# Patient Record
Sex: FEMALE | Race: WHITE | NOT HISPANIC OR LATINO | Employment: FULL TIME | URBAN - METROPOLITAN AREA
[De-identification: names, ages, dates, MRNs, and addresses within clinical notes are randomized per-mention and may not be internally consistent; named-entity substitution may affect disease eponyms.]

---

## 2017-01-18 ENCOUNTER — MYC REFILL (OUTPATIENT)
Dept: FAMILY MEDICINE | Facility: CLINIC | Age: 52
End: 2017-01-18

## 2017-01-18 DIAGNOSIS — H69.92 DISORDER OF LEFT EUSTACHIAN TUBE: ICD-10-CM

## 2017-01-18 RX ORDER — FLUTICASONE PROPIONATE 50 MCG
2 SPRAY, SUSPENSION (ML) NASAL DAILY
Qty: 16 G | Refills: 11 | Status: SHIPPED | OUTPATIENT
Start: 2017-01-18 | End: 2021-05-10

## 2017-01-18 NOTE — TELEPHONE ENCOUNTER
Fluticasone (Flonase)50 mcg/act     Last Written Prescription Date: 10/6/16  Last Fill Quantity: 16 g,  # refills: 11   Last Office Visit with [facility], P or [facility] prescribing provider: 12/6/16  Prescription approved per [facility] Refill Protocol.

## 2017-04-17 ENCOUNTER — PRE VISIT (OUTPATIENT)
Dept: CARDIOLOGY | Facility: CLINIC | Age: 52
End: 2017-04-17

## 2017-04-24 ENCOUNTER — OFFICE VISIT (OUTPATIENT)
Dept: CARDIOLOGY | Facility: CLINIC | Age: 52
End: 2017-04-24
Attending: INTERNAL MEDICINE
Payer: COMMERCIAL

## 2017-04-24 VITALS
HEART RATE: 88 BPM | SYSTOLIC BLOOD PRESSURE: 136 MMHG | HEIGHT: 65 IN | WEIGHT: 283.8 LBS | DIASTOLIC BLOOD PRESSURE: 86 MMHG | BODY MASS INDEX: 47.28 KG/M2

## 2017-04-24 DIAGNOSIS — I48.20 CHRONIC ATRIAL FIBRILLATION (H): ICD-10-CM

## 2017-04-24 PROCEDURE — 99214 OFFICE O/P EST MOD 30 MIN: CPT | Mod: 25 | Performed by: NURSE PRACTITIONER

## 2017-04-24 PROCEDURE — 93000 ELECTROCARDIOGRAM COMPLETE: CPT | Performed by: INTERNAL MEDICINE

## 2017-04-24 RX ORDER — MEDROXYPROGESTERONE ACETATE 10 MG
10 TABLET ORAL DAILY
COMMUNITY
End: 2022-07-19

## 2017-04-24 NOTE — LETTER
4/24/2017    Nicole Joy Siegler, PA-C  [facility]   [address]  [facility] [address]    RE: Mary Gorman       Dear Colleague,    I had the pleasure of seeing Mary Gorman in the [facility].    Mary is a delightful 52-year-old female presenting in clinic today for a followup visit regarding persistent atrial fibrillation.  She has been in chronic atrial fibrillation for a number of years.  She has been asymptomatic and rate controlled.  She is on anticoagulation with Pradaxa.  She sees Dr. Greenfield at least annually and last saw him in 2016.      In clinic today, Mary tells me she has had an uneventful past year in regards to her health.  She has concerns that she is feeling her heart more frequently.  This occurs when she is active on an intermittent basis.  She does not endorse any palpitations, chest pain, breathlessness, lightheadedness, dizziness, presyncope or syncope.  She is also noticing that her lower extremities are swollen more of the time.  She has obstructive sleep apnea and is compliant with her CPAP.  Her weight has been fluctuant over the last year and is up a little bit at 283.  Blood pressure has been well controlled on her fosinopril.  In regards to the rate control medication, she has been on high dose diltiazem 360 mg daily for a number of years as well.      In clinic today, blood pressure is 130/80, heart rate 88 and irregularly irregular.  EKG shows atrial fibrillation.      PHYSICAL EXAMINATION:   GENERAL:  Well-appearing female in no acute distress.   HEENT:  Normocephalic, atraumatic.   NECK:  Supple without jugular venous distention.   LUNGS:  Clear.   HEART:  S1, S2, with an irregularly irregular rhythm, no murmurs or gallops.   ABDOMEN:  Obese, soft, nontender, nondistended.   EXTREMITIES:  Lower extremities show 1+ edema on the right and 2+ pitting edema on the left.      Outpatient Encounter Prescriptions as of 4/24/2017    Medication Sig Dispense Refill     medroxyPROGESTERone (PROVERA) 10 MG tablet Take 10 mg by mouth daily       fluticasone (FLONASE) 50 MCG/ACT spray Spray 2 sprays into both nostrils daily 16 g 11     cetirizine (ZYRTEC) 10 MG tablet Take 1 tablet (10 mg) by mouth every evening 90 tablet 3     fosinopril (MONOPRIL) 10 MG tablet Take 1 tablet (10 mg) by mouth daily 90 tablet 3     albuterol (PROAIR HFA, PROVENTIL HFA, VENTOLIN HFA) 108 (90 BASE) MCG/ACT inhaler Inhale 2 puffs into the lungs every 6 hours 1 each 0     [DISCONTINUED] diltiazem (TIAZAC) 360 MG 24 hr ER beaded capsule Take 1 capsule (360 mg) by mouth daily 90 capsule 5     [DISCONTINUED] dabigatran ANTICOAGULANT (PRADAXA ANTICOAGULANT) 150 MG CAPS BLISTER PACK Take 1 capsule (150 mg) by mouth every 12 hours Storage of the product in a pill organizer is not recommended.  Store in original 's bottle or blister pack. Use within 120 days of opening.  Do not remove product from 's blister pack or bottle until time of administration. 180 capsule 5     cholecalciferol 2000 UNITS CAPS Take 2 tablets by mouth daily.       buPROPion (WELLBUTRIN XL) 150 MG 24 hr tablet Take 3 tablets by mouth every morning. Indications: Major Depressive Disorder.        No facility-administered encounter medications on file as of 4/24/2017.      IMPRESSION AND PLAN:  Ms. Gorman is a delightful 52-year-old female with persistent atrial fibrillation, rate control, anticoagulated.  This year she is endorsing more palpitations with exertion on an intermittent basis.  She is also endorsing more lower extremity edema.  At this point in time, I think it would be a good idea to pursue a Holter monitor to ascertain if we have adequate rate control.  If this shows poor rate control, then changes to her medications would be warranted.  I will consider adding a beta blocker as it does not appear she has been before.  In addition, the high-dose diltiazem may be  contributing to her edema.  I will consider decreasing this as well.  Once we have the results of the Holter, I can ascertain if she will also need beta blocker therapy.  I have suggested measures such as low-sodium diet, Jobst stockings and leg elevation.  She does sit at a desk most of the day.  We talked about how her weight, atrial fibrillation and sleep apnea could contribute to edema.  We would consider a low-dose diuretic therapy if she is not responsive to the reduction in calcium channel blocker.  I will note she had an echocardiogram several years ago showing normal LV function.                    It was my pleasure to participate in the care of Ms. Gorman in clinic today.  I will be in contract with the results of the monitor.     Sincerely,    Flor Pérez, ALYSIA, APRN CNP     [facility]

## 2017-04-24 NOTE — MR AVS SNAPSHOT
After Visit Summary   4/24/2017    Mary Gorman    MRN: [id number]           Patient Information     Date Of Birth          1965        Visit Information        Provider Department      4/24/2017 8:50 AM Flor Pérez APRN CNP [facility] [place]        Today's Diagnoses     Chronic atrial fibrillation (H)          Care Instructions    -2 day monitor and I will call with results and suggestions for medication changes  -elevate legs above level of the heart, try compression socks, low salt diet ( less than 2000 mg)  -call with problems or concerns [phone]        Follow-ups after your visit        Future tests that were ordered for you today     Open Future Orders        Priority Expected Expires Ordered    Holter Monitor 48 hour - Adult Routine 4/24/2017 6/8/2017 4/24/2017            Who to contact     If you have questions or need follow up information about today's clinic visit or your schedule please contact [facility] [place] directly at [phone].  Normal or non-critical lab and imaging results will be communicated to you by [employer], letter or phone within 4 business days after the clinic has received the results. If you do not hear from us within 7 days, please contact the clinic through [employer] or phone. If you have a critical or abnormal lab result, we will notify you by phone as soon as possible.  Submit refill requests through [employer] or call your pharmacy and they will forward the refill request to us. Please allow 3 business days for your refill to be completed.          Additional Information About Your Visit        [employer] Information     [employer] gives you secure access to your electronic health record. If you see a primary care provider, you can also send messages to your care team and make appointments. If you have questions, please call your primary care clinic.  If you do not have a  "primary care provider, please call [phone] and they will assist you.        Care EveryWhere ID     This is your Care EveryWhere ID. This could be used by other organizations to access your [facility] medical records  [id number]        Your Vitals Were     Pulse Height BMI (Body Mass Index)             88 1.651 m (5' 5\") 47.23 kg/m2          Blood Pressure from Last 3 Encounters:   04/24/17 136/86   12/06/16 114/76   11/10/16 116/84    Weight from Last 3 Encounters:   04/24/17 128.7 kg (283 lb 12.8 oz)   12/06/16 120.2 kg (265 lb)   11/10/16 127 kg (280 lb)              We Performed the Following     EKG 12-lead complete w/read (Future)- to be scheduled     Follow-Up with Cardiac Advanced Practice Provider        Primary Care Provider Fax #    [facility] [phone]       [address] [place]  [facility] [id number]        Thank you!     Thank you for choosing [facility] [place]  for your care. Our goal is always to provide you with excellent care. Hearing back from our patients is one way we can continue to improve our services. Please take a few minutes to complete the written survey that you may receive in the mail after your visit with us. Thank you!             Your Updated Medication List - Protect others around you: Learn how to safely use, store and throw away your medicines at www.disposemymeds.org.          This list is accurate as of: 4/24/17  9:24 AM.  Always use your most recent med list.                   Brand Name Dispense Instructions for use    albuterol 108 (90 BASE) MCG/ACT Inhaler    PROAIR HFA/PROVENTIL HFA/VENTOLIN HFA    1 each    Inhale 2 puffs into the lungs every 6 hours       buPROPion 150 MG 24 hr tablet    WELLBUTRIN XL     Take 3 tablets by mouth every morning. Indications: Major Depressive Disorder.       cetirizine 10 MG tablet    zyrTEC    90 tablet    Take 1 tablet (10 mg) by mouth every evening       cholecalciferol 2000 " UNITS Caps      Take 2 tablets by mouth daily.       dabigatran ANTICOAGULANT 150 MG Caps capsule    PRADAXA ANTICOAGULANT    180 capsule    Take 1 capsule (150 mg) by mouth every 12 hours Storage of the product in a pill organizer is not recommended.  Store in original 's bottle or blister pack. Use within 120 days of opening.  Do not remove product from 's blister pack or bottle until time of administration.       diltiazem 360 MG 24 hr capsule    TIAZAC    90 capsule    Take 1 capsule (360 mg) by mouth daily       fluticasone 50 MCG/ACT spray    FLONASE    16 g    Spray 2 sprays into both nostrils daily       fosinopril 10 MG tablet    MONOPRIL    90 tablet    Take 1 tablet (10 mg) by mouth daily       medroxyPROGESTERone 10 MG tablet    PROVERA     Take 10 mg by mouth daily

## 2017-04-24 NOTE — PROGRESS NOTES
HPI and Plan:   See dictation    Orders Placed This Encounter   Procedures     Holter Monitor 48 hour - Adult       Orders Placed This Encounter   Medications     medroxyPROGESTERone (PROVERA) 10 MG tablet     Sig: Take 10 mg by mouth daily       There are no discontinued medications.      Encounter Diagnosis   Name Primary?     Chronic atrial fibrillation (H)        CURRENT MEDICATIONS:  Current Outpatient Prescriptions   Medication Sig Dispense Refill     medroxyPROGESTERone (PROVERA) 10 MG tablet Take 10 mg by mouth daily       fluticasone (FLONASE) 50 MCG/ACT spray Spray 2 sprays into both nostrils daily 16 g 11     cetirizine (ZYRTEC) 10 MG tablet Take 1 tablet (10 mg) by mouth every evening 90 tablet 3     fosinopril (MONOPRIL) 10 MG tablet Take 1 tablet (10 mg) by mouth daily 90 tablet 3     albuterol (PROAIR HFA, PROVENTIL HFA, VENTOLIN HFA) 108 (90 BASE) MCG/ACT inhaler Inhale 2 puffs into the lungs every 6 hours 1 each 0     diltiazem (TIAZAC) 360 MG 24 hr ER beaded capsule Take 1 capsule (360 mg) by mouth daily 90 capsule 5     dabigatran ANTICOAGULANT (PRADAXA ANTICOAGULANT) 150 MG CAPS BLISTER PACK Take 1 capsule (150 mg) by mouth every 12 hours Storage of the product in a pill organizer is not recommended.  Store in original 's bottle or blister pack. Use within 120 days of opening.  Do not remove product from 's blister pack or bottle until time of administration. 180 capsule 5     cholecalciferol 2000 UNITS CAPS Take 2 tablets by mouth daily.       buPROPion (WELLBUTRIN XL) 150 MG 24 hr tablet Take 3 tablets by mouth every morning. Indications: Major Depressive Disorder.          ALLERGIES     Allergies   Allergen Reactions     Dyazide [Hydrochlorothiazide W/Triamterene] Unknown     Nickel Rash     Robaxin [Methocarbamol] Rash     Sulfa Drugs Rash       PAST MEDICAL HISTORY:  Past Medical History:   Diagnosis Date     Depression      GERD (gastroesophageal reflux disease)       HTN (hypertension)      Hyperlipidemia      BALTA (obstructive sleep apnea)      Permanent atrial fibrillation (H) 6/5/11       PAST SURGICAL HISTORY:  Past Surgical History:   Procedure Laterality Date     CARDIOVERSION  6/7/11     CHOLECYSTECTOMY       DILATION AND CURETTAGE  4/26/2012    Procedure:DILATION AND CURETTAGE; DILATION AND CURETTAGE; Surgeon:JEAN-PAUL DEL CID; Location:[facility]     DILATION AND CURETTAGE, HYSTEROSCOPY DIAGNOSTIC, COMBINED  12/8/2011    Procedure:COMBINED DILATION AND CURETTAGE, HYSTEROSCOPY DIAGNOSTIC; DILATION AND CURETTAGE, DIAGNOSTIC HYSTEROSCOPY ; Surgeon:JEAN-PAUL DEL CID; Location:[facility]     KNEE SURGERY         FAMILY HISTORY:  Family History   Problem Relation Age of Onset     Hypertension Mother      HEART DISEASE Mother      A-fib     Arthritis Mother      HEART DISEASE Father      triple bypass     CANCER Father 50     bladder     Hypertension Father      Respiratory Father      smoker     CANCER Paternal Grandmother 90     rectal     Unknown/Adopted Brother        SOCIAL HISTORY:  Social History     Social History     Marital status: Single     Spouse name: N/A     Number of children: N/A     Years of education: N/A     Social History Main Topics     Smoking status: Never Smoker     Smokeless tobacco: Never Used     Alcohol use Yes      Comment: rarely     Drug use: No     Sexual activity: No     Other Topics Concern     Parent/Sibling W/ Cabg, Mi Or Angioplasty Before 65f 55m? No     Special Diet No     Exercise Yes     walks 2 miles minimum 5 x weekly      Social History Narrative       Review of Systems:  Skin:  Negative       Eyes:  Negative      ENT:  Negative      Respiratory:  Positive for dyspnea on exertion increasing    Cardiovascular:    Positive for;palpitations;edema;exercise intolerance;fatigue palpitations increasing   Gastroenterology: Negative      Genitourinary:  Negative      Musculoskeletal:  Positive for arthritis    Neurologic:  Negative      Psychiatric:  Negative     "  Heme/Lymph/Imm:  Negative      Endocrine:  Negative        Physical Exam:  Vitals: /86  Pulse 88  Ht 1.651 m (5' 5\")  Wt 128.7 kg (283 lb 12.8 oz)  BMI 47.23 kg/m2    Constitutional:           Skin:           Head:           Eyes:           ENT:           Neck:           Chest:             Cardiac:                    Abdomen:           Vascular:                                          Extremities and Back:                 Neurological:                 CC  Jeffrey Greenfield MD   [facility]  [address]  [address]  [place] [address]              "

## 2017-04-24 NOTE — PATIENT INSTRUCTIONS
-2 day monitor and I will call with results and suggestions for medication changes  -elevate legs above level of the heart, try compression socks, low salt diet ( less than 2000 mg)  -call with problems or concerns [phone]

## 2017-04-24 NOTE — PROGRESS NOTES
HISTORY OF PRESENT ILLNESS:  Mary is a delightful 52-year-old female presenting in clinic today for a followup visit regarding persistent atrial fibrillation.  She has been in chronic atrial fibrillation for a number of years.  She has been asymptomatic and rate controlled.  She is on anticoagulation with Pradaxa.  She sees Dr. Greenfield at least annually and last saw him in 2016.      In clinic today, Mary tells me she has had an uneventful past year in regards to her health.  She has concerns that she is feeling her heart more frequently.  This occurs when she is active on an intermittent basis.  She does not endorse any palpitations, chest pain, breathlessness, lightheadedness, dizziness, presyncope or syncope.  She is also noticing that her lower extremities are swollen more of the time.  She has obstructive sleep apnea and is compliant with her CPAP.  Her weight has been fluctuant over the last year and is up a little bit at 283.  Blood pressure has been well controlled on her fosinopril.  In regards to the rate control medication, she has been on high dose diltiazem 360 mg daily for a number of years as well.      In clinic today, blood pressure is 130/80, heart rate 88 and irregularly irregular.  EKG shows atrial fibrillation.      PHYSICAL EXAMINATION:   GENERAL:  Well-appearing female in no acute distress.   HEENT:  Normocephalic, atraumatic.   NECK:  Supple without jugular venous distention.   LUNGS:  Clear.   HEART:  S1, S2, with an irregularly irregular rhythm, no murmurs or gallops.   ABDOMEN:  Obese, soft, nontender, nondistended.   EXTREMITIES:  Lower extremities show 1+ edema on the right and 2+ pitting edema on the left.      IMPRESSION AND PLAN:  Ms. Gorman is a delightful 52-year-old female with persistent atrial fibrillation, rate control, anticoagulated.  This year she is endorsing more palpitations with exertion on an intermittent basis.  She is also endorsing more lower extremity edema.  At this  point in time, I think it would be a good idea to pursue a Holter monitor to ascertain if we have adequate rate control.  If this shows poor rate control, then changes to her medications would be warranted.  I will consider adding a beta blocker as it does not appear she has been before.  In addition, the high-dose diltiazem may be contributing to her edema.  I will consider decreasing this as well.  Once we have the results of the Holter, I can ascertain if she will also need beta blocker therapy.  I have suggested measures such as low-sodium diet, Jobst stockings and leg elevation.  She does sit at a desk most of the day.  We talked about how her weight, atrial fibrillation and sleep apnea could contribute to edema.  We would consider a low-dose diuretic therapy if she is not responsive to the reduction in calcium channel blocker.  I will note she had an echocardiogram several years ago showing normal LV function.      It was my pleasure to participate in the care of Ms. Estes in clinic today.  I will be in contract with the results of the monitor.         YAMIL GAMEZ, CNP             D: 2017 09:36   T: 2017 15:12   MT: [email or web address]      Name:     ALBARO ESTES   MRN:      [id number]        Account:      [id number]   :      1965           Service Date: 2017      Document: [id number]

## 2017-05-01 ENCOUNTER — HOSPITAL ENCOUNTER (OUTPATIENT)
Dept: CARDIOLOGY | Facility: CLINIC | Age: 52
Discharge: HOME | End: 2017-05-01
Attending: NURSE PRACTITIONER | Admitting: NURSE PRACTITIONER
Payer: COMMERCIAL

## 2017-05-01 DIAGNOSIS — I48.20 CHRONIC ATRIAL FIBRILLATION (H): ICD-10-CM

## 2017-05-01 PROCEDURE — 93226 XTRNL ECG REC<48 HR SCAN A/R: CPT

## 2017-05-01 PROCEDURE — 93227 XTRNL ECG REC<48 HR R&I: CPT | Performed by: INTERNAL MEDICINE

## 2017-05-09 ENCOUNTER — TELEPHONE (OUTPATIENT)
Dept: CARDIOLOGY | Facility: CLINIC | Age: 52
End: 2017-05-09

## 2017-05-09 DIAGNOSIS — I10 ESSENTIAL HYPERTENSION, BENIGN: ICD-10-CM

## 2017-05-09 RX ORDER — METOPROLOL SUCCINATE 50 MG/1
50 TABLET, EXTENDED RELEASE ORAL DAILY
Qty: 30 TABLET | Refills: 11 | Status: SHIPPED | OUTPATIENT
Start: 2017-05-09 | End: 2017-05-11

## 2017-05-09 RX ORDER — DILTIAZEM HYDROCHLORIDE 240 MG/1
240 CAPSULE, EXTENDED RELEASE ORAL DAILY
Qty: 30 CAPSULE | Refills: 11 | Status: SHIPPED | OUTPATIENT
Start: 2017-05-09 | End: 2017-05-11

## 2017-05-09 NOTE — TELEPHONE ENCOUNTER
Please call patient.  I reviewed her Holter.  Rates are a little on the high side.  She was also having edema at our OV.  I suggest decreasing diltiazem to 240 and adding metoprolol 50 mg.  Can you send scripts to the pharmacy of her choice as well?  None listed in her chart.  I would like to see her with an EKG in 2 weeks.  Thanks!

## 2017-05-11 RX ORDER — METOPROLOL SUCCINATE 50 MG/1
50 TABLET, EXTENDED RELEASE ORAL DAILY
Qty: 30 TABLET | Refills: 11 | Status: SHIPPED | OUTPATIENT
Start: 2017-05-11 | End: 2018-05-03

## 2017-05-11 RX ORDER — DILTIAZEM HYDROCHLORIDE 240 MG/1
240 CAPSULE, EXTENDED RELEASE ORAL DAILY
Qty: 30 CAPSULE | Refills: 11 | Status: SHIPPED | OUTPATIENT
Start: 2017-05-11 | End: 2018-05-03

## 2017-05-11 NOTE — TELEPHONE ENCOUNTER
I spoke to Mary regarding the results of her Holter and the medication changes. Patient verbalized understanding of these changes and requested the prescriptions be sent to the [facility] mail order pharmacy, which I did. I also told her that Flor would like to follow up with her in two weeks with an EKG - Mary said she needed to look at a few things first and would call our schedulers back to make the appointment. Orders for both the follow up and EKG were placed. No further questions/concerns. ELBA Hall

## 2017-05-15 DIAGNOSIS — I48.20 CHRONIC ATRIAL FIBRILLATION (H): ICD-10-CM

## 2017-05-15 RX ORDER — DABIGATRAN ETEXILATE 150 MG/1
150 CAPSULE ORAL EVERY 12 HOURS
Qty: 180 CAPSULE | Refills: 3 | Status: SHIPPED | OUTPATIENT
Start: 2017-05-15 | End: 2018-05-03

## 2017-05-31 ENCOUNTER — OFFICE VISIT (OUTPATIENT)
Dept: FAMILY MEDICINE | Facility: CLINIC | Age: 52
End: 2017-05-31
Payer: COMMERCIAL

## 2017-05-31 VITALS
RESPIRATION RATE: 16 BRPM | BODY MASS INDEX: 47.59 KG/M2 | TEMPERATURE: 97.8 F | WEIGHT: 286 LBS | DIASTOLIC BLOOD PRESSURE: 80 MMHG | HEART RATE: 85 BPM | OXYGEN SATURATION: 97 % | SYSTOLIC BLOOD PRESSURE: 134 MMHG

## 2017-05-31 DIAGNOSIS — I10 HYPERTENSION GOAL BP (BLOOD PRESSURE) < 140/90: ICD-10-CM

## 2017-05-31 DIAGNOSIS — S86.812A STRAIN OF CALF MUSCLE, LEFT, INITIAL ENCOUNTER: ICD-10-CM

## 2017-05-31 DIAGNOSIS — G57.01 PIRIFORMIS SYNDROME OF RIGHT SIDE: Primary | ICD-10-CM

## 2017-05-31 DIAGNOSIS — R73.03 PREDIABETES: ICD-10-CM

## 2017-05-31 LAB
CREAT SERPL-MCNC: 0.98 MG/DL (ref 0.52–1.04)
GFR SERPL CREATININE-BSD FRML MDRD: 59 ML/MIN/1.7M2
HBA1C MFR BLD: 6.2 % (ref 4.3–6)

## 2017-05-31 PROCEDURE — 99214 OFFICE O/P EST MOD 30 MIN: CPT | Performed by: PHYSICIAN ASSISTANT

## 2017-05-31 PROCEDURE — 36415 COLL VENOUS BLD VENIPUNCTURE: CPT | Performed by: PHYSICIAN ASSISTANT

## 2017-05-31 PROCEDURE — 83036 HEMOGLOBIN GLYCOSYLATED A1C: CPT | Performed by: PHYSICIAN ASSISTANT

## 2017-05-31 PROCEDURE — 82565 ASSAY OF CREATININE: CPT | Performed by: PHYSICIAN ASSISTANT

## 2017-05-31 RX ORDER — METHYLPREDNISOLONE 4 MG
TABLET, DOSE PACK ORAL
Qty: 21 TABLET | Refills: 0 | Status: SHIPPED | OUTPATIENT
Start: 2017-05-31 | End: 2017-06-12

## 2017-05-31 RX ORDER — LIDOCAINE 50 MG/G
OINTMENT TOPICAL PRN
Qty: 50 G | Refills: 3 | Status: SHIPPED | OUTPATIENT
Start: 2017-05-31 | End: 2017-10-01

## 2017-05-31 NOTE — MR AVS SNAPSHOT
After Visit Summary   5/31/2017    Mary Gorman    MRN: [id number]           Patient Information     Date Of Birth          1965        Visit Information        Provider Department      5/31/2017 3:10 PM Siegler, Nicole Joy, PA-C [facility]        Today's Diagnoses     Piriformis syndrome of right side    -  1    Strain of calf muscle, left, initial encounter        Hypertension goal BP (blood pressure) < 140/90        Prediabetes           Follow-ups after your visit        Additional Services     [facility] PT, HAND, AND CHIROPRACTIC REFERRAL       **This order will print in the [facility] Scheduling Office**    Physical Therapy, Hand Therapy and Chiropractic Care are available through:    *[facility]  *[facility]  *[facility]    Call one number to schedule at any of the above locations: [phone].    Your provider has referred you to: Physical Therapy at [facility] or [facility]    Indication/Reason for Referral: right side piriformis syndrome, left calf strain  Onset of Illness: a few weeks  Therapy Orders: Evaluate and Treat  Special Programs: None  Special Request: None    Renata Yanez      Additional Comments for the Therapist or Chiropractor: none    Please be aware that coverage of these services is subject to the terms and limitations of your health insurance plan.  Call member services at your health plan with any benefit or coverage questions.      Please bring the following to your appointment:    *Your personal calendar for scheduling future appointments  *Comfortable clothing                  Your next 10 appointments already scheduled     Jun 12, 2017  8:50 AM CDT   [facility] EP RETURN with YAMIL Boswell CNP   [facility] [place] ([facility])    [address] [place] [id number] [phone]              Who to contact     If you have  questions or need follow up information about today's clinic visit or your schedule please contact [facility] directly at [phone].  Normal or non-critical lab and imaging results will be communicated to you by MyChart, letter or phone within 4 business days after the clinic has received the results. If you do not hear from us within 7 days, please contact the clinic through [employer] or phone. If you have a critical or abnormal lab result, we will notify you by phone as soon as possible.  Submit refill requests through [employer] or call your pharmacy and they will forward the refill request to us. Please allow 3 business days for your refill to be completed.          Additional Information About Your Visit        [employer] Information     [employer] gives you secure access to your electronic health record. If you see a primary care provider, you can also send messages to your care team and make appointments. If you have questions, please call your primary care clinic.  If you do not have a primary care provider, please call [phone] and they will assist you.        Care EveryWhere ID     This is your Care EveryWhere ID. This could be used by other organizations to access your [employer] medical records  [id number]        Your Vitals Were     Pulse Temperature Respirations Pulse Oximetry BMI (Body Mass Index)       85 97.8  F (36.6  C) (Tympanic) 16 97% 47.59 kg/m2        Blood Pressure from Last 3 Encounters:   05/31/17 134/80   04/24/17 136/86   12/06/16 114/76    Weight from Last 3 Encounters:   05/31/17 286 lb (129.7 kg)   04/24/17 283 lb 12.8 oz (128.7 kg)   12/06/16 265 lb (120.2 kg)              We Performed the Following     Creatinine     Hemoglobin A1c     [facility] PT, HAND, AND CHIROPRACTIC REFERRAL          Today's Medication Changes          These changes are accurate as of: 5/31/17  3:42 PM.  If you have any questions, ask your nurse or doctor.               Start taking these  medicines.        Dose/Directions    lidocaine 5 % ointment   Commonly known as:  XYLOCAINE   Used for:  Piriformis syndrome of right side   Started by:  Siegler, Nicole Joy, PA-C        Apply topically as needed for moderate pain   Quantity:  50 g   Refills:  3       methylPREDNISolone 4 MG tablet   Commonly known as:  MEDROL DOSEPAK   Used for:  Piriformis syndrome of right side   Started by:  Siegler, Nicole Joy, PA-C        Follow package instructions   Quantity:  21 tablet   Refills:  0            Where to get your medicines      These [facility] [address] [facility], [place] - [address] AT [facility] [address]  [address], [facility] [address]     Phone:  [phone]     lidocaine 5 % ointment         Some of these will need a paper prescription and others can be bought over the counter.  Ask your nurse if you have questions.     Bring a paper prescription for each of these medications     methylPREDNISolone 4 MG tablet                Primary Care Provider Fax #    [facility] [phone]       [address]  [facility] [address]        Thank you!     Thank you for choosing [facility]  for your care. Our goal is always to provide you with excellent care. Hearing back from our patients is one way we can continue to improve our services. Please take a few minutes to complete the written survey that you may receive in the mail after your visit with us. Thank you!             Your Updated Medication List - Protect others around you: Learn how to safely use, store and throw away your medicines at www.disposemymeds.org.          This list is accurate as of: 5/31/17  3:42 PM.  Always use your most recent med list.                   Brand Name Dispense Instructions for use    albuterol 108 (90 BASE) MCG/ACT Inhaler    PROAIR HFA/PROVENTIL HFA/VENTOLIN HFA    1 each    Inhale 2 puffs into the  lungs every 6 hours       buPROPion 150 MG 24 hr tablet    WELLBUTRIN XL     Take 3 tablets by mouth every morning. Indications: Major Depressive Disorder.       cetirizine 10 MG tablet    zyrTEC    90 tablet    Take 1 tablet (10 mg) by mouth every evening       cholecalciferol 2000 UNITS Caps      Take 2 tablets by mouth daily.       dabigatran ANTICOAGULANT 150 MG Caps capsule    PRADAXA ANTICOAGULANT    180 capsule    Take 1 capsule (150 mg) by mouth every 12 hours Store in original bottle/blister pack. Use within 120 days of opening.       diltiazem 240 MG 24 hr ER beaded capsule    TIAZAC    30 capsule    Take 1 capsule (240 mg) by mouth daily       fluticasone 50 MCG/ACT spray    FLONASE    16 g    Spray 2 sprays into both nostrils daily       fosinopril 10 MG tablet    MONOPRIL    90 tablet    Take 1 tablet (10 mg) by mouth daily       lidocaine 5 % ointment    XYLOCAINE    50 g    Apply topically as needed for moderate pain       medroxyPROGESTERone 10 MG tablet    PROVERA     Take 10 mg by mouth daily       methylPREDNISolone 4 MG tablet    MEDROL DOSEPAK    21 tablet    Follow package instructions       metoprolol 50 MG 24 hr tablet    TOPROL-XL    30 tablet    Take 1 tablet (50 mg) by mouth daily

## 2017-05-31 NOTE — PROGRESS NOTES
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    Alleviating factors:  Improved by: nothing       Therapies Tried and outcome: ice, heat, advil    No radiation of pain beyond the buttock/hip   No numbness or tingling of the right lower extremity  No loss of bladder or bowel control  No saddle anesthesia  No snapping/popping or audible motion of the hip  No perceived instability of the hip      Musculoskeletal problem/pain      Duration: 3 week    Description  Location: patient states that ever since she increased her walking she has tightness behind her left knee    Intensity:  mild, moderate    Accompanying signs and symptoms: always has swelling in left knee due to knee surgery    History  Previous similar problem: no   Previous evaluation:  none    Precipitating or alleviating factors:  Trauma or overuse: no   Aggravating factors include: none    Therapies tried and outcome: nothing     No clicking/popping of the knee or calf  No pain with movement of the ankle  No perceived weakness or instability of the knee or ankle    Problem list and histories reviewed & adjusted, as indicated.  Additional history: as documented    Patient Active Problem List   Diagnosis     BALTA (obstructive sleep apnea)     Low back pain     Lumbar radiculitis     Hyperlipidemia LDL goal <100     Atrial fibrillation (H)     Mild recurrent major depression (H)     Hypertension  goal BP (blood pressure) < 140/90     ACP (advance care planning)     Permanent atrial fibrillation (H)     Lipoma of skin and subcutaneous tissue     Morbid obesity due to excess calories (H)     Prediabetes     Past Surgical History:   Procedure Laterality Date     CARDIOVERSION  6/7/11     CHOLECYSTECTOMY       DILATION AND CURETTAGE  4/26/2012    Procedure:DILATION AND CURETTAGE; DILATION AND CURETTAGE; Surgeon:JEAN-PAUL DEL CID; Location:[facility]     DILATION AND CURETTAGE, HYSTEROSCOPY DIAGNOSTIC, COMBINED  12/8/2011    Procedure:COMBINED DILATION AND CURETTAGE, HYSTEROSCOPY DIAGNOSTIC; DILATION AND CURETTAGE, DIAGNOSTIC HYSTEROSCOPY ; Surgeon:JEAN-PAUL DEL CID; Location:[facility]     KNEE SURGERY         Social History   Substance Use Topics     Smoking status: Never Smoker     Smokeless tobacco: Never Used     Alcohol use Yes      Comment: rarely     Family History   Problem Relation Age of Onset     Hypertension Mother      HEART DISEASE Mother      A-fib     Arthritis Mother      HEART DISEASE Father      triple bypass     CANCER Father 50     bladder     Hypertension Father      Respiratory Father      smoker     CANCER Paternal Grandmother 90     rectal     Unknown/Adopted Brother          Current Outpatient Prescriptions   Medication Sig Dispense Refill     methylPREDNISolone (MEDROL DOSEPAK) 4 MG tablet Follow package instructions 21 tablet 0     lidocaine (XYLOCAINE) 5 % ointment Apply topically as needed for moderate pain 50 g 3     dabigatran ANTICOAGULANT (PRADAXA ANTICOAGULANT) 150 MG CAPS capsule Take 1 capsule (150 mg) by mouth every 12 hours Store in original bottle/blister pack. Use within 120 days of opening. 180 capsule 3     diltiazem (TIAZAC) 240 MG 24 hr ER beaded capsule Take 1 capsule (240 mg) by mouth daily 30 capsule 11     metoprolol (TOPROL-XL) 50 MG 24 hr tablet Take 1 tablet (50 mg) by mouth daily 30 tablet 11     medroxyPROGESTERone (PROVERA) 10 MG tablet Take 10 mg by mouth daily       fluticasone  (FLONASE) 50 MCG/ACT spray Spray 2 sprays into both nostrils daily 16 g 11     cetirizine (ZYRTEC) 10 MG tablet Take 1 tablet (10 mg) by mouth every evening 90 tablet 3     fosinopril (MONOPRIL) 10 MG tablet Take 1 tablet (10 mg) by mouth daily 90 tablet 3     albuterol (PROAIR HFA, PROVENTIL HFA, VENTOLIN HFA) 108 (90 BASE) MCG/ACT inhaler Inhale 2 puffs into the lungs every 6 hours 1 each 0     cholecalciferol 2000 UNITS CAPS Take 2 tablets by mouth daily.       buPROPion (WELLBUTRIN XL) 150 MG 24 hr tablet Take 3 tablets by mouth every morning. Indications: Major Depressive Disorder.          Reviewed and updated as needed this visit by clinical staff  Tobacco  Allergies  Meds  Med Hx  Surg Hx  Fam Hx  Soc Hx      Reviewed and updated as needed this visit by Provider         ROS:  Constitutional, HEENT, cardiovascular, pulmonary, gi and gu systems are negative, except as otherwise noted.    OBJECTIVE:                                                    /80 (BP Location: Left arm, Patient Position: Chair, Cuff Size: Adult Large)  Pulse 85  Temp 97.8  F (36.6  C) (Tympanic)  Resp 16  Wt 286 lb (129.7 kg)  SpO2 97%  BMI 47.59 kg/m2  Body mass index is 47.59 kg/(m^2).  GENERAL: healthy, alert and no distress  RESP: non labored breathing  MS:   Back/buttock: no midline tenderness in the c/t/l spines. No paraspinal tenderness. ttp over the right proximal buttock. Full ROM of the right hip. Pain in the posterior buttock with fwd flexion of the lumbar spine and flexion of the hip only.   Left lower extremity mild tenderness at the medial head of the gastrocnemius. Generalized soft tissue swelling around the left knee. Full ROM of the ankle and knee with normal strength. No swelling of the calf, edema or discoloration.    SKIN: no rash, ecchymosis, redness, induration of the back, buttock or LLE  NEURO: bilateral lower extremities with grossly normal DTRs and sensation to light touch intact.   PSYCH:  mentation appears normal, affect normal/bright    Diagnostic Test Results:  none      ASSESSMENT/PLAN:                                                        ICD-10-CM    1. Piriformis syndrome of right side G57.01 methylPREDNISolone (MEDROL DOSEPAK) 4 MG tablet     [facility] PT, HAND, AND CHIROPRACTIC REFERRAL     lidocaine (XYLOCAINE) 5 % ointment   2. Strain of calf muscle, left, initial encounter S86.812A [facility] PT, HAND, AND CHIROPRACTIC REFERRAL   3. Hypertension goal BP (blood pressure) < 140/90 I10 Creatinine   4. Prediabetes R73.03 Hemoglobin A1c     Provided handout about piriformis syndrome and provided PT referral for that and calf strain.   No concern for blood clot of the left calf d/t no change in size compared to contralateral, no redness/ecchymosis or rash, no tenderness of the calf or popliteal to palpation. Wells score is 0.     She will start with physical means, however, if doing so makes her buttock pain worse she has been provided a medrol dose pack. She has not used prednisone or medrol for 3+ months. We discussed the potential negative side effects of use and of use too frequently. She agrees to start only if symptoms were not improving in the next few days with physical means.     Nicole Joy Siegler, PA-C  [facility]

## 2017-05-31 NOTE — NURSING NOTE
"Chief Complaint   Patient presents with     Musculoskeletal Problem       Initial /80 (BP Location: Left arm, Patient Position: Chair, Cuff Size: Adult Large)  Pulse 85  Temp 97.8  F (36.6  C) (Tympanic)  Resp 16  Wt 286 lb (129.7 kg)  SpO2 97%  BMI 47.59 kg/m2 Estimated body mass index is 47.59 kg/(m^2) as calculated from the following:    Height as of 4/24/17: 5' 5\" (1.651 m).    Weight as of this encounter: 286 lb (129.7 kg).  Medication Reconciliation: complete    "

## 2017-06-01 ASSESSMENT — PATIENT HEALTH QUESTIONNAIRE - PHQ9: SUM OF ALL RESPONSES TO PHQ QUESTIONS 1-9: 5

## 2017-06-05 ENCOUNTER — MYC MEDICAL ADVICE (OUTPATIENT)
Dept: FAMILY MEDICINE | Facility: CLINIC | Age: 52
End: 2017-06-05

## 2017-06-06 ENCOUNTER — MYC MEDICAL ADVICE (OUTPATIENT)
Dept: FAMILY MEDICINE | Facility: CLINIC | Age: 52
End: 2017-06-06

## 2017-06-06 DIAGNOSIS — M54.40 ACUTE LOW BACK PAIN WITH SCIATICA, SCIATICA LATERALITY UNSPECIFIED, UNSPECIFIED BACK PAIN LATERALITY: Primary | ICD-10-CM

## 2017-06-06 RX ORDER — GABAPENTIN 100 MG/1
100 CAPSULE ORAL 3 TIMES DAILY
Qty: 90 CAPSULE | Refills: 1 | Status: SHIPPED | OUTPATIENT
Start: 2017-06-06 | End: 2020-05-28

## 2017-06-07 ENCOUNTER — THERAPY VISIT (OUTPATIENT)
Dept: PHYSICAL THERAPY | Facility: CLINIC | Age: 52
End: 2017-06-07
Payer: COMMERCIAL

## 2017-06-07 DIAGNOSIS — G89.29 CHRONIC PAIN OF LEFT KNEE: ICD-10-CM

## 2017-06-07 DIAGNOSIS — M25.562 CHRONIC PAIN OF LEFT KNEE: ICD-10-CM

## 2017-06-07 DIAGNOSIS — M54.50 LOW BACK PAIN: ICD-10-CM

## 2017-06-07 DIAGNOSIS — M25.551 HIP PAIN, RIGHT: ICD-10-CM

## 2017-06-07 PROCEDURE — 97161 PT EVAL LOW COMPLEX 20 MIN: CPT | Mod: GP | Performed by: PHYSICAL THERAPIST

## 2017-06-07 PROCEDURE — 97110 THERAPEUTIC EXERCISES: CPT | Mod: GP | Performed by: PHYSICAL THERAPIST

## 2017-06-07 ASSESSMENT — ACTIVITIES OF DAILY LIVING (ADL)
STEPPING_UP_AND_DOWN_CURBS: SLIGHT DIFFICULTY
PUTTING_ON_SOCKS_AND_SHOES: SLIGHT DIFFICULTY
HOW_WOULD_YOU_RATE_YOUR_CURRENT_LEVEL_OF_FUNCTION_DURING_YOUR_USUAL_ACTIVITIES_OF_DAILY_LIVING_FROM_0_TO_100_WITH_100_BEING_YOUR_LEVEL_OF_FUNCTION_PRIOR_TO_YOUR_HIP_PROBLEM_AND_0_BEING_THE_INABILITY_TO_PERFORM_ANY_OF_YOUR_USUAL_DAILY_ACTIVITIES?: 50
TWISTING/PIVOTING_ON_INVOLVED_LEG: SLIGHT DIFFICULTY
GETTING_INTO_AND_OUT_OF_A_BATHTUB: UNABLE TO DO
WALKING_INITIALLY: NO DIFFICULTY AT ALL
HOS_ADL_COUNT: 17
WALKING_15_MINUTES_OR_GREATER: MODERATE DIFFICULTY
HEAVY_WORK: MODERATE DIFFICULTY
WALKING_DOWN_STEEP_HILLS: EXTREME DIFFICULTY
WALKING_APPROXIMATELY_10_MINUTES: SLIGHT DIFFICULTY
DEEP_SQUATTING: UNABLE TO DO
GOING_DOWN_1_FLIGHT_OF_STAIRS: MODERATE DIFFICULTY
SITTING_FOR_15_MINUTES: SLIGHT DIFFICULTY
STANDING_FOR_15_MINUTES: MODERATE DIFFICULTY
ROLLING_OVER_IN_BED: NO DIFFICULTY AT ALL
LIGHT_TO_MODERATE_WORK: SLIGHT DIFFICULTY
WALKING_UP_STEEP_HILLS: EXTREME DIFFICULTY
RECREATIONAL_ACTIVITIES: MODERATE DIFFICULTY
HOS_ADL_HIGHEST_POTENTIAL_SCORE: 68
HOS_ADL_ITEM_SCORE_TOTAL: 37
GETTING_INTO_AND_OUT_OF_AN_AVERAGE_CAR: SLIGHT DIFFICULTY
HOS_ADL_SCORE(%): 54.41
GOING_UP_1_FLIGHT_OF_STAIRS: MODERATE DIFFICULTY

## 2017-06-07 NOTE — MR AVS SNAPSHOT
After Visit Summary   6/7/2017    Mary Gorman    MRN: [id number]           Patient Information     Date Of Birth          1965        Visit Information        Provider Department      6/7/2017 3:10 PM Amairlis Law, PT [facility]        Today's Diagnoses     Low back pain        Hip pain, right        Chronic pain of left knee           Follow-ups after your visit        Your next 10 appointments already scheduled     Jun 12, 2017  8:50 AM CDT   [facility] EP RETURN with YAMIL Boswell CNP   [facility] [place] ([facility])    [address]  [facility] [id number] [phone]            Mika 15, 2017 10:00 AM CDT   [facility] Extremity with Amarilis Law PT   [facility] ([facility]  )    [address]  [facility] [id number] [phone]              Who to contact     If you have questions or need follow up information about today's clinic visit or your schedule please contact [facility] directly at [phone].  Normal or non-critical lab and imaging results will be communicated to you by [employer], letter or phone within 4 business days after the clinic has received the results. If you do not hear from us within 7 days, please contact the clinic through [employer] or phone. If you have a critical or abnormal lab result, we will notify you by phone as soon as possible.  Submit refill requests through [employer] or call your pharmacy and they will forward the refill request to us. Please allow 3 business days for your refill to be completed.          Additional Information About Your Visit        [employer] Information     [employer] gives you secure access to your electronic health record. If you see a primary care provider, you can also send messages to your care team and make  appointments. If you have questions, please call your primary care clinic.  If you do not have a primary care provider, please call [phone] and they will assist you.        Care EveryWhere ID     This is your Care EveryWhere ID. This could be used by other organizations to access your [facility] medical records  [id number]         Blood Pressure from Last 3 Encounters:   05/31/17 134/80   04/24/17 136/86   12/06/16 114/76    Weight from Last 3 Encounters:   05/31/17 129.7 kg (286 lb)   04/24/17 128.7 kg (283 lb 12.8 oz)   12/06/16 120.2 kg (265 lb)              We Performed the Following     HC PT EVAL, LOW COMPLEXITY     [facility] INITIAL EVAL REPORT     THERAPEUTIC EXERCISES        Primary Care Provider Fax #    [place] [facility]hell [phone] [address]  [facility] [address]        Thank you!     Thank you for choosing [facility]  for your care. Our goal is always to provide you with excellent care. Hearing back from our patients is one way we can continue to improve our services. Please take a few minutes to complete the written survey that you may receive in the mail after your visit with us. Thank you!             Your Updated Medication List - Protect others around you: Learn how to safely use, store and throw away your medicines at www.disposemymeds.org.          This list is accurate as of: 6/7/17 11:59 PM.  Always use your most recent med list.                   Brand Name Dispense Instructions for use    albuterol 108 (90 BASE) MCG/ACT Inhaler    PROAIR HFA/PROVENTIL HFA/VENTOLIN HFA    1 each    Inhale 2 puffs into the lungs every 6 hours       buPROPion 150 MG 24 hr tablet    WELLBUTRIN XL     Take 3 tablets by mouth every morning. Indications: Major Depressive Disorder.       cetirizine 10 MG tablet    zyrTEC    90 tablet    Take 1 tablet (10 mg) by mouth every evening       cholecalciferol 2000 UNITS Caps      Take 2 tablets  by mouth daily.       dabigatran ANTICOAGULANT 150 MG capsule    PRADAXA ANTICOAGULANT    180 capsule    Take 1 capsule (150 mg) by mouth every 12 hours Store in original bottle/blister pack. Use within 120 days of opening.       diclofenac 1 % Gel topical gel    VOLTAREN    100 g    Place 2 g onto the skin 4 times daily       diltiazem 240 MG 24 hr ER beaded capsule    TIAZAC    30 capsule    Take 1 capsule (240 mg) by mouth daily       fluticasone 50 MCG/ACT spray    FLONASE    16 g    Spray 2 sprays into both nostrils daily       fosinopril 10 MG tablet    MONOPRIL    90 tablet    Take 1 tablet (10 mg) by mouth daily       gabapentin 100 MG capsule    NEURONTIN    90 capsule    Take 1 capsule (100 mg) by mouth 3 times daily       lidocaine 5 % ointment    XYLOCAINE    50 g    Apply topically as needed for moderate pain       medroxyPROGESTERone 10 MG tablet    PROVERA     Take 10 mg by mouth daily       methylPREDNISolone 4 MG tablet    MEDROL DOSEPAK    21 tablet    Follow package instructions       metoprolol 50 MG 24 hr tablet    TOPROL-XL    30 tablet    Take 1 tablet (50 mg) by mouth daily

## 2017-06-08 ENCOUNTER — THERAPY VISIT (OUTPATIENT)
Dept: PHYSICAL THERAPY | Facility: CLINIC | Age: 52
End: 2017-06-08
Payer: COMMERCIAL

## 2017-06-08 ENCOUNTER — PRE VISIT (OUTPATIENT)
Dept: CARDIOLOGY | Facility: CLINIC | Age: 52
End: 2017-06-08

## 2017-06-08 DIAGNOSIS — M54.16 LUMBAR RADICULITIS: ICD-10-CM

## 2017-06-08 DIAGNOSIS — M54.40 ACUTE LOW BACK PAIN WITH SCIATICA, SCIATICA LATERALITY UNSPECIFIED, UNSPECIFIED BACK PAIN LATERALITY: ICD-10-CM

## 2017-06-08 PROCEDURE — 97110 THERAPEUTIC EXERCISES: CPT | Mod: GP | Performed by: PHYSICAL THERAPIST

## 2017-06-08 PROCEDURE — 97140 MANUAL THERAPY 1/> REGIONS: CPT | Mod: GP | Performed by: PHYSICAL THERAPIST

## 2017-06-08 NOTE — PROGRESS NOTES
Subjective:    Patient is a 52 year old female presenting with rehab back hpi.           Pt reports insidious onset of L posterior knee tightness and aching pain with increase in walking May 2017.   She was then on a bus trip around [place]. She had been doing a lot of standing and sitting. She had sharp shooting burning R hip and LE pain following that trip..    Patient reports pain:  Lower lumbar spine, central lumbar spine, SI joint right and lumbar spine right (L knee).  Radiates to:  Gluteals right.  Pain is described as aching, sharp and shooting and is constant and reported as 6/10.  Associated symptoms:  Loss of motion/stiffness. Pain is worse during the day.  Symptoms are exacerbated by bending, standing, twisting, lifting, carrying, sitting and walking and relieved by rest and analgesics.  Since onset symptoms are unchanged.    Previous treatment includes physical therapy (several years ago for LB).                          Red flags:  None as reported by the patient.                        Objective:    Standing Alignment:          Pelvic:  Iliac crest high L        General deviations alignment: inc WTB R   Gait:  Guarded,     Deviations:  General Deviations:  Anabel decr, stance time decr and stride length decr    Flexibility/Screens:   Positive screens:  Lumbar                   Lumbar/SI Evaluation  ROM:    AROM Lumbar:   Flexion:          Max loss with inc R hip area pain   Ext:                    Max loss with inc R hip area pain    Side Bend:        Left:  Mod loss    Right:  Max loss with inc R hip pain   Rotation:           Left:     Right:   Side Glide:        Left:     Right:           Lumbar Myotomes:  normal            Lumbar DTR's:  not assessed      Cord Signs:  not assessed    Lumbar Dermtomes:  not assessed                  Lumbar Palpation:  Palpation (lumbar): sig guarding and spasm throughout LS area, dec dwayne to eval and palpation due to pain   Tenderness present at Left:     Quadratus Lumborum; Erector Spinae; Piriformis and PSIS  Tenderness present at Right: Quadratus Lumborum; Erector Spinae; Piriformis and PSIS                                                     General     ROS    Assessment/Plan:      Patient is a 52 year old female with lumbar complaints.    Patient has the following significant findings with corresponding treatment plan.                Diagnosis 1:  LBP, R hip pain, L knee pain   Pain -  hot/cold therapy, manual therapy, self management and directional preference exercise  Decreased ROM/flexibility - manual therapy and therapeutic exercise  Decreased joint mobility - manual therapy and therapeutic exercise  Impaired gait - gait training  Impaired muscle performance - neuro re-education  Decreased function - therapeutic activities  Impaired posture - neuro re-education    Therapy Evaluation Codes:   1) History comprised of:   Personal factors that impact the plan of care:      None.    Comorbidity factors that impact the plan of care are:      None.     Medications impacting care: None.  2) Examination of Body Systems comprised of:   Body structures and functions that impact the plan of care:      Hip, Knee and Lumbar spine.   Activity limitations that impact the plan of care are:      Bending, Dressing, Sitting, Standing and Walking.  3) Clinical presentation characteristics are:   Stable/Uncomplicated.  4) Decision-Making    Low complexity using standardized patient assessment instrument and/or measureable assessment of functional outcome.  Cumulative Therapy Evaluation is: Low complexity.    Previous and current functional limitations:  (See Goal Flow Sheet for this information)    Short term and Long term goals: (See Goal Flow Sheet for this information)     Communication ability:  Patient appears to be able to clearly communicate and understand verbal and written communication and follow directions correctly.  Treatment Explanation - The following has been  discussed with the patient:   RX ordered/plan of care  Anticipated outcomes  Possible risks and side effects  This patient would benefit from PT intervention to resume normal activities.   Rehab potential is excellent.    Frequency:  2 X week, once daily  Duration:  for 4 weeks  Discharge Plan:  Achieve all LTG.  Independent in home treatment program.  Reach maximal therapeutic benefit.    Please refer to the daily flowsheet for treatment today, total treatment time and time spent performing 1:1 timed codes.

## 2017-06-09 PROBLEM — G89.29 CHRONIC PAIN OF LEFT KNEE: Status: ACTIVE | Noted: 2017-06-09

## 2017-06-09 PROBLEM — M25.551 HIP PAIN, RIGHT: Status: ACTIVE | Noted: 2017-06-09

## 2017-06-09 PROBLEM — M25.562 CHRONIC PAIN OF LEFT KNEE: Status: ACTIVE | Noted: 2017-06-09

## 2017-06-09 NOTE — PROGRESS NOTES
Subjective:    Patient is a 52 year old female presenting with rehab left ankle/foot hpi.                                      Pertinent medical history includes:  Overweight, high blood pressure, depression and other (afib).  Medical allergies: yes (sulfa).  Other surgeries include:  Orthopedic surgery and other (L knee, [place]).  Current medications:  Cardiac medication, hormone replacement therapy, pain medication, anti-depressants and high blood pressure medication.  Current occupation is RN.    Primary job tasks include:  Other (computer work).                                Objective:    System    Physical Exam    General     ROS    Assessment/Plan:

## 2017-06-12 ENCOUNTER — OFFICE VISIT (OUTPATIENT)
Dept: CARDIOLOGY | Facility: CLINIC | Age: 52
End: 2017-06-12
Attending: NURSE PRACTITIONER
Payer: COMMERCIAL

## 2017-06-12 VITALS
BODY MASS INDEX: 47.17 KG/M2 | SYSTOLIC BLOOD PRESSURE: 130 MMHG | HEIGHT: 65 IN | WEIGHT: 283.1 LBS | DIASTOLIC BLOOD PRESSURE: 80 MMHG

## 2017-06-12 DIAGNOSIS — I10 ESSENTIAL HYPERTENSION, BENIGN: ICD-10-CM

## 2017-06-12 PROCEDURE — 93000 ELECTROCARDIOGRAM COMPLETE: CPT | Performed by: NURSE PRACTITIONER

## 2017-06-12 PROCEDURE — 99214 OFFICE O/P EST MOD 30 MIN: CPT | Mod: 25 | Performed by: NURSE PRACTITIONER

## 2017-06-12 NOTE — MR AVS SNAPSHOT
After Visit Summary   6/12/2017    Mary Gorman    MRN: [id number]           Patient Information     Date Of Birth          1965        Visit Information        Provider Department      6/12/2017 8:50 AM Flor Pérez APRN CNP [facility] [place]        Today's Diagnoses     Essential hypertension, benign           Follow-ups after your visit        Your next 10 appointments already scheduled     Mika 15, 2017 10:00 AM CDT   SEJ[facility] Extremity with Amarilis Law, PT   [facility] [place] [facility] ([facility] [place]  )    [address] [place] [address] [address]  [place] [address] [phone]              Who to contact     If you have questions or need follow up information about today's clinic visit or your schedule please contact [facility] directly at [phone].  Normal or non-critical lab and imaging results will be communicated to you by MyChart, letter or phone within 4 business days after the clinic has received the results. If you do not hear from us within 7 days, please contact the clinic through [employer] or phone. If you have a critical or abnormal lab result, we will notify you by phone as soon as possible.  Submit refill requests through [employer] or call your pharmacy and they will forward the refill request to us. Please allow 3 business days for your refill to be completed.          Additional Information About Your Visit        MyChart Information     [employer] gives you secure access to your electronic health record. If you see a primary care provider, you can also send messages to your care team and make appointments. If you have questions, please call your primary care clinic.  If you do not have a primary care provider, please call [phone] and they will assist you.        Care EveryWhere ID     This is your Care EveryWhere ID. This could be used by other  "organizations to access your [place] medical records  [id number]        Your Vitals Were     Height BMI (Body Mass Index)                1.651 m (5' 5\") 47.11 kg/m2           Blood Pressure from Last 3 Encounters:   06/12/17 130/80   05/31/17 134/80   04/24/17 136/86    Weight from Last 3 Encounters:   06/12/17 128.4 kg (283 lb 1.6 oz)   05/31/17 129.7 kg (286 lb)   04/24/17 128.7 kg (283 lb 12.8 oz)              We Performed the Following     EKG 12-lead complete w/read - Clinics (future- to be scheduled)     EKG 12-lead complete w/read - Clinics (performed today)     Follow-Up with Cardiac Advanced Practice Provider        Primary Care Provider Office Phone # Fax #    Nicole Joy Siegler, PA-C [phone]       [facility] [address]  [facility] [address]        Thank you!     Thank you for choosing [facility] [place]  for your care. Our goal is always to provide you with excellent care. Hearing back from our patients is one way we can continue to improve our services. Please take a few minutes to complete the written survey that you may receive in the mail after your visit with us. Thank you!             Your Updated Medication List - Protect others around you: Learn how to safely use, store and throw away your medicines at www.disposemymeds.org.          This list is accurate as of: 6/12/17  9:29 AM.  Always use your most recent med list.                   Brand Name Dispense Instructions for use    albuterol 108 (90 BASE) MCG/ACT Inhaler    PROAIR HFA/PROVENTIL HFA/VENTOLIN HFA    1 each    Inhale 2 puffs into the lungs every 6 hours       buPROPion 150 MG 24 hr tablet    WELLBUTRIN XL     Take 3 tablets by mouth every morning. Indications: Major Depressive Disorder.       cetirizine 10 MG tablet    zyrTEC    90 tablet    Take 1 tablet (10 mg) by mouth every evening       cholecalciferol 2000 UNITS Caps      Take 2 tablets by mouth daily.    "    dabigatran ANTICOAGULANT 150 MG capsule    PRADAXA ANTICOAGULANT    180 capsule    Take 1 capsule (150 mg) by mouth every 12 hours Store in original bottle/blister pack. Use within 120 days of opening.       diclofenac 1 % Gel topical gel    VOLTAREN    100 g    Place 2 g onto the skin 4 times daily       diltiazem 240 MG 24 hr ER beaded capsule    TIAZAC    30 capsule    Take 1 capsule (240 mg) by mouth daily       fluticasone 50 MCG/ACT spray    FLONASE    16 g    Spray 2 sprays into both nostrils daily       fosinopril 10 MG tablet    MONOPRIL    90 tablet    Take 1 tablet (10 mg) by mouth daily       gabapentin 100 MG capsule    NEURONTIN    90 capsule    Take 1 capsule (100 mg) by mouth 3 times daily       lidocaine 5 % ointment    XYLOCAINE    50 g    Apply topically as needed for moderate pain       medroxyPROGESTERone 10 MG tablet    PROVERA     Take 10 mg by mouth daily       metoprolol 50 MG 24 hr tablet    TOPROL-XL    30 tablet    Take 1 tablet (50 mg) by mouth daily

## 2017-06-12 NOTE — PROGRESS NOTES
HISTORY OF PRESENT ILLNESS:  Mary is a delightful 52-year-old patient presenting in clinic today for reevaluation regarding persistent atrial fibrillation.  She has been treated with rhythm control and anticoagulation for a number of years.  In regards to her rate control, she has been on high dose diltiazem 360 mg.  For anticoagulation, she takes Pradaxa.  At our last visit in April of this year she was complaining of more lower extremity edema, palpitations.  We performed a Holter monitor at that time and recommended she decrease her diltiazem to 240 mg and initiate metoprolol extended release 50 mg daily.      In clinic today, Mary tells me that she has not been aware of her symptoms of swelling and palpitations due to the new onset of back pain.  For this she has been placed on gabapentin and is undergoing physical therapy.  She has had minimal relief with these interventions.  She denies feeling palpitations or a racing heart.  She has no breathlessness with chest pain, lightheadedness, dizziness, presyncope or syncope.      In clinic today, blood pressure is 130/80.  An EKG shows a heart rate of 79 beats per minute with a rhythm of atrial fibrillation.      PHYSICAL EXAMINATION:   GENERAL:  Reveals a well-appearing, overweight female in no acute distress.   HEENT:  Normocephalic, atraumatic.   LUNGS:  Clear.   HEART:  Reveals S1, S2 with irregularly irregular rhythm.   ABDOMEN:  Soft and obese.   EXTREMITIES:  Lower extremities show trace edema bilaterally.      IMPRESSION AND PLAN:  Ms. Gorman is a delightful 52-year-old female with a history of persistent atrial fibrillation.  She has been treated for a number of years with rate control on high-dose diltiazem.  At our annual visit last month, she was complaining of more palpitations and edema.  We decreased her diltiazem and placed her on Toprol extended release.  Today, her biggest complaint is the back pain she has been experiencing.  She is in therapy  and on gabapentin, which has provided some minimal relief.  Physical exam shows that her edema is just trace bilaterally which is an improvement from our last visit.  Additionally, EKG shows a well-controlled heart rate of 79 beats per minute.  As she is tolerating the beta blocker, we will continue with this regimen.  She is to contact our office should she have any worsening edema or more sensation of palpitations, and we can certainly readjust her medication regimen.  She will continue on Pradaxa for anticoagulation.  We will see her back in 1 year or sooner at her request.         YAMIL GAMEZ, CNP             D: 2017 09:29   T: 2017 11:54   MT: al      Name:     ALBARO ESTES   MRN:      [id number]        Account:      [id number]   :      1965           Service Date: 2017      Document: [id number]

## 2017-06-12 NOTE — PROGRESS NOTES
HPI and Plan:   See dictation    Orders Placed This Encounter   Procedures     EKG 12-lead complete w/read - Clinics (performed today)       No orders of the defined types were placed in this encounter.      Medications Discontinued During This Encounter   Medication Reason     methylPREDNISolone (MEDROL DOSEPAK) 4 MG tablet Therapy completed         Encounter Diagnosis   Name Primary?     Essential hypertension, benign        CURRENT MEDICATIONS:  Current Outpatient Prescriptions   Medication Sig Dispense Refill     gabapentin (NEURONTIN) 100 MG capsule Take 1 capsule (100 mg) by mouth 3 times daily 90 capsule 1     diclofenac (VOLTAREN) 1 % GEL topical gel Place 2 g onto the skin 4 times daily 100 g 1     lidocaine (XYLOCAINE) 5 % ointment Apply topically as needed for moderate pain 50 g 3     dabigatran ANTICOAGULANT (PRADAXA ANTICOAGULANT) 150 MG CAPS capsule Take 1 capsule (150 mg) by mouth every 12 hours Store in original bottle/blister pack. Use within 120 days of opening. 180 capsule 3     diltiazem (TIAZAC) 240 MG 24 hr ER beaded capsule Take 1 capsule (240 mg) by mouth daily 30 capsule 11     metoprolol (TOPROL-XL) 50 MG 24 hr tablet Take 1 tablet (50 mg) by mouth daily 30 tablet 11     medroxyPROGESTERone (PROVERA) 10 MG tablet Take 10 mg by mouth daily       fluticasone (FLONASE) 50 MCG/ACT spray Spray 2 sprays into both nostrils daily 16 g 11     cetirizine (ZYRTEC) 10 MG tablet Take 1 tablet (10 mg) by mouth every evening 90 tablet 3     fosinopril (MONOPRIL) 10 MG tablet Take 1 tablet (10 mg) by mouth daily 90 tablet 3     albuterol (PROAIR HFA, PROVENTIL HFA, VENTOLIN HFA) 108 (90 BASE) MCG/ACT inhaler Inhale 2 puffs into the lungs every 6 hours 1 each 0     cholecalciferol 2000 UNITS CAPS Take 2 tablets by mouth daily.       buPROPion (WELLBUTRIN XL) 150 MG 24 hr tablet Take 3 tablets by mouth every morning. Indications: Major Depressive Disorder.          ALLERGIES     Allergies   Allergen  Reactions     Dyazide [Hydrochlorothiazide W/Triamterene] Unknown     Nickel Rash     Robaxin [Methocarbamol] Rash     Sulfa Drugs Rash       PAST MEDICAL HISTORY:  Past Medical History:   Diagnosis Date     Atrial fibrillation (H)      Depression      GERD (gastroesophageal reflux disease)      HTN (hypertension)      Hyperlipidemia      BALTA (obstructive sleep apnea)      Permanent atrial fibrillation (H) 6/5/11       PAST SURGICAL HISTORY:  Past Surgical History:   Procedure Laterality Date     CARDIOVERSION  6/7/11     CHOLECYSTECTOMY       DILATION AND CURETTAGE  4/26/2012    Procedure:DILATION AND CURETTAGE; DILATION AND CURETTAGE; Surgeon:JEAN-PAUL DEL CID; Location:[facility]     DILATION AND CURETTAGE, HYSTEROSCOPY DIAGNOSTIC, COMBINED  12/8/2011    Procedure:COMBINED DILATION AND CURETTAGE, HYSTEROSCOPY DIAGNOSTIC; DILATION AND CURETTAGE, DIAGNOSTIC HYSTEROSCOPY ; Surgeon:JEAN-PAUL DEL CID; Location:[facility]     KNEE SURGERY         FAMILY HISTORY:  Family History   Problem Relation Age of Onset     Hypertension Mother      HEART DISEASE Mother      A-fib     Arthritis Mother      HEART DISEASE Father      triple bypass     CANCER Father 50     bladder     Hypertension Father      Respiratory Father      smoker     CANCER Paternal Grandmother 90     rectal     Unknown/Adopted Brother        SOCIAL HISTORY:  Social History     Social History     Marital status: Single     Spouse name: N/A     Number of children: N/A     Years of education: N/A     Social History Main Topics     Smoking status: Never Smoker     Smokeless tobacco: Never Used     Alcohol use Yes      Comment: rarely     Drug use: No     Sexual activity: No     Other Topics Concern     Parent/Sibling W/ Cabg, Mi Or Angioplasty Before 65f 55m? No     Special Diet No     Exercise Yes     walks 2 miles minimum 5 x weekly      Social History Narrative       Review of Systems:  Skin:  Negative       Eyes:  Negative      ENT:  Negative      Respiratory:  Positive for  "dyspnea on exertion increasing    Cardiovascular:    Positive for;palpitations;edema;exercise intolerance;fatigue palpitations increasing   Gastroenterology: Negative      Genitourinary:  Negative      Musculoskeletal:  Positive for arthritis    Neurologic:  Negative      Psychiatric:  Negative      Heme/Lymph/Imm:  Negative      Endocrine:  Negative        Physical Exam:  Vitals: Ht 1.651 m (5' 5\")  Wt 128.4 kg (283 lb 1.6 oz)  BMI 47.11 kg/m2    Constitutional:           Skin:           Head:           Eyes:           ENT:           Neck:           Chest:             Cardiac:                    Abdomen:           Vascular:                                          Extremities and Back:                 Neurological:                 CC  Flor Pérez, APRN CNP   [facility]  [address]  [place] [address]              "

## 2017-06-12 NOTE — LETTER
6/12/2017    Nicole Joy Siegler, PA-C  [facility]   [address]  [facility] [address]    RE: Mary Noonan Sherif       Dear Colleague,    I had the pleasure of seeing Mary Gorman in the [facility].    Mary is a delightful 52-year-old patient presenting in clinic today for reevaluation regarding persistent atrial fibrillation.  She has been treated with rhythm control and anticoagulation for a number of years.  In regards to her rate control, she has been on high dose diltiazem 360 mg.  For anticoagulation, she takes Pradaxa.  At our last visit in April of this year she was complaining of more lower extremity edema, palpitations.  We performed a Holter monitor at that time and recommended she decrease her diltiazem to 240 mg and initiate metoprolol extended release 50 mg daily.      In clinic today, Mary tells me that she has not been aware of her symptoms of swelling and palpitations due to the new onset of back pain.  For this she has been placed on gabapentin and is undergoing physical therapy.  She has had minimal relief with these interventions.  She denies feeling palpitations or a racing heart.  She has no breathlessness with chest pain, lightheadedness, dizziness, presyncope or syncope.      In clinic today, blood pressure is 130/80.  An EKG shows a heart rate of 79 beats per minute with a rhythm of atrial fibrillation.      PHYSICAL EXAMINATION:   GENERAL:  Reveals a well-appearing, overweight female in no acute distress.   HEENT:  Normocephalic, atraumatic.   LUNGS:  Clear.   HEART:  Reveals S1, S2 with irregularly irregular rhythm.   ABDOMEN:  Soft and obese.   EXTREMITIES:  Lower extremities show trace edema bilaterally.     Outpatient Encounter Prescriptions as of 6/12/2017   Medication Sig Dispense Refill     gabapentin (NEURONTIN) 100 MG capsule Take 1 capsule (100 mg) by mouth 3 times daily 90 capsule 1     [DISCONTINUED] diclofenac  (VOLTAREN) 1 % GEL topical gel Place 2 g onto the skin 4 times daily 100 g 1     lidocaine (XYLOCAINE) 5 % ointment Apply topically as needed for moderate pain 50 g 3     dabigatran ANTICOAGULANT (PRADAXA ANTICOAGULANT) 150 MG CAPS capsule Take 1 capsule (150 mg) by mouth every 12 hours Store in original bottle/blister pack. Use within 120 days of opening. 180 capsule 3     diltiazem (TIAZAC) 240 MG 24 hr ER beaded capsule Take 1 capsule (240 mg) by mouth daily 30 capsule 11     metoprolol (TOPROL-XL) 50 MG 24 hr tablet Take 1 tablet (50 mg) by mouth daily 30 tablet 11     medroxyPROGESTERone (PROVERA) 10 MG tablet Take 10 mg by mouth daily       fluticasone (FLONASE) 50 MCG/ACT spray Spray 2 sprays into both nostrils daily 16 g 11     cetirizine (ZYRTEC) 10 MG tablet Take 1 tablet (10 mg) by mouth every evening 90 tablet 3     fosinopril (MONOPRIL) 10 MG tablet Take 1 tablet (10 mg) by mouth daily 90 tablet 3     albuterol (PROAIR HFA, PROVENTIL HFA, VENTOLIN HFA) 108 (90 BASE) MCG/ACT inhaler Inhale 2 puffs into the lungs every 6 hours 1 each 0     cholecalciferol 2000 UNITS CAPS Take 2 tablets by mouth daily.       buPROPion (WELLBUTRIN XL) 150 MG 24 hr tablet Take 3 tablets by mouth every morning. Indications: Major Depressive Disorder.        [DISCONTINUED] methylPREDNISolone (MEDROL DOSEPAK) 4 MG tablet Follow package instructions 21 tablet 0     No facility-administered encounter medications on file as of 6/12/2017.       IMPRESSION AND PLAN:  Ms. Gorman is a delightful 52-year-old female with a history of persistent atrial fibrillation.  She has been treated for a number of years with rate control on high-dose diltiazem.  At our annual visit last month, she was complaining of more palpitations and edema.  We decreased her diltiazem and placed her on Toprol extended release.  Today, her biggest complaint is the back pain she has been experiencing.  She is in therapy and on gabapentin, which has provided some  minimal relief.  Physical exam shows that her edema is just trace bilaterally which is an improvement from our last visit.  Additionally, EKG shows a well-controlled heart rate of 79 beats per minute.  As she is tolerating the beta blocker, we will continue with this regimen.  She is to contact our office should she have any worsening edema or more sensation of palpitations, and we can certainly readjust her medication regimen.  She will continue on Pradaxa for anticoagulation.  We will see her back in 1 year or sooner at her request.     Again, thank you for allowing me to participate in the care of your patient.      Sincerely,    Flor Pérez, ALYSIA, APRN CNP     [facility]

## 2017-06-15 ENCOUNTER — THERAPY VISIT (OUTPATIENT)
Dept: PHYSICAL THERAPY | Facility: CLINIC | Age: 52
End: 2017-06-15
Payer: COMMERCIAL

## 2017-06-15 DIAGNOSIS — G89.29 CHRONIC PAIN OF LEFT KNEE: ICD-10-CM

## 2017-06-15 DIAGNOSIS — M25.562 CHRONIC PAIN OF LEFT KNEE: ICD-10-CM

## 2017-06-15 DIAGNOSIS — M54.40 ACUTE LOW BACK PAIN WITH SCIATICA, SCIATICA LATERALITY UNSPECIFIED, UNSPECIFIED BACK PAIN LATERALITY: ICD-10-CM

## 2017-06-15 DIAGNOSIS — M25.551 HIP PAIN, RIGHT: ICD-10-CM

## 2017-06-15 PROCEDURE — 97140 MANUAL THERAPY 1/> REGIONS: CPT | Mod: GP | Performed by: PHYSICAL THERAPIST

## 2017-06-15 PROCEDURE — 97110 THERAPEUTIC EXERCISES: CPT | Mod: GP | Performed by: PHYSICAL THERAPIST

## 2017-06-20 ENCOUNTER — THERAPY VISIT (OUTPATIENT)
Dept: PHYSICAL THERAPY | Facility: CLINIC | Age: 52
End: 2017-06-20
Payer: COMMERCIAL

## 2017-06-20 DIAGNOSIS — M25.551 HIP PAIN, RIGHT: ICD-10-CM

## 2017-06-20 DIAGNOSIS — G89.29 CHRONIC PAIN OF LEFT KNEE: ICD-10-CM

## 2017-06-20 DIAGNOSIS — M25.562 CHRONIC PAIN OF LEFT KNEE: ICD-10-CM

## 2017-06-20 DIAGNOSIS — M54.40 ACUTE LOW BACK PAIN WITH SCIATICA, SCIATICA LATERALITY UNSPECIFIED, UNSPECIFIED BACK PAIN LATERALITY: ICD-10-CM

## 2017-06-20 PROCEDURE — 97140 MANUAL THERAPY 1/> REGIONS: CPT | Mod: GP | Performed by: PHYSICAL THERAPIST

## 2017-06-20 PROCEDURE — 97110 THERAPEUTIC EXERCISES: CPT | Mod: GP | Performed by: PHYSICAL THERAPIST

## 2017-06-20 PROCEDURE — 97035 APP MDLTY 1+ULTRASOUND EA 15: CPT | Mod: GP | Performed by: PHYSICAL THERAPIST

## 2017-06-23 ENCOUNTER — THERAPY VISIT (OUTPATIENT)
Dept: PHYSICAL THERAPY | Facility: CLINIC | Age: 52
End: 2017-06-23
Payer: COMMERCIAL

## 2017-06-23 DIAGNOSIS — G89.29 CHRONIC PAIN OF LEFT KNEE: ICD-10-CM

## 2017-06-23 DIAGNOSIS — M25.551 HIP PAIN, RIGHT: ICD-10-CM

## 2017-06-23 DIAGNOSIS — M54.40 ACUTE LOW BACK PAIN WITH SCIATICA, SCIATICA LATERALITY UNSPECIFIED, UNSPECIFIED BACK PAIN LATERALITY: ICD-10-CM

## 2017-06-23 DIAGNOSIS — M25.562 CHRONIC PAIN OF LEFT KNEE: ICD-10-CM

## 2017-06-23 PROCEDURE — 97110 THERAPEUTIC EXERCISES: CPT | Mod: GP | Performed by: PHYSICAL THERAPIST

## 2017-06-23 PROCEDURE — 97035 APP MDLTY 1+ULTRASOUND EA 15: CPT | Mod: GP | Performed by: PHYSICAL THERAPIST

## 2017-06-23 PROCEDURE — 97140 MANUAL THERAPY 1/> REGIONS: CPT | Mod: GP | Performed by: PHYSICAL THERAPIST

## 2017-06-23 NOTE — MR AVS SNAPSHOT
After Visit Summary   6/23/2017    Mary Gorman    MRN: [id number]           Patient Information     Date Of Birth          1965        Visit Information        Provider Department      6/23/2017 11:20 AM Amarilis Law, PT [facility]        Today's Diagnoses     Hip pain, right        Chronic pain of left knee        Acute low back pain with sciatica, sciatica laterality unspecified, unspecified back pain laterality           Follow-ups after your visit        Your next 10 appointments already scheduled     Jun 27, 2017  3:10 PM CDT   Nenita Gibbons with Nicole Joy Siegler, [facility]-C   [facility]    [address]  [facility] [id number] [phone]            Jun 29, 2017  9:20 AM CDT   SEJAL Extremity with Amarilis Law PT   [facility] ([facility] [place]  )    [address]  [facility] [address] [phone]              Who to contact     If you have questions or need follow up information about today's clinic visit or your schedule please contact [facility] directly at [phone].  Normal or non-critical lab and imaging results will be communicated to you by Phraxishart, letter or phone within 4 business days after the clinic has received the results. If you do not hear from us within 7 days, please contact the clinic through Phraxishart or phone. If you have a critical or abnormal lab result, we will notify you by phone as soon as possible.  Submit refill requests through [employer] or call your pharmacy and they will forward the refill request to us. Please allow 3 business days for your refill to be completed.          Additional Information About Your Visit        PhraxisharHomejoy Information     [employer] gives you secure access to your electronic health  record. If you see a primary care provider, you can also send messages to your care team and make appointments. If you have questions, please call your primary care clinic.  If you do not have a primary care provider, please call [phone] and they will assist you.        Care EveryWhere ID     This is your Care EveryWhere ID. This could be used by other organizations to access your [place] medical records  [id number]         Blood Pressure from Last 3 Encounters:   06/12/17 130/80   05/31/17 134/80   04/24/17 136/86    Weight from Last 3 Encounters:   06/12/17 128.4 kg (283 lb 1.6 oz)   05/31/17 129.7 kg (286 lb)   04/24/17 128.7 kg (283 lb 12.8 oz)              We Performed the Following     SEJAL PROGRESS NOTES REPORT     MANUAL THER TECH,1+REGIONS,EA 15 MIN     THERAPEUTIC EXERCISES     ULTRASOUND THERAPY        Primary Care Provider Office Phone # Fax #    Nicole Joy Siegler, PA-C [phone]       [facility] [address]  [facility] [address]        Equal Access to Services     LORENZO CONTRERAS : Hadii aad ku hadasho Soomaali, waaxda luqadaha, qaybta kaalmada adeegyada, eve sanchez . So [facility] [phone].    ATENCIÓN: Si habla español, tiene a kiser disposición servicios gratuitos de asistencia lingüística. [facility] [phone].    We comply with applicable federal civil rights laws and [place] laws. We do not discriminate on the basis of race, color, national origin, age, disability sex, sexual orientation or gender identity.            Thank you!     Thank you for choosing [facility]  for your care. Our goal is always to provide you with excellent care. Hearing back from our patients is one way we can continue to improve our services. Please take a few minutes to complete the written survey that you may receive in the mail after your visit with us. Thank you!             Your Updated Medication List -  Protect others around you: Learn how to safely use, store and throw away your medicines at www.disposemymeds.org.          This list is accurate as of: 6/23/17 11:59 PM.  Always use your most recent med list.                   Brand Name Dispense Instructions for use Diagnosis    albuterol 108 (90 BASE) MCG/ACT Inhaler    PROAIR HFA/PROVENTIL HFA/VENTOLIN HFA    1 each    Inhale 2 puffs into the lungs every 6 hours    Wheezing       buPROPion 150 MG 24 hr tablet    WELLBUTRIN XL     Take 3 tablets by mouth every morning. Indications: Major Depressive Disorder.        cetirizine 10 MG tablet    zyrTEC    90 tablet    Take 1 tablet (10 mg) by mouth every evening    Disorder of left eustachian tube       cholecalciferol 2000 UNITS Caps      Take 2 tablets by mouth daily.        dabigatran ANTICOAGULANT 150 MG capsule    PRADAXA ANTICOAGULANT    180 capsule    Take 1 capsule (150 mg) by mouth every 12 hours Store in original bottle/blister pack. Use within 120 days of opening.    Chronic atrial fibrillation (H)       diclofenac 1 % Gel topical gel    VOLTAREN    100 g    Place 2 g onto the skin 4 times daily    Acute low back pain with sciatica, sciatica laterality unspecified, unspecified back pain laterality       diltiazem 240 MG 24 hr ER beaded capsule    TIAZAC    30 capsule    Take 1 capsule (240 mg) by mouth daily    Essential hypertension, benign       fluticasone 50 MCG/ACT spray    FLONASE    16 g    Spray 2 sprays into both nostrils daily    Disorder of left eustachian tube       fosinopril 10 MG tablet    MONOPRIL    90 tablet    Take 1 tablet (10 mg) by mouth daily    Secondary hypertension with goal blood pressure less than 140/90       gabapentin 100 MG capsule    NEURONTIN    90 capsule    Take 1 capsule (100 mg) by mouth 3 times daily    Acute low back pain with sciatica, sciatica laterality unspecified, unspecified back pain laterality       lidocaine 5 % ointment    XYLOCAINE    50 g    Apply  topically as needed for moderate pain    Piriformis syndrome of right side       medroxyPROGESTERone 10 MG tablet    PROVERA     Take 10 mg by mouth daily        metoprolol 50 MG 24 hr tablet    TOPROL-XL    30 tablet    Take 1 tablet (50 mg) by mouth daily    Essential hypertension, benign

## 2017-06-26 ENCOUNTER — THERAPY VISIT (OUTPATIENT)
Dept: PHYSICAL THERAPY | Facility: CLINIC | Age: 52
End: 2017-06-26
Payer: COMMERCIAL

## 2017-06-26 DIAGNOSIS — M25.562 CHRONIC PAIN OF LEFT KNEE: ICD-10-CM

## 2017-06-26 DIAGNOSIS — G89.29 CHRONIC PAIN OF LEFT KNEE: ICD-10-CM

## 2017-06-26 DIAGNOSIS — M54.40 ACUTE LOW BACK PAIN WITH SCIATICA, SCIATICA LATERALITY UNSPECIFIED, UNSPECIFIED BACK PAIN LATERALITY: ICD-10-CM

## 2017-06-26 DIAGNOSIS — M25.551 HIP PAIN, RIGHT: ICD-10-CM

## 2017-06-26 PROCEDURE — 97140 MANUAL THERAPY 1/> REGIONS: CPT | Mod: GP | Performed by: PHYSICAL THERAPIST

## 2017-06-26 PROCEDURE — 97035 APP MDLTY 1+ULTRASOUND EA 15: CPT | Mod: GP | Performed by: PHYSICAL THERAPIST

## 2017-06-26 PROCEDURE — 97110 THERAPEUTIC EXERCISES: CPT | Mod: GP | Performed by: PHYSICAL THERAPIST

## 2017-06-26 NOTE — PROGRESS NOTES
Subjective:    HPI                    Objective:    System    Physical Exam    General     ROS    Assessment/Plan:      PROGRESS  REPORT    Progress reporting period is  to 6-.       SUBJECTIVE  Subjective changes noted by patient:  She has had pain into R LE with sharp LBP ongoing with walking and WTB. SL and prone can eliminate LE pain. She also has had L knee pain dating back to hx of ORIF and loss of motion related . Motion in knee improving. R LE and LBP very significantly primary functional limitation.     .   Changes in function:  Yes (See Goal flowsheet attached for changes in current functional level)  Adverse reaction to treatment or activity: activity - walking/standing    OBJECTIVE  Changes noted in objective findings:  Yes,   Objective: Dwayne press up today (prior only dwayne sidelying L, and prone, and prone for RX, Lumbar ext mod loss with inc L LBP, Standing and walking inc R LE symtpoms within a few minutes. Guarded motion and transition motions, sig inc mm tone LS.  ASSESSMENT/PLAN  Updated problem list and treatment plan: Diagnosis 1:  LBP, L knee pain   Pain -  hot/cold therapy, US, manual therapy, self management and directional preference exercise  Decreased ROM/flexibility - manual therapy and therapeutic exercise  Decreased joint mobility - manual therapy and therapeutic exercise  Impaired gait - gait training  Impaired muscle performance - neuro re-education  Decreased function - therapeutic activities  Impaired posture - neuro re-education  STG/LTGs have been met or progress has been made towards goals:  Yes (See Goal flow sheet completed today.)  Assessment of Progress: The patient's condition has potential to improve.  Self Management Plans:  Patient has been instructed in a home treatment program.  I have re-evaluated this patient and find that the nature, scope, duration and intensity of the therapy is appropriate for the medical condition of the patient.  Mary continues to require  the following intervention to meet STG and LTG's:  PT    Recommendations:  This patient would benefit from continued therapy.     Frequency:  2 X week, once daily  Duration:  for 3 weeks        Please refer to the daily flowsheet for treatment today, total treatment time and time spent performing 1:1 timed codes.

## 2017-06-27 ENCOUNTER — OFFICE VISIT (OUTPATIENT)
Dept: FAMILY MEDICINE | Facility: CLINIC | Age: 52
End: 2017-06-27
Payer: COMMERCIAL

## 2017-06-27 VITALS
OXYGEN SATURATION: 97 % | WEIGHT: 283 LBS | HEIGHT: 65 IN | TEMPERATURE: 99.1 F | DIASTOLIC BLOOD PRESSURE: 64 MMHG | SYSTOLIC BLOOD PRESSURE: 108 MMHG | HEART RATE: 85 BPM | BODY MASS INDEX: 47.15 KG/M2

## 2017-06-27 DIAGNOSIS — M54.40 ACUTE LOW BACK PAIN WITH SCIATICA, SCIATICA LATERALITY UNSPECIFIED, UNSPECIFIED BACK PAIN LATERALITY: Primary | ICD-10-CM

## 2017-06-27 DIAGNOSIS — M54.16 LUMBAR RADICULITIS: ICD-10-CM

## 2017-06-27 PROCEDURE — 99214 OFFICE O/P EST MOD 30 MIN: CPT | Performed by: PHYSICIAN ASSISTANT

## 2017-06-27 RX ORDER — TRAMADOL HYDROCHLORIDE 50 MG/1
50-100 TABLET ORAL EVERY 6 HOURS PRN
Qty: 20 TABLET | Refills: 0 | Status: SHIPPED | OUTPATIENT
Start: 2017-06-27 | End: 2018-10-25

## 2017-06-27 ASSESSMENT — PAIN SCALES - GENERAL: PAINLEVEL: SEVERE PAIN (6)

## 2017-06-27 NOTE — MR AVS SNAPSHOT
After Visit Summary   6/27/2017    Mary Gorman    MRN: [id number]           Patient Information     Date Of Birth          1965        Visit Information        Provider Department      6/27/2017 3:10 PM Siegler, Nicole Joy, PA-C [facility]        Today's Diagnoses     Acute low back pain with sciatica, sciatica laterality unspecified, unspecified back pain laterality    -  1    Lumbar radiculitis           Follow-ups after your visit        Your next 10 appointments already scheduled     Jun 29, 2017  9:20 AM CDT   SEJAL Extremity with Amarilis Law, PT   [facility] ([facility] [place]  )    [address]  [place] [address] [phone]              Who to contact     If you have questions or need follow up information about today's clinic visit or your schedule please contact [facility] directly at [phone].  Normal or non-critical lab and imaging results will be communicated to you by [employer], letter or phone within 4 business days after the clinic has received the results. If you do not hear from us within 7 days, please contact the clinic through [employer] or phone. If you have a critical or abnormal lab result, we will notify you by phone as soon as possible.  Submit refill requests through [employer] or call your pharmacy and they will forward the refill request to us. Please allow 3 business days for your refill to be completed.          Additional Information About Your Visit        MyChart Information     [employer] gives you secure access to your electronic health record. If you see a primary care provider, you can also send messages to your care team and make appointments. If you have questions, please call your primary care clinic.  If you do not have a primary care provider, please call [phone] and they will assist you.        Care EveryWhere ID     This is  "your Care EveryWhere ID. This could be used by other organizations to access your [place] medical records  [id number]        Your Vitals Were     Pulse Temperature Height Pulse Oximetry Breastfeeding? BMI (Body Mass Index)    85 99.1  F (37.3  C) (Tympanic) 5' 5\" (1.651 m) 97% No 47.09 kg/m2       Blood Pressure from Last 3 Encounters:   06/27/17 108/64   06/12/17 130/80   05/31/17 134/80    Weight from Last 3 Encounters:   06/27/17 283 lb (128.4 kg)   06/12/17 283 lb 1.6 oz (128.4 kg)   05/31/17 286 lb (129.7 kg)              Today, you had the following     No orders found for display         Today's Medication Changes          These changes are accurate as of: 6/27/17  3:41 PM.  If you have any questions, ask your nurse or doctor.               Start taking these medicines.        Dose/Directions    tiZANidine 4 MG tablet   Commonly known as:  ZANAFLEX   Used for:  Acute low back pain with sciatica, sciatica laterality unspecified, unspecified back pain laterality, Lumbar radiculitis   Started by:  Siegler, Nicole Joy, PA-C        Dose:  2-4 mg   Take 0.5-1 tablets (2-4 mg) by mouth 3 times daily as needed for muscle spasms   Quantity:  30 tablet   Refills:  1       traMADol 50 MG tablet   Commonly known as:  ULTRAM   Used for:  Lumbar radiculitis   Started by:  Siegler, Nicole Joy, PA-C        Dose:   mg   Take 1-2 tablets ( mg) by mouth every 6 hours as needed for pain   Quantity:  20 tablet   Refills:  0            Where to get your medicines      These medications were sent to [facility] [id number] [place] [address], [place] - [address] AT [address]  [address], [facility] [address]     Phone:  [phone]     diclofenac 1 % Gel topical gel    tiZANidine 4 MG tablet         Some of these will need a paper prescription and others can be bought over the counter.  Ask your nurse if you have questions.     Bring a paper prescription for each of these " medications     traMADol 50 MG tablet                Primary Care Provider Office Phone # Fax #    Nicole Joy Siegler, PA-C [phone]       [facility] [address]  [facility] [address]        Equal Access to Services     LORENZO CONTRERAS AH: Hadii aad ku hadasho Soomaali, waaxda luqadaha, qaybta kaalmada adeegyada, waxay idiin hayaan adeeg khfannie lajuno virgen. So [facility] [phone].    ATENCIÓN: Si habla español, tiene a kiser disposición servicios gratuitos de asistencia lingüística. Llame al [phone].    We comply with applicable federal civil rights laws and [place] laws. We do not discriminate on the basis of race, color, national origin, age, disability sex, sexual orientation or gender identity.            Thank you!     Thank you for choosing [facility]  for your care. Our goal is always to provide you with excellent care. Hearing back from our patients is one way we can continue to improve our services. Please take a few minutes to complete the written survey that you may receive in the mail after your visit with us. Thank you!             Your Updated Medication List - Protect others around you: Learn how to safely use, store and throw away your medicines at www.disposemymeds.org.          This list is accurate as of: 6/27/17  3:41 PM.  Always use your most recent med list.                   Brand Name Dispense Instructions for use Diagnosis    albuterol 108 (90 BASE) MCG/ACT Inhaler    PROAIR HFA/PROVENTIL HFA/VENTOLIN HFA    1 each    Inhale 2 puffs into the lungs every 6 hours    Wheezing       buPROPion 150 MG 24 hr tablet    WELLBUTRIN XL     Take 3 tablets by mouth every morning. Indications: Major Depressive Disorder.        cetirizine 10 MG tablet    zyrTEC    90 tablet    Take 1 tablet (10 mg) by mouth every evening    Disorder of left eustachian tube       cholecalciferol 2000 UNITS Caps      Take 2 tablets by mouth daily.         dabigatran ANTICOAGULANT 150 MG capsule    PRADAXA ANTICOAGULANT    180 capsule    Take 1 capsule (150 mg) by mouth every 12 hours Store in original bottle/blister pack. Use within 120 days of opening.    Chronic atrial fibrillation (H)       diclofenac 1 % Gel topical gel    VOLTAREN    100 g    Place 2 g onto the skin 4 times daily    Acute low back pain with sciatica, sciatica laterality unspecified, unspecified back pain laterality       diltiazem 240 MG 24 hr ER beaded capsule    TIAZAC    30 capsule    Take 1 capsule (240 mg) by mouth daily    Essential hypertension, benign       fluticasone 50 MCG/ACT spray    FLONASE    16 g    Spray 2 sprays into both nostrils daily    Disorder of left eustachian tube       fosinopril 10 MG tablet    MONOPRIL    90 tablet    Take 1 tablet (10 mg) by mouth daily    Secondary hypertension with goal blood pressure less than 140/90       gabapentin 100 MG capsule    NEURONTIN    90 capsule    Take 1 capsule (100 mg) by mouth 3 times daily    Acute low back pain with sciatica, sciatica laterality unspecified, unspecified back pain laterality       lidocaine 5 % ointment    XYLOCAINE    50 g    Apply topically as needed for moderate pain    Piriformis syndrome of right side       medroxyPROGESTERone 10 MG tablet    PROVERA     Take 10 mg by mouth daily        metoprolol 50 MG 24 hr tablet    TOPROL-XL    30 tablet    Take 1 tablet (50 mg) by mouth daily    Essential hypertension, benign       tiZANidine 4 MG tablet    ZANAFLEX    30 tablet    Take 0.5-1 tablets (2-4 mg) by mouth 3 times daily as needed for muscle spasms    Acute low back pain with sciatica, sciatica laterality unspecified, unspecified back pain laterality, Lumbar radiculitis       traMADol 50 MG tablet    ULTRAM    20 tablet    Take 1-2 tablets ( mg) by mouth every 6 hours as needed for pain    Lumbar radiculitis

## 2017-06-27 NOTE — NURSING NOTE
"Chief Complaint   Patient presents with     Musculoskeletal Problem     right lower back     Musculoskeletal Problem     Pain behind left knee continues       Initial /64 (BP Location: Left arm, Patient Position: Sitting, Cuff Size: Adult Large)  Pulse 85  Temp 99.1  F (37.3  C) (Tympanic)  Ht 5' 5\" (1.651 m)  Wt 283 lb (128.4 kg)  SpO2 97%  Breastfeeding? No  BMI 47.09 kg/m2 Estimated body mass index is 47.09 kg/(m^2) as calculated from the following:    Height as of this encounter: 5' 5\" (1.651 m).    Weight as of this encounter: 283 lb (128.4 kg).  Medication Reconciliation: complete     Fadia Salas LPN  "

## 2017-06-27 NOTE — PROGRESS NOTES
SUBJECTIVE:                                                    Mary Gorman is a 52 year old female who presents to clinic today for the following health issues:    Back Pain       Duration: 1 Month or more        Specific cause: none    Description:   Location of pain: low back right  Character of pain: sharp and dull ache  Pain radiation:radiates into the right buttocks  New numbness or weakness in legs, not attributed to pain:  no     Intensity: Currently 0/10, At its worst 7/10    History:   Pain interferes with job: YES,   History of back problems: recurrent self limited episodes of low back pain in the past  Any previous MRI or X-rays: None  Sees a specialist for back pain:  No  Therapies tried without relief: Physical Therapy    Alleviating factors:   Improved by: Gabapentin, NSAIDs, Physical Therapy and stretch      Precipitating factors:  Worsened by: Bending, Standing, Sitting and Walking    Functional and Psychosocial Screen (Renata STarT Back):      Most recent score:    RENATA START BACK TOTAL SCORE 3/26/2013   Total Score (all 9) 5         Accompanying Signs & Symptoms:  Risk of Fracture:  None  Risk of Cauda Equina:  None  Risk of Infection:  None  Risk of Cancer:  None  Risk of Ankylosing Spondylitis:  Onset at age <35, male, AND morning back stiffness. no       Pt is currently being seen at PT and feels it has been helpful. There has been muscle manipulation and fascial manipulation along with some stretching at PT and she notes the pain is moving down into her hip rather than at her sciatic/ upper buttock/ low back area. She notes the pain is causing her to not be able to walk more than a block without pain which has been debilitating. She has been taking advil against the advice of her cardiologist and other providers in her care due to no improvement with tylenol alone. She plans to continue with PT and is not requesting a change to that plan but is seeking other options for those days  which are very bad due to pain. She does feel like She denies new numbness/tingling, changes to bladder/bowel control or saddle anesthesia.      Problem list and histories reviewed & adjusted, as indicated.  Additional history: as documented    Patient Active Problem List   Diagnosis     BALTA (obstructive sleep apnea)     Low back pain     Lumbar radiculitis     Hyperlipidemia LDL goal <100     Atrial fibrillation (H)     Mild recurrent major depression (H)     Hypertension goal BP (blood pressure) < 140/90     ACP (advance care planning)     Permanent atrial fibrillation (H)     Lipoma of skin and subcutaneous tissue     Morbid obesity due to excess calories (H)     Prediabetes     Hip pain, right     Chronic pain of left knee     Past Surgical History:   Procedure Laterality Date     CARDIOVERSION  6/7/11     CHOLECYSTECTOMY       DILATION AND CURETTAGE  4/26/2012    Procedure:DILATION AND CURETTAGE; DILATION AND CURETTAGE; Surgeon:JEAN-PAUL DEL CID; Location:[facility]     DILATION AND CURETTAGE, HYSTEROSCOPY DIAGNOSTIC, COMBINED  12/8/2011    Procedure:COMBINED DILATION AND CURETTAGE, HYSTEROSCOPY DIAGNOSTIC; DILATION AND CURETTAGE, DIAGNOSTIC HYSTEROSCOPY ; Surgeon:JEAN-PAUL DEL CID; Location:[facility]     KNEE SURGERY         Social History   Substance Use Topics     Smoking status: Never Smoker     Smokeless tobacco: Never Used     Alcohol use Yes      Comment: rarely     Family History   Problem Relation Age of Onset     Hypertension Mother      HEART DISEASE Mother      A-fib     Arthritis Mother      HEART DISEASE Father      triple bypass     CANCER Father 50     bladder     Hypertension Father      Respiratory Father      smoker     CANCER Paternal Grandmother 90     rectal     Unknown/Adopted Brother          Current Outpatient Prescriptions   Medication Sig Dispense Refill     tiZANidine (ZANAFLEX) 4 MG tablet Take 0.5-1 tablets (2-4 mg) by mouth 3 times daily as needed for muscle spasms 30 tablet 1     diclofenac (VOLTAREN)  "1 % GEL topical gel Place 2 g onto the skin 4 times daily 100 g 1     traMADol (ULTRAM) 50 MG tablet Take 1-2 tablets ( mg) by mouth every 6 hours as needed for pain 20 tablet 0     gabapentin (NEURONTIN) 100 MG capsule Take 1 capsule (100 mg) by mouth 3 times daily 90 capsule 1     lidocaine (XYLOCAINE) 5 % ointment Apply topically as needed for moderate pain 50 g 3     dabigatran ANTICOAGULANT (PRADAXA ANTICOAGULANT) 150 MG CAPS capsule Take 1 capsule (150 mg) by mouth every 12 hours Store in original bottle/blister pack. Use within 120 days of opening. 180 capsule 3     diltiazem (TIAZAC) 240 MG 24 hr ER beaded capsule Take 1 capsule (240 mg) by mouth daily 30 capsule 11     metoprolol (TOPROL-XL) 50 MG 24 hr tablet Take 1 tablet (50 mg) by mouth daily 30 tablet 11     medroxyPROGESTERone (PROVERA) 10 MG tablet Take 10 mg by mouth daily       fluticasone (FLONASE) 50 MCG/ACT spray Spray 2 sprays into both nostrils daily 16 g 11     cetirizine (ZYRTEC) 10 MG tablet Take 1 tablet (10 mg) by mouth every evening 90 tablet 3     fosinopril (MONOPRIL) 10 MG tablet Take 1 tablet (10 mg) by mouth daily 90 tablet 3     albuterol (PROAIR HFA, PROVENTIL HFA, VENTOLIN HFA) 108 (90 BASE) MCG/ACT inhaler Inhale 2 puffs into the lungs every 6 hours 1 each 0     cholecalciferol 2000 UNITS CAPS Take 2 tablets by mouth daily.       buPROPion (WELLBUTRIN XL) 150 MG 24 hr tablet Take 3 tablets by mouth every morning. Indications: Major Depressive Disorder.          Reviewed and updated as needed this visit by clinical staff  Tobacco  Allergies  Meds  Med Hx  Surg Hx  Fam Hx  Soc Hx      Reviewed and updated as needed this visit by Provider         ROS:  Constitutional, HEENT, cardiovascular, pulmonary, gi and gu systems are negative, except as otherwise noted.    OBJECTIVE:     /64 (BP Location: Left arm, Patient Position: Sitting, Cuff Size: Adult Large)  Pulse 85  Temp 99.1  F (37.3  C) (Tympanic)  Ht 5' 5\" " (1.651 m)  Wt 283 lb (128.4 kg)  SpO2 97%  Breastfeeding? No  BMI 47.09 kg/m2  Body mass index is 47.09 kg/(m^2).  GENERAL: healthy, alert and no distress  RESP: no labored breathing  MS: no gross musculoskeletal defects noted, no edema  SKIN: no suspicious lesions or rashes  NEURO: Normal strength and tone, mentation intact and speech normal  BACK: no CVA tenderness, no paralumbar tenderness  PSYCH: mentation appears normal, affect normal/bright    Diagnostic Test Results:  none     ASSESSMENT/PLAN:         ICD-10-CM    1. Acute low back pain with sciatica, sciatica laterality unspecified, unspecified back pain laterality M54.40 tiZANidine (ZANAFLEX) 4 MG tablet     diclofenac (VOLTAREN) 1 % GEL topical gel   2. Lumbar radiculitis M54.16 tiZANidine (ZANAFLEX) 4 MG tablet     traMADol (ULTRAM) 50 MG tablet     We discussed use of zanaflex and potential for somnolence but if she is able to find an appropriate dose between 2-8mg that does improve her symptoms without making her sleepy and is helpful for her to continue to move throughout the day that is appropriate. I also agreed to provide her tramadol for her moderate to severe pain and discussed the intent of the medication was for as needed less than daily use and that if she felt she required it more frequently than that would need to return for further evaluation/ discussion. It is appropriate for use intermittently during her active therapy and we discussed potential negative side effects of overdose and potential addictive properties. Both new medications were evaluated for appropriateness with her current cardiovascular conditions and medications. She agrees with the plan and will also continue with therapy/ her current plan.     25 total minutes spent with the patient, > 50% face to face discussing the patients concerns and addressing questions in the above assessment and plan.         Nicole Joy Siegler, PA-C  [facility]

## 2017-06-29 ENCOUNTER — THERAPY VISIT (OUTPATIENT)
Dept: PHYSICAL THERAPY | Facility: CLINIC | Age: 52
End: 2017-06-29
Payer: COMMERCIAL

## 2017-06-29 DIAGNOSIS — M25.562 CHRONIC PAIN OF LEFT KNEE: ICD-10-CM

## 2017-06-29 DIAGNOSIS — M54.40 ACUTE LOW BACK PAIN WITH SCIATICA, SCIATICA LATERALITY UNSPECIFIED, UNSPECIFIED BACK PAIN LATERALITY: ICD-10-CM

## 2017-06-29 DIAGNOSIS — G89.29 CHRONIC PAIN OF LEFT KNEE: ICD-10-CM

## 2017-06-29 DIAGNOSIS — M25.551 HIP PAIN, RIGHT: ICD-10-CM

## 2017-06-29 PROCEDURE — 97140 MANUAL THERAPY 1/> REGIONS: CPT | Mod: GP | Performed by: PHYSICAL THERAPIST

## 2017-06-29 PROCEDURE — 97110 THERAPEUTIC EXERCISES: CPT | Mod: GP | Performed by: PHYSICAL THERAPIST

## 2017-06-29 PROCEDURE — 97035 APP MDLTY 1+ULTRASOUND EA 15: CPT | Mod: GP | Performed by: PHYSICAL THERAPIST

## 2017-07-06 ENCOUNTER — THERAPY VISIT (OUTPATIENT)
Dept: PHYSICAL THERAPY | Facility: CLINIC | Age: 52
End: 2017-07-06
Payer: COMMERCIAL

## 2017-07-06 DIAGNOSIS — M25.562 CHRONIC PAIN OF LEFT KNEE: ICD-10-CM

## 2017-07-06 DIAGNOSIS — G89.29 CHRONIC PAIN OF LEFT KNEE: ICD-10-CM

## 2017-07-06 DIAGNOSIS — M54.40 ACUTE LOW BACK PAIN WITH SCIATICA, SCIATICA LATERALITY UNSPECIFIED, UNSPECIFIED BACK PAIN LATERALITY: ICD-10-CM

## 2017-07-06 DIAGNOSIS — M25.551 HIP PAIN, RIGHT: ICD-10-CM

## 2017-07-06 PROCEDURE — 97110 THERAPEUTIC EXERCISES: CPT | Mod: GP | Performed by: PHYSICAL THERAPIST

## 2017-07-06 PROCEDURE — 97140 MANUAL THERAPY 1/> REGIONS: CPT | Mod: GP | Performed by: PHYSICAL THERAPIST

## 2017-07-06 PROCEDURE — 97035 APP MDLTY 1+ULTRASOUND EA 15: CPT | Mod: GP | Performed by: PHYSICAL THERAPIST

## 2017-07-10 ENCOUNTER — THERAPY VISIT (OUTPATIENT)
Dept: PHYSICAL THERAPY | Facility: CLINIC | Age: 52
End: 2017-07-10
Payer: COMMERCIAL

## 2017-07-10 DIAGNOSIS — M25.551 HIP PAIN, RIGHT: ICD-10-CM

## 2017-07-10 DIAGNOSIS — M25.562 CHRONIC PAIN OF LEFT KNEE: ICD-10-CM

## 2017-07-10 DIAGNOSIS — G89.29 CHRONIC PAIN OF LEFT KNEE: ICD-10-CM

## 2017-07-10 DIAGNOSIS — M54.40 ACUTE LOW BACK PAIN WITH SCIATICA, SCIATICA LATERALITY UNSPECIFIED, UNSPECIFIED BACK PAIN LATERALITY: ICD-10-CM

## 2017-07-10 PROCEDURE — 97112 NEUROMUSCULAR REEDUCATION: CPT | Mod: GP | Performed by: PHYSICAL THERAPIST

## 2017-07-10 PROCEDURE — 97140 MANUAL THERAPY 1/> REGIONS: CPT | Mod: GP | Performed by: PHYSICAL THERAPIST

## 2017-07-10 PROCEDURE — 97035 APP MDLTY 1+ULTRASOUND EA 15: CPT | Mod: GP | Performed by: PHYSICAL THERAPIST

## 2017-07-13 ENCOUNTER — THERAPY VISIT (OUTPATIENT)
Dept: PHYSICAL THERAPY | Facility: CLINIC | Age: 52
End: 2017-07-13
Payer: COMMERCIAL

## 2017-07-13 DIAGNOSIS — M25.551 HIP PAIN, RIGHT: ICD-10-CM

## 2017-07-13 DIAGNOSIS — G89.29 CHRONIC PAIN OF LEFT KNEE: ICD-10-CM

## 2017-07-13 DIAGNOSIS — M25.562 CHRONIC PAIN OF LEFT KNEE: ICD-10-CM

## 2017-07-13 DIAGNOSIS — M54.40 ACUTE LOW BACK PAIN WITH SCIATICA, SCIATICA LATERALITY UNSPECIFIED, UNSPECIFIED BACK PAIN LATERALITY: ICD-10-CM

## 2017-07-13 PROCEDURE — 97110 THERAPEUTIC EXERCISES: CPT | Mod: GP | Performed by: PHYSICAL THERAPIST

## 2017-07-13 PROCEDURE — 97140 MANUAL THERAPY 1/> REGIONS: CPT | Mod: GP | Performed by: PHYSICAL THERAPIST

## 2017-07-20 ENCOUNTER — THERAPY VISIT (OUTPATIENT)
Dept: PHYSICAL THERAPY | Facility: CLINIC | Age: 52
End: 2017-07-20
Payer: COMMERCIAL

## 2017-07-20 DIAGNOSIS — M25.551 HIP PAIN, RIGHT: ICD-10-CM

## 2017-07-20 DIAGNOSIS — M25.562 CHRONIC PAIN OF LEFT KNEE: ICD-10-CM

## 2017-07-20 DIAGNOSIS — G89.29 CHRONIC PAIN OF LEFT KNEE: ICD-10-CM

## 2017-07-20 DIAGNOSIS — M54.40 ACUTE LOW BACK PAIN WITH SCIATICA, SCIATICA LATERALITY UNSPECIFIED, UNSPECIFIED BACK PAIN LATERALITY: ICD-10-CM

## 2017-07-20 PROCEDURE — 97140 MANUAL THERAPY 1/> REGIONS: CPT | Mod: GP | Performed by: PHYSICAL THERAPIST

## 2017-07-20 PROCEDURE — 97110 THERAPEUTIC EXERCISES: CPT | Mod: GP | Performed by: PHYSICAL THERAPIST

## 2017-07-24 ENCOUNTER — THERAPY VISIT (OUTPATIENT)
Dept: PHYSICAL THERAPY | Facility: CLINIC | Age: 52
End: 2017-07-24
Payer: COMMERCIAL

## 2017-07-24 DIAGNOSIS — M25.562 CHRONIC PAIN OF LEFT KNEE: ICD-10-CM

## 2017-07-24 DIAGNOSIS — M25.551 HIP PAIN, RIGHT: ICD-10-CM

## 2017-07-24 DIAGNOSIS — G89.29 CHRONIC PAIN OF LEFT KNEE: ICD-10-CM

## 2017-07-24 DIAGNOSIS — M54.40 ACUTE LOW BACK PAIN WITH SCIATICA, SCIATICA LATERALITY UNSPECIFIED, UNSPECIFIED BACK PAIN LATERALITY: ICD-10-CM

## 2017-07-24 PROCEDURE — 97112 NEUROMUSCULAR REEDUCATION: CPT | Mod: GP | Performed by: PHYSICAL THERAPIST

## 2017-07-24 PROCEDURE — 97110 THERAPEUTIC EXERCISES: CPT | Mod: GP | Performed by: PHYSICAL THERAPIST

## 2017-07-31 ENCOUNTER — THERAPY VISIT (OUTPATIENT)
Dept: PHYSICAL THERAPY | Facility: CLINIC | Age: 52
End: 2017-07-31
Payer: COMMERCIAL

## 2017-07-31 DIAGNOSIS — G89.29 CHRONIC PAIN OF LEFT KNEE: ICD-10-CM

## 2017-07-31 DIAGNOSIS — M25.562 CHRONIC PAIN OF LEFT KNEE: ICD-10-CM

## 2017-07-31 DIAGNOSIS — M25.551 HIP PAIN, RIGHT: ICD-10-CM

## 2017-07-31 DIAGNOSIS — M54.40 ACUTE LOW BACK PAIN WITH SCIATICA, SCIATICA LATERALITY UNSPECIFIED, UNSPECIFIED BACK PAIN LATERALITY: ICD-10-CM

## 2017-07-31 PROCEDURE — 97112 NEUROMUSCULAR REEDUCATION: CPT | Mod: GP | Performed by: PHYSICAL THERAPIST

## 2017-07-31 PROCEDURE — 97110 THERAPEUTIC EXERCISES: CPT | Mod: GP | Performed by: PHYSICAL THERAPIST

## 2017-08-14 ENCOUNTER — THERAPY VISIT (OUTPATIENT)
Dept: PHYSICAL THERAPY | Facility: CLINIC | Age: 52
End: 2017-08-14
Payer: COMMERCIAL

## 2017-08-14 DIAGNOSIS — M25.562 CHRONIC PAIN OF LEFT KNEE: ICD-10-CM

## 2017-08-14 DIAGNOSIS — M25.551 HIP PAIN, RIGHT: ICD-10-CM

## 2017-08-14 DIAGNOSIS — M54.40 ACUTE LOW BACK PAIN WITH SCIATICA, SCIATICA LATERALITY UNSPECIFIED, UNSPECIFIED BACK PAIN LATERALITY: ICD-10-CM

## 2017-08-14 DIAGNOSIS — G89.29 CHRONIC PAIN OF LEFT KNEE: ICD-10-CM

## 2017-08-14 PROCEDURE — 97112 NEUROMUSCULAR REEDUCATION: CPT | Mod: GP | Performed by: PHYSICAL THERAPIST

## 2017-08-14 PROCEDURE — 97110 THERAPEUTIC EXERCISES: CPT | Mod: GP | Performed by: PHYSICAL THERAPIST

## 2017-09-18 ENCOUNTER — THERAPY VISIT (OUTPATIENT)
Dept: PHYSICAL THERAPY | Facility: CLINIC | Age: 52
End: 2017-09-18
Payer: COMMERCIAL

## 2017-09-18 DIAGNOSIS — G89.29 CHRONIC PAIN OF LEFT KNEE: ICD-10-CM

## 2017-09-18 DIAGNOSIS — M54.40 ACUTE LOW BACK PAIN WITH SCIATICA, SCIATICA LATERALITY UNSPECIFIED, UNSPECIFIED BACK PAIN LATERALITY: ICD-10-CM

## 2017-09-18 DIAGNOSIS — M25.551 HIP PAIN, RIGHT: ICD-10-CM

## 2017-09-18 DIAGNOSIS — M25.562 CHRONIC PAIN OF LEFT KNEE: ICD-10-CM

## 2017-09-18 PROCEDURE — 97110 THERAPEUTIC EXERCISES: CPT | Mod: GP | Performed by: PHYSICAL THERAPIST

## 2017-09-18 PROCEDURE — 97140 MANUAL THERAPY 1/> REGIONS: CPT | Mod: GP | Performed by: PHYSICAL THERAPIST

## 2017-10-01 ENCOUNTER — OFFICE VISIT (OUTPATIENT)
Dept: URGENT CARE | Facility: URGENT CARE | Age: 52
End: 2017-10-01
Payer: COMMERCIAL

## 2017-10-01 VITALS
HEART RATE: 90 BPM | DIASTOLIC BLOOD PRESSURE: 80 MMHG | WEIGHT: 293 LBS | SYSTOLIC BLOOD PRESSURE: 110 MMHG | TEMPERATURE: 99.1 F | BODY MASS INDEX: 49.09 KG/M2 | OXYGEN SATURATION: 97 %

## 2017-10-01 DIAGNOSIS — J06.9 VIRAL URI: ICD-10-CM

## 2017-10-01 DIAGNOSIS — K11.20 SIALOADENITIS OF SUBMANDIBULAR GLAND: Primary | ICD-10-CM

## 2017-10-01 PROCEDURE — 99213 OFFICE O/P EST LOW 20 MIN: CPT | Performed by: FAMILY MEDICINE

## 2017-10-01 RX ORDER — AMOXICILLIN 875 MG
875 TABLET ORAL 2 TIMES DAILY
Qty: 20 TABLET | Refills: 0 | Status: SHIPPED | OUTPATIENT
Start: 2017-10-01 | End: 2017-10-17

## 2017-10-01 NOTE — PROGRESS NOTES
SUBJECTIVE:  Chief Complaint   Patient presents with     URI     Pt c/o URI sxs with swollen lymph glands X 1 week.     Urgent Care     Mary Gorman is a 52 year old female  with a chief complaint of swelling and tenderness in the right  mouth/ cheek .  There has been  no associated redness,  warmth and purulence  in the swollen area.         Onset of symptoms was 1 day(s) ago.  Though she has had mild URI symptoms for 1 week    no swelling/ pain localized to a tooth or the gingiva    Course of illness: sudden onset, still present and constant.    Severity:  moderate.  Current and Associated symptoms:  congestion, cough and malaise  Treatment measures tried include None tried  Past Medical History:   Diagnosis Date     Atrial fibrillation (H)      Depression      GERD (gastroesophageal reflux disease)      HTN (hypertension)      Hyperlipidemia      BALTA (obstructive sleep apnea)      Permanent atrial fibrillation (H) 6/5/11       ALLERGIES:  Dyazide [hydrochlorothiazide w/triamterene]; Nickel; Robaxin [methocarbamol]; and Sulfa drugs      Current Outpatient Prescriptions on File Prior to Visit:  tiZANidine (ZANAFLEX) 4 MG tablet Take 0.5-1 tablets (2-4 mg) by mouth 3 times daily as needed for muscle spasms   traMADol (ULTRAM) 50 MG tablet Take 1-2 tablets ( mg) by mouth every 6 hours as needed for pain   gabapentin (NEURONTIN) 100 MG capsule Take 1 capsule (100 mg) by mouth 3 times daily (Patient taking differently: Take 100 mg by mouth as needed )   dabigatran ANTICOAGULANT (PRADAXA ANTICOAGULANT) 150 MG CAPS capsule Take 1 capsule (150 mg) by mouth every 12 hours Store in original bottle/blister pack. Use within 120 days of opening.   diltiazem (TIAZAC) 240 MG 24 hr ER beaded capsule Take 1 capsule (240 mg) by mouth daily   metoprolol (TOPROL-XL) 50 MG 24 hr tablet Take 1 tablet (50 mg) by mouth daily   medroxyPROGESTERone (PROVERA) 10 MG tablet Take 10 mg by mouth daily   fluticasone (FLONASE) 50  MCG/ACT spray Spray 2 sprays into both nostrils daily   cetirizine (ZYRTEC) 10 MG tablet Take 1 tablet (10 mg) by mouth every evening   fosinopril (MONOPRIL) 10 MG tablet Take 1 tablet (10 mg) by mouth daily   albuterol (PROAIR HFA, PROVENTIL HFA, VENTOLIN HFA) 108 (90 BASE) MCG/ACT inhaler Inhale 2 puffs into the lungs every 6 hours   cholecalciferol 2000 UNITS CAPS Take 2 tablets by mouth daily.   buPROPion (WELLBUTRIN XL) 150 MG 24 hr tablet Take 3 tablets by mouth every morning. Indications: Major Depressive Disorder.    diclofenac (VOLTAREN) 1 % GEL topical gel Place 2 g onto the skin 4 times daily (Patient taking differently: Place 2 g onto the skin as needed )     No current facility-administered medications on file prior to visit.     Social History   Substance Use Topics     Smoking status: Never Smoker     Smokeless tobacco: Never Used     Alcohol use Yes      Comment: rarely       Family History   Problem Relation Age of Onset     Hypertension Mother      HEART DISEASE Mother      A-fib     Arthritis Mother      HEART DISEASE Father      triple bypass     CANCER Father 50     bladder     Hypertension Father      Respiratory Father      smoker     CANCER Paternal Grandmother 90     rectal     Unknown/Adopted Brother        ROS:  CONSTITUTIONAL:NEGATIVE for fever, chills, change in weight  INTEGUMENTARY/SKIN: NEGATIVE for worrisome rashes, moles or lesions  EYES: NEGATIVE for vision changes or irritation  GI: NEGATIVE for nausea, abdominal pain, heartburn, or change in bowel habits       OBJECTIVE:   /80  Pulse 90  Temp 99.1  F (37.3  C)  Wt 295 lb (133.8 kg)  SpO2 97%  BMI 49.09 kg/m2   Swelling in the right   submandibular  region without associated redness, warmth, purulence,  With tenderness to palpation  GENERAL APPEARANCE:  , alert and mild distress,  EYES: EOMI,  PERRL, conjunctiva clear  ,HENT: ear canals and TM's normal.  Nose normal.  Pharynx  no erythema , no exudate noted.  NECK:  supple, non-tender to palpation, no adenopathy noted,  RESP: lungs clear to auscultation - no rales, rhonchi or wheezes  ,CV: regular rates and rhythm, normal S1 S2, no murmur noted,   SKIN: no suspicious lesions or rashes     ASSESSMENT:  Sialoadenitis of submandibular gland     - amoxicillin (AMOXIL) 875 MG tablet; Take 1 tablet (875 mg) by mouth 2 times daily      Recommend that patient stimulate saliva production by sucking on lemon drops or other hard candy to try to dislodge the salivary obstruction  Amoxicillin 875 mg. po bid x 10 days  Acetaminophen / ibuprofen for pain.   Follow-up with  ENT if swelling does not resolve with current treatment  .  Given education materials on obstruction of the salivary gland  May apply a warm pack to the region to improve blood flow to the salivary gland    Viral URI   symptomatic treatment with acetaminophen/ ibuprofen

## 2017-10-01 NOTE — PATIENT INSTRUCTIONS
"  Salivary Gland Infection  Salivary glands make saliva. Saliva is mostly water. It also has minerals and proteins that help break down food and keep the mouth and teeth healthy. There are three pairs of salivary glands:    Parotid glands (in front of the ear)    Submandibular glands (below the jaw)    Sublingual glands (below the tongue)  A salivary gland can become infected by bacteria (germs). Things that make this more likely include dehydration and taking medicines that affect saliva flow. Infection is also more likely when the duct (channel) that carries saliva from the gland to the mouth is blocked. It may be blocked by a salivary gland \"stone.\" This is a collection of minerals that forms in the salivary gland.  Signs of infection include fever, severe pain in the gland, and redness and swelling over the gland. It may hurt to open the mouth. Symptoms may be worse when the flow of saliva is stimulated, such as by the smell of food.   Antibiotics are used to treat the infection. Drainage of the infection with a simple surgical procedure is sometimes needed. It a salivary gland stone is present, a procedure may be done to remove it.  Home Care:    Take antibiotics as directed until they are finished. Do this even if you start to feel better after only a few days.    Unless another medicine was prescribed, take over-the-counter medicines, such as acetaminophen or ibuprofen, to help relieve pain.    Moist heat can also help relieve swelling and pain. Wet a cloth with warm water and put it over the affected gland for 10-15 minutes several times a day.    Gently massage the gland a few times a day.     Suck on lemon or other tart hard candies to encourage flow of saliva.  To help prevent blockages and infections:    Drink 6-8 glasses of fluid per day (such as water, tea, and clear soup) to keep well hydrated.    If you smoke, ask your healthcare provider for help to quit. Smoking makes salivary gland stones more " likely.    Maintain good dental hygiene. Brush and floss your teeth daily. See your dentist for regular cleanings.  Follow-up care  Follow up with your healthcare provider or as advised.   When to seek medical advice  Call your healthcare provider if any of the following occur:    Fever over 100.4 F (38 C) after two days of taking antibiotics    Symptoms get worse or don't improve within a few days    Trouble breathing or swallowing  Date Last Reviewed: 5/4/2015 2000-2017 [employer]. [address] [place] [address], [place] [address]. All rights reserved. This information is not intended as a substitute for professional medical care. Always follow your healthcare professional's instructions.

## 2017-10-01 NOTE — NURSING NOTE
"Chief Complaint   Patient presents with     URI     Pt c/o URI sxs with swollen lymph glands X 1 week.     Urgent Care       Initial /80  Pulse 90  Temp 99.1  F (37.3  C)  Wt 295 lb (133.8 kg)  SpO2 97%  BMI 49.09 kg/m2 Estimated body mass index is 49.09 kg/(m^2) as calculated from the following:    Height as of 6/27/17: 5' 5\" (1.651 m).    Weight as of this encounter: 295 lb (133.8 kg).  Medication Reconciliation: complete    "

## 2017-10-01 NOTE — MR AVS SNAPSHOT
"              After Visit Summary   10/1/2017    Mary Gorman    MRN: [id number]           Patient Information     Date Of Birth          1965        Visit Information        Provider Department      10/1/2017 10:40 AM Apolonia Crockett MD [facility]        Today's Diagnoses     Sialoadenitis of submandibular gland    -  1    Viral URI          Care Instructions      Salivary Gland Infection  Salivary glands make saliva. Saliva is mostly water. It also has minerals and proteins that help break down food and keep the mouth and teeth healthy. There are three pairs of salivary glands:    Parotid glands (in front of the ear)    Submandibular glands (below the jaw)    Sublingual glands (below the tongue)  A salivary gland can become infected by bacteria (germs). Things that make this more likely include dehydration and taking medicines that affect saliva flow. Infection is also more likely when the duct (channel) that carries saliva from the gland to the mouth is blocked. It may be blocked by a salivary gland \"stone.\" This is a collection of minerals that forms in the salivary gland.  Signs of infection include fever, severe pain in the gland, and redness and swelling over the gland. It may hurt to open the mouth. Symptoms may be worse when the flow of saliva is stimulated, such as by the smell of food.   Antibiotics are used to treat the infection. Drainage of the infection with a simple surgical procedure is sometimes needed. It a salivary gland stone is present, a procedure may be done to remove it.  Home Care:    Take antibiotics as directed until they are finished. Do this even if you start to feel better after only a few days.    Unless another medicine was prescribed, take over-the-counter medicines, such as acetaminophen or ibuprofen, to help relieve pain.    Moist heat can also help relieve swelling and pain. Wet a cloth with warm water and put it over the affected " gland for 10-15 minutes several times a day.    Gently massage the gland a few times a day.     Suck on lemon or other tart hard candies to encourage flow of saliva.  To help prevent blockages and infections:    Drink 6-8 glasses of fluid per day (such as water, tea, and clear soup) to keep well hydrated.    If you smoke, ask your healthcare provider for help to quit. Smoking makes salivary gland stones more likely.    Maintain good dental hygiene. Brush and floss your teeth daily. See your dentist for regular cleanings.  Follow-up care  Follow up with your healthcare provider or as advised.   When to seek medical advice  Call your healthcare provider if any of the following occur:    Fever over 100.4 F (38 C) after two days of taking antibiotics    Symptoms get worse or don't improve within a few days    Trouble breathing or swallowing  Date Last Reviewed: 5/4/2015 2000-2017 [employer]. [address] [place] [address]. All rights reserved. This information is not intended as a substitute for professional medical care. Always follow your healthcare professional's instructions.                Follow-ups after your visit        Your next 10 appointments already scheduled     Oct 03, 2017  3:10 PM CDT   SEJAL Extremity with Amarilis Law PT   [facility] ([facility]  )    [address] [place] [address] [id number]  [facility] [id number] [phone]            Oct 17, 2017  8:50 AM CDT   MyChart Physical Adult with Nicole Joy Siegler, PA-C   [facility]    [address]  [address]  [facility] [address]   [phone]            Oct 17, 2017 10:45 AM CDT   MA SCREENING DIGITAL BILATERAL with SHBCMA6   [facility]    [address] [place] [address], [address]  [facility] [id number] [phone]           Do not use any powder,  "lotion or deodorant under your arms or on your breast. If you do, we will ask you to remove it before your exam.  Wear comfortable, two-piece clothing.  If you have any allergies, tell your care team.  Bring any previous mammograms from other facilities or have them mailed to the breast center. Three-dimensional (3D) mammograms are available at [facility] locations in [facility], [place], [place], [facility], [place], [place], and [place]. Health locations include [place] and [facility] in [place]. Benefits of 3D mammograms include: - Improved rate of cancer detection - Decreases your chance of having to go back for more tests, which means fewer: - \"False-positive\" results (This means that there is an abnormal area but it isn't cancer.) - Invasive testing procedures, such as a biopsy or surgery - Can provide clearer images of the breast if you have dense breast tissue. 3D mammography is an optional exam that anyone can have with a 2D mammogram. It doesn't replace or take the place of a 2D mammogram. 2D mammograms remain an effective screening test for all women.  Not all insurance companies cover the cost of a 3D mammogram. Check with your insurance.            Dec 20, 2017  3:00 PM CST   [facility] with Bennett Ezra Goltz, PA-C   [facility] ([facility] [place])    [address] [place] [id number] [phone]              Who to contact     If you have questions or need follow up information about today's clinic visit or your schedule please contact [facility] directly at [phone].  Normal or non-critical lab and imaging results will be communicated to you by MyChart, letter or phone within 4 business days after the clinic has received the results. If you do not hear from us within 7 days, please contact the clinic through [employer] or phone. If you have a critical or " abnormal lab result, we will notify you by phone as soon as possible.  Submit refill requests through [employer] or call your pharmacy and they will forward the refill request to us. Please allow 3 business days for your refill to be completed.          Additional Information About Your Visit        Aware Labshart Information     [employer] gives you secure access to your electronic health record. If you see a primary care provider, you can also send messages to your care team and make appointments. If you have questions, please call your primary care clinic.  If you do not have a primary care provider, please call [phone] and they will assist you.        Care EveryWhere ID     This is your Care EveryWhere ID. This could be used by other organizations to access your [facility] medical records  [id number]        Your Vitals Were     Pulse Temperature Pulse Oximetry BMI (Body Mass Index)          90 99.1  F (37.3  C) 97% 49.09 kg/m2         Blood Pressure from Last 3 Encounters:   10/01/17 110/80   06/27/17 108/64   06/12/17 130/80    Weight from Last 3 Encounters:   10/01/17 295 lb (133.8 kg)   06/27/17 283 lb (128.4 kg)   06/12/17 283 lb 1.6 oz (128.4 kg)              Today, you had the following     No orders found for display         Today's Medication Changes          These changes are accurate as of: 10/1/17 12:17 PM.  If you have any questions, ask your nurse or doctor.               Start taking these medicines.        Dose/Directions    amoxicillin 875 MG tablet   Commonly known as:  AMOXIL   Used for:  Sialoadenitis of submandibular gland   Started by:  Apolonia Crockett MD        Dose:  875 mg   Take 1 tablet (875 mg) by mouth 2 times daily   Quantity:  20 tablet   Refills:  0         These medicines have changed or have updated prescriptions.        Dose/Directions    diclofenac 1 % Gel topical gel   Commonly known as:  VOLTAREN   This may have changed:    - when to take this  - reasons to take this   Used for:   Acute low back pain with sciatica, sciatica laterality unspecified, unspecified back pain laterality        Dose:  2 g   Place 2 g onto the skin 4 times daily   Quantity:  100 g   Refills:  1       gabapentin 100 MG capsule   Commonly known as:  NEURONTIN   This may have changed:    - when to take this  - reasons to take this   Used for:  Acute low back pain with sciatica, sciatica laterality unspecified, unspecified back pain laterality        Dose:  100 mg   Take 1 capsule (100 mg) by mouth 3 times daily   Quantity:  90 capsule   Refills:  1            Where to get your medicines      These medications were sent to [id number] - [place], [place] - [address] AT [facility] [address] [place]  [address], [facility] [address]     Phone:  [phone]     amoxicillin 875 MG tablet                Primary Care Provider Office Phone # Fax #    Nicole Joy Siegler, PA-C [phone]       [facility] [address]  [facility] [address]        Equal Access to Services     [facility]: Hadii aad ku hadasho Soomaali, waaxda luqadaha, qaybta kaalmada adeegyada, waxay idiin hayaan ademichele sanchez . So [facility] [phone].    ATENCIÓN: Si habla español, tiene a kiser disposición servicios gratuitos de asistencia lingüística. Llame al [phone].    We comply with applicable federal civil rights laws and [place] laws. We do not discriminate on the basis of race, color, national origin, age, disability, sex, sexual orientation, or gender identity.            Thank you!     Thank you for choosing [facility]  for your care. Our goal is always to provide you with excellent care. Hearing back from our patients is one way we can continue to improve our services. Please take a few minutes to complete the written survey that you may receive in the mail after your visit with us. Thank you!             Your Updated Medication List -  Protect others around you: Learn how to safely use, store and throw away your medicines at www.disposemymeds.org.          This list is accurate as of: 10/1/17 12:17 PM.  Always use your most recent med list.                   Brand Name Dispense Instructions for use Diagnosis    albuterol 108 (90 BASE) MCG/ACT Inhaler    PROAIR HFA/PROVENTIL HFA/VENTOLIN HFA    1 each    Inhale 2 puffs into the lungs every 6 hours    Wheezing       amoxicillin 875 MG tablet    AMOXIL    20 tablet    Take 1 tablet (875 mg) by mouth 2 times daily    Sialoadenitis of submandibular gland       buPROPion 150 MG 24 hr tablet    WELLBUTRIN XL     Take 3 tablets by mouth every morning. Indications: Major Depressive Disorder.        cetirizine 10 MG tablet    zyrTEC    90 tablet    Take 1 tablet (10 mg) by mouth every evening    Disorder of left eustachian tube       cholecalciferol 2000 UNITS Caps      Take 2 tablets by mouth daily.        dabigatran ANTICOAGULANT 150 MG capsule    PRADAXA ANTICOAGULANT    180 capsule    Take 1 capsule (150 mg) by mouth every 12 hours Store in original bottle/blister pack. Use within 120 days of opening.    Chronic atrial fibrillation (H)       diclofenac 1 % Gel topical gel    VOLTAREN    100 g    Place 2 g onto the skin 4 times daily    Acute low back pain with sciatica, sciatica laterality unspecified, unspecified back pain laterality       diltiazem 240 MG 24 hr ER beaded capsule    TIAZAC    30 capsule    Take 1 capsule (240 mg) by mouth daily    Essential hypertension, benign       fluticasone 50 MCG/ACT spray    FLONASE    16 g    Spray 2 sprays into both nostrils daily    Disorder of left eustachian tube       fosinopril 10 MG tablet    MONOPRIL    90 tablet    Take 1 tablet (10 mg) by mouth daily    Secondary hypertension with goal blood pressure less than 140/90       gabapentin 100 MG capsule    NEURONTIN    90 capsule    Take 1 capsule (100 mg) by mouth 3 times daily    Acute low back pain with  sciatica, sciatica laterality unspecified, unspecified back pain laterality       medroxyPROGESTERone 10 MG tablet    PROVERA     Take 10 mg by mouth daily        metoprolol 50 MG 24 hr tablet    TOPROL-XL    30 tablet    Take 1 tablet (50 mg) by mouth daily    Essential hypertension, benign       tiZANidine 4 MG tablet    ZANAFLEX    30 tablet    Take 0.5-1 tablets (2-4 mg) by mouth 3 times daily as needed for muscle spasms    Acute low back pain with sciatica, sciatica laterality unspecified, unspecified back pain laterality, Lumbar radiculitis       traMADol 50 MG tablet    ULTRAM    20 tablet    Take 1-2 tablets ( mg) by mouth every 6 hours as needed for pain    Lumbar radiculitis

## 2017-10-03 ENCOUNTER — THERAPY VISIT (OUTPATIENT)
Dept: PHYSICAL THERAPY | Facility: CLINIC | Age: 52
End: 2017-10-03
Payer: COMMERCIAL

## 2017-10-03 DIAGNOSIS — M25.562 CHRONIC PAIN OF LEFT KNEE: ICD-10-CM

## 2017-10-03 DIAGNOSIS — G89.29 CHRONIC PAIN OF LEFT KNEE: ICD-10-CM

## 2017-10-03 DIAGNOSIS — M54.40 ACUTE LOW BACK PAIN WITH SCIATICA, SCIATICA LATERALITY UNSPECIFIED, UNSPECIFIED BACK PAIN LATERALITY: ICD-10-CM

## 2017-10-03 DIAGNOSIS — M25.551 HIP PAIN, RIGHT: ICD-10-CM

## 2017-10-03 PROCEDURE — 97140 MANUAL THERAPY 1/> REGIONS: CPT | Mod: GP | Performed by: PHYSICAL THERAPIST

## 2017-10-03 PROCEDURE — 97110 THERAPEUTIC EXERCISES: CPT | Mod: GP | Performed by: PHYSICAL THERAPIST

## 2017-10-16 ASSESSMENT — ANXIETY QUESTIONNAIRES
2. NOT BEING ABLE TO STOP OR CONTROL WORRYING: NOT AT ALL
GAD7 TOTAL SCORE: 0
7. FEELING AFRAID AS IF SOMETHING AWFUL MIGHT HAPPEN: NOT AT ALL
5. BEING SO RESTLESS THAT IT IS HARD TO SIT STILL: NOT AT ALL
3. WORRYING TOO MUCH ABOUT DIFFERENT THINGS: NOT AT ALL
4. TROUBLE RELAXING: NOT AT ALL
GAD7 TOTAL SCORE: 0
GAD7 TOTAL SCORE: 0
1. FEELING NERVOUS, ANXIOUS, OR ON EDGE: NOT AT ALL
7. FEELING AFRAID AS IF SOMETHING AWFUL MIGHT HAPPEN: NOT AT ALL
6. BECOMING EASILY ANNOYED OR IRRITABLE: NOT AT ALL

## 2017-10-16 ASSESSMENT — PATIENT HEALTH QUESTIONNAIRE - PHQ9
SUM OF ALL RESPONSES TO PHQ QUESTIONS 1-9: 2
10. IF YOU CHECKED OFF ANY PROBLEMS, HOW DIFFICULT HAVE THESE PROBLEMS MADE IT FOR YOU TO DO YOUR WORK, TAKE CARE OF THINGS AT HOME, OR GET ALONG WITH OTHER PEOPLE: NOT DIFFICULT AT ALL
SUM OF ALL RESPONSES TO PHQ QUESTIONS 1-9: 2

## 2017-10-17 ENCOUNTER — HOSPITAL ENCOUNTER (OUTPATIENT)
Dept: MAMMOGRAPHY | Facility: CLINIC | Age: 52
Discharge: HOME | End: 2017-10-17
Attending: PHYSICIAN ASSISTANT | Admitting: PHYSICIAN ASSISTANT
Payer: COMMERCIAL

## 2017-10-17 ENCOUNTER — OFFICE VISIT (OUTPATIENT)
Dept: FAMILY MEDICINE | Facility: CLINIC | Age: 52
End: 2017-10-17
Payer: COMMERCIAL

## 2017-10-17 VITALS
BODY MASS INDEX: 48.65 KG/M2 | SYSTOLIC BLOOD PRESSURE: 112 MMHG | OXYGEN SATURATION: 98 % | HEART RATE: 85 BPM | HEIGHT: 65 IN | RESPIRATION RATE: 18 BRPM | TEMPERATURE: 97.8 F | WEIGHT: 292 LBS | DIASTOLIC BLOOD PRESSURE: 80 MMHG

## 2017-10-17 DIAGNOSIS — Z00.00 ROUTINE GENERAL MEDICAL EXAMINATION AT A HEALTH CARE FACILITY: Primary | ICD-10-CM

## 2017-10-17 DIAGNOSIS — Z12.39 SCREENING FOR MALIGNANT NEOPLASM OF BREAST: ICD-10-CM

## 2017-10-17 DIAGNOSIS — F33.0 MILD RECURRENT MAJOR DEPRESSION (H): ICD-10-CM

## 2017-10-17 DIAGNOSIS — E66.01 MORBID OBESITY DUE TO EXCESS CALORIES (H): ICD-10-CM

## 2017-10-17 DIAGNOSIS — Z23 NEED FOR PROPHYLACTIC VACCINATION AND INOCULATION AGAINST INFLUENZA: ICD-10-CM

## 2017-10-17 DIAGNOSIS — I15.9 SECONDARY HYPERTENSION WITH GOAL BLOOD PRESSURE LESS THAN 140/90: ICD-10-CM

## 2017-10-17 DIAGNOSIS — Z12.4 SCREENING FOR CERVICAL CANCER: ICD-10-CM

## 2017-10-17 DIAGNOSIS — R73.03 PREDIABETES: ICD-10-CM

## 2017-10-17 DIAGNOSIS — Z23 NEED FOR PROPHYLACTIC VACCINATION AGAINST STREPTOCOCCUS PNEUMONIAE (PNEUMOCOCCUS): ICD-10-CM

## 2017-10-17 LAB
CHOLEST SERPL-MCNC: 163 MG/DL
CREAT UR-MCNC: 114 MG/DL
HBA1C MFR BLD: 6.2 % (ref 4.3–6)
HDLC SERPL-MCNC: 35 MG/DL
LDLC SERPL CALC-MCNC: 86 MG/DL
MICROALBUMIN UR-MCNC: <5 MG/L
MICROALBUMIN/CREAT UR: NORMAL MG/G CR (ref 0–25)
NONHDLC SERPL-MCNC: 128 MG/DL
TRIGL SERPL-MCNC: 211 MG/DL

## 2017-10-17 PROCEDURE — 80061 LIPID PANEL: CPT | Performed by: PHYSICIAN ASSISTANT

## 2017-10-17 PROCEDURE — 36415 COLL VENOUS BLD VENIPUNCTURE: CPT | Performed by: PHYSICIAN ASSISTANT

## 2017-10-17 PROCEDURE — G0202 SCR MAMMO BI INCL CAD: HCPCS

## 2017-10-17 PROCEDURE — G0145 SCR C/V CYTO,THINLAYER,RESCR: HCPCS | Performed by: PHYSICIAN ASSISTANT

## 2017-10-17 PROCEDURE — 82043 UR ALBUMIN QUANTITATIVE: CPT | Performed by: PHYSICIAN ASSISTANT

## 2017-10-17 PROCEDURE — 99207 C FOOT EXAM  NO CHARGE: CPT | Mod: 25 | Performed by: PHYSICIAN ASSISTANT

## 2017-10-17 PROCEDURE — 77063 BREAST TOMOSYNTHESIS BI: CPT

## 2017-10-17 PROCEDURE — 90670 PCV13 VACCINE IM: CPT | Performed by: PHYSICIAN ASSISTANT

## 2017-10-17 PROCEDURE — 90471 IMMUNIZATION ADMIN: CPT | Performed by: PHYSICIAN ASSISTANT

## 2017-10-17 PROCEDURE — 90472 IMMUNIZATION ADMIN EACH ADD: CPT | Performed by: PHYSICIAN ASSISTANT

## 2017-10-17 PROCEDURE — 90686 IIV4 VACC NO PRSV 0.5 ML IM: CPT | Performed by: PHYSICIAN ASSISTANT

## 2017-10-17 PROCEDURE — 87624 HPV HI-RISK TYP POOLED RSLT: CPT | Performed by: PHYSICIAN ASSISTANT

## 2017-10-17 PROCEDURE — 99396 PREV VISIT EST AGE 40-64: CPT | Mod: 25 | Performed by: PHYSICIAN ASSISTANT

## 2017-10-17 PROCEDURE — 83036 HEMOGLOBIN GLYCOSYLATED A1C: CPT | Performed by: PHYSICIAN ASSISTANT

## 2017-10-17 RX ORDER — FOSINOPRIL SODIUM 10 MG/1
10 TABLET ORAL DAILY
Qty: 90 TABLET | Refills: 3 | Status: SHIPPED | OUTPATIENT
Start: 2017-10-17 | End: 2018-09-29

## 2017-10-17 ASSESSMENT — PATIENT HEALTH QUESTIONNAIRE - PHQ9: SUM OF ALL RESPONSES TO PHQ QUESTIONS 1-9: 2

## 2017-10-17 ASSESSMENT — ANXIETY QUESTIONNAIRES: GAD7 TOTAL SCORE: 0

## 2017-10-17 NOTE — NURSING NOTE
"Chief Complaint   Patient presents with     Physical       Initial /80  Pulse 85  Temp 97.8  F (36.6  C)  Resp 18  Ht 5' 5\" (1.651 m)  Wt 292 lb (132.5 kg)  SpO2 98%  BMI 48.59 kg/m2 Estimated body mass index is 48.59 kg/(m^2) as calculated from the following:    Height as of this encounter: 5' 5\" (1.651 m).    Weight as of this encounter: 292 lb (132.5 kg).  Medication Reconciliation: lisa Mccann CMA      "

## 2017-10-17 NOTE — LETTER
My Depression Action Plan  Name: Mary Gorman   Date of Birth 1965  Date: 10/17/2017    My doctor: Siegler, Nicole Joy   My clinic: [address] [address]  [phone]          GREEN    ZONE   Good Control    What it looks like:     Things are going generally well. You have normal up s and down s. You may even feel depressed from time to time, but bad moods usually last less than a day.   What you need to do:  1. Continue to care for yourself (see self care plan)  2. Check your depression survival kit and update it as needed  3. Follow your physician s recommendations including any medication.  4. Do not stop taking medication unless you consult with your physician first.           YELLOW         ZONE Getting Worse    What it looks like:     Depression is starting to interfere with your life.     It may be hard to get out of bed; you may be starting to isolate yourself from others.    Symptoms of depression are starting to last most all day and this has happened for several days.     You may have suicidal thoughts but they are not constant.   What you need to do:     1. Call your care team, your response to treatment will improve if you keep your care team informed of your progress. Yellow periods are signs an adjustment may need to be made.     2. Continue your self-care, even if you have to fake it!    3. Talk to someone in your support network    4. Open up your depression survival kit           RED    ZONE Medical Alert - Get Help    What it looks like:     Depression is seriously interfering with your life.     You may experience these or other symptoms: You can t get out of bed most days, can t work or engage in other necessary activities, you have trouble taking care of basic hygiene, or basic responsibilities, thoughts of suicide or death that will not go away, self-injurious behavior.     What you  need to do:  1. Call your care team and request a same-day appointment. If they are not available (weekends or after hours) call your local crisis line, emergency room or 911.      Electronically signed by: Zo Mccann, October 17, 2017    Depression Self Care Plan / Survival Kit    Self-Care for Depression  Here s the deal. Your body and mind are really not as separate as most people think.  What you do and think affects how you feel and how you feel influences what you do and think. This means if you do things that people who feel good do, it will help you feel better.  Sometimes this is all it takes.  There is also a place for medication and therapy depending on how severe your depression is, so be sure to consult with your medical provider and/ or Behavioral Health Consultant if your symptoms are worsening or not improving.     In order to better manage my stress, I will:    Exercise  Get some form of exercise, every day. This will help reduce pain and release endorphins, the  feel good  chemicals in your brain. This is almost as good as taking antidepressants!  This is not the same as joining a gym and then never going! (they count on that by the way ) It can be as simple as just going for a walk or doing some gardening, anything that will get you moving.      Hygiene   Maintain good hygiene (Get out of bed in the morning, Make your bed, Brush your teeth, Take a shower, and Get dressed like you were going to work, even if you are unemployed).  If your clothes don't fit try to get ones that do.    Diet  I will strive to eat foods that are good for me, drink plenty of water, and avoid excessive sugar, caffeine, alcohol, and other mood-altering substances.  Some foods that are helpful in depression are: complex carbohydrates, B vitamins, flaxseed, fish or fish oil, fresh fruits and vegetables.    Psychotherapy  I agree to participate in Individual Therapy (if recommended).    Medication  If prescribed  medications, I agree to take them.  Missing doses can result in serious side effects.  I understand that drinking alcohol, or other illicit drug use, may cause potential side effects.  I will not stop my medication abruptly without first discussing it with my provider.    Staying Connected With Others  I will stay in touch with my friends, family members, and my primary care provider/team.    Use your imagination  Be creative.  We all have a creative side; it doesn t matter if it s oil painting, sand castles, or mud pies! This will also kick up the endorphins.    Witness Beauty  (AKA stop and smell the roses) Take a look outside, even in mid-winter. Notice colors, textures. Watch the squirrels and birds.     Service to others  Be of service to others.  There is always someone else in need.  By helping others we can  get out of ourselves  and remember the really important things.  This also provides opportunities for practicing all the other parts of the program.    Humor  Laugh and be silly!  Adjust your TV habits for less news and crime-drama and more comedy.    Control your stress  Try breathing deep, massage therapy, biofeedback, and meditation. Find time to relax each day.     My support system    Clinic Contact:  Phone number:    Contact 1:  Phone number:    Contact 2:  Phone number:    Confucianism/:  Phone number:    Therapist:  Phone number:    [facility] crisis center:    Phone number:    Other community support:  Phone number:

## 2017-10-17 NOTE — MR AVS SNAPSHOT
After Visit Summary   10/17/2017    Mary Gorman    MRN: [id number]           Patient Information     Date Of Birth          1965        Visit Information        Provider Department      10/17/2017 8:50 AM Siegler, Nicole Joy, PA-C [facility]        Today's Diagnoses     Routine general medical examination at a health care facility    -  1    Secondary hypertension with goal blood pressure less than 140/90        Prediabetes        Screening for cervical cancer          Care Instructions      Preventive Health Recommendations  Female Ages 50 - 64    Yearly exam: See your health care provider every year in order to  o Review health changes.   o Discuss preventive care.    o Review your medicines if your doctor has prescribed any.      Get a Pap test every three years (unless you have an abnormal result and your provider advises testing more often).    If you get Pap tests with HPV test, you only need to test every 5 years, unless you have an abnormal result.     You do not need a Pap test if your uterus was removed (hysterectomy) and you have not had cancer.    You should be tested each year for STDs (sexually transmitted diseases) if you're at risk.     Have a mammogram every 1 to 2 years.    Have a colonoscopy at age 50, or have a yearly FIT test (stool test). These exams screen for colon cancer.      Have a cholesterol test every 5 years, or more often if advised.    Have a diabetes test (fasting glucose) every three years. If you are at risk for diabetes, you should have this test more often.     If you are at risk for osteoporosis (brittle bone disease), think about having a bone density scan (DEXA).    Shots: Get a flu shot each year. Get a tetanus shot every 10 years.    Nutrition:     Eat at least 5 servings of fruits and vegetables each day.    Eat whole-grain bread, whole-wheat pasta and brown rice instead of white grains and rice.    Talk to your  "provider about Calcium and Vitamin D.     Lifestyle    Exercise at least 150 minutes a week (30 minutes a day, 5 days a week). This will help you control your weight and prevent disease.    Limit alcohol to one drink per day.    No smoking.     Wear sunscreen to prevent skin cancer.     See your dentist every six months for an exam and cleaning.    See your eye doctor every 1 to 2 years.            Follow-ups after your visit        Your next 10 appointments already scheduled     Oct 17, 2017 10:45 AM CDT   MA SCREENING DIGITAL BILATERAL with [id number]   [facility] ([facility])    [address] [place] [address], [address]  [facility] [address] [phone]           Do not use any powder, lotion or deodorant under your arms or on your breast. If you do, we will ask you to remove it before your exam.  Wear comfortable, two-piece clothing.  If you have any allergies, tell your care team.  Bring any previous mammograms from other facilities or have them mailed to the breast center. Three-dimensional (3D) mammograms are available at [facility] locations in [facility], [facility], [place], [place], and [place]. [facility] locations include [place] [facility] in [place]. Benefits of 3D mammograms include: - Improved rate of cancer detection - Decreases your chance of having to go back for more tests, which means fewer: - \"False-positive\" results (This means that there is an abnormal area but it isn't cancer.) - Invasive testing procedures, such as a biopsy or surgery - Can provide clearer images of the breast if you have dense breast tissue. 3D mammography is an optional exam that anyone can have with a 2D mammogram. It doesn't replace or take the place of a 2D mammogram. 2D mammograms remain an effective screening test for all women.  Not all insurance companies cover the cost of a 3D mammogram. Check with your insurance.            Dec " "20, 2017  3:00 PM [facility] with Bennett Ezra Goltz, PA-C   [facility] ([facility])    [address] [address] [phone]              Who to contact     If you have questions or need follow up information about today's clinic visit or your schedule please contact [facility] directly at [phone].  Normal or non-critical lab and imaging results will be communicated to you by [employer] [place], letter or phone within 4 business days after the clinic has received the results. If you do not hear from us within 7 days, please contact the clinic through [employer] or phone. If you have a critical or abnormal lab result, we will notify you by phone as soon as possible.  Submit refill requests through [employer] or call your pharmacy and they will forward the refill request to us. Please allow 3 business days for your refill to be completed.          Additional Information About Your Visit        [employer] Information     [employer] gives you secure access to your electronic health record. If you see a primary care provider, you can also send messages to your care team and make appointments. If you have questions, please call your primary care clinic.  If you do not have a primary care provider, please call [phone] and they will assist you.        Care EveryWhere ID     This is your Care EveryWhere ID. This could be used by other organizations to access your [facility] medical records  [id number]        Your Vitals Were     Pulse Temperature Respirations Height Pulse Oximetry BMI (Body Mass Index)    85 97.8  F (36.6  C) 18 5' 5\" (1.651 m) 98% 48.59 kg/m2       Blood Pressure from Last 3 Encounters:   10/17/17 112/80   10/01/17 110/80   06/27/17 108/64    Weight from Last 3 Encounters:   10/17/17 292 lb (132.5 kg)   10/01/17 295 lb (133.8 kg)   06/27/17 283 lb (128.4 kg)              We Performed the " Following     Albumin Random Urine Quantitative with Creat Ratio     DEPRESSION ACTION PLAN (DAP)     Hemoglobin A1c     HPV High Risk Types DNA Cervical     Lipid panel reflex to direct LDL     Pap imaged thin layer screen with HPV - recommended age 30 - 65 years (select HPV order below)          Today's Medication Changes          These changes are accurate as of: 10/17/17  9:28 AM.  If you have any questions, ask your nurse or doctor.               These medicines have changed or have updated prescriptions.        Dose/Directions    diclofenac 1 % Gel topical gel   Commonly known as:  VOLTAREN   This may have changed:    - when to take this  - reasons to take this   Used for:  Acute low back pain with sciatica, sciatica laterality unspecified, unspecified back pain laterality        Dose:  2 g   Place 2 g onto the skin 4 times daily   Quantity:  100 g   Refills:  1       gabapentin 100 MG capsule   Commonly known as:  NEURONTIN   This may have changed:    - when to take this  - reasons to take this   Used for:  Acute low back pain with sciatica, sciatica laterality unspecified, unspecified back pain laterality        Dose:  100 mg   Take 1 capsule (100 mg) by mouth 3 times daily   Quantity:  90 capsule   Refills:  1            Where to get your medicines      These medications were sent to [facility] - [place], [place] - [address]  [address] [facility] [address]    Hours:  Mon-Fri 8:30am-5:00pm Toll Free [phone] Phone:  [phone]     fosinopril 10 MG tablet                Primary Care Provider Office Phone # Fax #    Nicole Joy Siegler, PA-C [phone]       [facility] [address]  [facility] [address]        Equal Access to Services     [facility] : Hadii aad ku hadasho Soomaali, waaxda luqadaha, qaybta kaalmichaela del real, eve virgen. So [facility] [phone].    ATENCIÓN: Kenny daily,  tiene a kiser disposición servicios gratuitos de asistencia lingüística. Carolina haskins [phone].    We comply with applicable federal civil rights laws and [place] laws. We do not discriminate on the basis of race, color, national origin, age, disability, sex, sexual orientation, or gender identity.            Thank you!     Thank you for choosing [facility]  for your care. Our goal is always to provide you with excellent care. Hearing back from our patients is one way we can continue to improve our services. Please take a few minutes to complete the written survey that you may receive in the mail after your visit with us. Thank you!             Your Updated Medication List - Protect others around you: Learn how to safely use, store and throw away your medicines at www.disposemymeds.org.          This list is accurate as of: 10/17/17  9:28 AM.  Always use your most recent med list.                   Brand Name Dispense Instructions for use Diagnosis    albuterol 108 (90 BASE) MCG/ACT Inhaler    PROAIR HFA/PROVENTIL HFA/VENTOLIN HFA    1 each    Inhale 2 puffs into the lungs every 6 hours    Wheezing       buPROPion 150 MG 24 hr tablet    WELLBUTRIN XL     Take 3 tablets by mouth every morning. Indications: Major Depressive Disorder.        cetirizine 10 MG tablet    zyrTEC    90 tablet    Take 1 tablet (10 mg) by mouth every evening    Disorder of left eustachian tube       cholecalciferol 2000 UNITS Caps      Take 2 tablets by mouth daily.        dabigatran ANTICOAGULANT 150 MG capsule    PRADAXA ANTICOAGULANT    180 capsule    Take 1 capsule (150 mg) by mouth every 12 hours Store in original bottle/blister pack. Use within 120 days of opening.    Chronic atrial fibrillation (H)       diclofenac 1 % Gel topical gel    VOLTAREN    100 g    Place 2 g onto the skin 4 times daily    Acute low back pain with sciatica, sciatica laterality unspecified, unspecified back pain laterality        diltiazem 240 MG 24 hr ER beaded capsule    TIAZAC    30 capsule    Take 1 capsule (240 mg) by mouth daily    Essential hypertension, benign       fluticasone 50 MCG/ACT spray    FLONASE    16 g    Spray 2 sprays into both nostrils daily    Disorder of left eustachian tube       fosinopril 10 MG tablet    MONOPRIL    90 tablet    Take 1 tablet (10 mg) by mouth daily    Secondary hypertension with goal blood pressure less than 140/90       gabapentin 100 MG capsule    NEURONTIN    90 capsule    Take 1 capsule (100 mg) by mouth 3 times daily    Acute low back pain with sciatica, sciatica laterality unspecified, unspecified back pain laterality       medroxyPROGESTERone 10 MG tablet    PROVERA     Take 10 mg by mouth daily        metoprolol 50 MG 24 hr tablet    TOPROL-XL    30 tablet    Take 1 tablet (50 mg) by mouth daily    Essential hypertension, benign       tiZANidine 4 MG tablet    ZANAFLEX    30 tablet    Take 0.5-1 tablets (2-4 mg) by mouth 3 times daily as needed for muscle spasms    Acute low back pain with sciatica, sciatica laterality unspecified, unspecified back pain laterality, Lumbar radiculitis       traMADol 50 MG tablet    ULTRAM    20 tablet    Take 1-2 tablets ( mg) by mouth every 6 hours as needed for pain    Lumbar radiculitis

## 2017-10-17 NOTE — PROGRESS NOTES
Injectable Influenza Immunization Documentation    1.  Is the person to be vaccinated sick today?   No    2. Does the person to be vaccinated have an allergy to a component   of the vaccine?   No    3. Has the person to be vaccinated ever had a serious reaction   to influenza vaccine in the past?   No    4. Has the person to be vaccinated ever had Guillain-Barré syndrome?   No    Form completed by LINDA Mccann CMA

## 2017-10-17 NOTE — PROGRESS NOTES
SUBJECTIVE:   CC: Mary Gorman is an 52 year old woman who presents for preventive health visit.     Physical   Annual:     Getting at least 3 servings of Calcium per day::  Yes    Bi-annual eye exam::  Yes    Dental care twice a year::  Yes    Sleep apnea or symptoms of sleep apnea::  Sleep apnea    Diet::  Regular (no restrictions)    Frequency of exercise::  2-3 days/week    Duration of exercise::  15-30 minutes    Taking medications regularly::  Yes    Medication side effects::  Not applicable    Additional concerns today::  YES      Today's PHQ-2 Score:   PHQ-2 ( 1999 Pfizer) 10/16/2017   Q1: Little interest or pleasure in doing things 0   Q2: Feeling down, depressed or hopeless 1   PHQ-2 Score 1   Q1: Little interest or pleasure in doing things Not at all   Q2: Feeling down, depressed or hopeless Several days   PHQ-2 Score 1       Abuse: Current or Past(Physical, Sexual or Emotional)- No  Do you feel safe in your environment - Yes    Social History   Substance Use Topics     Smoking status: Never Smoker     Smokeless tobacco: Never Used     Alcohol use Yes      Comment: rarely     The patient does not drink >3 drinks per day nor >7 drinks per week.    Reviewed orders with patient.  Reviewed health maintenance and updated orders accordingly - Yes  BP Readings from Last 3 Encounters:   10/17/17 112/80   10/01/17 110/80   06/27/17 108/64    Wt Readings from Last 3 Encounters:   10/17/17 292 lb (132.5 kg)   10/01/17 295 lb (133.8 kg)   06/27/17 283 lb (128.4 kg)                  Patient Active Problem List   Diagnosis     BALTA (obstructive sleep apnea)     Low back pain     Lumbar radiculitis     Hyperlipidemia LDL goal <100     Atrial fibrillation (H)     Mild recurrent major depression (H)     Hypertension goal BP (blood pressure) < 140/90     ACP (advance care planning)     Permanent atrial fibrillation (H)     Lipoma of skin and subcutaneous tissue     Morbid obesity due to excess calories (H)      Prediabetes     Hip pain, right     Chronic pain of left knee     Past Surgical History:   Procedure Laterality Date     CARDIOVERSION  6/7/11     CHOLECYSTECTOMY       DILATION AND CURETTAGE  4/26/2012    Procedure:DILATION AND CURETTAGE; DILATION AND CURETTAGE; Surgeon:JEAN-PAUL DEL CID; Location:[facility]     DILATION AND CURETTAGE, HYSTEROSCOPY DIAGNOSTIC, COMBINED  12/8/2011    Procedure:COMBINED DILATION AND CURETTAGE, HYSTEROSCOPY DIAGNOSTIC; DILATION AND CURETTAGE, DIAGNOSTIC HYSTEROSCOPY ; Surgeon:JEAN-PAUL DEL CID; Location:[facility]     KNEE SURGERY         Social History   Substance Use Topics     Smoking status: Never Smoker     Smokeless tobacco: Never Used     Alcohol use Yes      Comment: rarely     Family History   Problem Relation Age of Onset     Hypertension Mother      HEART DISEASE Mother      A-fib     Arthritis Mother      HEART DISEASE Father      triple bypass     CANCER Father 50     bladder     Hypertension Father      Respiratory Father      smoker     CANCER Paternal Grandmother 90     rectal     Unknown/Adopted Brother          Current Outpatient Prescriptions   Medication Sig Dispense Refill     fosinopril (MONOPRIL) 10 MG tablet Take 1 tablet (10 mg) by mouth daily 90 tablet 3     tiZANidine (ZANAFLEX) 4 MG tablet Take 0.5-1 tablets (2-4 mg) by mouth 3 times daily as needed for muscle spasms 30 tablet 1     diclofenac (VOLTAREN) 1 % GEL topical gel Place 2 g onto the skin 4 times daily (Patient taking differently: Place 2 g onto the skin as needed ) 100 g 1     traMADol (ULTRAM) 50 MG tablet Take 1-2 tablets ( mg) by mouth every 6 hours as needed for pain 20 tablet 0     gabapentin (NEURONTIN) 100 MG capsule Take 1 capsule (100 mg) by mouth 3 times daily (Patient taking differently: Take 100 mg by mouth as needed ) 90 capsule 1     dabigatran ANTICOAGULANT (PRADAXA ANTICOAGULANT) 150 MG CAPS capsule Take 1 capsule (150 mg) by mouth every 12 hours Store in original bottle/blister pack. Use within 120  days of opening. 180 capsule 3     diltiazem (TIAZAC) 240 MG 24 hr ER beaded capsule Take 1 capsule (240 mg) by mouth daily 30 capsule 11     metoprolol (TOPROL-XL) 50 MG 24 hr tablet Take 1 tablet (50 mg) by mouth daily 30 tablet 11     medroxyPROGESTERone (PROVERA) 10 MG tablet Take 10 mg by mouth daily       fluticasone (FLONASE) 50 MCG/ACT spray Spray 2 sprays into both nostrils daily 16 g 11     cetirizine (ZYRTEC) 10 MG tablet Take 1 tablet (10 mg) by mouth every evening 90 tablet 3     albuterol (PROAIR HFA, PROVENTIL HFA, VENTOLIN HFA) 108 (90 BASE) MCG/ACT inhaler Inhale 2 puffs into the lungs every 6 hours 1 each 0     cholecalciferol 2000 UNITS CAPS Take 2 tablets by mouth daily.       buPROPion (WELLBUTRIN XL) 150 MG 24 hr tablet Take 3 tablets by mouth every morning. Indications: Major Depressive Disorder.        [DISCONTINUED] fosinopril (MONOPRIL) 10 MG tablet Take 1 tablet (10 mg) by mouth daily 90 tablet 3           Patient over age 50, mutual decision to screen reflected in health maintenance.      Pertinent mammograms are reviewed under the imaging tab.  History of abnormal Pap smear: NO - age 30- 65 PAP every 3 years recommended    Reviewed and updated as needed this visit by clinical staff  Tobacco  Allergies  Meds  Problems  Med Hx  Surg Hx  Fam Hx  Soc Hx          Reviewed and updated as needed this visit by Provider  Allergies  Meds  Problems              ROS:  C: NEGATIVE for fever, chills, change in weight  I: NEGATIVE for worrisome rashes, moles or lesions  E: NEGATIVE for vision changes or irritation  ENT: NEGATIVE for ear, mouth and throat problems  R: NEGATIVE for significant cough or SOB  B: NEGATIVE for masses, tenderness or discharge  CV: NEGATIVE for chest pain, palpitations or peripheral edema  GI: NEGATIVE for nausea, abdominal pain, heartburn, or change in bowel habits  : NEGATIVE for unusual urinary or vaginal symptoms. No vaginal bleeding.  M: NEGATIVE for  "significant arthralgias or myalgia  N: NEGATIVE for weakness, dizziness or paresthesias  P: NEGATIVE for changes in mood or affect      OBJECTIVE:   /80  Pulse 85  Temp 97.8  F (36.6  C)  Resp 18  Ht 5' 5\" (1.651 m)  Wt 292 lb (132.5 kg)  SpO2 98%  BMI 48.59 kg/m2  EXAM:  GENERAL: healthy, alert and no distress  EYES: Eyes grossly normal to inspection, PERRL and conjunctivae and sclerae normal  HENT: ear canals and TM's normal, nose and mouth without ulcers or lesions  NECK: no adenopathy, no asymmetry, masses, or scars and thyroid normal to palpation  RESP: lungs clear to auscultation - no rales, rhonchi or wheezes  BREAST: normal without masses, tenderness or nipple discharge and no palpable axillary masses or adenopathy  CV: regular rate and rhythm, normal S1 S2, no S3 or S4, no murmur, click or rub, no peripheral edema and peripheral pulses strong  ABDOMEN: soft, nontender, no hepatosplenomegaly, no masses and bowel sounds normal   (female): normal female external genitalia, normal urethral meatus, vaginal mucosa pink, moist, well rugated, and normal cervix/adnexa/uterus without masses or discharge  MS: no gross musculoskeletal defects noted, no edema  SKIN: no suspicious lesions or rashes  NEURO: Normal strength and tone, mentation intact and speech normal  PSYCH: mentation appears normal, affect normal/bright  Foot Exam: normal DP and PT pulses, no trophic changes or ulcerative lesions, normal sensory exam and normal monofilament exam        ASSESSMENT/PLAN:       ICD-10-CM    1. Routine general medical examination at a health care facility Z00.00    2. Secondary hypertension with goal blood pressure less than 140/90 I15.9 fosinopril (MONOPRIL) 10 MG tablet   3. Prediabetes R73.03 Hemoglobin A1c     Albumin Random Urine Quantitative with Creat Ratio     Lipid panel reflex to direct LDL     FOOT EXAM   4. Screening for cervical cancer Z12.4 Pap imaged thin layer screen with HPV - recommended age " "30 - 65 years (select HPV order below)     HPV High Risk Types DNA Cervical   5. Need for prophylactic vaccination against Streptococcus pneumoniae (pneumococcus) Z23 Pneumococcal vaccine 13 valent PCV13 IM (Prevnar) [79865]     ADMIN: Vaccine, Initial (40953)   6. Need for prophylactic vaccination and inoculation against influenza Z23 FLU VAC, SPLIT VIRUS IM > 3 YO (QUADRIVALENT) [89133]     Vaccine Administration, Each Additional [17265]       COUNSELING:  Reviewed preventive health counseling, as reflected in patient instructions     reports that she has never smoked. She has never used smokeless tobacco.    Estimated body mass index is 48.59 kg/(m^2) as calculated from the following:    Height as of this encounter: 5' 5\" (1.651 m).    Weight as of this encounter: 292 lb (132.5 kg).   Weight management plan: Discussed healthy diet and exercise guidelines and patient will follow up in 12 months in clinic to re-evaluate.    Nicole Joy Siegler, PA-C  [facility]  "

## 2017-10-20 LAB
COPATH REPORT: NORMAL
PAP: NORMAL

## 2017-10-24 LAB
FINAL DIAGNOSIS: NORMAL
HPV HR 12 DNA CVX QL NAA+PROBE: NEGATIVE
HPV16 DNA SPEC QL NAA+PROBE: NEGATIVE
HPV18 DNA SPEC QL NAA+PROBE: NEGATIVE
SPECIMEN DESCRIPTION: NORMAL

## 2017-10-31 ENCOUNTER — OFFICE VISIT (OUTPATIENT)
Dept: FAMILY MEDICINE | Facility: CLINIC | Age: 52
End: 2017-10-31
Payer: COMMERCIAL

## 2017-10-31 VITALS
TEMPERATURE: 97.9 F | RESPIRATION RATE: 20 BRPM | HEART RATE: 90 BPM | DIASTOLIC BLOOD PRESSURE: 80 MMHG | SYSTOLIC BLOOD PRESSURE: 128 MMHG | WEIGHT: 292 LBS | BODY MASS INDEX: 48.59 KG/M2 | OXYGEN SATURATION: 97 %

## 2017-10-31 DIAGNOSIS — R07.0 THROAT PAIN: Primary | ICD-10-CM

## 2017-10-31 PROCEDURE — 99213 OFFICE O/P EST LOW 20 MIN: CPT | Performed by: PHYSICIAN ASSISTANT

## 2017-10-31 NOTE — NURSING NOTE
"Chief Complaint   Patient presents with     Mouth Problem       Initial /80  Pulse 90  Temp 97.9  F (36.6  C) (Tympanic)  Resp 20  Wt 292 lb (132.5 kg)  SpO2 97%  BMI 48.59 kg/m2 Estimated body mass index is 48.59 kg/(m^2) as calculated from the following:    Height as of 10/17/17: 5' 5\" (1.651 m).    Weight as of this encounter: 292 lb (132.5 kg).  Medication Reconciliation: complete      "

## 2017-10-31 NOTE — PROGRESS NOTES
SUBJECTIVE:   Mary Gorman is a 52 year old female who presents to clinic today for the following health issues:    Mouth issue      Duration: 3-4 weeks    Description (location/character/radiation): patient states that she has a sore in her mouth on the left side that she can feel, states she feels like something is stuck, but knows that there is not    Intensity:  mild    Accompanying signs and symptoms: see above    History (similar episodes/previous evaluation): None    Precipitating or alleviating factors: None    Therapies tried and outcome: None     Painful with yawning, irritation in the back of the throat on the left side and feels swollen to her. No notable discharge or bleeding, no swollen glands that she can tell. She had salivary gland infection about a month ago on the right and then left sides. She feels her pain has improved today and she feels it is getting better but wants to be sure it looks ok.     Problem list and histories reviewed & adjusted, as indicated.  Additional history: as documented    Patient Active Problem List   Diagnosis     BALTA (obstructive sleep apnea)     Low back pain     Lumbar radiculitis     Hyperlipidemia LDL goal <100     Atrial fibrillation (H)     Mild recurrent major depression (H)     Hypertension goal BP (blood pressure) < 140/90     ACP (advance care planning)     Permanent atrial fibrillation (H)     Lipoma of skin and subcutaneous tissue     Morbid obesity due to excess calories (H)     Prediabetes     Hip pain, right     Chronic pain of left knee     Past Surgical History:   Procedure Laterality Date     CARDIOVERSION  6/7/11     CHOLECYSTECTOMY       DILATION AND CURETTAGE  4/26/2012    Procedure:DILATION AND CURETTAGE; DILATION AND CURETTAGE; Surgeon:JEAN-PAUL DEL CID; Location:[facility]     DILATION AND CURETTAGE, HYSTEROSCOPY DIAGNOSTIC, COMBINED  12/8/2011    Procedure:COMBINED DILATION AND CURETTAGE, HYSTEROSCOPY DIAGNOSTIC; DILATION AND CURETTAGE, DIAGNOSTIC  HYSTEROSCOPY ; Surgeon:JEAN-PAUL DEL CID; Location: [place]     KNEE SURGERY         Social History   Substance Use Topics     Smoking status: Never Smoker     Smokeless tobacco: Never Used     Alcohol use Yes      Comment: rarely     Family History   Problem Relation Age of Onset     Hypertension Mother      HEART DISEASE Mother      A-fib     Arthritis Mother      HEART DISEASE Father      triple bypass     CANCER Father 50     bladder     Hypertension Father      Respiratory Father      smoker     CANCER Paternal Grandmother 90     rectal     Unknown/Adopted Brother          Current Outpatient Prescriptions   Medication Sig Dispense Refill     fosinopril (MONOPRIL) 10 MG tablet Take 1 tablet (10 mg) by mouth daily 90 tablet 3     tiZANidine (ZANAFLEX) 4 MG tablet Take 0.5-1 tablets (2-4 mg) by mouth 3 times daily as needed for muscle spasms 30 tablet 1     diclofenac (VOLTAREN) 1 % GEL topical gel Place 2 g onto the skin 4 times daily (Patient taking differently: Place 2 g onto the skin as needed ) 100 g 1     traMADol (ULTRAM) 50 MG tablet Take 1-2 tablets ( mg) by mouth every 6 hours as needed for pain 20 tablet 0     gabapentin (NEURONTIN) 100 MG capsule Take 1 capsule (100 mg) by mouth 3 times daily (Patient taking differently: Take 100 mg by mouth as needed ) 90 capsule 1     dabigatran ANTICOAGULANT (PRADAXA ANTICOAGULANT) 150 MG CAPS capsule Take 1 capsule (150 mg) by mouth every 12 hours Store in original bottle/blister pack. Use within 120 days of opening. 180 capsule 3     diltiazem (TIAZAC) 240 MG 24 hr ER beaded capsule Take 1 capsule (240 mg) by mouth daily 30 capsule 11     metoprolol (TOPROL-XL) 50 MG 24 hr tablet Take 1 tablet (50 mg) by mouth daily 30 tablet 11     medroxyPROGESTERone (PROVERA) 10 MG tablet Take 10 mg by mouth daily       fluticasone (FLONASE) 50 MCG/ACT spray Spray 2 sprays into both nostrils daily 16 g 11     cetirizine (ZYRTEC) 10 MG tablet Take 1 tablet (10 mg) by mouth every  evening 90 tablet 3     albuterol (PROAIR HFA, PROVENTIL HFA, VENTOLIN HFA) 108 (90 BASE) MCG/ACT inhaler Inhale 2 puffs into the lungs every 6 hours 1 each 0     cholecalciferol 2000 UNITS CAPS Take 2 tablets by mouth daily.       buPROPion (WELLBUTRIN XL) 150 MG 24 hr tablet Take 3 tablets by mouth every morning. Indications: Major Depressive Disorder.            Reviewed and updated as needed this visit by clinical staffTobacco  Allergies  Meds  Med Hx  Surg Hx  Fam Hx  Soc Hx      Reviewed and updated as needed this visit by Provider         ROS:  Constitutional, HEENT, cardiovascular, pulmonary, gi and gu systems are negative, except as otherwise noted.      OBJECTIVE:   /80  Pulse 90  Temp 97.9  F (36.6  C) (Tympanic)  Resp 20  Wt 292 lb (132.5 kg)  SpO2 97%  BMI 48.59 kg/m2  Body mass index is 48.59 kg/(m^2).  GENERAL: healthy, alert and no distress  EYES: Eyes grossly normal to inspection, PERRL and conjunctivae and sclerae normal  HENT: ear canals and TM's normal, nose and mouth without ulcers or lesions. Posterior pharynx is pink, left side tonsillar hypertrophy 2+ without erythema. There are a few areas of mucus on the tonsil without the appearance of purulence, one small tonsillar stone on the left.   NECK: no adenopathy, no asymmetry, masses, or scars and thyroid normal to palpation  RESP: non labored breathing    Diagnostic Test Results:  none     ASSESSMENT/PLAN:       ICD-10-CM    1. Throat pain R07.0      We discussed her mild tonsillar enlargement, what appears to be drainage and a stone in the area. I recommended gargle, continue to increase fluids and monitor. If her symptoms are not improving or worsening return to clinic, I anticipate this should continue to improve as it has been. She agrees.     Nicole Joy Siegler, PA-C  [facility]

## 2017-10-31 NOTE — MR AVS SNAPSHOT
After Visit Summary   10/31/2017    Mary Gorman    MRN: [id number]           Patient Information     Date Of Birth          1965        Visit Information        Provider Department      10/31/2017 4:30 PM Siegler, Nicole Joy, PA-C [facility]        Today's Diagnoses     Throat pain    -  1       Follow-ups after your visit        Your next 10 appointments already scheduled     Dec 20, 2017  3:00 PM [facility] Return with Bennett Ezra Goltz, PA-C   [facility] ([facility])    [address] [address] [phone]              Who to contact     If you have questions or need follow up information about today's clinic visit or your schedule please contact [facility] directly at [phone].  Normal or non-critical lab and imaging results will be communicated to you by Decision Scienceshart, letter or phone within 4 business days after the clinic has received the results. If you do not hear from us within 7 days, please contact the clinic through [employer] Scienceshart or phone. If you have a critical or abnormal lab result, we will notify you by phone as soon as possible.  Submit refill requests through WellApps or call your pharmacy and they will forward the refill request to us. Please allow 3 business days for your refill to be completed.          Additional Information About Your Visit        MyChart Information     WellApps gives you secure access to your electronic health record. If you see a primary care provider, you can also send messages to your care team and make appointments. If you have questions, please call your primary care clinic.  If you do not have a primary care provider, please call [phone] and they will assist you.        Care EveryWhere ID     This is your Care EveryWhere ID. This could be used by other organizations to access your [facility]  records  [id number]        Your Vitals Were     Pulse Temperature Respirations Pulse Oximetry BMI (Body Mass Index)       90 97.9  F (36.6  C) (Tympanic) 20 97% 48.59 kg/m2        Blood Pressure from Last 3 Encounters:   10/31/17 128/80   10/17/17 112/80   10/01/17 110/80    Weight from Last 3 Encounters:   10/31/17 292 lb (132.5 kg)   10/17/17 292 lb (132.5 kg)   10/01/17 295 lb (133.8 kg)              Today, you had the following     No orders found for display         Today's Medication Changes          These changes are accurate as of: 10/31/17 11:59 PM.  If you have any questions, ask your nurse or doctor.               These medicines have changed or have updated prescriptions.        Dose/Directions    diclofenac 1 % Gel topical gel   Commonly known as:  VOLTAREN   This may have changed:    - when to take this  - reasons to take this   Used for:  Acute low back pain with sciatica, sciatica laterality unspecified, unspecified back pain laterality        Dose:  2 g   Place 2 g onto the skin 4 times daily   Quantity:  100 g   Refills:  1       gabapentin 100 MG capsule   Commonly known as:  NEURONTIN   This may have changed:    - when to take this  - reasons to take this   Used for:  Acute low back pain with sciatica, sciatica laterality unspecified, unspecified back pain laterality        Dose:  100 mg   Take 1 capsule (100 mg) by mouth 3 times daily   Quantity:  90 capsule   Refills:  1                Primary Care Provider Office Phone # Fax #    Nicole Joy Siegler, PA-C [phone]       [facility] [address] [place] [address]  [facility] [address]        Equal Access to Services     [facility] - Harbor HospitalDANILO AH: Hadii aad ku hadasho Soomaali, waaxda luqadaha, qaybta kaalmada adeegyada, eve virgen. So [facility] [phone].    ATENCIÓN: Si habla español, tiene a kiser disposición servicios gratuitos de asistencia lingüística. Llame al [phone].    We comply with applicable  federal civil rights laws and [place] laws. We do not discriminate on the basis of race, color, national origin, age, disability, sex, sexual orientation, or gender identity.            Thank you!     Thank you for choosing [facility]  for your care. Our goal is always to provide you with excellent care. Hearing back from our patients is one way we can continue to improve our services. Please take a few minutes to complete the written survey that you may receive in the mail after your visit with us. Thank you!             Your Updated Medication List - Protect others around you: Learn how to safely use, store and throw away your medicines at www.disposemymeds.org.          This list is accurate as of: 10/31/17 11:59 PM.  Always use your most recent med list.                   Brand Name Dispense Instructions for use Diagnosis    albuterol 108 (90 BASE) MCG/ACT Inhaler    PROAIR HFA/PROVENTIL HFA/VENTOLIN HFA    1 each    Inhale 2 puffs into the lungs every 6 hours    Wheezing       buPROPion 150 MG 24 hr tablet    WELLBUTRIN XL     Take 3 tablets by mouth every morning. Indications: Major Depressive Disorder.        cetirizine 10 MG tablet    zyrTEC    90 tablet    Take 1 tablet (10 mg) by mouth every evening    Disorder of left eustachian tube       cholecalciferol 2000 UNITS Caps      Take 2 tablets by mouth daily.        dabigatran ANTICOAGULANT 150 MG capsule    PRADAXA ANTICOAGULANT    180 capsule    Take 1 capsule (150 mg) by mouth every 12 hours Store in original bottle/blister pack. Use within 120 days of opening.    Chronic atrial fibrillation (H)       diclofenac 1 % Gel topical gel    VOLTAREN    100 g    Place 2 g onto the skin 4 times daily    Acute low back pain with sciatica, sciatica laterality unspecified, unspecified back pain laterality       diltiazem 240 MG 24 hr ER beaded capsule    TIAZAC    30 capsule    Take 1 capsule (240 mg) by mouth daily    Essential  hypertension, benign       fluticasone 50 MCG/ACT spray    FLONASE    16 g    Spray 2 sprays into both nostrils daily    Disorder of left eustachian tube       fosinopril 10 MG tablet    MONOPRIL    90 tablet    Take 1 tablet (10 mg) by mouth daily    Secondary hypertension with goal blood pressure less than 140/90       gabapentin 100 MG capsule    NEURONTIN    90 capsule    Take 1 capsule (100 mg) by mouth 3 times daily    Acute low back pain with sciatica, sciatica laterality unspecified, unspecified back pain laterality       medroxyPROGESTERone 10 MG tablet    PROVERA     Take 10 mg by mouth daily        metoprolol 50 MG 24 hr tablet    TOPROL-XL    30 tablet    Take 1 tablet (50 mg) by mouth daily    Essential hypertension, benign       tiZANidine 4 MG tablet    ZANAFLEX    30 tablet    Take 0.5-1 tablets (2-4 mg) by mouth 3 times daily as needed for muscle spasms    Acute low back pain with sciatica, sciatica laterality unspecified, unspecified back pain laterality, Lumbar radiculitis       traMADol 50 MG tablet    ULTRAM    20 tablet    Take 1-2 tablets ( mg) by mouth every 6 hours as needed for pain    Lumbar radiculitis

## 2017-12-20 ENCOUNTER — OFFICE VISIT (OUTPATIENT)
Dept: SLEEP MEDICINE | Facility: CLINIC | Age: 52
End: 2017-12-20
Payer: COMMERCIAL

## 2017-12-20 VITALS
HEART RATE: 88 BPM | OXYGEN SATURATION: 98 % | BODY MASS INDEX: 48.82 KG/M2 | RESPIRATION RATE: 16 BRPM | SYSTOLIC BLOOD PRESSURE: 128 MMHG | WEIGHT: 293 LBS | HEIGHT: 65 IN | DIASTOLIC BLOOD PRESSURE: 83 MMHG

## 2017-12-20 DIAGNOSIS — G47.33 OSA (OBSTRUCTIVE SLEEP APNEA): Primary | ICD-10-CM

## 2017-12-20 PROCEDURE — 99214 OFFICE O/P EST MOD 30 MIN: CPT | Performed by: PHYSICIAN ASSISTANT

## 2017-12-20 NOTE — MR AVS SNAPSHOT
After Visit Summary   12/20/2017    Mary Gorman    MRN: [id number]           Patient Information     Date Of Birth          1965        Visit Information        Provider Department      12/20/2017 3:00 PM Goltz, Bennett Ezra, PA-C [facility]        Today's Diagnoses     BALTA (obstructive sleep apnea)    -  1      Care Instructions      Your BMI is Body mass index is 48.76 kg/(m^2).  Weight management is a personal decision.  If you are interested in exploring weight loss strategies, the following discussion covers the approaches that may be successful. Body mass index (BMI) is one way to tell whether you are at a healthy weight, overweight, or obese. It measures your weight in relation to your height.  A BMI of 18.5 to 24.9 is in the healthy range. A person with a BMI of 25 to 29.9 is considered overweight, and someone with a BMI of 30 or greater is considered obese. More than two-thirds of American adults are considered overweight or obese.  Being overweight or obese increases the risk for further weight gain. Excess weight may lead to heart disease and diabetes.  Creating and following plans for healthy eating and physical activity may help you improve your health.  Weight control is part of healthy lifestyle and includes exercise, emotional health, and healthy eating habits. Careful eating habits lifelong are the mainstay of weight control. Though there are significant health benefits from weight loss, long-term weight loss with diet alone may be very difficult to achieve- studies show long-term success with dietary management in less than 10% of people. Attaining a healthy weight may be especially difficult to achieve in those with severe obesity. In some cases, medications, devices and surgical management might be considered.  What can you do?  If you are overweight or obese and are interested in methods for weight loss, you should discuss this with your provider.      Consider reducing daily calorie intake by 500 calories.     Keep a food journal.     Avoiding skipping meals, consider cutting portions instead.    Diet combined with exercise helps maintain muscle while optimizing fat loss. Strength training is particularly important for building and maintaining muscle mass. Exercise helps reduce stress, increase energy, and improves fitness. Increasing exercise without diet control, however, may not burn enough calories to loose weight.       Start walking three days a week 10-20 minutes at a time    Work towards walking thirty minutes five days a week     Eventually, increase the speed of your walking for 1-2 minutes at time    In addition, we recommend that you review healthy lifestyles and methods for weight loss available through the National Institutes of Health patient information sites:  http://win.niddk.nih.gov/publications/index.htm    And look into health and wellness programs that may be available through your health insurance provider, employer, local community center, or nilesh club.    Weight management plan: Patient was referred to their PCP to discuss a diet and exercise plan.              Follow-ups after your visit        Follow-up notes from your care team     Return in about 2 years (around 12/20/2019) for CPAP compliance recheck.      Who to contact     If you have questions or need follow up information about today's clinic visit or your schedule please contact [facility] directly at [phone].  Normal or non-critical lab and imaging results will be communicated to you by MyChart, letter or phone within 4 business days after the clinic has received the results. If you do not hear from us within 7 days, please contact the clinic through [email or web address] or phone. If you have a critical or abnormal lab result, we will notify you by phone as soon as possible.  Submit refill requests through [employer] or call your pharmacy and they will forward the  "refill request to us. Please allow 3 business days for your refill to be completed.          Additional Information About Your Visit        PointBurstharConferize Information     ascentify gives you secure access to your electronic health record. If you see a primary care provider, you can also send messages to your care team and make appointments. If you have questions, please call your primary care clinic.  If you do not have a primary care provider, please call [phone] and they will assist you.        Care EveryWhere ID     This is your Care EveryWhere ID. This could be used by other organizations to access your [facility] medical records  [id number]        Your Vitals Were     Pulse Respirations Height Pulse Oximetry BMI (Body Mass Index)       88 16 1.651 m (5' 5\") 98% 48.76 kg/m2        Blood Pressure from Last 3 Encounters:   12/20/17 128/83   10/31/17 128/80   10/17/17 112/80    Weight from Last 3 Encounters:   12/20/17 132.9 kg (293 lb)   10/31/17 132.5 kg (292 lb)   10/17/17 132.5 kg (292 lb)              We Performed the Following     Comprehensive DME          Today's Medication Changes          These changes are accurate as of: 12/20/17  3:30 PM.  If you have any questions, ask your nurse or doctor.               These medicines have changed or have updated prescriptions.        Dose/Directions    diclofenac 1 % Gel topical gel   Commonly known as:  VOLTAREN   This may have changed:    - when to take this  - reasons to take this   Used for:  Acute low back pain with sciatica, sciatica laterality unspecified, unspecified back pain laterality        Dose:  2 g   Place 2 g onto the skin 4 times daily   Quantity:  100 g   Refills:  1       gabapentin 100 MG capsule   Commonly known as:  NEURONTIN   This may have changed:    - when to take this  - reasons to take this   Used for:  Acute low back pain with sciatica, sciatica laterality unspecified, unspecified back pain laterality        Dose:  100 mg   Take 1 capsule " (100 mg) by mouth 3 times daily   Quantity:  90 capsule   Refills:  1                Primary Care Provider Office Phone # Fax #    Nicole Joy Siegler, PA-C [phone]       [facility] [address]  [facility] [address]        Equal Access to Services     LORENZO CONTRERAS : Hadii aad ku hadasho Soomaali, waaxda luqadaha, qaybta kaalmada adeegyada, waxay idiin hayaan adeeg khantoniosh la'rossn . So [facility] [phone].    ATENCIÓN: Si habla español, tiene a kiser disposición servicios gratuitos de asistencia lingüística. [place] al [phone].    We comply with applicable federal civil rights laws and [place] laws. We do not discriminate on the basis of race, color, national origin, age, disability, sex, sexual orientation, or gender identity.            Thank you!     Thank you for choosing [facility]  for your care. Our goal is always to provide you with excellent care. Hearing back from our patients is one way we can continue to improve our services. Please take a few minutes to complete the written survey that you may receive in the mail after your visit with us. Thank you!             Your Updated Medication List - Protect others around you: Learn how to safely use, store and throw away your medicines at www.disposemymeds.org.          This list is accurate as of: 12/20/17  3:30 PM.  Always use your most recent med list.                   Brand Name Dispense Instructions for use Diagnosis    albuterol 108 (90 BASE) MCG/ACT Inhaler    PROAIR HFA/PROVENTIL HFA/VENTOLIN HFA    1 each    Inhale 2 puffs into the lungs every 6 hours    Wheezing       buPROPion 150 MG 24 hr tablet    WELLBUTRIN XL     Take 3 tablets by mouth every morning. Indications: Major Depressive Disorder.        cetirizine 10 MG tablet    zyrTEC    90 tablet    Take 1 tablet (10 mg) by mouth every evening    Disorder of left eustachian tube       cholecalciferol 2000 UNITS Caps      Take 2 tablets by mouth  daily.        dabigatran ANTICOAGULANT 150 MG capsule    PRADAXA ANTICOAGULANT    180 capsule    Take 1 capsule (150 mg) by mouth every 12 hours Store in original bottle/blister pack. Use within 120 days of opening.    Chronic atrial fibrillation (H)       diclofenac 1 % Gel topical gel    VOLTAREN    100 g    Place 2 g onto the skin 4 times daily    Acute low back pain with sciatica, sciatica laterality unspecified, unspecified back pain laterality       diltiazem 240 MG 24 hr ER beaded capsule    TIAZAC    30 capsule    Take 1 capsule (240 mg) by mouth daily    Essential hypertension, benign       fluticasone 50 MCG/ACT spray    FLONASE    16 g    Spray 2 sprays into both nostrils daily    Disorder of left eustachian tube       fosinopril 10 MG tablet    MONOPRIL    90 tablet    Take 1 tablet (10 mg) by mouth daily    Secondary hypertension with goal blood pressure less than 140/90       gabapentin 100 MG capsule    NEURONTIN    90 capsule    Take 1 capsule (100 mg) by mouth 3 times daily    Acute low back pain with sciatica, sciatica laterality unspecified, unspecified back pain laterality       medroxyPROGESTERone 10 MG tablet    PROVERA     Take 10 mg by mouth daily        metoprolol 50 MG 24 hr tablet    TOPROL-XL    30 tablet    Take 1 tablet (50 mg) by mouth daily    Essential hypertension, benign       tiZANidine 4 MG tablet    ZANAFLEX    30 tablet    Take 0.5-1 tablets (2-4 mg) by mouth 3 times daily as needed for muscle spasms    Acute low back pain with sciatica, sciatica laterality unspecified, unspecified back pain laterality, Lumbar radiculitis       traMADol 50 MG tablet    ULTRAM    20 tablet    Take 1-2 tablets ( mg) by mouth every 6 hours as needed for pain    Lumbar radiculitis

## 2017-12-20 NOTE — PATIENT INSTRUCTIONS

## 2017-12-20 NOTE — NURSING NOTE
"Chief Complaint   Patient presents with     CPAP Follow Up     Follow up melissa       Initial /83  Pulse 88  Resp 16  Ht 1.651 m (5' 5\")  Wt 132.9 kg (293 lb)  SpO2 98%  BMI 48.76 kg/m2 Estimated body mass index is 48.76 kg/(m^2) as calculated from the following:    Height as of this encounter: 1.651 m (5' 5\").    Weight as of this encounter: 132.9 kg (293 lb).  Medication Reconciliation: complete   ESS 2  Dinorah Márquez MA      "

## 2017-12-20 NOTE — PROGRESS NOTES
Sleep Study Follow-Up Visit:    Date on this visit: 12/20/2017    Mary Gorman is a pleasant 48-year-old female who [facility] in followup of CPAP use for severe obstructive sleep apnea. Her AHI was 39, RDI was 60, desaturations to 89%. She is on an auto titrating CPAP with pressure range 7-12 cm of water.  She feels things are going fine. She could use new supplies. It has been a year since replacing them. She is not snoring with CPAP. She uses a nasal mask. She does not get leak. No dry nose or mouth as long as she uses the humidifier. She is comfortable with the pressures.   The compliance data shows that she has used the CPAP for 90/90 nights, 100% of nights for >4 hours.  The 95th% pressure is 11.6 cm.  The 95th% leak is 0.2 lpm.  The average nightly usage is 8:15.  The average AHI is 0.2/hr.  Her weight is 293, up from 265 in 2015.  Bedtime 11 PM to midnight. Up 7-7:30 AM. She falls asleep within 30 minutes. Hot flashes sometimes interfere with sleep. She may wake 1-2 times, often because her hands fall asleep. Her energy is ok, except she hurt her back in May. She is just starting to get back to her prior level of activity. No naps.    Past medical/surgical history, family history, social history, medications and allergies were reviewed.      Problem List:  Patient Active Problem List    Diagnosis Date Noted     Hip pain, right 06/09/2017     Priority: Medium     Chronic pain of left knee 06/09/2017     Priority: Medium     Prediabetes 12/08/2016     Priority: Medium     Diabetic diagnosis occurred during use of antipsychotic medications with improvement in A1C since d/c antipsychotics x 2 years.       Morbid obesity due to excess calories (H) 12/06/2016     Priority: Medium     Lipoma of skin and subcutaneous tissue 11/10/2016     Priority: Medium     11/10/2016: Present for about a year, located on LUQ of abdomen, about 19 cm in width, 12 cm in length.       ACP (advance  care planning) 11/18/2014     Priority: Medium     Advance Care Planning: Receipt of ACP document:  Received: Health Care Directive which was witnessed or notarized on 7-15-11.  Document previously scanned on 7-18-11 in [facility]-moved to epic 12-29-14.  Validation form completed and sent to be scanned.  Code Status needs to be updated to reflect choices in most recent ACP document.  Confirmed/documented designated decision maker(s). See permanent comments section of demographics in clinical tab. View document(s) and details by clicking on code status. Added by Tricia Hubbard RN, System ACP Coordinator [facility] on 12/29/2014.             Hypertension goal BP (blood pressure) < 140/90 05/15/2014     Priority: Medium     Mild recurrent major depression (H) 06/23/2013     Priority: Medium     Dr Varela's office asking results be faxed to their office- see contact info       Hyperlipidemia LDL goal <100 05/03/2013     Priority: Medium     Atrial fibrillation (H) 05/03/2013     Priority: Medium     Low back pain 03/26/2013     Priority: Medium     Lumbar radiculitis 03/26/2013     Priority: Medium     BALTA (obstructive sleep apnea) 12/02/2011     Priority: Medium     AHI 39 and RDI of 60, desaturations to as 89%       Permanent atrial fibrillation (H) 06/05/2011     Priority: Medium        Impression/Plan:    (G47.33) BALTA (obstructive sleep apnea)  (primary encounter diagnosis)  Comment: She is doing very well with CPAP  Plan: Comprehensive DME        Continue auto CPAP 7-12 cm. She uses [facility]. We reviewed the schedule for cleaning and replacing supplies. We reviewed symptoms to look for as an indication that her apnea may be getting worse. We discussed the relationship between weight and apnea. She is increasing her activity level now that her back is feeling better.      She will follow up with me in about 2 year(s).     Fifteen minutes spent with patient, all of which were spent face-to-face counseling,  consulting, coordinating plan of care.      Bennett Goltz, PA-C    CC: No ref. provider found

## 2018-04-20 ENCOUNTER — OFFICE VISIT (OUTPATIENT)
Dept: CARDIOLOGY | Facility: CLINIC | Age: 53
End: 2018-04-20
Attending: INTERNAL MEDICINE
Payer: COMMERCIAL

## 2018-04-20 VITALS
SYSTOLIC BLOOD PRESSURE: 125 MMHG | DIASTOLIC BLOOD PRESSURE: 84 MMHG | BODY MASS INDEX: 48.82 KG/M2 | HEIGHT: 65 IN | HEART RATE: 64 BPM | WEIGHT: 293 LBS

## 2018-04-20 DIAGNOSIS — I10 ESSENTIAL HYPERTENSION: Primary | ICD-10-CM

## 2018-04-20 DIAGNOSIS — I48.20 CHRONIC ATRIAL FIBRILLATION (H): ICD-10-CM

## 2018-04-20 PROCEDURE — 99214 OFFICE O/P EST MOD 30 MIN: CPT | Performed by: INTERNAL MEDICINE

## 2018-04-20 PROCEDURE — 93000 ELECTROCARDIOGRAM COMPLETE: CPT | Performed by: INTERNAL MEDICINE

## 2018-04-20 RX ORDER — SPIRONOLACTONE 25 MG/1
25 TABLET ORAL DAILY
Qty: 30 TABLET | Refills: 3 | Status: SHIPPED | OUTPATIENT
Start: 2018-04-20 | End: 2018-08-06

## 2018-04-20 NOTE — MR AVS SNAPSHOT
After Visit Summary   4/20/2018    Mary Gorman    MRN: [id number]           Patient Information     Date Of Birth          1965        Visit Information        Provider Department      4/20/2018 3:45 PM Jeffrey Greenfield MD [facility]        Today's Diagnoses     Essential hypertension    -  1    Chronic atrial fibrillation (H)           Follow-ups after your visit        Additional Services     Follow-Up with Cardiac Advanced Practice Provider           Follow-Up with Electrophysiologist                 Future tests that were ordered for you today     Open Future Orders        Priority Expected Expires Ordered    Follow-Up with Cardiac Advanced Practice Provider Routine 4/20/2019 9/2/2019 4/20/2018    Follow-Up with Electrophysiologist Routine 4/20/2020 9/2/2020 4/20/2018            Who to contact     If you have questions or need follow up information about today's clinic visit or your schedule please contact [facility] directly at [phone].  Normal or non-critical lab and imaging results will be communicated to you by ZeaChemhart, letter or phone within 4 business days after the clinic has received the results. If you do not hear from us within 7 days, please contact the clinic through Ondoret or phone. If you have a critical or abnormal lab result, we will notify you by phone as soon as possible.  Submit refill requests through YouLike or call your pharmacy and they will forward the refill request to us. Please allow 3 business days for your refill to be completed.          Additional Information About Your Visit        MyChart Information     YouLike gives you secure access to your electronic health record. If you see a primary care provider, you can also send messages to your care team and make appointments. If you have questions, please call your primary care clinic.  If you do not have a primary care provider,  "please call [phone] and they will assist you.        Care EveryWhere ID     This is your Care EveryWhere ID. This could be used by other organizations to access your [facility] medical records  [id number]        Your Vitals Were     Pulse Height BMI (Body Mass Index)             64 1.651 m (5' 5\") 50.04 kg/m2          Blood Pressure from Last 3 Encounters:   04/20/18 125/84   12/20/17 128/83   10/31/17 128/80    Weight from Last 3 Encounters:   04/20/18 136.4 kg (300 lb 11.2 oz)   12/20/17 132.9 kg (293 lb)   10/31/17 132.5 kg (292 lb)              We Performed the Following     EKG 12-lead complete w/read - Clinics     Follow-Up with Electrophysiologist          Today's Medication Changes          These changes are accurate as of 4/20/18  4:03 PM.  If you have any questions, ask your nurse or doctor.               Start taking these medicines.        Dose/Directions    spironolactone 25 MG tablet   Commonly known as:  ALDACTONE   Used for:  Essential hypertension   Started by:  Jeffrey Greenfield MD        Dose:  25 mg   Take 1 tablet (25 mg) by mouth daily   Quantity:  30 tablet   Refills:  3         These medicines have changed or have updated prescriptions.        Dose/Directions    cetirizine 10 MG tablet   Commonly known as:  zyrTEC   This may have changed:    - when to take this  - reasons to take this   Used for:  Disorder of left eustachian tube        Dose:  10 mg   Take 1 tablet (10 mg) by mouth every evening   Quantity:  90 tablet   Refills:  3       diclofenac 1 % Gel topical gel   Commonly known as:  VOLTAREN   This may have changed:    - when to take this  - reasons to take this   Used for:  Acute low back pain with sciatica, sciatica laterality unspecified, unspecified back pain laterality        Dose:  2 g   Place 2 g onto the skin 4 times daily   Quantity:  100 g   Refills:  1       gabapentin 100 MG capsule   Commonly known as:  NEURONTIN   This may have changed:    - when to take this  - reasons to " take this   Used for:  Acute low back pain with sciatica, sciatica laterality unspecified, unspecified back pain laterality        Dose:  100 mg   Take 1 capsule (100 mg) by mouth 3 times daily   Quantity:  90 capsule   Refills:  1            Where to get your medicines      These medications were sent to [id number] - [place], [place] - [address] AT [facility] [address]  [address], [facility] [address]     Phone:  [phone]     spironolactone 25 MG tablet                Primary Care Provider Office Phone # Fax #    Nicole Joy Siegler, PA-C [phone]       [facility] [address]  [facility] [address]        Equal Access to Services     LORENZO CONTRERAS : Hadii juarez hernandez hadasho Soomaali, waaxda luqadaha, qaybta kaalmada adeegyada, eve sanchez . So [facility] [phone].    ATENCIÓN: Si habla español, tiene a kiser disposición servicios gratuitos de asistencia lingüística. Llame al [phone].    We comply with applicable federal civil rights laws and [place] laws. We do not discriminate on the basis of race, color, national origin, age, disability, sex, sexual orientation, or gender identity.            Thank you!     Thank you for choosing [facility]  for your care. Our goal is always to provide you with excellent care. Hearing back from our patients is one way we can continue to improve our services. Please take a few minutes to complete the written survey that you may receive in the mail after your visit with us. Thank you!             Your Updated Medication List - Protect others around you: Learn how to safely use, store and throw away your medicines at www.disposemymeds.org.          This list is accurate as of 4/20/18  4:03 PM.  Always use your most recent med list.                   Brand Name Dispense Instructions for use Diagnosis    albuterol 108  (90 Base) MCG/ACT Inhaler    PROAIR HFA/PROVENTIL HFA/VENTOLIN HFA    1 each    Inhale 2 puffs into the lungs every 6 hours    Wheezing       buPROPion 150 MG 24 hr tablet    WELLBUTRIN XL     Take 3 tablets by mouth every morning. Indications: Major Depressive Disorder.        cetirizine 10 MG tablet    zyrTEC    90 tablet    Take 1 tablet (10 mg) by mouth every evening    Disorder of left eustachian tube       cholecalciferol 2000 units Caps      Take 2 tablets by mouth daily.        dabigatran ANTICOAGULANT 150 MG capsule    PRADAXA ANTICOAGULANT    180 capsule    Take 1 capsule (150 mg) by mouth every 12 hours Store in original bottle/blister pack. Use within 120 days of opening.    Chronic atrial fibrillation (H)       diclofenac 1 % Gel topical gel    VOLTAREN    100 g    Place 2 g onto the skin 4 times daily    Acute low back pain with sciatica, sciatica laterality unspecified, unspecified back pain laterality       diltiazem 240 MG 24 hr ER beaded capsule    TIAZAC    30 capsule    Take 1 capsule (240 mg) by mouth daily    Essential hypertension, benign       fluticasone 50 MCG/ACT spray    FLONASE    16 g    Spray 2 sprays into both nostrils daily    Disorder of left eustachian tube       fosinopril 10 MG tablet    MONOPRIL    90 tablet    Take 1 tablet (10 mg) by mouth daily    Secondary hypertension with goal blood pressure less than 140/90       gabapentin 100 MG capsule    NEURONTIN    90 capsule    Take 1 capsule (100 mg) by mouth 3 times daily    Acute low back pain with sciatica, sciatica laterality unspecified, unspecified back pain laterality       medroxyPROGESTERone 10 MG tablet    PROVERA     Take 10 mg by mouth daily        metoprolol succinate 50 MG 24 hr tablet    TOPROL-XL    30 tablet    Take 1 tablet (50 mg) by mouth daily    Essential hypertension, benign       spironolactone 25 MG tablet    ALDACTONE    30 tablet    Take 1 tablet (25 mg) by mouth daily    Essential hypertension        tiZANidine 4 MG tablet    ZANAFLEX    30 tablet    Take 0.5-1 tablets (2-4 mg) by mouth 3 times daily as needed for muscle spasms    Acute low back pain with sciatica, sciatica laterality unspecified, unspecified back pain laterality, Lumbar radiculitis       traMADol 50 MG tablet    ULTRAM    20 tablet    Take 1-2 tablets ( mg) by mouth every 6 hours as needed for pain    Lumbar radiculitis

## 2018-04-20 NOTE — LETTER
4/20/2018      Nicole Joy Siegler, PA-C  [facility] [address]  [facility] [address]      RE: Mary Gorman       Dear Colleague,    I had the pleasure of seeing Mary Gorman in the [facility].    Service Date: 04/20/2018      HISTORY OF PRESENT ILLNESS:  I saw Ms. Gorman for followup of atrial fibrillation.  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  She has been on rate control and anticoagulation because she did not have apparent symptoms from atrial fibrillation and the rate could be controlled medically.  Over the last year she has not had any palpitation or chest pain.  She now has some back pain issue.  She has had difficulty to lose weight.  In addition, she has noticed some ankle swelling.      PHYSICAL EXAMINATION:   VITAL SIGNS:  Blood pressure was 125/84, heart rate 64 beats per minute, body weight 300 pounds.   CARDIAC:  Rhythm was irregularly irregular, and the heart sounds were normal.   EXTREMITIES:  She does have tracing bilateral leg edema.      EKG showed atrial fibrillation with narrow QRS complex and controlled ventricular rate.      Her previous creatinine level back in 2015 was mildly elevated.      ASSESSMENT AND RECOMMENDATIONS:  Ms. Gorman remains in chronic atrial fibrillation with controlled ventricular rate.  Her leg edema could be due to chronic renal insufficiency and side effects of diltiazem.  I originally considered addition of hydrochlorothiazide, but that is on her allergy list.  I therefore prescribed spironolactone for her leg edema.  She is asked to check her creatinine with you when she has her next visit in your office.  Otherwise, she will continue the cardiac medications and return for followup visit in a year.      cc:   Nicole Siegler, PA-C    [facility]    [address] [place] [address], [address]    [place]  [address]         MORA HUBBARD MD              D: 2018   T: 2018   MT: CLEMENTINE      Name:     ALBARO ESTES   MRN:      [id number]        Account:      [id number]   :      1965           Service Date: 2018      Document: [id number]           Outpatient Encounter Prescriptions as of 2018   Medication Sig Dispense Refill     albuterol (PROAIR HFA, PROVENTIL HFA, VENTOLIN HFA) 108 (90 BASE) MCG/ACT inhaler Inhale 2 puffs into the lungs every 6 hours 1 each 0     buPROPion (WELLBUTRIN XL) 150 MG 24 hr tablet Take 3 tablets by mouth every morning. Indications: Major Depressive Disorder.        cetirizine (ZYRTEC) 10 MG tablet Take 1 tablet (10 mg) by mouth every evening (Patient taking differently: Take 10 mg by mouth as needed ) 90 tablet 3     cholecalciferol 2000 UNITS CAPS Take 2 tablets by mouth daily.       dabigatran ANTICOAGULANT (PRADAXA ANTICOAGULANT) 150 MG CAPS capsule Take 1 capsule (150 mg) by mouth every 12 hours Store in original bottle/blister pack. Use within 120 days of opening. 180 capsule 3     diclofenac (VOLTAREN) 1 % GEL topical gel Place 2 g onto the skin 4 times daily (Patient taking differently: Place 2 g onto the skin as needed ) 100 g 1     diltiazem (TIAZAC) 240 MG 24 hr ER beaded capsule Take 1 capsule (240 mg) by mouth daily 30 capsule 11     fluticasone (FLONASE) 50 MCG/ACT spray Spray 2 sprays into both nostrils daily 16 g 11     fosinopril (MONOPRIL) 10 MG tablet Take 1 tablet (10 mg) by mouth daily 90 tablet 3     gabapentin (NEURONTIN) 100 MG capsule Take 1 capsule (100 mg) by mouth 3 times daily (Patient taking differently: Take 100 mg by mouth as needed ) 90 capsule 1     medroxyPROGESTERone (PROVERA) 10 MG tablet Take 10 mg by mouth daily       metoprolol (TOPROL-XL) 50 MG 24 hr tablet Take 1 tablet (50 mg) by mouth daily 30 tablet 11     spironolactone (ALDACTONE) 25 MG tablet Take 1 tablet (25 mg) by mouth daily 30 tablet 3     tiZANidine (ZANAFLEX) 4 MG tablet Take 0.5-1 tablets (2-4 mg) by  mouth 3 times daily as needed for muscle spasms 30 tablet 1     traMADol (ULTRAM) 50 MG tablet Take 1-2 tablets ( mg) by mouth every 6 hours as needed for pain 20 tablet 0     No facility-administered encounter medications on file as of 4/20/2018.        Again, thank you for allowing me to participate in the care of your patient.      Sincerely,    Jeffrey Greenfield MD     [facility]

## 2018-04-20 NOTE — PROGRESS NOTES
Service Date: 2018      HISTORY OF PRESENT ILLNESS:  I saw Ms. Estes for followup of atrial fibrillation.  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  She has been on rate control and anticoagulation because she did not have apparent symptoms from atrial fibrillation and the rate could be controlled medically.  Over the last year she has not had any palpitation or chest pain.  She now has some back pain issue.  She has had difficulty to lose weight.  In addition, she has noticed some ankle swelling.      PHYSICAL EXAMINATION:   VITAL SIGNS:  Blood pressure was 125/84, heart rate 64 beats per minute, body weight 300 pounds.   CARDIAC:  Rhythm was irregularly irregular, and the heart sounds were normal.   EXTREMITIES:  She does have tracing bilateral leg edema.      EKG showed atrial fibrillation with narrow QRS complex and controlled ventricular rate.      Her previous creatinine level back in  was mildly elevated.      ASSESSMENT AND RECOMMENDATIONS:  Ms. Estes remains in chronic atrial fibrillation with controlled ventricular rate.  Her leg edema could be due to chronic renal insufficiency and side effects of diltiazem.  I originally considered addition of hydrochlorothiazide, but that is on her allergy list.  I therefore prescribed spironolactone for her leg edema.  She is asked to check her creatinine with you when she has her next visit in your office.  Otherwise, she will continue the cardiac medications and return for followup visit in a year.      cc:   Nicole Siegler, PA-C    [address], [address]    [place] [address]         MORA HUBBARD MD             D: 2018   T: 2018   MT: CLEMENTINE      Name:     ALBARO ESTES   MRN:      [id number]        Account:      [id number]   :      1965           Service Date: 2018      Document: [id number]

## 2018-04-20 NOTE — LETTER
4/20/2018    Nicole Joy Siegler, PA-C  [facility] [address]  [facility] [address]    RE: Mary Gorman       Dear Colleague,    I had the pleasure of seeing Mary Gorman in the [facility].    HPI and Plan:   See dictation    Orders Placed This Encounter   Procedures     Follow-Up with Cardiac Advanced Practice Provider     Follow-Up with Electrophysiologist     EKG 12-lead complete w/read - Clinics       No orders of the defined types were placed in this encounter.      There are no discontinued medications.      Encounter Diagnosis   Name Primary?     Chronic atrial fibrillation (H)        CURRENT MEDICATIONS:  Current Outpatient Prescriptions   Medication Sig Dispense Refill     albuterol (PROAIR HFA, PROVENTIL HFA, VENTOLIN HFA) 108 (90 BASE) MCG/ACT inhaler Inhale 2 puffs into the lungs every 6 hours 1 each 0     buPROPion (WELLBUTRIN XL) 150 MG 24 hr tablet Take 3 tablets by mouth every morning. Indications: Major Depressive Disorder.        cetirizine (ZYRTEC) 10 MG tablet Take 1 tablet (10 mg) by mouth every evening (Patient taking differently: Take 10 mg by mouth as needed ) 90 tablet 3     cholecalciferol 2000 UNITS CAPS Take 2 tablets by mouth daily.       dabigatran ANTICOAGULANT (PRADAXA ANTICOAGULANT) 150 MG CAPS capsule Take 1 capsule (150 mg) by mouth every 12 hours Store in original bottle/blister pack. Use within 120 days of opening. 180 capsule 3     diclofenac (VOLTAREN) 1 % GEL topical gel Place 2 g onto the skin 4 times daily (Patient taking differently: Place 2 g onto the skin as needed ) 100 g 1     diltiazem (TIAZAC) 240 MG 24 hr ER beaded capsule Take 1 capsule (240 mg) by mouth daily 30 capsule 11     fluticasone (FLONASE) 50 MCG/ACT spray Spray 2 sprays into both nostrils daily 16 g 11     fosinopril (MONOPRIL) 10 MG tablet Take 1 tablet (10 mg) by mouth daily 90 tablet 3     gabapentin (NEURONTIN) 100 MG capsule  Take 1 capsule (100 mg) by mouth 3 times daily (Patient taking differently: Take 100 mg by mouth as needed ) 90 capsule 1     medroxyPROGESTERone (PROVERA) 10 MG tablet Take 10 mg by mouth daily       metoprolol (TOPROL-XL) 50 MG 24 hr tablet Take 1 tablet (50 mg) by mouth daily 30 tablet 11     tiZANidine (ZANAFLEX) 4 MG tablet Take 0.5-1 tablets (2-4 mg) by mouth 3 times daily as needed for muscle spasms 30 tablet 1     traMADol (ULTRAM) 50 MG tablet Take 1-2 tablets ( mg) by mouth every 6 hours as needed for pain 20 tablet 0       ALLERGIES     Allergies   Allergen Reactions     Dyazide [Hydrochlorothiazide W/Triamterene] Unknown     Nickel Rash     Robaxin [Methocarbamol] Rash     Sulfa Drugs Rash       PAST MEDICAL HISTORY:  Past Medical History:   Diagnosis Date     Atrial fibrillation (H)      Depression      GERD (gastroesophageal reflux disease)      HTN (hypertension)      Hyperlipidemia      BALTA (obstructive sleep apnea)      Permanent atrial fibrillation (H) 6/5/11       PAST SURGICAL HISTORY:  Past Surgical History:   Procedure Laterality Date     CARDIOVERSION  6/7/11     CHOLECYSTECTOMY       DILATION AND CURETTAGE  4/26/2012    Procedure:DILATION AND CURETTAGE; DILATION AND CURETTAGE; Surgeon:JEAN-PAUL DEL CID; Location:[facility]     DILATION AND CURETTAGE, HYSTEROSCOPY DIAGNOSTIC, COMBINED  12/8/2011    Procedure:COMBINED DILATION AND CURETTAGE, HYSTEROSCOPY DIAGNOSTIC; DILATION AND CURETTAGE, DIAGNOSTIC HYSTEROSCOPY ; Surgeon:JEAN-PAUL DEL CID; Location:[facility]     KNEE SURGERY         FAMILY HISTORY:  Family History   Problem Relation Age of Onset     Hypertension Mother      HEART DISEASE Mother      A-fib     Arthritis Mother      HEART DISEASE Father      triple bypass     CANCER Father 50     bladder     Hypertension Father      Respiratory Father      smoker     CANCER Paternal Grandmother 90     rectal     Unknown/Adopted Brother        SOCIAL HISTORY:  Social History     Social History     Marital  "status: Single     Spouse name: N/A     Number of children: N/A     Years of education: N/A     Social History Main Topics     Smoking status: Never Smoker     Smokeless tobacco: Never Used     Alcohol use Yes      Comment: rarely     Drug use: No     Sexual activity: No     Other Topics Concern     Parent/Sibling W/ Cabg, Mi Or Angioplasty Before 65f 55m? No     Special Diet No     Exercise Yes     walks 2 miles minimum 5 x weekly      Social History Narrative       Review of Systems:  Skin:  Negative       Eyes:  Negative      ENT:  Negative      Respiratory:  Positive for dyspnea on exertion increasing    Cardiovascular:    Positive for palpitations increasing   Gastroenterology: Negative      Genitourinary:  Negative      Musculoskeletal:  Positive for arthritis    Neurologic:  Negative      Psychiatric:  Negative      Heme/Lymph/Imm:  Negative      Endocrine:  Negative        Physical Exam:  Vitals: /84  Pulse 64  Ht 1.651 m (5' 5\")  Wt 136.4 kg (300 lb 11.2 oz)  BMI 50.04 kg/m2    Constitutional:  cooperative, alert and oriented, well developed, well nourished, in no acute distress        Skin:  warm and dry to the touch, no apparent skin lesions or masses noted          Head:  normocephalic, no masses or lesions        Eyes:           Lymph:      ENT:  no pallor or cyanosis, dentition good        Neck:  JVP normal;no carotid bruit        Respiratory:  normal breath sounds, clear to auscultation, normal A-P diameter, normal symmetry, normal respiratory excursion, no use of accessory muscles         Cardiac:   irregularly irregular rhythm           HR 70s  pulses full and equal                                        GI:  abdomen soft obese      Extremities and Muscular Skeletal:  no deformities, clubbing, cyanosis, erythema observed   bilateral LE edema;trace          Neurological:           Psych:           CC  Jeffrey Greenfield MD  [address]  [address]  [place] [id number]                Thank you for " allowing me to participate in the care of your patient.      Sincerely,     Jeffrey Greenfield MD     [facility]    cc:   Jeffrey Greenfield MD  [address]  [address]  [place] [address]

## 2018-04-20 NOTE — PROGRESS NOTES
HPI and Plan:   See dictation    Orders Placed This Encounter   Procedures     Follow-Up with Cardiac Advanced Practice Provider     Follow-Up with Electrophysiologist     EKG 12-lead complete w/read - Clinics       No orders of the defined types were placed in this encounter.      There are no discontinued medications.      Encounter Diagnosis   Name Primary?     Chronic atrial fibrillation (H)        CURRENT MEDICATIONS:  Current Outpatient Prescriptions   Medication Sig Dispense Refill     albuterol (PROAIR HFA, PROVENTIL HFA, VENTOLIN HFA) 108 (90 BASE) MCG/ACT inhaler Inhale 2 puffs into the lungs every 6 hours 1 each 0     buPROPion (WELLBUTRIN XL) 150 MG 24 hr tablet Take 3 tablets by mouth every morning. Indications: Major Depressive Disorder.        cetirizine (ZYRTEC) 10 MG tablet Take 1 tablet (10 mg) by mouth every evening (Patient taking differently: Take 10 mg by mouth as needed ) 90 tablet 3     cholecalciferol 2000 UNITS CAPS Take 2 tablets by mouth daily.       dabigatran ANTICOAGULANT (PRADAXA ANTICOAGULANT) 150 MG CAPS capsule Take 1 capsule (150 mg) by mouth every 12 hours Store in original bottle/blister pack. Use within 120 days of opening. 180 capsule 3     diclofenac (VOLTAREN) 1 % GEL topical gel Place 2 g onto the skin 4 times daily (Patient taking differently: Place 2 g onto the skin as needed ) 100 g 1     diltiazem (TIAZAC) 240 MG 24 hr ER beaded capsule Take 1 capsule (240 mg) by mouth daily 30 capsule 11     fluticasone (FLONASE) 50 MCG/ACT spray Spray 2 sprays into both nostrils daily 16 g 11     fosinopril (MONOPRIL) 10 MG tablet Take 1 tablet (10 mg) by mouth daily 90 tablet 3     gabapentin (NEURONTIN) 100 MG capsule Take 1 capsule (100 mg) by mouth 3 times daily (Patient taking differently: Take 100 mg by mouth as needed ) 90 capsule 1     medroxyPROGESTERone (PROVERA) 10 MG tablet Take 10 mg by mouth daily       metoprolol (TOPROL-XL) 50 MG 24 hr tablet Take 1 tablet (50 mg) by  mouth daily 30 tablet 11     tiZANidine (ZANAFLEX) 4 MG tablet Take 0.5-1 tablets (2-4 mg) by mouth 3 times daily as needed for muscle spasms 30 tablet 1     traMADol (ULTRAM) 50 MG tablet Take 1-2 tablets ( mg) by mouth every 6 hours as needed for pain 20 tablet 0       ALLERGIES     Allergies   Allergen Reactions     Dyazide [Hydrochlorothiazide W/Triamterene] Unknown     Nickel Rash     Robaxin [Methocarbamol] Rash     Sulfa Drugs Rash       PAST MEDICAL HISTORY:  Past Medical History:   Diagnosis Date     Atrial fibrillation (H)      Depression      GERD (gastroesophageal reflux disease)      HTN (hypertension)      Hyperlipidemia      BALTA (obstructive sleep apnea)      Permanent atrial fibrillation (H) 6/5/11       PAST SURGICAL HISTORY:  Past Surgical History:   Procedure Laterality Date     CARDIOVERSION  6/7/11     CHOLECYSTECTOMY       DILATION AND CURETTAGE  4/26/2012    Procedure:DILATION AND CURETTAGE; DILATION AND CURETTAGE; Surgeon:JEAN-PAUL DEL CID; Location:[facility]     DILATION AND CURETTAGE, HYSTEROSCOPY DIAGNOSTIC, COMBINED  12/8/2011    Procedure:COMBINED DILATION AND CURETTAGE, HYSTEROSCOPY DIAGNOSTIC; DILATION AND CURETTAGE, DIAGNOSTIC HYSTEROSCOPY ; Surgeon:JEAN-PAUL DEL CID; Location:[facility]     KNEE SURGERY         FAMILY HISTORY:  Family History   Problem Relation Age of Onset     Hypertension Mother      HEART DISEASE Mother      A-fib     Arthritis Mother      HEART DISEASE Father      triple bypass     CANCER Father 50     bladder     Hypertension Father      Respiratory Father      smoker     CANCER Paternal Grandmother 90     rectal     Unknown/Adopted Brother        SOCIAL HISTORY:  Social History     Social History     Marital status: Single     Spouse name: N/A     Number of children: N/A     Years of education: N/A     Social History Main Topics     Smoking status: Never Smoker     Smokeless tobacco: Never Used     Alcohol use Yes      Comment: rarely     Drug use: No     Sexual activity: No  "    Other Topics Concern     Parent/Sibling W/ Cabg, Mi Or Angioplasty Before 65f 55m? No     Special Diet No     Exercise Yes     walks 2 miles minimum 5 x weekly      Social History Narrative       Review of Systems:  Skin:  Negative       Eyes:  Negative      ENT:  Negative      Respiratory:  Positive for dyspnea on exertion increasing    Cardiovascular:    Positive for palpitations increasing   Gastroenterology: Negative      Genitourinary:  Negative      Musculoskeletal:  Positive for arthritis    Neurologic:  Negative      Psychiatric:  Negative      Heme/Lymph/Imm:  Negative      Endocrine:  Negative        Physical Exam:  Vitals: /84  Pulse 64  Ht 1.651 m (5' 5\")  Wt 136.4 kg (300 lb 11.2 oz)  BMI 50.04 kg/m2    Constitutional:  cooperative, alert and oriented, well developed, well nourished, in no acute distress        Skin:  warm and dry to the touch, no apparent skin lesions or masses noted          Head:  normocephalic, no masses or lesions        Eyes:           Lymph:      ENT:  no pallor or cyanosis, dentition good        Neck:  JVP normal;no carotid bruit        Respiratory:  normal breath sounds, clear to auscultation, normal A-P diameter, normal symmetry, normal respiratory excursion, no use of accessory muscles         Cardiac:   irregularly irregular rhythm           HR 70s  pulses full and equal                                        GI:  abdomen soft obese      Extremities and Muscular Skeletal:  no deformities, clubbing, cyanosis, erythema observed   bilateral LE edema;trace          Neurological:           Psych:           CC  Jeffrey Greenfield MD  [address]  [address]  [place] [id number]              "

## 2018-05-03 DIAGNOSIS — I10 ESSENTIAL HYPERTENSION, BENIGN: ICD-10-CM

## 2018-05-03 DIAGNOSIS — I48.20 CHRONIC ATRIAL FIBRILLATION (H): ICD-10-CM

## 2018-05-03 RX ORDER — DABIGATRAN ETEXILATE 150 MG/1
150 CAPSULE ORAL EVERY 12 HOURS
Qty: 180 CAPSULE | Refills: 3 | Status: SHIPPED | OUTPATIENT
Start: 2018-05-03 | End: 2019-04-17

## 2018-05-03 RX ORDER — DILTIAZEM HYDROCHLORIDE 240 MG/1
240 CAPSULE, EXTENDED RELEASE ORAL DAILY
Qty: 90 CAPSULE | Refills: 3 | Status: SHIPPED | OUTPATIENT
Start: 2018-05-03 | End: 2019-04-17

## 2018-05-03 RX ORDER — METOPROLOL SUCCINATE 50 MG/1
50 TABLET, EXTENDED RELEASE ORAL DAILY
Qty: 90 TABLET | Refills: 3 | Status: SHIPPED | OUTPATIENT
Start: 2018-05-03 | End: 2019-04-15

## 2018-05-25 ENCOUNTER — TELEPHONE (OUTPATIENT)
Dept: CARDIOLOGY | Facility: CLINIC | Age: 53
End: 2018-05-25

## 2018-05-25 NOTE — TELEPHONE ENCOUNTER
Pt calling and asking if it is OK to take serteraline with her cardiac meds. Reviewed micromedex and there are no interactions with serteraline and diltiazem and metoprolol. The serteraline is ordered by another MD.

## 2018-08-06 DIAGNOSIS — I10 ESSENTIAL HYPERTENSION: ICD-10-CM

## 2018-08-06 RX ORDER — SPIRONOLACTONE 25 MG/1
25 TABLET ORAL DAILY
Qty: 90 TABLET | Refills: 2 | Status: SHIPPED | OUTPATIENT
Start: 2018-08-06 | End: 2019-04-15

## 2018-09-29 DIAGNOSIS — I15.9 SECONDARY HYPERTENSION WITH GOAL BLOOD PRESSURE LESS THAN 140/90: ICD-10-CM

## 2018-09-29 NOTE — TELEPHONE ENCOUNTER
"Requested Prescriptions   Pending Prescriptions Disp Refills     fosinopril (MONOPRIL) 10 MG tablet [Pharmacy Med Name: FOSINOPRIL SODIUM 10MG TABS]  Last Written Prescription Date:  10/17/2017  Last Fill Quantity: 90,  # refills: 3   Last office visit: 10/31/2017 with prescribing provider:  Molly   Future Office Visit:     90 tablet 3     Sig: TAKE ONE TABLET BY MOUTH EVERY DAY    ACE Inhibitors (Including Combos) Protocol Failed    9/29/2018 10:50 AM       Failed - Normal serum creatinine on file in past 12 months    Recent Labs   Lab Test  05/31/17   1546   CR  0.98            Failed - Normal serum potassium on file in past 12 months    Recent Labs   Lab Test  11/02/15   0901   POTASSIUM  4.0            Passed - Blood pressure under 140/90 in past 12 months    BP Readings from Last 3 Encounters:   04/20/18 125/84   12/20/17 128/83   10/31/17 128/80                Passed - Recent (12 mo) or future (30 days) visit within the authorizing provider's specialty    Patient had office visit in the last 12 months or has a visit in the next 30 days with authorizing provider or within the authorizing provider's specialty.  See \"Patient Info\" tab in inbasket, or \"Choose Columns\" in Meds & Orders section of the refill encounter.           Passed - Patient is age 18 or older       Passed - No active pregnancy on record       Passed - No positive pregnancy test in past 12 months          "

## 2018-10-02 RX ORDER — FOSINOPRIL SODIUM 10 MG/1
TABLET ORAL
Qty: 30 TABLET | Refills: 0 | Status: SHIPPED | OUTPATIENT
Start: 2018-10-02 | End: 2018-10-25

## 2018-10-02 NOTE — TELEPHONE ENCOUNTER
Overdue for office visit, only 1 month  supply submitted.   No additional refills until seen in clinic.     Please call and inform pt to schedule an annual exam

## 2018-10-25 ENCOUNTER — OFFICE VISIT (OUTPATIENT)
Dept: FAMILY MEDICINE | Facility: CLINIC | Age: 53
End: 2018-10-25
Payer: COMMERCIAL

## 2018-10-25 VITALS
OXYGEN SATURATION: 98 % | RESPIRATION RATE: 20 BRPM | WEIGHT: 293 LBS | HEART RATE: 85 BPM | DIASTOLIC BLOOD PRESSURE: 82 MMHG | BODY MASS INDEX: 48.82 KG/M2 | TEMPERATURE: 97 F | HEIGHT: 65 IN | SYSTOLIC BLOOD PRESSURE: 128 MMHG

## 2018-10-25 DIAGNOSIS — E78.5 HYPERLIPIDEMIA LDL GOAL <100: ICD-10-CM

## 2018-10-25 DIAGNOSIS — R73.03 PREDIABETES: ICD-10-CM

## 2018-10-25 DIAGNOSIS — F33.0 MILD RECURRENT MAJOR DEPRESSION (H): ICD-10-CM

## 2018-10-25 DIAGNOSIS — E66.01 MORBID OBESITY DUE TO EXCESS CALORIES (H): ICD-10-CM

## 2018-10-25 DIAGNOSIS — Z00.00 ROUTINE HISTORY AND PHYSICAL EXAMINATION OF ADULT: Primary | ICD-10-CM

## 2018-10-25 DIAGNOSIS — I48.20 CHRONIC ATRIAL FIBRILLATION (H): ICD-10-CM

## 2018-10-25 DIAGNOSIS — I10 HYPERTENSION GOAL BP (BLOOD PRESSURE) < 140/90: ICD-10-CM

## 2018-10-25 DIAGNOSIS — H61.21 EXCESSIVE CERUMEN IN RIGHT EAR CANAL: ICD-10-CM

## 2018-10-25 LAB
ALBUMIN SERPL-MCNC: 3.5 G/DL (ref 3.4–5)
ALP SERPL-CCNC: 121 U/L (ref 40–150)
ALT SERPL W P-5'-P-CCNC: 32 U/L (ref 0–50)
ANION GAP SERPL CALCULATED.3IONS-SCNC: 10 MMOL/L (ref 3–14)
AST SERPL W P-5'-P-CCNC: 22 U/L (ref 0–45)
BILIRUB SERPL-MCNC: 0.6 MG/DL (ref 0.2–1.3)
BUN SERPL-MCNC: 18 MG/DL (ref 7–30)
CALCIUM SERPL-MCNC: 9.7 MG/DL (ref 8.5–10.1)
CHLORIDE SERPL-SCNC: 108 MMOL/L (ref 94–109)
CHOLEST SERPL-MCNC: 172 MG/DL
CO2 SERPL-SCNC: 21 MMOL/L (ref 20–32)
CREAT SERPL-MCNC: 1.04 MG/DL (ref 0.52–1.04)
GFR SERPL CREATININE-BSD FRML MDRD: 55 ML/MIN/1.7M2
GLUCOSE SERPL-MCNC: 123 MG/DL (ref 70–99)
HBA1C MFR BLD: 6.4 % (ref 0–5.6)
HDLC SERPL-MCNC: 35 MG/DL
LDLC SERPL CALC-MCNC: 97 MG/DL
NONHDLC SERPL-MCNC: 137 MG/DL
POTASSIUM SERPL-SCNC: 4.3 MMOL/L (ref 3.4–5.3)
PROT SERPL-MCNC: 7.6 G/DL (ref 6.8–8.8)
SODIUM SERPL-SCNC: 139 MMOL/L (ref 133–144)
TRIGL SERPL-MCNC: 200 MG/DL

## 2018-10-25 PROCEDURE — 36415 COLL VENOUS BLD VENIPUNCTURE: CPT | Performed by: PHYSICIAN ASSISTANT

## 2018-10-25 PROCEDURE — 80061 LIPID PANEL: CPT | Performed by: PHYSICIAN ASSISTANT

## 2018-10-25 PROCEDURE — 69210 REMOVE IMPACTED EAR WAX UNI: CPT | Performed by: PHYSICIAN ASSISTANT

## 2018-10-25 PROCEDURE — 83036 HEMOGLOBIN GLYCOSYLATED A1C: CPT | Performed by: PHYSICIAN ASSISTANT

## 2018-10-25 PROCEDURE — 99396 PREV VISIT EST AGE 40-64: CPT | Mod: 25 | Performed by: PHYSICIAN ASSISTANT

## 2018-10-25 PROCEDURE — 80053 COMPREHEN METABOLIC PANEL: CPT | Performed by: PHYSICIAN ASSISTANT

## 2018-10-25 RX ORDER — FOSINOPRIL SODIUM 10 MG/1
10 TABLET ORAL DAILY
Qty: 90 TABLET | Refills: 3 | Status: SHIPPED | OUTPATIENT
Start: 2018-10-25 | End: 2019-11-25

## 2018-10-25 RX ORDER — GABAPENTIN 100 MG/1
100 CAPSULE ORAL 3 TIMES DAILY PRN
Qty: 90 CAPSULE | Refills: 3 | Status: CANCELLED | OUTPATIENT
Start: 2018-10-25

## 2018-10-25 ASSESSMENT — PATIENT HEALTH QUESTIONNAIRE - PHQ9
SUM OF ALL RESPONSES TO PHQ QUESTIONS 1-9: 1
10. IF YOU CHECKED OFF ANY PROBLEMS, HOW DIFFICULT HAVE THESE PROBLEMS MADE IT FOR YOU TO DO YOUR WORK, TAKE CARE OF THINGS AT HOME, OR GET ALONG WITH OTHER PEOPLE: NOT DIFFICULT AT ALL
SUM OF ALL RESPONSES TO PHQ QUESTIONS 1-9: 1

## 2018-10-25 ASSESSMENT — ANXIETY QUESTIONNAIRES
GAD7 TOTAL SCORE: 0
GAD7 TOTAL SCORE: 0
3. WORRYING TOO MUCH ABOUT DIFFERENT THINGS: NOT AT ALL
7. FEELING AFRAID AS IF SOMETHING AWFUL MIGHT HAPPEN: NOT AT ALL
5. BEING SO RESTLESS THAT IT IS HARD TO SIT STILL: NOT AT ALL
4. TROUBLE RELAXING: NOT AT ALL
7. FEELING AFRAID AS IF SOMETHING AWFUL MIGHT HAPPEN: NOT AT ALL
6. BECOMING EASILY ANNOYED OR IRRITABLE: NOT AT ALL
GAD7 TOTAL SCORE: 0
2. NOT BEING ABLE TO STOP OR CONTROL WORRYING: NOT AT ALL
1. FEELING NERVOUS, ANXIOUS, OR ON EDGE: NOT AT ALL

## 2018-10-25 NOTE — MR AVS SNAPSHOT
After Visit Summary   10/25/2018    Mary Gorman    MRN: [id number]           Patient Information     Date Of Birth          1965        Visit Information        Provider Department      10/25/2018 9:10 AM Nicole Castellanos PA-C [facility]        Today's Diagnoses     Routine history and physical examination of adult    -  1    Morbid obesity due to excess calories (H)        Chronic atrial fibrillation (H)        Mild recurrent major depression (H)        Hypertension goal BP (blood pressure) < 140/90        Prediabetes        Hyperlipidemia LDL goal <100        Excessive cerumen in right ear canal          Care Instructions      Preventive Health Recommendations  Female Ages 50 - 64    Yearly exam: See your health care provider every year in order to  o Review health changes.   o Discuss preventive care.    o Review your medicines if your doctor has prescribed any.      Get a Pap test every three years (unless you have an abnormal result and your provider advises testing more often).    If you get Pap tests with HPV test, you only need to test every 5 years, unless you have an abnormal result.     You do not need a Pap test if your uterus was removed (hysterectomy) and you have not had cancer.    You should be tested each year for STDs (sexually transmitted diseases) if you're at risk.     Have a mammogram every 1 to 2 years.    Have a colonoscopy at age 50, or have a yearly FIT test (stool test). These exams screen for colon cancer.      Have a cholesterol test every 5 years, or more often if advised.    Have a diabetes test (fasting glucose) every three years. If you are at risk for diabetes, you should have this test more often.     If you are at risk for osteoporosis (brittle bone disease), think about having a bone density scan (DEXA).    Shots: Get a flu shot each year. Get a tetanus shot every 10 years.    Nutrition:     Eat at least 5  servings of fruits and vegetables each day.    Eat whole-grain bread, whole-wheat pasta and brown rice instead of white grains and rice.    Get adequate Calcium and Vitamin D.     Lifestyle    Exercise at least 150 minutes a week (30 minutes a day, 5 days a week). This will help you control your weight and prevent disease.    Limit alcohol to one drink per day.    No smoking.     Wear sunscreen to prevent skin cancer.     See your dentist every six months for an exam and cleaning.    See your eye doctor every 1 to 2 years.            Follow-ups after your visit        Follow-up notes from your care team     Return in about 1 year (around 10/25/2019) for Annual Exam.      Who to contact     If you have questions or need follow up information about today's clinic visit or your schedule please contact [facility] directly at [phone].  Normal or non-critical lab and imaging results will be communicated to you by [email or web address], letter or phone within 4 business days after the clinic has received the results. If you do not hear from us within 7 days, please contact the clinic through [employer] or phone. If you have a critical or abnormal lab result, we will notify you by phone as soon as possible.  Submit refill requests through Collections or call your pharmacy and they will forward the refill request to us. Please allow 3 business days for your refill to be completed.          Additional Information About Your Visit        MyChart Information     Collections gives you secure access to your electronic health record. If you see a primary care provider, you can also send messages to your care team and make appointments. If you have questions, please call your primary care clinic.  If you do not have a primary care provider, please call [phone] and they will assist you.        Care EveryWhere ID     This is your Care EveryWhere ID. This could be used by other organizations to access your [place]  "medical records  [id number]        Your Vitals Were     Pulse Temperature Respirations Height Pulse Oximetry BMI (Body Mass Index)    85 97  F (36.1  C) (Tympanic) 20 5' 4.5\" (1.638 m) 98% 50.62 kg/m2       Blood Pressure from Last 3 Encounters:   10/25/18 128/82   04/20/18 125/84   12/20/17 128/83    Weight from Last 3 Encounters:   10/25/18 299 lb 8 oz (135.9 kg)   04/20/18 300 lb 11.2 oz (136.4 kg)   12/20/17 293 lb (132.9 kg)              We Performed the Following     Comprehensive metabolic panel     DEPRESSION ACTION PLAN (DAP)     Hemoglobin A1c     Lipid panel reflex to direct LDL Fasting     REMOVE IMPACTED CERUMEN          Today's Medication Changes          These changes are accurate as of 10/25/18  9:44 AM.  If you have any questions, ask your nurse or doctor.               These medicines have changed or have updated prescriptions.        Dose/Directions    cetirizine 10 MG tablet   Commonly known as:  zyrTEC   This may have changed:    - when to take this  - reasons to take this   Used for:  Disorder of left eustachian tube        Dose:  10 mg   Take 1 tablet (10 mg) by mouth every evening   Quantity:  90 tablet   Refills:  3       diclofenac 1 % Gel topical gel   Commonly known as:  VOLTAREN   This may have changed:    - when to take this  - reasons to take this   Used for:  Acute low back pain with sciatica, sciatica laterality unspecified, unspecified back pain laterality        Dose:  2 g   Place 2 g onto the skin 4 times daily   Quantity:  100 g   Refills:  1       fosinopril 10 MG tablet   Commonly known as:  MONOPRIL   This may have changed:  See the new instructions.   Used for:  Hypertension goal BP (blood pressure) < 140/90   Changed by:  Nicole Castellanos PA-C        Dose:  10 mg   Take 1 tablet (10 mg) by mouth daily   Quantity:  90 tablet   Refills:  3       gabapentin 100 MG capsule   Commonly known as:  NEURONTIN   This may have changed:    - when to take this  - reasons " to take this   Used for:  Acute low back pain with sciatica, sciatica laterality unspecified, unspecified back pain laterality        Dose:  100 mg   Take 1 capsule (100 mg) by mouth 3 times daily   Quantity:  90 capsule   Refills:  1         Stop taking these medicines if you haven't already. Please contact your care team if you have questions.     tiZANidine 4 MG tablet   Commonly known as:  ZANAFLEX   Stopped by:  Nicole Castellanos PA-C           traMADol 50 MG tablet   Commonly known as:  ULTRAM   Stopped by:  Nicole Castellanos PA-C                Where to get your medicines      These medications were sent to [facility] [place] [address] [facility]  [address], [facility] [address]    Hours:  Mon-Fri 8:30am-5:00pm Toll Free [phone] Phone:  [phone]     fosinopril 10 MG tablet                Primary Care Provider Office Phone # Fax #    [facility] [phone] [address] [address]        Equal Access to Services     LORENZO CONTRERAS : Hadii aad ku hadasho Soomaali, waaxda luqadaha, qaybta kaalmada adeegyada, waxay idiin hayaan ademichele kharanaveed sanchez . So [facility] [phone].    ATENCIÓN: Si habla español, tiene a kiser disposición servicios gratuitos de asistencia lingüística. Llame al [phone].    We comply with applicable federal civil rights laws and [place] laws. We do not discriminate on the basis of race, color, national origin, age, disability, sex, sexual orientation, or gender identity.            Thank you!     Thank you for choosing [facility]  for your care. Our goal is always to provide you with excellent care. Hearing back from our patients is one way we can continue to improve our services. Please take a few minutes to complete the written survey that you may receive in the mail after your visit with us. Thank you!              Your Updated Medication List - Protect others around you: Learn how to safely use, store and throw away your medicines at www.disposemymeds.org.          This list is accurate as of 10/25/18  9:44 AM.  Always use your most recent med list.                   Brand Name Dispense Instructions for use Diagnosis    albuterol 108 (90 Base) MCG/ACT inhaler    PROAIR HFA/PROVENTIL HFA/VENTOLIN HFA    1 each    Inhale 2 puffs into the lungs every 6 hours    Wheezing       buPROPion 150 MG 24 hr tablet    WELLBUTRIN XL     Take 3 tablets by mouth every morning. Indications: Major Depressive Disorder.        cetirizine 10 MG tablet    zyrTEC    90 tablet    Take 1 tablet (10 mg) by mouth every evening    Disorder of left eustachian tube       cholecalciferol 2000 units Caps      Take 2 tablets by mouth daily.        dabigatran ANTICOAGULANT 150 MG capsule    PRADAXA ANTICOAGULANT    180 capsule    Take 1 capsule (150 mg) by mouth every 12 hours Store in original bottle/blister pack. Use within 120 days of opening.    Chronic atrial fibrillation (H)       diclofenac 1 % Gel topical gel    VOLTAREN    100 g    Place 2 g onto the skin 4 times daily    Acute low back pain with sciatica, sciatica laterality unspecified, unspecified back pain laterality       diltiazem 240 MG 24 hr ER beaded capsule    TIAZAC    90 capsule    Take 1 capsule (240 mg) by mouth daily    Essential hypertension, benign       fluticasone 50 MCG/ACT spray    FLONASE    16 g    Spray 2 sprays into both nostrils daily    Disorder of left eustachian tube       fosinopril 10 MG tablet    MONOPRIL    90 tablet    Take 1 tablet (10 mg) by mouth daily    Hypertension goal BP (blood pressure) < 140/90       gabapentin 100 MG capsule    NEURONTIN    90 capsule    Take 1 capsule (100 mg) by mouth 3 times daily    Acute low back pain with sciatica, sciatica laterality unspecified, unspecified back pain laterality       medroxyPROGESTERone 10 MG tablet     PROVERA     Take 10 mg by mouth daily        metoprolol succinate 50 MG 24 hr tablet    TOPROL-XL    90 tablet    Take 1 tablet (50 mg) by mouth daily    Essential hypertension, benign       sertraline 50 MG tablet    ZOLOFT          spironolactone 25 MG tablet    ALDACTONE    90 tablet    Take 1 tablet (25 mg) by mouth daily    Essential hypertension

## 2018-10-25 NOTE — PROGRESS NOTES
SUBJECTIVE:   CC: Mary Gorman is an 53 year old woman who presents for preventive health visit.     Physical   Annual:     Getting at least 3 servings of Calcium per day:  Yes    Bi-annual eye exam:  Yes    Dental care twice a year:  Yes    Sleep apnea or symptoms of sleep apnea:  Sleep apnea    Diet:  Regular (no restrictions)    Frequency of exercise:  4-5 days/week    Duration of exercise:  15-30 minutes    Taking medications regularly:  Yes    Medication side effects:  None    Additional concerns today:  No     Hyperlipidemia Follow-Up      Rate your low fat/cholesterol diet?: fair    Taking statin?  No    Other lipid medications/supplements?:  none    Hypertension Follow-up      Outpatient blood pressures are not being checked.    Low Salt Diet: not monitoring salt    Depression Followup    Status since last visit: Stable     See PHQ-9 for current symptoms.  Other associated symptoms: None    Complicating factors:   Significant life event:  No   Current substance abuse:  None  Anxiety or Panic symptoms:  Yes-  mild    PHQ 5/31/2017 10/16/2017 10/25/2018   PHQ-9 Total Score 5 2 1   Q9: Suicide Ideation Not at all Not at all Not at all     PHQ-9  English  PHQ-9   Any Language  Suicide Assessment Five-step Evaluation and Treatment (SAFE-T)    Today's PHQ-2 Score:   PHQ-2 ( 1999 [employer]) 10/25/2018   Q1: Little interest or pleasure in doing things 0   Q2: Feeling down, depressed or hopeless 1   PHQ-2 Score 1   Q1: Little interest or pleasure in doing things Not at all   Q2: Feeling down, depressed or hopeless Several days   PHQ-2 Score 1       Abuse: Current or Past(Physical, Sexual or Emotional)- No  Do you feel safe in your environment - Yes    Social History   Substance Use Topics     Smoking status: Never Smoker     Smokeless tobacco: Never Used     Alcohol use Yes      Comment: rarely     Alcohol Use 10/25/2018   If you drink alcohol do you typically have greater than 3 drinks per day OR greater than  7 drinks per week? No   No flowsheet data found.    Reviewed orders with patient.  Reviewed health maintenance and updated orders accordingly - Yes  BP Readings from Last 3 Encounters:   10/25/18 128/82   04/20/18 125/84   12/20/17 128/83    Wt Readings from Last 3 Encounters:   10/25/18 299 lb 8 oz (135.9 kg)   04/20/18 300 lb 11.2 oz (136.4 kg)   12/20/17 293 lb (132.9 kg)                  Recent Labs   Lab Test  10/17/17   0933  05/31/17   1546  12/06/16   0900   11/02/15   0901  05/28/14   1036  03/21/14   0841   02/27/12   1511   06/15/11   0515   06/07/11   1339   A1C  6.2*  6.2*  6.0   < >   --   6.5*   --    < >  5.9   < >  7.0*   --   6.6*   LDL  86   --   94   --   93   --   44   --   65.4   --   48   < >  57.8   HDL  35*   --   39*   --   48*   --   38*   --   45   --   38*   --   37   TRIG  211*   --   148   --   119   --   138   --   178*   < >  298*   --   321*   ALT   --    --    --    --    --    --   30   --   41.0   --    --    --   57.0   CR   --   0.98   --    --   1.10*  1.30*   --    < >  0.9   --    --    < >   --    GFRESTIMATED   --   59*   --    --   52*  43*   --    --    --    --    --    < >   --    GFRESTBLACK   --   72   --    --   63  52*   --    --    --    --    --    < >   --    POTASSIUM   --    --    --    --   4.0  4.4   --    < >  4.6   < >   --    < >   --    TSH   --    --   1.75   --    --    --    --    --    --    --   2.64   --    --     < > = values in this interval not displayed.        Patient over age 50, mutual decision to screen reflected in health maintenance.    Pertinent mammograms are reviewed under the imaging tab.  History of abnormal Pap smear: NO - age 30-65 PAP every 5 years with negative HPV co-testing recommended  PAP / HPV Latest Ref Rng & Units 10/17/2017   PAP - NIL   HPV 16 DNA NEG:Negative Negative   HPV 18 DNA NEG:Negative Negative   OTHER HR HPV NEG:Negative Negative     Reviewed and updated as needed this visit by clinical staff  Tobacco   "Allergies  Meds  Problems  Med Hx  Surg Hx  Fam Hx  Soc Hx          Reviewed and updated as needed this visit by Provider  Tobacco  Allergies  Meds  Problems  Med Hx  Surg Hx  Fam Hx  Soc Hx           Review of Systems  CONSTITUTIONAL: NEGATIVE for fever, chills, change in weight  INTEGUMENTARY/SKIN: NEGATIVE for worrisome rashes, moles or lesions  EYES: NEGATIVE for vision changes or irritation  ENT: NEGATIVE for ear, mouth and throat problems  RESP: NEGATIVE for significant cough or SOB  BREAST: NEGATIVE for masses, tenderness or discharge  CV: NEGATIVE for chest pain, palpitations or peripheral edema  GI: NEGATIVE for nausea, abdominal pain, heartburn, or change in bowel habits  : NEGATIVE for unusual urinary or vaginal symptoms. No vaginal bleeding.  MUSCULOSKELETAL: NEGATIVE for significant arthralgias or myalgia  NEURO: NEGATIVE for weakness, dizziness or paresthesias  PSYCHIATRIC: NEGATIVE for changes in mood or affect      OBJECTIVE:   /82 (BP Location: Right arm, Patient Position: Sitting, Cuff Size: Adult Large)  Pulse 85  Temp 97  F (36.1  C) (Tympanic)  Resp 20  Ht 5' 4.5\" (1.638 m)  Wt 299 lb 8 oz (135.9 kg)  SpO2 98%  BMI 50.62 kg/m2  Physical Exam  GENERAL APPEARANCE: healthy, alert and no distress  EYES: Eyes grossly normal to inspection, PERRL and conjunctivae and sclerae normal  HENT: ear canals and TM's normal, nose and mouth without ulcers or lesions, oropharynx clear and oral mucous membranes moist  NECK: no adenopathy, no asymmetry, masses, or scars and thyroid normal to palpation  RESP: lungs clear to auscultation - no rales, rhonchi or wheezes  BREAST: normal without masses, tenderness or nipple discharge and no palpable axillary masses or adenopathy  CV: regular rate and rhythm, normal S1 S2, no S3 or S4, no murmur, click or rub, no peripheral edema and peripheral pulses strong  ABDOMEN: soft, nontender, no hepatosplenomegaly, no masses and bowel sounds " "normal  MS: no musculoskeletal defects are noted and gait is age appropriate without ataxia  SKIN: no suspicious lesions or rashes  NEURO: Normal strength and tone, sensory exam grossly normal, mentation intact and speech normal  PSYCH: mentation appears normal and affect normal/bright      ASSESSMENT/PLAN:       ICD-10-CM    1. Routine history and physical examination of adult Z00.00 Hemoglobin A1c     Lipid panel reflex to direct LDL Fasting     Comprehensive metabolic panel   2. Morbid obesity due to excess calories (H) E66.01    3. Chronic atrial fibrillation (H) I48.2    4. Mild recurrent major depression (H) F33.0    5. Hypertension goal BP (blood pressure) < 140/90 I10 fosinopril (MONOPRIL) 10 MG tablet   6. Prediabetes R73.03 Hemoglobin A1c   7. Acute low back pain with sciatica, sciatica laterality unspecified, unspecified back pain laterality M54.40 gabapentin (NEURONTIN) 100 MG capsule   8. Hyperlipidemia LDL goal <100 E78.5    Cerumen is noted via otoscopic examination in the right external ear causing severe obstruction with impaction.  Removal deemed medically necessary due loss of hearing secondary to obstruction.  Cerumen was removed by provider with syringing and manual debridement with wax curette. Instructions for home care to prevent wax buildup are given. Pt tolerated procedure well without complication.       COUNSELING:  Reviewed preventive health counseling, as reflected in patient instructions    BP Readings from Last 1 Encounters:   10/25/18 128/82     Estimated body mass index is 50.62 kg/(m^2) as calculated from the following:    Height as of this encounter: 5' 4.5\" (1.638 m).    Weight as of this encounter: 299 lb 8 oz (135.9 kg).      Weight management plan: : -30 minutes of exercise 5 days a week (walking, jogging, housework, biking).  - Limit portion sizes and avoid eating out.  -Eat at least 5 servings of fruits and vegetables daily.   -Eat whole-grain bread, whole-wheat pasta and " brown rice instead of white grains and rice.       reports that she has never smoked. She has never used smokeless tobacco.      Counseling Resources:  ATP IV Guidelines  Pooled Cohorts Equation Calculator  Breast Cancer Risk Calculator  FRAX Risk Assessment  ICSI Preventive Guidelines  Dietary Guidelines for Americans, 2010  USDA's MyPlate  ASA Prophylaxis  Lung CA Screening    Nicole Castellanos PA-C  [facility]

## 2018-10-26 ASSESSMENT — PATIENT HEALTH QUESTIONNAIRE - PHQ9: SUM OF ALL RESPONSES TO PHQ QUESTIONS 1-9: 1

## 2018-10-26 ASSESSMENT — ANXIETY QUESTIONNAIRES: GAD7 TOTAL SCORE: 0

## 2018-10-29 NOTE — PROGRESS NOTES
Octavio Hernandez,    I just wanted to let you know that your lab results have been reviewed and are attached.    - Your A1c is still in the pre-diabetic range and we want to make sure it doesn't reach 6.5%.  Try to decrease sugars and carbohydrates from your diet and increase exercise to keep those numbers down.  We'll recheck next year.    Please let me know if you have any questions and have a great week!    Sincerely,    Paulina Castellanos PA-C    [facility]  [address]  [place] [address]  [phone] (p)

## 2019-04-15 DIAGNOSIS — I10 ESSENTIAL HYPERTENSION, BENIGN: ICD-10-CM

## 2019-04-15 DIAGNOSIS — I10 ESSENTIAL HYPERTENSION: ICD-10-CM

## 2019-04-15 RX ORDER — SPIRONOLACTONE 25 MG/1
25 TABLET ORAL DAILY
Qty: 90 TABLET | Refills: 0 | Status: SHIPPED | OUTPATIENT
Start: 2019-04-15 | End: 2019-07-17

## 2019-04-15 RX ORDER — METOPROLOL SUCCINATE 50 MG/1
50 TABLET, EXTENDED RELEASE ORAL DAILY
Qty: 60 TABLET | Refills: 3 | Status: SHIPPED | OUTPATIENT
Start: 2019-04-15 | End: 2019-06-14

## 2019-04-17 DIAGNOSIS — I10 ESSENTIAL HYPERTENSION, BENIGN: ICD-10-CM

## 2019-04-17 DIAGNOSIS — I48.20 CHRONIC ATRIAL FIBRILLATION (H): ICD-10-CM

## 2019-04-17 RX ORDER — DILTIAZEM HYDROCHLORIDE 240 MG/1
240 CAPSULE, EXTENDED RELEASE ORAL DAILY
Qty: 90 CAPSULE | Refills: 0 | Status: SHIPPED | OUTPATIENT
Start: 2019-04-17 | End: 2019-06-14

## 2019-04-17 RX ORDER — DABIGATRAN ETEXILATE 150 MG/1
150 CAPSULE ORAL EVERY 12 HOURS
Qty: 180 CAPSULE | Refills: 0 | Status: SHIPPED | OUTPATIENT
Start: 2019-04-17 | End: 2019-06-14

## 2019-05-02 ENCOUNTER — OFFICE VISIT (OUTPATIENT)
Dept: CARDIOLOGY | Facility: CLINIC | Age: 54
End: 2019-05-02
Attending: INTERNAL MEDICINE
Payer: COMMERCIAL

## 2019-05-02 VITALS
HEART RATE: 90 BPM | BODY MASS INDEX: 52.05 KG/M2 | WEIGHT: 293 LBS | DIASTOLIC BLOOD PRESSURE: 74 MMHG | SYSTOLIC BLOOD PRESSURE: 128 MMHG

## 2019-05-02 DIAGNOSIS — I48.20 CHRONIC ATRIAL FIBRILLATION (H): ICD-10-CM

## 2019-05-02 DIAGNOSIS — R06.09 DYSPNEA ON EXERTION: Primary | ICD-10-CM

## 2019-05-02 PROCEDURE — 93000 ELECTROCARDIOGRAM COMPLETE: CPT | Performed by: PHYSICIAN ASSISTANT

## 2019-05-02 PROCEDURE — 99214 OFFICE O/P EST MOD 30 MIN: CPT | Performed by: PHYSICIAN ASSISTANT

## 2019-05-02 NOTE — LETTER
5/2/2019    [facility]  [address]  [facility] [address]    RE: Mary Noonan Sherif       Dear Colleague,    I had the pleasure of seeing Mary Gorman in the [facility].    HPI:   I had the pleasure of seeing Mary when she came for follow up of atrial fibrillation.  She is a 54 year old who sees Dr. Greenfield for her history of:    1. Permanent atrial fibrillation on a rate control/ AC strategy given her asymptomatic status  2. Hypertension under good control  3. Obesity   4. Lower extremity edema with adjustments in Diltiazem dose and addition of spironlactone    Mary saw Dr. Greenfield 1 year ago at which time she complained of edema thought due to use of CCB for her AFib.  He recommended spironolactone 25 mg daily as she had an allergy to hydrochlorothiazide listed.  Most recent BMP 10/2018 showed normal electrolytes and renal function.    Unfortunately, the spironolactone did not seem to improve her swelling dramatically, but did help somewhat. She's not had orthopnea or PND but does feel more dyspneic.  This has been a gradual increase.    She remains compliant with CPAP therapy. No chest pain, dizziness or lightheadedness.    EKG today, which I overread, showed atrial fibrillation at 82 bpm with low voltage.  48 Hour Holter 5/2017 showed atrial fibrillation with average HR 95 bpm, for which Diltiazem was decreased to 240 mg daily (for swelling) and Metoprolol was initiated.   Echocardiogram 4/2014 showed EF 55-60% with no RWMA. RV function and size was normal.  1+ MR noted. LA was mildly dilated at 4.3 cm.    Component      Latest Ref Rng & Units 10/25/2018   Sodium      133 - 144 mmol/L 139   Potassium      3.4 - 5.3 mmol/L 4.3   Chloride      94 - 109 mmol/L 108   Carbon Dioxide      20 - 32 mmol/L 21   Anion Gap      3 - 14 mmol/L 10   Glucose      70 - 99 mg/dL 123 (H)   Urea Nitrogen      7 - 30 mg/dL 18   Creatinine      0.52 - 1.04 mg/dL  1.04   GFR Estimate      >60 mL/min/1.7m2 55 (L)   GFR Estimate If Black      >60 mL/min/1.7m2 67       Assessment & Plan:    1. Permanent AFib    Remains on rate control/AC strategy    Last Holter and echo as above    Remains on AC given CHADSVASc 2 (HTN, sex)    PLAN:    Continue Pradaxa 150 mg BID    Will assess HR control given occasional palpitations    See me back to review Holter and echo      2. Dyspnea    Slightly worse, but has been a gradual increase    No evidence of significant fluid overload. No blood loss on Pradaxa    PLAN:    Echo and Holter    See me back to review. Could consider stress test and/or blood work or furosemide therapy    3. Hypertension    Well controlled on Diltiazem, fosinopril and metoprolol XL. No significant reduction after starting spironolactone.    PLAN:    Continue current meds      Joceline Gomez PA-C, MSPAS      Orders Placed This Encounter   Procedures     Follow-Up with Cardiac Advanced Practice Provider     EKG 12-lead complete w/read - Clinics (performed today)     Holter Monitor 24 hour Adult Pediatric     Echocardiogram Complete     No orders of the defined types were placed in this encounter.    There are no discontinued medications.      Encounter Diagnoses   Name Primary?     Chronic atrial fibrillation (H)      Dyspnea on exertion Yes       CURRENT MEDICATIONS:  Current Outpatient Medications   Medication Sig Dispense Refill     albuterol (PROAIR HFA, PROVENTIL HFA, VENTOLIN HFA) 108 (90 BASE) MCG/ACT inhaler Inhale 2 puffs into the lungs every 6 hours 1 each 0     buPROPion (WELLBUTRIN XL) 150 MG 24 hr tablet Take 3 tablets by mouth every morning. Indications: Major Depressive Disorder.        cetirizine (ZYRTEC) 10 MG tablet Take 1 tablet (10 mg) by mouth every evening (Patient taking differently: Take 10 mg by mouth as needed ) 90 tablet 3     cholecalciferol 2000 UNITS CAPS Take 2 tablets by mouth daily.       dabigatran ANTICOAGULANT (PRADAXA ANTICOAGULANT) 150  MG capsule Take 1 capsule (150 mg) by mouth every 12 hours Store in original bottle/blister pack. Use within 120 days of opening. 180 capsule 0     diclofenac (VOLTAREN) 1 % GEL topical gel Place 2 g onto the skin 4 times daily (Patient taking differently: Place 2 g onto the skin as needed ) 100 g 1     diltiazem ER (TIAZAC) 240 MG 24 hr ER beaded capsule Take 1 capsule (240 mg) by mouth daily 90 capsule 0     fluticasone (FLONASE) 50 MCG/ACT spray Spray 2 sprays into both nostrils daily 16 g 11     fosinopril (MONOPRIL) 10 MG tablet Take 1 tablet (10 mg) by mouth daily 90 tablet 3     gabapentin (NEURONTIN) 100 MG capsule Take 1 capsule (100 mg) by mouth 3 times daily (Patient taking differently: Take 100 mg by mouth as needed ) 90 capsule 1     medroxyPROGESTERone (PROVERA) 10 MG tablet Take 10 mg by mouth daily       metoprolol succinate ER (TOPROL-XL) 50 MG 24 hr tablet Take 1 tablet (50 mg) by mouth daily 60 tablet 3     sertraline (ZOLOFT) 50 MG tablet        spironolactone (ALDACTONE) 25 MG tablet Take 1 tablet (25 mg) by mouth daily 90 tablet 0       ALLERGIES     Allergies   Allergen Reactions     Dyazide [Hydrochlorothiazide W/Triamterene] Unknown     Nickel Rash     Robaxin [Methocarbamol] Rash     Sulfa Drugs Rash       PAST MEDICAL HISTORY:  Past Medical History:   Diagnosis Date     Atrial fibrillation (H)      Depression      GERD (gastroesophageal reflux disease)      HTN (hypertension)      Hyperlipidemia      BALTA (obstructive sleep apnea)      Permanent atrial fibrillation (H) 6/5/11       PAST SURGICAL HISTORY:  Past Surgical History:   Procedure Laterality Date     CARDIOVERSION  6/7/11     CHOLECYSTECTOMY       DILATION AND CURETTAGE  4/26/2012    Procedure:DILATION AND CURETTAGE; DILATION AND CURETTAGE; Surgeon:JEAN-PAUL DEL CID; Location:[facility]     DILATION AND CURETTAGE, HYSTEROSCOPY DIAGNOSTIC, COMBINED  12/8/2011    Procedure:COMBINED DILATION AND CURETTAGE, HYSTEROSCOPY DIAGNOSTIC; DILATION AND  CURETTAGE, DIAGNOSTIC HYSTEROSCOPY ; Surgeon:JEAN-PAUL DEL CID; Location:[facility]     KNEE SURGERY         FAMILY HISTORY:  Family History   Problem Relation Age of Onset     Hypertension Mother      Heart Disease Mother         A-fib     Arthritis Mother      Heart Disease Father         triple bypass     Cancer Father 50        bladder     Hypertension Father      Respiratory Father         smoker     Cancer Paternal Grandmother 90        rectal     Unknown/Adopted Brother        SOCIAL HISTORY:  Social History     Socioeconomic History     Marital status: Single     Spouse name: None     Number of children: None     Years of education: None     Highest education level: None   Occupational History     None   Social Needs     Financial resource strain: None     Food insecurity:     Worry: None     Inability: None     Transportation needs:     Medical: None     Non-medical: None   Tobacco Use     Smoking status: Never Smoker     Smokeless tobacco: Never Used   Substance and Sexual Activity     Alcohol use: Yes     Comment: rarely     Drug use: No     Sexual activity: Never   Lifestyle     Physical activity:     Days per week: None     Minutes per session: None     Stress: None   Relationships     Social connections:     Talks on phone: None     Gets together: None     Attends Shinto service: None     Active member of club or organization: None     Attends meetings of clubs or organizations: None     Relationship status: None     Intimate partner violence:     Fear of current or ex partner: None     Emotionally abused: None     Physically abused: None     Forced sexual activity: None   Other Topics Concern     Parent/sibling w/ CABG, MI or angioplasty before 65F 55M? No      Service Not Asked     Blood Transfusions Not Asked     Caffeine Concern Not Asked     Occupational Exposure Not Asked     Hobby Hazards Not Asked     Sleep Concern Not Asked     Stress Concern Not Asked     Weight Concern Not Asked     Special  Diet No     Back Care Not Asked     Exercise Yes     Comment: walks 2 miles minimum 5 x weekly      Bike Helmet Not Asked     Seat Belt Not Asked     Self-Exams Not Asked   Social History Narrative     None       Review of Systems:  Skin:  Negative     Eyes:  Negative    ENT:  Negative    Respiratory:  Positive for dyspnea on exertion  Cardiovascular:  Negative for;palpitations;chest pain Positive for;edema;fatigue;exercise intolerance  Gastroenterology: Negative for melena;hematochezia  Genitourinary:  Negative    Musculoskeletal:  Positive for arthritis  Neurologic:  Negative    Psychiatric:  Negative    Heme/Lymph/Imm:  Negative    Endocrine:  Negative      Physical Exam:  Vitals: /74   Pulse 90   Wt 139.7 kg (308 lb)   BMI 52.05 kg/m       Constitutional:  cooperative, alert and oriented, well developed, well nourished, in no acute distress        Skin:  warm and dry to the touch, no apparent skin lesions or masses noted        Head:  normocephalic, no masses or lesions        Eyes:  pupils equal and round;conjunctivae and lids unremarkable;sclera white        ENT:  no pallor or cyanosis, dentition good        Neck:  JVP normal;no carotid bruit        Chest:  normal breath sounds, clear to auscultation, normal A-P diameter, normal symmetry, normal respiratory excursion, no use of accessory muscles        Cardiac:   irregularly irregular rhythm           HR 70s    Abdomen:  abdomen soft obese      Vascular: pulses full and equal                                      Extremities and Back:  no deformities, clubbing, cyanosis, erythema observed        Neurological:  no gross motor deficits          Recent Lab Results:  LIPID RESULTS:  Lab Results   Component Value Date    CHOL 172 10/25/2018    HDL 35 (L) 10/25/2018    LDL 97 10/25/2018    TRIG 200 (H) 10/25/2018    CHOLHDLRATIO 3.4 11/02/2015       LIVER ENZYME RESULTS:  Lab Results   Component Value Date    AST 22 10/25/2018    ALT 32 10/25/2018       CBC  RESULTS:  Lab Results   Component Value Date    WBC 9.3 06/14/2011    RBC 4.63 06/14/2011    HGB 13.5 04/26/2012    HCT 43.9 04/06/2012    MCV 89.7 04/06/2012    MCH 28.2 04/06/2012    MCHC 31.4 04/06/2012    RDW 14.5 04/06/2012     06/14/2011       BMP RESULTS:  Lab Results   Component Value Date     10/25/2018    POTASSIUM 4.3 10/25/2018    CHLORIDE 108 10/25/2018    CO2 21 10/25/2018    ANIONGAP 10 10/25/2018     (H) 10/25/2018    BUN 18 10/25/2018    CR 1.04 10/25/2018    GFRESTIMATED 55 (L) 10/25/2018    GFRESTBLACK 67 10/25/2018    CHERYL 9.7 10/25/2018        A1C RESULTS:  Lab Results   Component Value Date    A1C 6.4 (H) 10/25/2018       INR RESULTS:  Lab Results   Component Value Date    INR 1.19 (H) 08/03/2011    INR 1.01 06/15/2011                         Thank you for allowing me to participate in the care of your patient.      Sincerely,     Kayleen Gomez PA-C     [facility]

## 2019-05-02 NOTE — PATIENT INSTRUCTIONS
1. EKG today looked OK.  AFib @ 82 bpm.  This is fine.    2. Discussed slight increase (gradual) of shortness of breath.  Will plan to repeat 24 hour Holter and echo. See me back to review    3. Call if any issues! [phone]

## 2019-05-02 NOTE — PROGRESS NOTES
HPI:   I had the pleasure of seeing Mary when she came for follow up of atrial fibrillation.  She is a 54 year old who sees Dr. Greenfield for her history of:    1. Permanent atrial fibrillation on a rate control/ AC strategy given her asymptomatic status  2. Hypertension under good control  3. Obesity   4. Lower extremity edema with adjustments in Diltiazem dose and addition of spironlactone    Mary saw Dr. Greenfield 1 year ago at which time she complained of edema thought due to use of CCB for her AFib.  He recommended spironolactone 25 mg daily as she had an allergy to hydrochlorothiazide listed.  Most recent BMP 10/2018 showed normal electrolytes and renal function.    Unfortunately, the spironolactone did not seem to improve her swelling dramatically, but did help somewhat. She's not had orthopnea or PND but does feel more dyspneic.  This has been a gradual increase.    She remains compliant with CPAP therapy. No chest pain, dizziness or lightheadedness.    EKG today, which I overread, showed atrial fibrillation at 82 bpm with low voltage.  48 Hour Holter 5/2017 showed atrial fibrillation with average HR 95 bpm, for which Diltiazem was decreased to 240 mg daily (for swelling) and Metoprolol was initiated.   Echocardiogram 4/2014 showed EF 55-60% with no RWMA. RV function and size was normal.  1+ MR noted. LA was mildly dilated at 4.3 cm.    Component      Latest Ref Rng & Units 10/25/2018   Sodium      133 - 144 mmol/L 139   Potassium      3.4 - 5.3 mmol/L 4.3   Chloride      94 - 109 mmol/L 108   Carbon Dioxide      20 - 32 mmol/L 21   Anion Gap      3 - 14 mmol/L 10   Glucose      70 - 99 mg/dL 123 (H)   Urea Nitrogen      7 - 30 mg/dL 18   Creatinine      0.52 - 1.04 mg/dL 1.04   GFR Estimate      >60 mL/min/1.7m2 55 (L)   GFR Estimate If Black      >60 mL/min/1.7m2 67       Assessment & Plan:    1. Permanent AFib    Remains on rate control/AC strategy    Last Holter and echo as above    Remains on AC given CHADSVASc 2  (HTN, sex)    PLAN:    Continue Pradaxa 150 mg BID    Will assess HR control given occasional palpitations    See me back to review Holter and echo      2. Dyspnea    Slightly worse, but has been a gradual increase    No evidence of significant fluid overload. No blood loss on Pradaxa    PLAN:    Echo and Holter    See me back to review. Could consider stress test and/or blood work or furosemide therapy    3. Hypertension    Well controlled on Diltiazem, fosinopril and metoprolol XL. No significant reduction after starting spironolactone.    PLAN:    Continue current meds      Joceline Gomez PA-C, MSPAS      Orders Placed This Encounter   Procedures     Follow-Up with Cardiac Advanced Practice Provider     EKG 12-lead complete w/read - Clinics (performed today)     Holter Monitor 24 hour Adult Pediatric     Echocardiogram Complete     No orders of the defined types were placed in this encounter.    There are no discontinued medications.      Encounter Diagnoses   Name Primary?     Chronic atrial fibrillation (H)      Dyspnea on exertion Yes       CURRENT MEDICATIONS:  Current Outpatient Medications   Medication Sig Dispense Refill     albuterol (PROAIR HFA, PROVENTIL HFA, VENTOLIN HFA) 108 (90 BASE) MCG/ACT inhaler Inhale 2 puffs into the lungs every 6 hours 1 each 0     buPROPion (WELLBUTRIN XL) 150 MG 24 hr tablet Take 3 tablets by mouth every morning. Indications: Major Depressive Disorder.        cetirizine (ZYRTEC) 10 MG tablet Take 1 tablet (10 mg) by mouth every evening (Patient taking differently: Take 10 mg by mouth as needed ) 90 tablet 3     cholecalciferol 2000 UNITS CAPS Take 2 tablets by mouth daily.       dabigatran ANTICOAGULANT (PRADAXA ANTICOAGULANT) 150 MG capsule Take 1 capsule (150 mg) by mouth every 12 hours Store in original bottle/blister pack. Use within 120 days of opening. 180 capsule 0     diclofenac (VOLTAREN) 1 % GEL topical gel Place 2 g onto the skin 4 times daily (Patient taking  differently: Place 2 g onto the skin as needed ) 100 g 1     diltiazem ER (TIAZAC) 240 MG 24 hr ER beaded capsule Take 1 capsule (240 mg) by mouth daily 90 capsule 0     fluticasone (FLONASE) 50 MCG/ACT spray Spray 2 sprays into both nostrils daily 16 g 11     fosinopril (MONOPRIL) 10 MG tablet Take 1 tablet (10 mg) by mouth daily 90 tablet 3     gabapentin (NEURONTIN) 100 MG capsule Take 1 capsule (100 mg) by mouth 3 times daily (Patient taking differently: Take 100 mg by mouth as needed ) 90 capsule 1     medroxyPROGESTERone (PROVERA) 10 MG tablet Take 10 mg by mouth daily       metoprolol succinate ER (TOPROL-XL) 50 MG 24 hr tablet Take 1 tablet (50 mg) by mouth daily 60 tablet 3     sertraline (ZOLOFT) 50 MG tablet        spironolactone (ALDACTONE) 25 MG tablet Take 1 tablet (25 mg) by mouth daily 90 tablet 0       ALLERGIES     Allergies   Allergen Reactions     Dyazide [Hydrochlorothiazide W/Triamterene] Unknown     Nickel Rash     Robaxin [Methocarbamol] Rash     Sulfa Drugs Rash       PAST MEDICAL HISTORY:  Past Medical History:   Diagnosis Date     Atrial fibrillation (H)      Depression      GERD (gastroesophageal reflux disease)      HTN (hypertension)      Hyperlipidemia      BALTA (obstructive sleep apnea)      Permanent atrial fibrillation (H) 6/5/11       PAST SURGICAL HISTORY:  Past Surgical History:   Procedure Laterality Date     CARDIOVERSION  6/7/11     CHOLECYSTECTOMY       DILATION AND CURETTAGE  4/26/2012    Procedure:DILATION AND CURETTAGE; DILATION AND CURETTAGE; Surgeon:JEAN-PAUL DEL CID; Location:[facility]     DILATION AND CURETTAGE, HYSTEROSCOPY DIAGNOSTIC, COMBINED  12/8/2011    Procedure:COMBINED DILATION AND CURETTAGE, HYSTEROSCOPY DIAGNOSTIC; DILATION AND CURETTAGE, DIAGNOSTIC HYSTEROSCOPY ; Surgeon:JEAN-PAUL DEL CID; Location:[facility]     KNEE SURGERY         FAMILY HISTORY:  Family History   Problem Relation Age of Onset     Hypertension Mother      Heart Disease Mother         A-fib     Arthritis Mother       Heart Disease Father         triple bypass     Cancer Father 50        bladder     Hypertension Father      Respiratory Father         smoker     Cancer Paternal Grandmother 90        rectal     Unknown/Adopted Brother        SOCIAL HISTORY:  Social History     Socioeconomic History     Marital status: Single     Spouse name: None     Number of children: None     Years of education: None     Highest education level: None   Occupational History     None   Social Needs     Financial resource strain: None     Food insecurity:     Worry: None     Inability: None     Transportation needs:     Medical: None     Non-medical: None   Tobacco Use     Smoking status: Never Smoker     Smokeless tobacco: Never Used   Substance and Sexual Activity     Alcohol use: Yes     Comment: rarely     Drug use: No     Sexual activity: Never   Lifestyle     Physical activity:     Days per week: None     Minutes per session: None     Stress: None   Relationships     Social connections:     Talks on phone: None     Gets together: None     Attends Hindu service: None     Active member of club or organization: None     Attends meetings of clubs or organizations: None     Relationship status: None     Intimate partner violence:     Fear of current or ex partner: None     Emotionally abused: None     Physically abused: None     Forced sexual activity: None   Other Topics Concern     Parent/sibling w/ CABG, MI or angioplasty before 65F 55M? No      Service Not Asked     Blood Transfusions Not Asked     Caffeine Concern Not Asked     Occupational Exposure Not Asked     Hobby Hazards Not Asked     Sleep Concern Not Asked     Stress Concern Not Asked     Weight Concern Not Asked     Special Diet No     Back Care Not Asked     Exercise Yes     Comment: walks 2 miles minimum 5 x weekly      Bike Helmet Not Asked     Seat Belt Not Asked     Self-Exams Not Asked   Social History Narrative     None       Review of Systems:  Skin:  Negative      Eyes:  Negative    ENT:  Negative    Respiratory:  Positive for dyspnea on exertion  Cardiovascular:  Negative for;palpitations;chest pain Positive for;edema;fatigue;exercise intolerance  Gastroenterology: Negative for melena;hematochezia  Genitourinary:  Negative    Musculoskeletal:  Positive for arthritis  Neurologic:  Negative    Psychiatric:  Negative    Heme/Lymph/Imm:  Negative    Endocrine:  Negative      Physical Exam:  Vitals: /74   Pulse 90   Wt 139.7 kg (308 lb)   BMI 52.05 kg/m      Constitutional:  cooperative, alert and oriented, well developed, well nourished, in no acute distress        Skin:  warm and dry to the touch, no apparent skin lesions or masses noted        Head:  normocephalic, no masses or lesions        Eyes:  pupils equal and round;conjunctivae and lids unremarkable;sclera white        ENT:  no pallor or cyanosis, dentition good        Neck:  JVP normal;no carotid bruit        Chest:  normal breath sounds, clear to auscultation, normal A-P diameter, normal symmetry, normal respiratory excursion, no use of accessory muscles        Cardiac:   irregularly irregular rhythm           HR 70s    Abdomen:  abdomen soft obese      Vascular: pulses full and equal                                      Extremities and Back:  no deformities, clubbing, cyanosis, erythema observed        Neurological:  no gross motor deficits          Recent Lab Results:  LIPID RESULTS:  Lab Results   Component Value Date    CHOL 172 10/25/2018    HDL 35 (L) 10/25/2018    LDL 97 10/25/2018    TRIG 200 (H) 10/25/2018    CHOLHDLRATIO 3.4 11/02/2015       LIVER ENZYME RESULTS:  Lab Results   Component Value Date    AST 22 10/25/2018    ALT 32 10/25/2018       CBC RESULTS:  Lab Results   Component Value Date    WBC 9.3 06/14/2011    RBC 4.63 06/14/2011    HGB 13.5 04/26/2012    HCT 43.9 04/06/2012    MCV 89.7 04/06/2012    MCH 28.2 04/06/2012    MCHC 31.4 04/06/2012    RDW 14.5 04/06/2012     06/14/2011        BMP RESULTS:  Lab Results   Component Value Date     10/25/2018    POTASSIUM 4.3 10/25/2018    CHLORIDE 108 10/25/2018    CO2 21 10/25/2018    ANIONGAP 10 10/25/2018     (H) 10/25/2018    BUN 18 10/25/2018    CR 1.04 10/25/2018    GFRESTIMATED 55 (L) 10/25/2018    GFRESTBLACK 67 10/25/2018    CHERYL 9.7 10/25/2018        A1C RESULTS:  Lab Results   Component Value Date    A1C 6.4 (H) 10/25/2018       INR RESULTS:  Lab Results   Component Value Date    INR 1.19 (H) 08/03/2011    INR 1.01 06/15/2011

## 2019-06-07 ENCOUNTER — HOSPITAL ENCOUNTER (OUTPATIENT)
Dept: CARDIOLOGY | Facility: CLINIC | Age: 54
End: 2019-06-07
Attending: PHYSICIAN ASSISTANT
Payer: COMMERCIAL

## 2019-06-07 ENCOUNTER — HOSPITAL ENCOUNTER (OUTPATIENT)
Dept: CARDIOLOGY | Facility: CLINIC | Age: 54
Discharge: HOME | End: 2019-06-07
Attending: PHYSICIAN ASSISTANT | Admitting: PHYSICIAN ASSISTANT
Payer: COMMERCIAL

## 2019-06-07 DIAGNOSIS — I48.20 CHRONIC ATRIAL FIBRILLATION (H): ICD-10-CM

## 2019-06-07 DIAGNOSIS — R06.09 DYSPNEA ON EXERTION: ICD-10-CM

## 2019-06-07 PROCEDURE — 93227 XTRNL ECG REC<48 HR R&I: CPT | Performed by: INTERNAL MEDICINE

## 2019-06-07 PROCEDURE — 93225 XTRNL ECG REC<48 HRS REC: CPT

## 2019-06-07 PROCEDURE — 25500064 ZZH RX 255 OP 636: Performed by: PHYSICIAN ASSISTANT

## 2019-06-07 PROCEDURE — 93306 TTE W/DOPPLER COMPLETE: CPT | Mod: 26 | Performed by: INTERNAL MEDICINE

## 2019-06-07 PROCEDURE — 40000264 ECHOCARDIOGRAM COMPLETE

## 2019-06-07 RX ADMIN — HUMAN ALBUMIN MICROSPHERES AND PERFLUTREN 3 ML: 10; .22 INJECTION, SOLUTION INTRAVENOUS at 10:45

## 2019-06-14 ENCOUNTER — OFFICE VISIT (OUTPATIENT)
Dept: CARDIOLOGY | Facility: CLINIC | Age: 54
End: 2019-06-14
Attending: PHYSICIAN ASSISTANT
Payer: COMMERCIAL

## 2019-06-14 VITALS
HEIGHT: 64 IN | SYSTOLIC BLOOD PRESSURE: 124 MMHG | HEART RATE: 78 BPM | WEIGHT: 293 LBS | BODY MASS INDEX: 50.02 KG/M2 | DIASTOLIC BLOOD PRESSURE: 92 MMHG

## 2019-06-14 DIAGNOSIS — I10 ESSENTIAL HYPERTENSION, BENIGN: ICD-10-CM

## 2019-06-14 DIAGNOSIS — I48.20 CHRONIC ATRIAL FIBRILLATION (H): Primary | ICD-10-CM

## 2019-06-14 DIAGNOSIS — R06.09 DYSPNEA ON EXERTION: ICD-10-CM

## 2019-06-14 PROCEDURE — 99214 OFFICE O/P EST MOD 30 MIN: CPT | Performed by: PHYSICIAN ASSISTANT

## 2019-06-14 RX ORDER — DABIGATRAN ETEXILATE 150 MG/1
150 CAPSULE ORAL EVERY 12 HOURS
Qty: 180 CAPSULE | Refills: 3 | Status: SHIPPED | OUTPATIENT
Start: 2019-06-14 | End: 2020-05-28

## 2019-06-14 RX ORDER — METOPROLOL SUCCINATE 50 MG/1
75 TABLET, EXTENDED RELEASE ORAL 2 TIMES DAILY
Qty: 270 TABLET | Refills: 3 | Status: SHIPPED | OUTPATIENT
Start: 2019-06-14 | End: 2019-06-28

## 2019-06-14 ASSESSMENT — MIFFLIN-ST. JEOR: SCORE: 2012.04

## 2019-06-14 NOTE — PROGRESS NOTES
"HPI:   I had the pleasure of seeing Mary when she came for follow up of atrial fibrillation and test results.  She is a 54 year old who sees Dr. Greenfield for her history of:     1. Permanent atrial fibrillation on a rate control/ AC strategy given her asymptomatic status. Recent palpitations prompting Holter  2. Hypertension under good control  3. Obesity   4. Lower extremity edema with adjustments in Diltiazem dose and addition of spironlactone    I saw Mary 5/2019 at which time she c/o continued LE edema despite spironolactone therapy. She also noted dyspnea and occasional palpitations for which I ordered an echo and 24 hour Holter.    - Still with L>R LE edema. No orthopnea/PND. Does not weigh at home. Thinks this weight gain of #6 is due to shoes and diet and NOT fluid.    - Takes diltiazem 240 bpm and metoprolol XL 50 mg daily at 0800    - Notes orthostatic sxs. No falls or presyncope associated. Once/week usually, in afternoons.    -No complaints of chest pain, pressure or tightness.  No orthopnea, or PND. No change in exercise tolerance or breathing. Overall, she is feeling \"fine\" except had skin reaction to Holter patches.    Echocardiogram 6/10/2019 showed EF 60% with normal RV size and function. No significant valvular abnormalities.     24 hour Holter 6/7/2019 showed atrial fibrillation with average HR 81 bpm. Range was 54 bpm @ 1431 and max was 236 bpm @ 122. Minimal ectopy. Event button pressed 4 times, correlating with AFib and RVR.       Assessment & Plan:    1. LE Edema    Did not improve with spironolactone. Allergy to HCTZ    Echo is fine with normal EF and no significant valve issues. Normal R heart    I think this is related to prior ortho surgery (L knee) and Diltiazem 240 mg daily    PLAN:    STOP diltiazem    Increase metoprolol XL to 75 mg BID    Spot check HR at rest and with exertion. Call in 2 weeks with update. If HR running a bit fast on BB therapy only, increase BB.  If HR appears to be " approaching goal, will plan 24 hour Holter (hesitate to order prematurely given skin reaction).     Has nickel allergy and apparently adhesive reaction.     1. Permanent AFib    Currently on Diltiazem 240 mg daily and Metoprolol XL 50 mg daily    Continues on Pradaxa for CHADSVASc 2 (HTN, sex)    HR on average is good at 81 bpm. Range is substantial, from 50s while at rest working at her kitchen table to 236 bpm while walking for exercise up a steep hill    PLAN:    Med changes as above    Joceline Gomez PA-C, MSPAS      No orders of the defined types were placed in this encounter.    Orders Placed This Encounter   Medications     metoprolol succinate ER (TOPROL-XL) 50 MG 24 hr tablet     Sig: Take 1.5 tablets (75 mg) by mouth 2 times daily     Dispense:  270 tablet     Refill:  3     Higher dose replaces Diltiazem     dabigatran ANTICOAGULANT (PRADAXA ANTICOAGULANT) 150 MG capsule     Sig: Take 1 capsule (150 mg) by mouth every 12 hours Store in original bottle/blister pack. Use within 120 days of opening.     Dispense:  180 capsule     Refill:  3     Medications Discontinued During This Encounter   Medication Reason     diltiazem ER (TIAZAC) 240 MG 24 hr ER beaded capsule      metoprolol succinate ER (TOPROL-XL) 50 MG 24 hr tablet      dabigatran ANTICOAGULANT (PRADAXA ANTICOAGULANT) 150 MG capsule Reorder         Encounter Diagnoses   Name Primary?     Chronic atrial fibrillation (H)      Dyspnea on exertion      Essential hypertension, benign        CURRENT MEDICATIONS:  Current Outpatient Medications   Medication Sig Dispense Refill     albuterol (PROAIR HFA, PROVENTIL HFA, VENTOLIN HFA) 108 (90 BASE) MCG/ACT inhaler Inhale 2 puffs into the lungs every 6 hours 1 each 0     buPROPion (WELLBUTRIN XL) 150 MG 24 hr tablet Take 3 tablets by mouth every morning. Indications: Major Depressive Disorder.        cetirizine (ZYRTEC) 10 MG tablet Take 1 tablet (10 mg) by mouth every evening (Patient taking differently: Take  10 mg by mouth as needed ) 90 tablet 3     cholecalciferol 2000 UNITS CAPS Take 2 tablets by mouth daily.       dabigatran ANTICOAGULANT (PRADAXA ANTICOAGULANT) 150 MG capsule Take 1 capsule (150 mg) by mouth every 12 hours Store in original bottle/blister pack. Use within 120 days of opening. 180 capsule 3     fluticasone (FLONASE) 50 MCG/ACT spray Spray 2 sprays into both nostrils daily 16 g 11     fosinopril (MONOPRIL) 10 MG tablet Take 1 tablet (10 mg) by mouth daily 90 tablet 3     gabapentin (NEURONTIN) 100 MG capsule Take 1 capsule (100 mg) by mouth 3 times daily (Patient taking differently: Take 100 mg by mouth as needed ) 90 capsule 1     medroxyPROGESTERone (PROVERA) 10 MG tablet Take 10 mg by mouth daily       metoprolol succinate ER (TOPROL-XL) 50 MG 24 hr tablet Take 1.5 tablets (75 mg) by mouth 2 times daily 270 tablet 3     sertraline (ZOLOFT) 50 MG tablet        spironolactone (ALDACTONE) 25 MG tablet Take 1 tablet (25 mg) by mouth daily 90 tablet 0     diclofenac (VOLTAREN) 1 % GEL topical gel Place 2 g onto the skin 4 times daily (Patient taking differently: Place 2 g onto the skin as needed ) 100 g 1       ALLERGIES     Allergies   Allergen Reactions     Dyazide [Hydrochlorothiazide W/Triamterene] Unknown     Nickel Rash     Robaxin [Methocarbamol] Rash     Sulfa Drugs Rash       PAST MEDICAL HISTORY:  Past Medical History:   Diagnosis Date     Atrial fibrillation (H)      Depression      GERD (gastroesophageal reflux disease)      HTN (hypertension)      Hyperlipidemia      BALTA (obstructive sleep apnea)      Permanent atrial fibrillation (H) 6/5/11       PAST SURGICAL HISTORY:  Past Surgical History:   Procedure Laterality Date     CARDIOVERSION  6/7/11     CHOLECYSTECTOMY       DILATION AND CURETTAGE  4/26/2012    Procedure:DILATION AND CURETTAGE; DILATION AND CURETTAGE; Surgeon:JEAN-PAUL DEL CID; Location:[facility]     DILATION AND CURETTAGE, HYSTEROSCOPY DIAGNOSTIC, COMBINED  12/8/2011     Procedure:COMBINED DILATION AND CURETTAGE, HYSTEROSCOPY DIAGNOSTIC; DILATION AND CURETTAGE, DIAGNOSTIC HYSTEROSCOPY ; Surgeon:JEAN-PAUL DEL CID; Location:[facility]     KNEE SURGERY         FAMILY HISTORY:  Family History   Problem Relation Age of Onset     Hypertension Mother      Heart Disease Mother         A-fib     Arthritis Mother      Heart Disease Father         triple bypass     Cancer Father 50        bladder     Hypertension Father      Respiratory Father         smoker     Cancer Paternal Grandmother 90        rectal     Unknown/Adopted Brother        SOCIAL HISTORY:  Social History     Socioeconomic History     Marital status: Single     Spouse name: None     Number of children: None     Years of education: None     Highest education level: None   Occupational History     None   Social Needs     Financial resource strain: None     Food insecurity:     Worry: None     Inability: None     Transportation needs:     Medical: None     Non-medical: None   Tobacco Use     Smoking status: Never Smoker     Smokeless tobacco: Never Used   Substance and Sexual Activity     Alcohol use: Yes     Comment: rarely     Drug use: No     Sexual activity: Never   Lifestyle     Physical activity:     Days per week: None     Minutes per session: None     Stress: None   Relationships     Social connections:     Talks on phone: None     Gets together: None     Attends Christianity service: None     Active member of club or organization: None     Attends meetings of clubs or organizations: None     Relationship status: None     Intimate partner violence:     Fear of current or ex partner: None     Emotionally abused: None     Physically abused: None     Forced sexual activity: None   Other Topics Concern     Parent/sibling w/ CABG, MI or angioplasty before 65F 55M? No      Service Not Asked     Blood Transfusions Not Asked     Caffeine Concern Not Asked     Occupational Exposure Not Asked     Hobby Hazards Not Asked     Sleep Concern  "Not Asked     Stress Concern Not Asked     Weight Concern Not Asked     Special Diet No     Back Care Not Asked     Exercise Yes     Comment: walks 2 miles minimum 5 x weekly      Bike Helmet Not Asked     Seat Belt Not Asked     Self-Exams Not Asked   Social History Narrative     None       Review of Systems:  Skin:  Negative     Eyes:  Negative    ENT:  Negative    Respiratory:  Positive for dyspnea on exertion  Cardiovascular:  Negative for;palpitations;chest pain Positive for;edema;fatigue;exercise intolerance  Gastroenterology: Negative for melena;hematochezia  Genitourinary:  Negative    Musculoskeletal:  Positive for arthritis  Neurologic:  Negative    Psychiatric:  Negative    Heme/Lymph/Imm:  Negative    Endocrine:  Negative      Physical Exam:  Vitals: BP (!) 124/92   Pulse 78   Ht 1.638 m (5' 4.49\")   Wt 142.4 kg (314 lb)   BMI 53.09 kg/m      Constitutional:  cooperative, alert and oriented, well developed, well nourished, in no acute distress        Skin:  warm and dry to the touch   reaction from patches on anterior chest wall    Head:  normocephalic, no masses or lesions        Eyes:  pupils equal and round;conjunctivae and lids unremarkable;sclera white        ENT:  no pallor or cyanosis, dentition good        Neck:  JVP normal;no carotid bruit        Chest:  normal breath sounds, clear to auscultation, normal A-P diameter, normal symmetry, normal respiratory excursion, no use of accessory muscles        Cardiac:   irregularly irregular rhythm           HR 70s    Abdomen:  abdomen soft obese      Vascular: pulses full and equal                                      Extremities and Back:  no deformities, clubbing, cyanosis, erythema observed        Neurological:  no gross motor deficits          Recent Lab Results:  LIPID RESULTS:  Lab Results   Component Value Date    CHOL 172 10/25/2018    HDL 35 (L) 10/25/2018    LDL 97 10/25/2018    TRIG 200 (H) 10/25/2018    CHOLHDLRATIO 3.4 11/02/2015 "       LIVER ENZYME RESULTS:  Lab Results   Component Value Date    AST 22 10/25/2018    ALT 32 10/25/2018       CBC RESULTS:  Lab Results   Component Value Date    WBC 9.3 06/14/2011    RBC 4.63 06/14/2011    HGB 13.5 04/26/2012    HCT 43.9 04/06/2012    MCV 89.7 04/06/2012    MCH 28.2 04/06/2012    MCHC 31.4 04/06/2012    RDW 14.5 04/06/2012     06/14/2011       BMP RESULTS:  Lab Results   Component Value Date     10/25/2018    POTASSIUM 4.3 10/25/2018    CHLORIDE 108 10/25/2018    CO2 21 10/25/2018    ANIONGAP 10 10/25/2018     (H) 10/25/2018    BUN 18 10/25/2018    CR 1.04 10/25/2018    GFRESTIMATED 55 (L) 10/25/2018    GFRESTBLACK 67 10/25/2018    CHERYL 9.7 10/25/2018        A1C RESULTS:  Lab Results   Component Value Date    A1C 6.4 (H) 10/25/2018       INR RESULTS:  Lab Results   Component Value Date    INR 1.19 (H) 08/03/2011    INR 1.01 06/15/2011

## 2019-06-14 NOTE — LETTER
"6/14/2019    [facility]  [address]  [facility] [address]    RE: Mary Noonan Sherif       Dear Colleague,    I had the pleasure of seeing Mary Gorman in the [facility].    HPI:   I had the pleasure of seeing Mary when she came for follow up of atrial fibrillation and test results.  She is a 54 year old who sees Dr. Greenfield for her history of:     1. Permanent atrial fibrillation on a rate control/ AC strategy given her asymptomatic status. Recent palpitations prompting Holter  2. Hypertension under good control  3. Obesity   4. Lower extremity edema with adjustments in Diltiazem dose and addition of spironlactone    I saw Mary 5/2019 at which time she c/o continued LE edema despite spironolactone therapy. She also noted dyspnea and occasional palpitations for which I ordered an echo and 24 hour Holter.    - Still with L>R LE edema. No orthopnea/PND. Does not weigh at home. Thinks this weight gain of #6 is due to shoes and diet and NOT fluid.    - Takes diltiazem 240 bpm and metoprolol XL 50 mg daily at 0800    - Notes orthostatic sxs. No falls or presyncope associated. Once/week usually, in afternoons.    -No complaints of chest pain, pressure or tightness.  No orthopnea, or PND. No change in exercise tolerance or breathing. Overall, she is feeling \"fine\" except had skin reaction to Holter patches.    Echocardiogram 6/10/2019 showed EF 60% with normal RV size and function. No significant valvular abnormalities.     24 hour Holter 6/7/2019 showed atrial fibrillation with average HR 81 bpm. Range was 54 bpm @ 1431 and max was 236 bpm @ 122. Minimal ectopy. Event button pressed 4 times, correlating with AFib and RVR.       Assessment & Plan:    1. LE Edema    Did not improve with spironolactone. Allergy to HCTZ    Echo is fine with normal EF and no significant valve issues. Normal R heart    I think this is related to prior ortho surgery (L " knee) and Diltiazem 240 mg daily    PLAN:    STOP diltiazem    Increase metoprolol XL to 75 mg BID    Spot check HR at rest and with exertion. Call in 2 weeks with update. If HR running a bit fast on BB therapy only, increase BB.  If HR appears to be approaching goal, will plan 24 hour Holter (hesitate to order prematurely given skin reaction).     Has nickel allergy and apparently adhesive reaction.     1. Permanent AFib    Currently on Diltiazem 240 mg daily and Metoprolol XL 50 mg daily    Continues on Pradaxa for CHADSVASc 2 (HTN, sex)    HR on average is good at 81 bpm. Range is substantial, from 50s while at rest working at her kitchen table to 236 bpm while walking for exercise up a steep hill    PLAN:    Med changes as above    Joceline Gomez PA-C, MSPAS      No orders of the defined types were placed in this encounter.    Orders Placed This Encounter   Medications     metoprolol succinate ER (TOPROL-XL) 50 MG 24 hr tablet     Sig: Take 1.5 tablets (75 mg) by mouth 2 times daily     Dispense:  270 tablet     Refill:  3     Higher dose replaces Diltiazem     dabigatran ANTICOAGULANT (PRADAXA ANTICOAGULANT) 150 MG capsule     Sig: Take 1 capsule (150 mg) by mouth every 12 hours Store in original bottle/blister pack. Use within 120 days of opening.     Dispense:  180 capsule     Refill:  3     Medications Discontinued During This Encounter   Medication Reason     diltiazem ER (TIAZAC) 240 MG 24 hr ER beaded capsule      metoprolol succinate ER (TOPROL-XL) 50 MG 24 hr tablet      dabigatran ANTICOAGULANT (PRADAXA ANTICOAGULANT) 150 MG capsule Reorder         Encounter Diagnoses   Name Primary?     Chronic atrial fibrillation (H)      Dyspnea on exertion      Essential hypertension, benign        CURRENT MEDICATIONS:  Current Outpatient Medications   Medication Sig Dispense Refill     albuterol (PROAIR HFA, PROVENTIL HFA, VENTOLIN HFA) 108 (90 BASE) MCG/ACT inhaler Inhale 2 puffs into the lungs every 6 hours 1 each  0     buPROPion (WELLBUTRIN XL) 150 MG 24 hr tablet Take 3 tablets by mouth every morning. Indications: Major Depressive Disorder.        cetirizine (ZYRTEC) 10 MG tablet Take 1 tablet (10 mg) by mouth every evening (Patient taking differently: Take 10 mg by mouth as needed ) 90 tablet 3     cholecalciferol 2000 UNITS CAPS Take 2 tablets by mouth daily.       dabigatran ANTICOAGULANT (PRADAXA ANTICOAGULANT) 150 MG capsule Take 1 capsule (150 mg) by mouth every 12 hours Store in original bottle/blister pack. Use within 120 days of opening. 180 capsule 3     fluticasone (FLONASE) 50 MCG/ACT spray Spray 2 sprays into both nostrils daily 16 g 11     fosinopril (MONOPRIL) 10 MG tablet Take 1 tablet (10 mg) by mouth daily 90 tablet 3     gabapentin (NEURONTIN) 100 MG capsule Take 1 capsule (100 mg) by mouth 3 times daily (Patient taking differently: Take 100 mg by mouth as needed ) 90 capsule 1     medroxyPROGESTERone (PROVERA) 10 MG tablet Take 10 mg by mouth daily       metoprolol succinate ER (TOPROL-XL) 50 MG 24 hr tablet Take 1.5 tablets (75 mg) by mouth 2 times daily 270 tablet 3     sertraline (ZOLOFT) 50 MG tablet        spironolactone (ALDACTONE) 25 MG tablet Take 1 tablet (25 mg) by mouth daily 90 tablet 0     diclofenac (VOLTAREN) 1 % GEL topical gel Place 2 g onto the skin 4 times daily (Patient taking differently: Place 2 g onto the skin as needed ) 100 g 1       ALLERGIES     Allergies   Allergen Reactions     Dyazide [Hydrochlorothiazide W/Triamterene] Unknown     Nickel Rash     Robaxin [Methocarbamol] Rash     Sulfa Drugs Rash       PAST MEDICAL HISTORY:  Past Medical History:   Diagnosis Date     Atrial fibrillation (H)      Depression      GERD (gastroesophageal reflux disease)      HTN (hypertension)      Hyperlipidemia      BALTA (obstructive sleep apnea)      Permanent atrial fibrillation (H) 6/5/11       PAST SURGICAL HISTORY:  Past Surgical History:   Procedure Laterality Date     CARDIOVERSION   6/7/11     CHOLECYSTECTOMY       DILATION AND CURETTAGE  4/26/2012    Procedure:DILATION AND CURETTAGE; DILATION AND CURETTAGE; Surgeon:JEAN-PAUL DEL CID; Location:[facility]     DILATION AND CURETTAGE, HYSTEROSCOPY DIAGNOSTIC, COMBINED  12/8/2011    Procedure:COMBINED DILATION AND CURETTAGE, HYSTEROSCOPY DIAGNOSTIC; DILATION AND CURETTAGE, DIAGNOSTIC HYSTEROSCOPY ; Surgeon:JEAN-PAUL DEL CID; Location:[facility]     KNEE SURGERY         FAMILY HISTORY:  Family History   Problem Relation Age of Onset     Hypertension Mother      Heart Disease Mother         A-fib     Arthritis Mother      Heart Disease Father         triple bypass     Cancer Father 50        bladder     Hypertension Father      Respiratory Father         smoker     Cancer Paternal Grandmother 90        rectal     Unknown/Adopted Brother        SOCIAL HISTORY:  Social History     Socioeconomic History     Marital status: Single     Spouse name: None     Number of children: None     Years of education: None     Highest education level: None   Occupational History     None   Social Needs     Financial resource strain: None     Food insecurity:     Worry: None     Inability: None     Transportation needs:     Medical: None     Non-medical: None   Tobacco Use     Smoking status: Never Smoker     Smokeless tobacco: Never Used   Substance and Sexual Activity     Alcohol use: Yes     Comment: rarely     Drug use: No     Sexual activity: Never   Lifestyle     Physical activity:     Days per week: None     Minutes per session: None     Stress: None   Relationships     Social connections:     Talks on phone: None     Gets together: None     Attends Christian service: None     Active member of club or organization: None     Attends meetings of clubs or organizations: None     Relationship status: None     Intimate partner violence:     Fear of current or ex partner: None     Emotionally abused: None     Physically abused: None     Forced sexual activity: None   Other Topics Concern      "Parent/sibling w/ CABG, MI or angioplasty before 65F 55M? No      Service Not Asked     Blood Transfusions Not Asked     Caffeine Concern Not Asked     Occupational Exposure Not Asked     Hobby Hazards Not Asked     Sleep Concern Not Asked     Stress Concern Not Asked     Weight Concern Not Asked     Special Diet No     Back Care Not Asked     Exercise Yes     Comment: walks 2 miles minimum 5 x weekly      Bike Helmet Not Asked     Seat Belt Not Asked     Self-Exams Not Asked   Social History Narrative     None       Review of Systems:  Skin:  Negative     Eyes:  Negative    ENT:  Negative    Respiratory:  Positive for dyspnea on exertion  Cardiovascular:  Negative for;palpitations;chest pain Positive for;edema;fatigue;exercise intolerance  Gastroenterology: Negative for melena;hematochezia  Genitourinary:  Negative    Musculoskeletal:  Positive for arthritis  Neurologic:  Negative    Psychiatric:  Negative    Heme/Lymph/Imm:  Negative    Endocrine:  Negative      Physical Exam:  Vitals: BP (!) 124/92   Pulse 78   Ht 1.638 m (5' 4.49\")   Wt 142.4 kg (314 lb)   BMI 53.09 kg/m       Constitutional:  cooperative, alert and oriented, well developed, well nourished, in no acute distress        Skin:  warm and dry to the touch   reaction from patches on anterior chest wall    Head:  normocephalic, no masses or lesions        Eyes:  pupils equal and round;conjunctivae and lids unremarkable;sclera white        ENT:  no pallor or cyanosis, dentition good        Neck:  JVP normal;no carotid bruit        Chest:  normal breath sounds, clear to auscultation, normal A-P diameter, normal symmetry, normal respiratory excursion, no use of accessory muscles        Cardiac:   irregularly irregular rhythm           HR 70s    Abdomen:  abdomen soft obese      Vascular: pulses full and equal                                      Extremities and Back:  no deformities, clubbing, cyanosis, erythema observed        Neurological:  " no gross motor deficits          Recent Lab Results:  LIPID RESULTS:  Lab Results   Component Value Date    CHOL 172 10/25/2018    HDL 35 (L) 10/25/2018    LDL 97 10/25/2018    TRIG 200 (H) 10/25/2018    CHOLHDLRATIO 3.4 11/02/2015       LIVER ENZYME RESULTS:  Lab Results   Component Value Date    AST 22 10/25/2018    ALT 32 10/25/2018       CBC RESULTS:  Lab Results   Component Value Date    WBC 9.3 06/14/2011    RBC 4.63 06/14/2011    HGB 13.5 04/26/2012    HCT 43.9 04/06/2012    MCV 89.7 04/06/2012    MCH 28.2 04/06/2012    MCHC 31.4 04/06/2012    RDW 14.5 04/06/2012     06/14/2011       BMP RESULTS:  Lab Results   Component Value Date     10/25/2018    POTASSIUM 4.3 10/25/2018    CHLORIDE 108 10/25/2018    CO2 21 10/25/2018    ANIONGAP 10 10/25/2018     (H) 10/25/2018    BUN 18 10/25/2018    CR 1.04 10/25/2018    GFRESTIMATED 55 (L) 10/25/2018    GFRESTBLACK 67 10/25/2018    CHERYL 9.7 10/25/2018        A1C RESULTS:  Lab Results   Component Value Date    A1C 6.4 (H) 10/25/2018       INR RESULTS:  Lab Results   Component Value Date    INR 1.19 (H) 08/03/2011    INR 1.01 06/15/2011             Thank you for allowing me to participate in the care of your patient.    Sincerely,     Kayleen Gomez PA-C     [facility]

## 2019-06-14 NOTE — PATIENT INSTRUCTIONS
1. Reviewed echo - this looked great!    2. Reviewed Holter - overall, atrial fibrillation under good control. HR range 50-230s!    3. Due to continued swelling STOP Diltiazem 240 mg daily. I'm hoping this improves your swelling!    4. INCREASE metoprolol to 75 mg (1.5 tabs) twice daily. New Rx sent     5. Continue Pradaxa (New Rx sent in)    6. Start spot-checking HR    7. Call in 2 weeks with update on 1) heart rates at rest and with exertion  2) Swelling off of the Diltiazem [phone]    8. Once HR is approaching the level we want, we will get a 24 hour monitor.  If we need to adjust metoprolol dose, we will do so before we subject you to another monitor!

## 2019-06-28 ENCOUNTER — TELEPHONE (OUTPATIENT)
Dept: CARDIOLOGY | Facility: CLINIC | Age: 54
End: 2019-06-28

## 2019-06-28 DIAGNOSIS — I10 ESSENTIAL HYPERTENSION, BENIGN: ICD-10-CM

## 2019-06-28 RX ORDER — METOPROLOL SUCCINATE 50 MG/1
100 TABLET, EXTENDED RELEASE ORAL 2 TIMES DAILY
Qty: 270 TABLET | Refills: 3
Start: 2019-06-28 | End: 2019-08-30

## 2019-06-28 NOTE — TELEPHONE ENCOUNTER
Called Mary to see how she was doing on the higher dose of metoprolol XL 75 mg BID in lieu of Diltiazem 240 for treatment of her permanent AFib.    Asked her to call back to get idea of what HRs are running and see if LE edema improved

## 2019-06-28 NOTE — TELEPHONE ENCOUNTER
06/28/19 Called pt to check status of the following:  how she was doing on the higher dose of metoprolol XL 75 mg BID in lieu of Diltiazem 240 for treatment of her permanent AFib.  She reported that her resting heart rates have been from 75-90 bpm, When she has been moving around (ie walking up a flight of stairs, her heart rates have been around the 150 range. When she is really exerting herself her heart rates have been 175-200.  see if LE edema improved- She reported there is no difference in her LE edema that she can tell.   Will send update to Joceline Gomez PA-C and inform pt if any changes are to be made. Pt voiced understanding. Anabelle 2:33 pm

## 2019-06-28 NOTE — TELEPHONE ENCOUNTER
She's on no other CV meds that typically contribute to swelling (that I see) and echo 6/2019 showed normal EF and normal RV function. Normal valves    I think some of the swelling is due to her previous ortho surgery but not sure what else may be contributing given that stopping the Diltiazem didn't seem to help anything.    1) For HR, pls INCREASE metoprolol XL to 100 mg BID.   2) Call in 2 weeks to see if HRs improved with exertion    3) For swelling, could check with pharmacy to see if any other meds may be contributing.  Also, watch salt, etc.    4) We can always send her to [facility] to see if venous insufficiency contributing to her edema.    Phyllis Car

## 2019-06-28 NOTE — TELEPHONE ENCOUNTER
06/28/19 Spoke w patient and informed her to increase Metoprolol XL to 100 mg BID. Requested she call us in 2 weeks with update on HR's. Also discussed recommendations to decrease LE edema and the possibility of seeing [facility]ous [facility]. She stated she is not bothered enough to pursue that currently. Pt voiced understanding in explanations. . Anabelle 3:55 pm

## 2019-07-15 ENCOUNTER — MYC MEDICAL ADVICE (OUTPATIENT)
Dept: CARDIOLOGY | Facility: CLINIC | Age: 54
End: 2019-07-15

## 2019-07-15 DIAGNOSIS — I10 ESSENTIAL HYPERTENSION: ICD-10-CM

## 2019-07-17 RX ORDER — SPIRONOLACTONE 25 MG/1
25 TABLET ORAL DAILY
Qty: 90 TABLET | Refills: 3 | Status: SHIPPED | OUTPATIENT
Start: 2019-07-17 | End: 2020-07-09

## 2019-08-30 ENCOUNTER — OFFICE VISIT (OUTPATIENT)
Dept: CARDIOLOGY | Facility: CLINIC | Age: 54
End: 2019-08-30
Attending: PHYSICIAN ASSISTANT
Payer: COMMERCIAL

## 2019-08-30 VITALS
HEIGHT: 65 IN | HEART RATE: 65 BPM | SYSTOLIC BLOOD PRESSURE: 92 MMHG | DIASTOLIC BLOOD PRESSURE: 66 MMHG | BODY MASS INDEX: 48.82 KG/M2 | WEIGHT: 293 LBS

## 2019-08-30 DIAGNOSIS — I10 ESSENTIAL HYPERTENSION: ICD-10-CM

## 2019-08-30 DIAGNOSIS — I10 ESSENTIAL HYPERTENSION, BENIGN: ICD-10-CM

## 2019-08-30 DIAGNOSIS — I48.20 CHRONIC ATRIAL FIBRILLATION (H): Primary | ICD-10-CM

## 2019-08-30 LAB
ANION GAP SERPL CALCULATED.3IONS-SCNC: 12.8 MMOL/L (ref 6–17)
BUN SERPL-MCNC: 19 MG/DL (ref 7–30)
CALCIUM SERPL-MCNC: 10.5 MG/DL (ref 8.5–10.5)
CHLORIDE SERPL-SCNC: 108 MMOL/L (ref 98–107)
CO2 SERPL-SCNC: 25 MMOL/L (ref 23–29)
CREAT SERPL-MCNC: 1.32 MG/DL (ref 0.7–1.3)
GFR SERPL CREATININE-BSD FRML MDRD: 42 ML/MIN/{1.73_M2}
GLUCOSE SERPL-MCNC: 137 MG/DL (ref 70–105)
POTASSIUM SERPL-SCNC: 4.8 MMOL/L (ref 3.5–5.1)
SODIUM SERPL-SCNC: 141 MMOL/L (ref 136–145)

## 2019-08-30 PROCEDURE — 93000 ELECTROCARDIOGRAM COMPLETE: CPT | Performed by: PHYSICIAN ASSISTANT

## 2019-08-30 PROCEDURE — 36415 COLL VENOUS BLD VENIPUNCTURE: CPT | Performed by: PHYSICIAN ASSISTANT

## 2019-08-30 PROCEDURE — 80048 BASIC METABOLIC PNL TOTAL CA: CPT | Performed by: PHYSICIAN ASSISTANT

## 2019-08-30 PROCEDURE — 99214 OFFICE O/P EST MOD 30 MIN: CPT | Performed by: PHYSICIAN ASSISTANT

## 2019-08-30 RX ORDER — DILTIAZEM HYDROCHLORIDE 240 MG/1
240 CAPSULE, EXTENDED RELEASE ORAL DAILY
Qty: 90 CAPSULE | Refills: 3 | Status: SHIPPED | OUTPATIENT
Start: 2019-08-30 | End: 2020-05-28

## 2019-08-30 RX ORDER — METOPROLOL SUCCINATE 50 MG/1
50 TABLET, EXTENDED RELEASE ORAL 2 TIMES DAILY
Qty: 180 TABLET | Refills: 3 | Status: SHIPPED | OUTPATIENT
Start: 2019-08-30 | End: 2020-05-28

## 2019-08-30 RX ORDER — METOPROLOL SUCCINATE 50 MG/1
50 TABLET, EXTENDED RELEASE ORAL 2 TIMES DAILY
Start: 2019-08-30 | End: 2019-08-30

## 2019-08-30 RX ORDER — CETIRIZINE HYDROCHLORIDE 10 MG/1
10 TABLET ORAL PRN
COMMUNITY

## 2019-08-30 ASSESSMENT — MIFFLIN-ST. JEOR: SCORE: 1994.54

## 2019-08-30 NOTE — LETTER
"8/30/2019    [facility]  [address]  [facility] [address]    RE: Mary Noonan Sherif       Dear Colleague,    I had the pleasure of seeing Mary Gorman in the [facility].    HPI:   I had the pleasure of seeing Mary when she came for follow up of atrial fibrillation and test results.  She is a 54 year old who sees Dr. Greenfield for her history of:     1. Permanent atrial fibrillation on a rate control/ AC strategy given her asymptomatic status. Recent medication changes due to rapid HR on Holter  2. Hypertension under good control  3. Obesity   4. Lower extremity edema with adjustments in Diltiazem dose and addition of spironolactone    I saw Mary 6/2019 at which time we reviewed an echo and 24 hour Holter ordered due to LE edema, dyspnea and occasional palpitations. Echo looked fine and 24 hour Holter showed some HRs to 236 bpm in AFib.     I stopped her Diltiazem due to continued c/o swelling. Metoprolol increased for HR control. Two weeks later, I asked her to increase Metoprolol to 100 mg BID. Stopping the Diltiazem had not seemed to help her edema much. On 7/15, I sent a [employer] message as her swelling hadn't improved and HR remained elevated. I restated the Diltiazem 240 mg daily and kept her on spironolactone.    Interval History:  Feels that feet still get pretty puffy. Gets better with elevation. Didn't get worse when re-started Diltiazem. It's a 2/10 and she's really not interested in starting a \"stronger\" diuretic at this point.    Is more fatigued in the evening. Nods off much sooner than she used to. Uses CPAP and still feels rested in the morning. Never falling asleep at work, while driving or otherwise engaged. Doesn't know if it started getting worse with the big increase in metoprolol. She's currently on metoprolol  mg BID and Diltiazem 240 - mg daily.     Notes episodes of lightheadedness occurring once per week. No " falling/fainting.     She has had some increasing knee pain, for which she has been taking Advil 400-600 mg/day.    Her BMP (below) shows normal electrolytes. Renal function a bit worse than normal, likely due to Advil and decreased fluid intake today.    Diagnostic Testing:    Component      Latest Ref Rng & Units 11/2/2015 10/25/2018 8/30/2019   Sodium      136 - 145 mmol/L 140 139 141   Potassium      3.5 - 5.1 mmol/L 4.0 4.3 4.8   Chloride      98 - 107 mmol/L 106 108 108 (H)   Carbon Dioxide      23 - 29 mmol/L 24 21 25   Anion Gap      6 - 17 mmol/L 9.8 10 12.8   Glucose      70 - 105 mg/dL 104 (H) 123 (H) 137 (H)   Urea Nitrogen      7 - 30 mg/dL 12 18 19   Creatinine      0.70 - 1.30 mg/dL 1.10 (H) 1.04 1.32 (H)   GFR Estimate      >60 mL/min/1.73:m2 52 (L) 55 (L) 42 (L)   GFR Estimate If Black      >60 mL/min/1.73:m2 63 67 51 (L)   Calcium      8.5 - 10.5 mg/dL 9.0 9.7 10.5         EKG today, which I overread, showed AFib 72 bpm. Poor RW progression and low voltage    Echocardiogram 6/10/2019 showed EF 60% with normal RV size and function. No significant valvular abnormalities.      24 hour Holter 6/7/2019 showed atrial fibrillation with average HR 81 bpm. Range was 54 bpm @ 1431 and max was 236 bpm @ 122. Minimal ectopy. Event button pressed 4 times, correlating with AFib and RVR.     Assessment & Plan:    1. Permanent AFib    Asx'c but rates were getting too fast on Diltiazem 240 and Toprol XL 50 mg daily    Now on Toprol  mg BID and restarted Diltiazem 240 given that edema didn't improve off of the Diltiazem    Remains on Pradaxa for CHADSVASc 2 (HTN, sex)    PLAN:    Continue Diltiazem 240 and reduce Toprol XL to 50 mg BID given fatigue and lightheadedness associated with hypotension.     Continue AC    Call/[place] [employer] message in 1 month with update on fatigue, HRs and BPs on lower dose of metoprolol XL.      24 hour Holter WITH SENSITIVE PATCHES before appt 4/2020 with Dr. Greenfield      2. LE edema    No  improvement in stopping Diltiazem    Did not improve with spironolactone. Allergy to hydrochlorothiazide. Echo with normal EF and normal R heart    Likely due to prior ortho surgery    PLAN:    Compression stockings, low sodium diet    Elevation    Call if want to try something like torsemide x 1 week    2. Renal insuffiencey -     Cr a bit higher today, but hasn't had much to drink today and Advil    PLAN:    Limit NSAID use      Joceline Gomez PA-C, MSPAS      Orders Placed This Encounter   Procedures     Holter Monitor 24 hour Adult Pediatric     Orders Placed This Encounter   Medications     cetirizine (ZYRTEC) 10 MG tablet     Sig: Take 10 mg by mouth as needed for allergies     DISCONTD: metoprolol succinate ER (TOPROL-XL) 50 MG 24 hr tablet     Sig: Take 1 tablet (50 mg) by mouth 2 times daily     metoprolol succinate ER (TOPROL-XL) 50 MG 24 hr tablet     Sig: Take 1 tablet (50 mg) by mouth 2 times daily     Dispense:  180 tablet     Refill:  3     diltiazem ER (DILT-XR) 240 MG 24 hr ER beaded capsule     Sig: Take 1 capsule (240 mg) by mouth daily     Dispense:  90 capsule     Refill:  3     Medications Discontinued During This Encounter   Medication Reason     cetirizine (ZYRTEC) 10 MG tablet Medication Reconciliation Clean Up     metoprolol succinate ER (TOPROL-XL) 50 MG 24 hr tablet      metoprolol succinate ER (TOPROL-XL) 50 MG 24 hr tablet          Encounter Diagnoses   Name Primary?     Essential hypertension      Chronic atrial fibrillation (H) Yes     Essential hypertension, benign        CURRENT MEDICATIONS:  Current Outpatient Medications   Medication Sig Dispense Refill     albuterol (PROAIR HFA, PROVENTIL HFA, VENTOLIN HFA) 108 (90 BASE) MCG/ACT inhaler Inhale 2 puffs into the lungs every 6 hours 1 each 0     buPROPion (WELLBUTRIN XL) 150 MG 24 hr tablet Take 3 tablets by mouth every morning. Indications: Major Depressive Disorder.        cetirizine (ZYRTEC) 10 MG tablet Take 10 mg by mouth as  needed for allergies       cholecalciferol 2000 UNITS CAPS Take 2 tablets by mouth daily.       dabigatran ANTICOAGULANT (PRADAXA ANTICOAGULANT) 150 MG capsule Take 1 capsule (150 mg) by mouth every 12 hours Store in original bottle/blister pack. Use within 120 days of opening. 180 capsule 3     diclofenac (VOLTAREN) 1 % GEL topical gel Place 2 g onto the skin 4 times daily (Patient taking differently: Place 2 g onto the skin as needed ) 100 g 1     diltiazem ER (DILT-XR) 240 MG 24 hr ER beaded capsule Take 1 capsule (240 mg) by mouth daily 90 capsule 3     fluticasone (FLONASE) 50 MCG/ACT spray Spray 2 sprays into both nostrils daily 16 g 11     fosinopril (MONOPRIL) 10 MG tablet Take 1 tablet (10 mg) by mouth daily 90 tablet 3     medroxyPROGESTERone (PROVERA) 10 MG tablet Take 10 mg by mouth daily       metoprolol succinate ER (TOPROL-XL) 50 MG 24 hr tablet Take 1 tablet (50 mg) by mouth 2 times daily 180 tablet 3     sertraline (ZOLOFT) 50 MG tablet Take 50 mg by mouth daily        spironolactone (ALDACTONE) 25 MG tablet Take 1 tablet (25 mg) by mouth daily 90 tablet 3     gabapentin (NEURONTIN) 100 MG capsule Take 1 capsule (100 mg) by mouth 3 times daily (Patient not taking: Reported on 8/30/2019) 90 capsule 1       ALLERGIES     Allergies   Allergen Reactions     Dyazide [Hydrochlorothiazide W/Triamterene] Unknown     Nickel Rash     Robaxin [Methocarbamol] Rash     Sulfa Drugs Rash       PAST MEDICAL HISTORY:  Past Medical History:   Diagnosis Date     Atrial fibrillation (H)      Depression      GERD (gastroesophageal reflux disease)      HTN (hypertension)      Hyperlipidemia      BALTA (obstructive sleep apnea)      Permanent atrial fibrillation (H) 6/5/11       PAST SURGICAL HISTORY:  Past Surgical History:   Procedure Laterality Date     CARDIOVERSION  6/7/11     CHOLECYSTECTOMY       DILATION AND CURETTAGE  4/26/2012    Procedure:DILATION AND CURETTAGE; DILATION AND CURETTAGE; Surgeon:CHO, SUZIN;  Location:[facility]     DILATION AND CURETTAGE, HYSTEROSCOPY DIAGNOSTIC, COMBINED  12/8/2011    Procedure:COMBINED DILATION AND CURETTAGE, HYSTEROSCOPY DIAGNOSTIC; DILATION AND CURETTAGE, DIAGNOSTIC HYSTEROSCOPY ; Surgeon:JEAN-PAUL DEL CID; Location:[facility]     KNEE SURGERY         FAMILY HISTORY:  Family History   Problem Relation Age of Onset     Hypertension Mother      Heart Disease Mother         A-fib     Arthritis Mother      Heart Disease Father         triple bypass     Cancer Father 50        bladder     Hypertension Father      Respiratory Father         smoker     Cancer Paternal Grandmother 90        rectal     Unknown/Adopted Brother        SOCIAL HISTORY:  Social History     Socioeconomic History     Marital status: Single     Spouse name: None     Number of children: None     Years of education: None     Highest education level: None   Occupational History     None   Social Needs     Financial resource strain: None     Food insecurity:     Worry: None     Inability: None     Transportation needs:     Medical: None     Non-medical: None   Tobacco Use     Smoking status: Never Smoker     Smokeless tobacco: Never Used   Substance and Sexual Activity     Alcohol use: Yes     Comment: rarely     Drug use: No     Sexual activity: Never   Lifestyle     Physical activity:     Days per week: None     Minutes per session: None     Stress: None   Relationships     Social connections:     Talks on phone: None     Gets together: None     Attends Anabaptism service: None     Active member of club or organization: None     Attends meetings of clubs or organizations: None     Relationship status: None     Intimate partner violence:     Fear of current or ex partner: None     Emotionally abused: None     Physically abused: None     Forced sexual activity: None   Other Topics Concern     Parent/sibling w/ CABG, MI or angioplasty before 65F 55M? No      Service Not Asked     Blood Transfusions Not Asked     Caffeine Concern  "Not Asked     Occupational Exposure Not Asked     Hobby Hazards Not Asked     Sleep Concern Not Asked     Stress Concern Not Asked     Weight Concern Not Asked     Special Diet No     Back Care Not Asked     Exercise Yes     Comment: walks 2 miles minimum 5 x weekly      Bike Helmet Not Asked     Seat Belt Not Asked     Self-Exams Not Asked   Social History Narrative     None       Review of Systems:  Skin:  Negative     Eyes:  Positive for glasses  ENT:  Negative    Respiratory:  Positive for dyspnea on exertion  Cardiovascular:    Positive for;palpitations;dizziness;edema  Gastroenterology: Negative    Genitourinary:  Negative    Musculoskeletal:  Positive for arthritis;joint pain  Neurologic:  Negative    Psychiatric:  Positive for excessive stress;depression;anxiety  Heme/Lymph/Imm:  Positive for allergies  Endocrine:  Negative      Physical Exam:  Vitals: BP 92/66   Pulse 65   Ht 1.638 m (5' 4.5\")   Wt 140.7 kg (310 lb 1.6 oz)   BMI 52.41 kg/m       Constitutional:  cooperative, alert and oriented, well developed, well nourished, in no acute distress        Skin:  warm and dry to the touch   reaction from patches on anterior chest wall    Head:  normocephalic, no masses or lesions        Eyes:  pupils equal and round;conjunctivae and lids unremarkable;sclera white        ENT:  no pallor or cyanosis, dentition good        Neck:  JVP normal;no carotid bruit        Chest:  normal breath sounds, clear to auscultation, normal A-P diameter, normal symmetry, normal respiratory excursion, no use of accessory muscles        Cardiac:   irregularly irregular rhythm           HR 70s    Abdomen:  abdomen soft obese      Vascular: pulses full and equal                                      Extremities and Back:  no deformities, clubbing, cyanosis, erythema observed        Neurological:  no gross motor deficits          Recent Lab Results:  LIPID RESULTS:  Lab Results   Component Value Date    CHOL 172 10/25/2018    HDL 35 " (L) 10/25/2018    LDL 97 10/25/2018    TRIG 200 (H) 10/25/2018    CHOLHDLRATIO 3.4 11/02/2015       LIVER ENZYME RESULTS:  Lab Results   Component Value Date    AST 22 10/25/2018    ALT 32 10/25/2018       CBC RESULTS:  Lab Results   Component Value Date    WBC 9.3 06/14/2011    RBC 4.63 06/14/2011    HGB 13.5 04/26/2012    HCT 43.9 04/06/2012    MCV 89.7 04/06/2012    MCH 28.2 04/06/2012    MCHC 31.4 04/06/2012    RDW 14.5 04/06/2012     06/14/2011       BMP RESULTS:  Lab Results   Component Value Date     08/30/2019    POTASSIUM 4.8 08/30/2019    CHLORIDE 108 (H) 08/30/2019    CO2 25 08/30/2019    ANIONGAP 12.8 08/30/2019     (H) 08/30/2019    BUN 19 08/30/2019    CR 1.32 (H) 08/30/2019    GFRESTIMATED 42 (L) 08/30/2019    GFRESTBLACK 51 (L) 08/30/2019    CHERYL 10.5 08/30/2019        Thank you for allowing me to participate in the care of your patient.    Sincerely,     Kayleen Gomez PA-C     [facility]

## 2019-08-30 NOTE — PROGRESS NOTES
"HPI:   I had the pleasure of seeing Mary when she came for follow up of atrial fibrillation and test results.  She is a 54 year old who sees Dr. Greenfield for her history of:     1. Permanent atrial fibrillation on a rate control/ AC strategy given her asymptomatic status. Recent medication changes due to rapid HR on Holter  2. Hypertension under good control  3. Obesity   4. Lower extremity edema with adjustments in Diltiazem dose and addition of spironolactone    I saw Mary 6/2019 at which time we reviewed an echo and 24 hour Holter ordered due to LE edema, dyspnea and occasional palpitations. Echo looked fine and 24 hour Holter showed some HRs to 236 bpm in AFib.     I stopped her Diltiazem due to continued c/o swelling. Metoprolol increased for HR control. Two weeks later, I asked her to increase Metoprolol to 100 mg BID. Stopping the Diltiazem had not seemed to help her edema much. On 7/15, I sent a LeTV message as her swelling hadn't improved and HR remained elevated. I restated the Diltiazem 240 mg daily and kept her on spironolactone.    Interval History:  Feels that feet still get pretty puffy. Gets better with elevation. Didn't get worse when re-started Diltiazem. It's a 2/10 and she's really not interested in starting a \"stronger\" diuretic at this point.    Is more fatigued in the evening. Nods off much sooner than she used to. Uses CPAP and still feels rested in the morning. Never falling asleep at work, while driving or otherwise engaged. Doesn't know if it started getting worse with the big increase in metoprolol. She's currently on metoprolol  mg BID and Diltiazem 240 - mg daily.     Notes episodes of lightheadedness occurring once per week. No falling/fainting.     She has had some increasing knee pain, for which she has been taking Advil 400-600 mg/day.    Her BMP (below) shows normal electrolytes. Renal function a bit worse than normal, likely due to Advil and decreased fluid intake " today.    Diagnostic Testing:    Component      Latest Ref Rng & Units 11/2/2015 10/25/2018 8/30/2019   Sodium      136 - 145 mmol/L 140 139 141   Potassium      3.5 - 5.1 mmol/L 4.0 4.3 4.8   Chloride      98 - 107 mmol/L 106 108 108 (H)   Carbon Dioxide      23 - 29 mmol/L 24 21 25   Anion Gap      6 - 17 mmol/L 9.8 10 12.8   Glucose      70 - 105 mg/dL 104 (H) 123 (H) 137 (H)   Urea Nitrogen      7 - 30 mg/dL 12 18 19   Creatinine      0.70 - 1.30 mg/dL 1.10 (H) 1.04 1.32 (H)   GFR Estimate      >60 mL/min/1.73:m2 52 (L) 55 (L) 42 (L)   GFR Estimate If Black      >60 mL/min/1.73:m2 63 67 51 (L)   Calcium      8.5 - 10.5 mg/dL 9.0 9.7 10.5         EKG today, which I overread, showed AFib 72 bpm. Poor RW progression and low voltage    Echocardiogram 6/10/2019 showed EF 60% with normal RV size and function. No significant valvular abnormalities.      24 hour Holter 6/7/2019 showed atrial fibrillation with average HR 81 bpm. Range was 54 bpm @ 1431 and max was 236 bpm @ 122. Minimal ectopy. Event button pressed 4 times, correlating with AFib and RVR.     Assessment & Plan:    1. Permanent AFib    Asx'c but rates were getting too fast on Diltiazem 240 and Toprol XL 50 mg daily    Now on Toprol  mg BID and restarted Diltiazem 240 given that edema didn't improve off of the Diltiazem    Remains on Pradaxa for CHADSVASc 2 (HTN, sex)    PLAN:    Continue Diltiazem 240 and reduce Toprol XL to 50 mg BID given fatigue and lightheadedness associated with hypotension.     Continue AC    Call/MyChart message in 1 month with update on fatigue, HRs and BPs on lower dose of metoprolol XL.      24 hour Holter WITH SENSITIVE PATCHES before appt 4/2020 with Dr. Greenfield      2. LE edema    No improvement in stopping Diltiazem    Did not improve with spironolactone. Allergy to hydrochlorothiazide. Echo with normal EF and normal R heart    Likely due to prior ortho surgery    PLAN:    Compression stockings, low sodium  diet    Elevation    Call if want to try something like torsemide x 1 week    2. Renal insuffiencey -     Cr a bit higher today, but hasn't had much to drink today and Advil    PLAN:    Limit NSAID use      Joceline Gomez PA-C, MSPAS      Orders Placed This Encounter   Procedures     Holter Monitor 24 hour Adult Pediatric     Orders Placed This Encounter   Medications     cetirizine (ZYRTEC) 10 MG tablet     Sig: Take 10 mg by mouth as needed for allergies     DISCONTD: metoprolol succinate ER (TOPROL-XL) 50 MG 24 hr tablet     Sig: Take 1 tablet (50 mg) by mouth 2 times daily     metoprolol succinate ER (TOPROL-XL) 50 MG 24 hr tablet     Sig: Take 1 tablet (50 mg) by mouth 2 times daily     Dispense:  180 tablet     Refill:  3     diltiazem ER (DILT-XR) 240 MG 24 hr ER beaded capsule     Sig: Take 1 capsule (240 mg) by mouth daily     Dispense:  90 capsule     Refill:  3     Medications Discontinued During This Encounter   Medication Reason     cetirizine (ZYRTEC) 10 MG tablet Medication Reconciliation Clean Up     metoprolol succinate ER (TOPROL-XL) 50 MG 24 hr tablet      metoprolol succinate ER (TOPROL-XL) 50 MG 24 hr tablet          Encounter Diagnoses   Name Primary?     Essential hypertension      Chronic atrial fibrillation (H) Yes     Essential hypertension, benign        CURRENT MEDICATIONS:  Current Outpatient Medications   Medication Sig Dispense Refill     albuterol (PROAIR HFA, PROVENTIL HFA, VENTOLIN HFA) 108 (90 BASE) MCG/ACT inhaler Inhale 2 puffs into the lungs every 6 hours 1 each 0     buPROPion (WELLBUTRIN XL) 150 MG 24 hr tablet Take 3 tablets by mouth every morning. Indications: Major Depressive Disorder.        cetirizine (ZYRTEC) 10 MG tablet Take 10 mg by mouth as needed for allergies       cholecalciferol 2000 UNITS CAPS Take 2 tablets by mouth daily.       dabigatran ANTICOAGULANT (PRADAXA ANTICOAGULANT) 150 MG capsule Take 1 capsule (150 mg) by mouth every 12 hours Store in original  bottle/blister pack. Use within 120 days of opening. 180 capsule 3     diclofenac (VOLTAREN) 1 % GEL topical gel Place 2 g onto the skin 4 times daily (Patient taking differently: Place 2 g onto the skin as needed ) 100 g 1     diltiazem ER (DILT-XR) 240 MG 24 hr ER beaded capsule Take 1 capsule (240 mg) by mouth daily 90 capsule 3     fluticasone (FLONASE) 50 MCG/ACT spray Spray 2 sprays into both nostrils daily 16 g 11     fosinopril (MONOPRIL) 10 MG tablet Take 1 tablet (10 mg) by mouth daily 90 tablet 3     medroxyPROGESTERone (PROVERA) 10 MG tablet Take 10 mg by mouth daily       metoprolol succinate ER (TOPROL-XL) 50 MG 24 hr tablet Take 1 tablet (50 mg) by mouth 2 times daily 180 tablet 3     sertraline (ZOLOFT) 50 MG tablet Take 50 mg by mouth daily        spironolactone (ALDACTONE) 25 MG tablet Take 1 tablet (25 mg) by mouth daily 90 tablet 3     gabapentin (NEURONTIN) 100 MG capsule Take 1 capsule (100 mg) by mouth 3 times daily (Patient not taking: Reported on 8/30/2019) 90 capsule 1       ALLERGIES     Allergies   Allergen Reactions     Dyazide [Hydrochlorothiazide W/Triamterene] Unknown     Nickel Rash     Robaxin [Methocarbamol] Rash     Sulfa Drugs Rash       PAST MEDICAL HISTORY:  Past Medical History:   Diagnosis Date     Atrial fibrillation (H)      Depression      GERD (gastroesophageal reflux disease)      HTN (hypertension)      Hyperlipidemia      BALTA (obstructive sleep apnea)      Permanent atrial fibrillation (H) 6/5/11       PAST SURGICAL HISTORY:  Past Surgical History:   Procedure Laterality Date     CARDIOVERSION  6/7/11     CHOLECYSTECTOMY       DILATION AND CURETTAGE  4/26/2012    Procedure:DILATION AND CURETTAGE; DILATION AND CURETTAGE; Surgeon:JEAN-PAUL DEL CID; Location:[facility]     DILATION AND CURETTAGE, HYSTEROSCOPY DIAGNOSTIC, COMBINED  12/8/2011    Procedure:COMBINED DILATION AND CURETTAGE, HYSTEROSCOPY DIAGNOSTIC; DILATION AND CURETTAGE, DIAGNOSTIC HYSTEROSCOPY ; Surgeon:CHO, SUZIN;  Location:[facility]     KNEE SURGERY         FAMILY HISTORY:  Family History   Problem Relation Age of Onset     Hypertension Mother      Heart Disease Mother         A-fib     Arthritis Mother      Heart Disease Father         triple bypass     Cancer Father 50        bladder     Hypertension Father      Respiratory Father         smoker     Cancer Paternal Grandmother 90        rectal     Unknown/Adopted Brother        SOCIAL HISTORY:  Social History     Socioeconomic History     Marital status: Single     Spouse name: None     Number of children: None     Years of education: None     Highest education level: None   Occupational History     None   Social Needs     Financial resource strain: None     Food insecurity:     Worry: None     Inability: None     Transportation needs:     Medical: None     Non-medical: None   Tobacco Use     Smoking status: Never Smoker     Smokeless tobacco: Never Used   Substance and Sexual Activity     Alcohol use: Yes     Comment: rarely     Drug use: No     Sexual activity: Never   Lifestyle     Physical activity:     Days per week: None     Minutes per session: None     Stress: None   Relationships     Social connections:     Talks on phone: None     Gets together: None     Attends Muslim service: None     Active member of club or organization: None     Attends meetings of clubs or organizations: None     Relationship status: None     Intimate partner violence:     Fear of current or ex partner: None     Emotionally abused: None     Physically abused: None     Forced sexual activity: None   Other Topics Concern     Parent/sibling w/ CABG, MI or angioplasty before 65F 55M? No      Service Not Asked     Blood Transfusions Not Asked     Caffeine Concern Not Asked     Occupational Exposure Not Asked     Hobby Hazards Not Asked     Sleep Concern Not Asked     Stress Concern Not Asked     Weight Concern Not Asked     Special Diet No     Back Care Not Asked     Exercise Yes      "Comment: walks 2 miles minimum 5 x weekly      Bike Helmet Not Asked     Seat Belt Not Asked     Self-Exams Not Asked   Social History Narrative     None       Review of Systems:  Skin:  Negative     Eyes:  Positive for glasses  ENT:  Negative    Respiratory:  Positive for dyspnea on exertion  Cardiovascular:    Positive for;palpitations;dizziness;edema  Gastroenterology: Negative    Genitourinary:  Negative    Musculoskeletal:  Positive for arthritis;joint pain  Neurologic:  Negative    Psychiatric:  Positive for excessive stress;depression;anxiety  Heme/Lymph/Imm:  Positive for allergies  Endocrine:  Negative      Physical Exam:  Vitals: BP 92/66   Pulse 65   Ht 1.638 m (5' 4.5\")   Wt 140.7 kg (310 lb 1.6 oz)   BMI 52.41 kg/m      Constitutional:  cooperative, alert and oriented, well developed, well nourished, in no acute distress        Skin:  warm and dry to the touch   reaction from patches on anterior chest wall    Head:  normocephalic, no masses or lesions        Eyes:  pupils equal and round;conjunctivae and lids unremarkable;sclera white        ENT:  no pallor or cyanosis, dentition good        Neck:  JVP normal;no carotid bruit        Chest:  normal breath sounds, clear to auscultation, normal A-P diameter, normal symmetry, normal respiratory excursion, no use of accessory muscles        Cardiac:   irregularly irregular rhythm           HR 70s    Abdomen:  abdomen soft obese      Vascular: pulses full and equal                                      Extremities and Back:  no deformities, clubbing, cyanosis, erythema observed        Neurological:  no gross motor deficits          Recent Lab Results:  LIPID RESULTS:  Lab Results   Component Value Date    CHOL 172 10/25/2018    HDL 35 (L) 10/25/2018    LDL 97 10/25/2018    TRIG 200 (H) 10/25/2018    CHOLHDLRATIO 3.4 11/02/2015       LIVER ENZYME RESULTS:  Lab Results   Component Value Date    AST 22 10/25/2018    ALT 32 10/25/2018       CBC RESULTS:  Lab " Results   Component Value Date    WBC 9.3 06/14/2011    RBC 4.63 06/14/2011    HGB 13.5 04/26/2012    HCT 43.9 04/06/2012    MCV 89.7 04/06/2012    MCH 28.2 04/06/2012    MCHC 31.4 04/06/2012    RDW 14.5 04/06/2012     06/14/2011       BMP RESULTS:  Lab Results   Component Value Date     08/30/2019    POTASSIUM 4.8 08/30/2019    CHLORIDE 108 (H) 08/30/2019    CO2 25 08/30/2019    ANIONGAP 12.8 08/30/2019     (H) 08/30/2019    BUN 19 08/30/2019    CR 1.32 (H) 08/30/2019    GFRESTIMATED 42 (L) 08/30/2019    GFRESTBLACK 51 (L) 08/30/2019    CHERYL 10.5 08/30/2019

## 2019-08-30 NOTE — PATIENT INSTRUCTIONS
"1. Discussed swelling. Could always try a stronger water pill/diuretic if the swelling is driving you crazy (tosemide given your normal pumping function).  Let me know if you want to try that for a week or so.  [phone]    2. As discussed, fatigue could be occuring due to the big dose of metoprolol   Decrease metoprolol back to 50 mg but TWICE A DAY   Continue Diltiazem 240 mg daily    3. See if fatigue improves on lower dose of medication and with increased exercise.    4. OK to exercise - we talked about \"perceived exertion.\" You should be more short-winded when you are getting exercise, but not gasping.  Build up slowly    5. Limit Advil use    6. Send [facility] message/call or make appt in 3-4 weeks with update on fatigue, BP, HR and exercise [phone]         "

## 2019-10-02 ENCOUNTER — HEALTH MAINTENANCE LETTER (OUTPATIENT)
Age: 54
End: 2019-10-02

## 2019-10-30 ENCOUNTER — HEALTH MAINTENANCE LETTER (OUTPATIENT)
Age: 54
End: 2019-10-30

## 2019-11-22 ASSESSMENT — ANXIETY QUESTIONNAIRES
GAD7 TOTAL SCORE: 1
GAD7 TOTAL SCORE: 1
5. BEING SO RESTLESS THAT IT IS HARD TO SIT STILL: NOT AT ALL
7. FEELING AFRAID AS IF SOMETHING AWFUL MIGHT HAPPEN: NOT AT ALL
GAD7 TOTAL SCORE: 1
7. FEELING AFRAID AS IF SOMETHING AWFUL MIGHT HAPPEN: NOT AT ALL
4. TROUBLE RELAXING: NOT AT ALL
1. FEELING NERVOUS, ANXIOUS, OR ON EDGE: SEVERAL DAYS
2. NOT BEING ABLE TO STOP OR CONTROL WORRYING: NOT AT ALL
6. BECOMING EASILY ANNOYED OR IRRITABLE: NOT AT ALL
3. WORRYING TOO MUCH ABOUT DIFFERENT THINGS: NOT AT ALL

## 2019-11-22 ASSESSMENT — PATIENT HEALTH QUESTIONNAIRE - PHQ9
10. IF YOU CHECKED OFF ANY PROBLEMS, HOW DIFFICULT HAVE THESE PROBLEMS MADE IT FOR YOU TO DO YOUR WORK, TAKE CARE OF THINGS AT HOME, OR GET ALONG WITH OTHER PEOPLE: NOT DIFFICULT AT ALL
SUM OF ALL RESPONSES TO PHQ QUESTIONS 1-9: 5
SUM OF ALL RESPONSES TO PHQ QUESTIONS 1-9: 5

## 2019-11-23 ASSESSMENT — ANXIETY QUESTIONNAIRES: GAD7 TOTAL SCORE: 1

## 2019-11-23 NOTE — PROGRESS NOTES
SUBJECTIVE:   CC: Mary Gorman is an 54 year old woman who presents for preventive health visit.     Healthy Habits:     Getting at least 3 servings of Calcium per day:  Yes    Bi-annual eye exam:  Yes    Dental care twice a year:  Yes    Sleep apnea or symptoms of sleep apnea:  Sleep apnea    Diet:  Regular (no restrictions)    Frequency of exercise:  None    Taking medications regularly:  Yes    Medication side effects:  Not applicable    PHQ-2 Total Score: 2    Additional concerns today:  Yes    -knee pain, over 1 year, left is worse but both bother her, sharp pain  -flushed feeling a lot  -ears ringing, both ears, 1 year, becoming worse    Today's PHQ-2 Score:   PHQ-2 ( 1999 Pfizer) 11/22/2019   Q1: Little interest or pleasure in doing things 1   Q2: Feeling down, depressed or hopeless 1   PHQ-2 Score 2   Q1: Little interest or pleasure in doing things Several days   Q2: Feeling down, depressed or hopeless Several days   PHQ-2 Score 2       Abuse: Current or Past(Physical, Sexual or Emotional)- No  Do you feel safe in your environment? Yes    Have you ever done Advance Care Planning? (For example, a Health Directive, POLST, or a discussion with a medical provider or your loved ones about your wishes): Yes, advance care planning is on file.    Social History     Tobacco Use     Smoking status: Never Smoker     Smokeless tobacco: Never Used   Substance Use Topics     Alcohol use: Yes     Comment: rarely     If you drink alcohol do you typically have >3 drinks per day or >7 drinks per week? No    No flowsheet data found.    Reviewed orders with patient.  Reviewed health maintenance and updated orders accordingly - Yes  BP Readings from Last 3 Encounters:   11/25/19 124/72   08/30/19 92/66   06/14/19 (!) 124/92    Wt Readings from Last 3 Encounters:   11/25/19 140.6 kg (310 lb)   08/30/19 140.7 kg (310 lb 1.6 oz)   06/14/19 142.4 kg (314 lb)                  Recent Labs   Lab Test 08/30/19  1330  10/25/18  0943 10/17/17  0933 05/31/17  1546 12/06/16  0900  03/21/14  0841  02/27/12  1511   A1C  --  6.4* 6.2* 6.2* 6.0   < >  --    < > 5.9   LDL  --  97 86  --  94   < > 44  --  65.4   HDL  --  35* 35*  --  39*   < > 38*  --  45   TRIG  --  200* 211*  --  148   < > 138  --  178*   ALT  --  32  --   --   --   --  30  --  41.0   CR 1.32* 1.04  --  0.98  --    < >  --    < > 0.9   GFRESTIMATED 42* 55*  --  59*  --    < >  --   --   --    GFRESTBLACK 51* 67  --  72  --    < >  --   --   --    POTASSIUM 4.8 4.3  --   --   --    < >  --    < > 4.6   TSH  --   --   --   --  1.75  --   --   --   --     < > = values in this interval not displayed.        Mammogram Screening: Patient over age 50, mutual decision to screen reflected in health maintenance.    Pertinent mammograms are reviewed under the imaging tab.  History of abnormal Pap smear: NO - age 30-65 PAP every 5 years with negative HPV co-testing recommended  PAP / HPV Latest Ref Rng & Units 10/17/2017   PAP - NIL   HPV 16 DNA NEG:Negative Negative   HPV 18 DNA NEG:Negative Negative   OTHER HR HPV NEG:Negative Negative     Reviewed and updated as needed this visit by clinical staff  Tobacco  Allergies  Meds  Med Hx  Surg Hx  Fam Hx  Soc Hx        Reviewed and updated as needed this visit by Provider          Review of Systems  CONSTITUTIONAL: NEGATIVE for fever, chills, change in weight  INTEGUMENTARY/SKIN: NEGATIVE for worrisome rashes, moles or lesions  EYES: NEGATIVE for vision changes or irritation  ENT: POSITIVE for tinnitus bilateral  RESP: NEGATIVE for significant cough or SOB  BREAST: NEGATIVE for masses, tenderness or discharge  CV: NEGATIVE for chest pain, palpitations or peripheral edema  GI: NEGATIVE for nausea, abdominal pain, heartburn, or change in bowel habits  : NEGATIVE for unusual urinary or vaginal symptoms. No vaginal bleeding.  MUSCULOSKELETAL:POSITIVE  for arthralgias bilateral knees, left worse than right  NEURO: NEGATIVE for  "weakness, dizziness or paresthesias  PSYCHIATRIC: NEGATIVE for changes in mood or affect      OBJECTIVE:   /72   Pulse 93   Temp 97.2  F (36.2  C) (Tympanic)   Resp 16   Ht 1.645 m (5' 4.75\")   Wt 140.6 kg (310 lb)   LMP 06/25/2019 (Approximate)   SpO2 97%   BMI 51.99 kg/m    Physical Exam  GENERAL APPEARANCE: healthy, alert and no distress  EYES: Eyes grossly normal to inspection, PERRL and conjunctivae and sclerae normal  HENT: nose and mouth without ulcers or lesions, oropharynx clear, oral mucous membranes moist and both ears: clear effusion  NECK: no adenopathy, no asymmetry, masses, or scars and thyroid normal to palpation  RESP: lungs clear to auscultation - no rales, rhonchi or wheezes  BREAST: normal without masses, tenderness or nipple discharge and no palpable axillary masses or adenopathy  CV: regular rate and rhythm, normal S1 S2, no S3 or S4, no murmur, click or rub, no peripheral edema and peripheral pulses strong  ABDOMEN: soft, nontender, no hepatosplenomegaly, no masses and bowel sounds normal  MS: no musculoskeletal defects are noted and gait is age appropriate without ataxia  SKIN: no suspicious lesions or rashes  NEURO: Normal strength and tone, sensory exam grossly normal, mentation intact and speech normal  PSYCH: mentation appears normal and affect normal/bright    Diagnostic Test Results:  Labs reviewed in Epic    ASSESSMENT/PLAN:       ICD-10-CM    1. Routine general medical examination at a health care facility Z00.00 Lipid panel reflex to direct LDL Fasting     CBC with platelets     Basic metabolic panel   2. Essential hypertension, benign I10    3. Morbid obesity due to excess calories (H) E66.01    4. Hypertension goal BP (blood pressure) < 140/90 I10 fosinopril (MONOPRIL) 10 MG tablet   5. Prediabetes R73.03 Hemoglobin A1c     TSH with free T4 reflex     Albumin Random Urine Quantitative with Creat Ratio   6. Visit for screening mammogram Z12.31 MA Screening Digital " "Bilateral   7. Arthralgia of both knees M25.561 ORTHOPEDICS ADULT REFERRAL    M25.562    Will schedule mammogram.  Follow up with orthopedics on knees.    COUNSELING:  Reviewed preventive health counseling, as reflected in patient instructions    Estimated body mass index is 51.99 kg/m  as calculated from the following:    Height as of this encounter: 1.645 m (5' 4.75\").    Weight as of this encounter: 140.6 kg (310 lb).    Weight management plan: Discussed healthy diet and exercise guidelines     reports that she has never smoked. She has never used smokeless tobacco.      Counseling Resources:  ATP IV Guidelines  Pooled Cohorts Equation Calculator  Breast Cancer Risk Calculator  FRAX Risk Assessment  ICSI Preventive Guidelines  Dietary Guidelines for Americans, 2010  USDA's MyPlate  ASA Prophylaxis  Lung CA Screening    Nicole Castellanos PA-C  [facility]  Answers for HPI/ROS submitted by the patient on 11/22/2019   Annual Exam:  If you checked off any problems, how difficult have these problems made it for you to do your work, take care of things at home, or get along with other people?: Not difficult at all  PHQ9 TOTAL SCORE: 5  SHAQUILLE 7 TOTAL SCORE: 1    "

## 2019-11-23 NOTE — PROGRESS NOTES
"   SUBJECTIVE:   CC: Mary Gorman is an 54 year old woman who presents for preventive health visit.     Healthy Habits:   PHQ-2 Total Score: 2      {Outside tests to abstract? :901193}    {additional problems to add (Optional):482963}    Today's PHQ-2 Score:   PHQ-2 ( 1999 Pfizer) 11/22/2019   Q1: Little interest or pleasure in doing things 1   Q2: Feeling down, depressed or hopeless 1   PHQ-2 Score 2   Q1: Little interest or pleasure in doing things Several days   Q2: Feeling down, depressed or hopeless Several days   PHQ-2 Score 2       Abuse: Current or Past(Physical, Sexual or Emotional)- { :419178}  Do you feel safe in your environment? { :090585}    Have you ever done Advance Care Planning? (For example, a Health Directive, POLST, or a discussion with a medical provider or your loved ones about your wishes): { :248663}    Social History     Tobacco Use     Smoking status: Never Smoker     Smokeless tobacco: Never Used   Substance Use Topics     Alcohol use: Yes     Comment: rarely     {Rooming Staff- Complete this question if Prescreen response is not shown below for today's visit. If you drink alcohol do you typically have >3 drinks per day or >7 drinks per week? (Optional):809368}    Alcohol Use 11/22/2019   Prescreen: >3 drinks/day or >7 drinks/week? No   Prescreen: >3 drinks/day or >7 drinks/week? -   {add AUDIT responses (Optional) (A score of 7 for adult men is an indication of hazardous drinking; a score of 8 or more is an indication of an alcohol use disorder.  A score of 7 or more for adult women is an indication of hazardous drinking or an alchohol use disorder):255170}    Reviewed orders with patient.  Reviewed health maintenance and updated orders accordingly - { :466625::\"Yes\"}  {Chronicprobdata (optional):382851}    {Mammo Decision Support (Optional):803335}    Pertinent mammograms are reviewed under the imaging tab.  History of abnormal Pap smear: { :171876}  PAP / HPV Latest Ref Rng & " "Units 10/17/2017   PAP - NIL   HPV 16 DNA NEG:Negative Negative   HPV 18 DNA NEG:Negative Negative   OTHER HR HPV NEG:Negative Negative     Reviewed and updated as needed this visit by clinical staff         Reviewed and updated as needed this visit by Provider        {HISTORY OPTIONS (Optional):616898}    Review of Systems  {FEMALE ROS (Optional):841079}     OBJECTIVE:   There were no vitals taken for this visit.  Physical Exam  {Exam Choices (Optional):535695}    {Diagnostic Test Results (Optional):463380::\"Diagnostic Test Results:\",\"Labs reviewed in Epic\"}    ASSESSMENT/PLAN:   {Diag Picklist:047751}    COUNSELING:  {FEMALE COUNSELING MESSAGES:577292::\"Reviewed preventive health counseling, as reflected in patient instructions\"}    Estimated body mass index is 52.41 kg/m  as calculated from the following:    Height as of 8/30/19: 1.638 m (5' 4.5\").    Weight as of 8/30/19: 140.7 kg (310 lb 1.6 oz).    {Weight Management Plan (ACO) Complete if BMI is abnormal-  Ages 18-64  BMI >24.9.  Age 65+ with BMI <23 or >30 (Optional):604994}     reports that she has never smoked. She has never used smokeless tobacco.  {Tobacco Cessation -- Complete if patient is a smoker (Optional):565236}    Counseling Resources:  ATP IV Guidelines  Pooled Cohorts Equation Calculator  Breast Cancer Risk Calculator  FRAX Risk Assessment  ICSI Preventive Guidelines  Dietary Guidelines for Americans, 2010  USDA's MyPlate  ASA Prophylaxis  Lung CA Screening    Nicole Castellanos PA-C  [facility]    "

## 2019-11-25 ENCOUNTER — OFFICE VISIT (OUTPATIENT)
Dept: FAMILY MEDICINE | Facility: CLINIC | Age: 54
End: 2019-11-25
Payer: COMMERCIAL

## 2019-11-25 VITALS
BODY MASS INDEX: 48.82 KG/M2 | TEMPERATURE: 97.2 F | HEIGHT: 65 IN | OXYGEN SATURATION: 97 % | SYSTOLIC BLOOD PRESSURE: 124 MMHG | RESPIRATION RATE: 16 BRPM | DIASTOLIC BLOOD PRESSURE: 72 MMHG | WEIGHT: 293 LBS | HEART RATE: 93 BPM

## 2019-11-25 DIAGNOSIS — R73.03 PREDIABETES: ICD-10-CM

## 2019-11-25 DIAGNOSIS — I10 HYPERTENSION GOAL BP (BLOOD PRESSURE) < 140/90: ICD-10-CM

## 2019-11-25 DIAGNOSIS — M25.561 ARTHRALGIA OF BOTH KNEES: ICD-10-CM

## 2019-11-25 DIAGNOSIS — Z12.31 VISIT FOR SCREENING MAMMOGRAM: ICD-10-CM

## 2019-11-25 DIAGNOSIS — M25.562 ARTHRALGIA OF BOTH KNEES: ICD-10-CM

## 2019-11-25 DIAGNOSIS — E66.01 MORBID OBESITY DUE TO EXCESS CALORIES (H): ICD-10-CM

## 2019-11-25 DIAGNOSIS — I10 ESSENTIAL HYPERTENSION, BENIGN: ICD-10-CM

## 2019-11-25 DIAGNOSIS — Z00.00 ROUTINE GENERAL MEDICAL EXAMINATION AT A HEALTH CARE FACILITY: Primary | ICD-10-CM

## 2019-11-25 LAB
CREAT UR-MCNC: 180 MG/DL
ERYTHROCYTE [DISTWIDTH] IN BLOOD BY AUTOMATED COUNT: 13.1 % (ref 10–15)
HBA1C MFR BLD: 6.4 % (ref 0–5.6)
HCT VFR BLD AUTO: 43.2 % (ref 35–47)
HGB BLD-MCNC: 14.3 G/DL (ref 11.7–15.7)
MCH RBC QN AUTO: 30 PG (ref 26.5–33)
MCHC RBC AUTO-ENTMCNC: 33.1 G/DL (ref 31.5–36.5)
MCV RBC AUTO: 91 FL (ref 78–100)
MICROALBUMIN UR-MCNC: 8 MG/L
MICROALBUMIN/CREAT UR: 4.37 MG/G CR (ref 0–25)
PLATELET # BLD AUTO: 290 10E9/L (ref 150–450)
RBC # BLD AUTO: 4.77 10E12/L (ref 3.8–5.2)
WBC # BLD AUTO: 7.8 10E9/L (ref 4–11)

## 2019-11-25 PROCEDURE — 85027 COMPLETE CBC AUTOMATED: CPT | Performed by: PHYSICIAN ASSISTANT

## 2019-11-25 PROCEDURE — 99396 PREV VISIT EST AGE 40-64: CPT | Performed by: PHYSICIAN ASSISTANT

## 2019-11-25 PROCEDURE — 82043 UR ALBUMIN QUANTITATIVE: CPT | Performed by: PHYSICIAN ASSISTANT

## 2019-11-25 PROCEDURE — 84443 ASSAY THYROID STIM HORMONE: CPT | Performed by: PHYSICIAN ASSISTANT

## 2019-11-25 PROCEDURE — 83036 HEMOGLOBIN GLYCOSYLATED A1C: CPT | Performed by: PHYSICIAN ASSISTANT

## 2019-11-25 PROCEDURE — 36415 COLL VENOUS BLD VENIPUNCTURE: CPT | Performed by: PHYSICIAN ASSISTANT

## 2019-11-25 PROCEDURE — 80061 LIPID PANEL: CPT | Performed by: PHYSICIAN ASSISTANT

## 2019-11-25 PROCEDURE — 80048 BASIC METABOLIC PNL TOTAL CA: CPT | Performed by: PHYSICIAN ASSISTANT

## 2019-11-25 RX ORDER — FOSINOPRIL SODIUM 10 MG/1
10 TABLET ORAL DAILY
Qty: 90 TABLET | Refills: 3 | Status: SHIPPED | OUTPATIENT
Start: 2019-11-25 | End: 2020-11-16

## 2019-11-25 ASSESSMENT — MIFFLIN-ST. JEOR: SCORE: 1998.06

## 2019-11-25 ASSESSMENT — PATIENT HEALTH QUESTIONNAIRE - PHQ9: SUM OF ALL RESPONSES TO PHQ QUESTIONS 1-9: 5

## 2019-11-26 LAB
ANION GAP SERPL CALCULATED.3IONS-SCNC: 6 MMOL/L (ref 3–14)
BUN SERPL-MCNC: 22 MG/DL (ref 7–30)
CALCIUM SERPL-MCNC: 9.5 MG/DL (ref 8.5–10.1)
CHLORIDE SERPL-SCNC: 110 MMOL/L (ref 94–109)
CHOLEST SERPL-MCNC: 199 MG/DL
CO2 SERPL-SCNC: 23 MMOL/L (ref 20–32)
CREAT SERPL-MCNC: 1.11 MG/DL (ref 0.52–1.04)
GFR SERPL CREATININE-BSD FRML MDRD: 56 ML/MIN/{1.73_M2}
GLUCOSE SERPL-MCNC: 137 MG/DL (ref 70–99)
HDLC SERPL-MCNC: 32 MG/DL
LDLC SERPL CALC-MCNC: 120 MG/DL
NONHDLC SERPL-MCNC: 167 MG/DL
POTASSIUM SERPL-SCNC: 4.4 MMOL/L (ref 3.4–5.3)
SODIUM SERPL-SCNC: 139 MMOL/L (ref 133–144)
TRIGL SERPL-MCNC: 235 MG/DL
TSH SERPL DL<=0.005 MIU/L-ACNC: 2.22 MU/L (ref 0.4–4)

## 2019-11-26 NOTE — RESULT ENCOUNTER NOTE
Octavio Hernandez,    I just wanted to let you know that your lab results have been reviewed and are attached.    - Hemoglobin A1c is a test shows your blood sugar level over the last 2-3 months. A normal result for someone who does not have diabetes is 4-5.7% (fasting blood sugar <100). - Your A1c is in the pre-diabetic range and we want to make sure it doesn't reach 6.5%.  Try to decrease sugars and carbohydrates from your diet and increase exercise to keep those numbers down.  We'll recheck next year.    Please let me know if you have any questions and have a great week!    Sincerely,    Paulina Castellanos PA-C    [facility]  [address]  [place], [place] [address] [phone] (p)

## 2019-11-29 ENCOUNTER — ANCILLARY PROCEDURE (OUTPATIENT)
Dept: MAMMOGRAPHY | Facility: CLINIC | Age: 54
End: 2019-11-29
Attending: PHYSICIAN ASSISTANT
Payer: COMMERCIAL

## 2019-11-29 DIAGNOSIS — Z12.31 VISIT FOR SCREENING MAMMOGRAM: ICD-10-CM

## 2019-11-29 PROCEDURE — 77067 SCR MAMMO BI INCL CAD: CPT | Mod: TC

## 2019-11-29 PROCEDURE — 77063 BREAST TOMOSYNTHESIS BI: CPT | Mod: TC

## 2019-12-02 ENCOUNTER — TRANSFERRED RECORDS (OUTPATIENT)
Dept: HEALTH INFORMATION MANAGEMENT | Facility: CLINIC | Age: 54
End: 2019-12-02

## 2019-12-06 ENCOUNTER — THERAPY VISIT (OUTPATIENT)
Dept: PHYSICAL THERAPY | Facility: CLINIC | Age: 54
End: 2019-12-06
Payer: COMMERCIAL

## 2019-12-06 DIAGNOSIS — M25.562 BILATERAL KNEE PAIN: Primary | ICD-10-CM

## 2019-12-06 DIAGNOSIS — M25.561 BILATERAL KNEE PAIN: Primary | ICD-10-CM

## 2019-12-06 PROCEDURE — 97110 THERAPEUTIC EXERCISES: CPT | Mod: GP | Performed by: PHYSICAL THERAPIST

## 2019-12-06 PROCEDURE — 97162 PT EVAL MOD COMPLEX 30 MIN: CPT | Mod: GP | Performed by: PHYSICAL THERAPIST

## 2019-12-06 ASSESSMENT — ACTIVITIES OF DAILY LIVING (ADL)
HOW_WOULD_YOU_RATE_THE_CURRENT_FUNCTION_OF_YOUR_KNEE_DURING_YOUR_USUAL_DAILY_ACTIVITIES_ON_A_SCALE_FROM_0_TO_100_WITH_100_BEING_YOUR_LEVEL_OF_KNEE_FUNCTION_PRIOR_TO_YOUR_INJURY_AND_0_BEING_THE_INABILITY_TO_PERFORM_ANY_OF_YOUR_USUAL_DAILY_ACTIVITIES?: 40
KNEE_ACTIVITY_OF_DAILY_LIVING_SUM: 28
WEAKNESS: THE SYMPTOM AFFECTS MY ACTIVITY SLIGHTLY
HOW_WOULD_YOU_RATE_THE_OVERALL_FUNCTION_OF_YOUR_KNEE_DURING_YOUR_USUAL_DAILY_ACTIVITIES?: ABNORMAL
WALK: ACTIVITY IS VERY DIFFICULT
SWELLING: I HAVE THE SYMPTOM BUT IT DOES NOT AFFECT MY ACTIVITY
LIMPING: THE SYMPTOM AFFECTS MY ACTIVITY SEVERELY
SIT WITH YOUR KNEE BENT: ACTIVITY IS MINIMALLY DIFFICULT
PAIN: THE SYMPTOM AFFECTS MY ACTIVITY SEVERELY
GO DOWN STAIRS: ACTIVITY IS FAIRLY DIFFICULT
AS_A_RESULT_OF_YOUR_KNEE_INJURY,_HOW_WOULD_YOU_RATE_YOUR_CURRENT_LEVEL_OF_DAILY_ACTIVITY?: ABNORMAL
STAND: ACTIVITY IS FAIRLY DIFFICULT
RAW_SCORE: 28
GIVING WAY, BUCKLING OR SHIFTING OF KNEE: THE SYMPTOM AFFECTS MY ACTIVITY SLIGHTLY
STIFFNESS: THE SYMPTOM AFFECTS MY ACTIVITY MODERATELY
RISE FROM A CHAIR: ACTIVITY IS SOMEWHAT DIFFICULT
GO UP STAIRS: ACTIVITY IS FAIRLY DIFFICULT
SQUAT: I AM UNABLE TO DO THE ACTIVITY
KNEEL ON THE FRONT OF YOUR KNEE: I AM UNABLE TO DO THE ACTIVITY
KNEE_ACTIVITY_OF_DAILY_LIVING_SCORE: 40
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
## 2019-12-11 ENCOUNTER — THERAPY VISIT (OUTPATIENT)
Dept: PHYSICAL THERAPY | Facility: CLINIC | Age: 54
End: 2019-12-11
Payer: COMMERCIAL

## 2019-12-11 DIAGNOSIS — M25.562 BILATERAL KNEE PAIN: Primary | ICD-10-CM

## 2019-12-11 DIAGNOSIS — M25.561 BILATERAL KNEE PAIN: Primary | ICD-10-CM

## 2019-12-11 PROCEDURE — 97112 NEUROMUSCULAR REEDUCATION: CPT | Mod: GP | Performed by: PHYSICAL THERAPIST

## 2019-12-11 PROCEDURE — 97110 THERAPEUTIC EXERCISES: CPT | Mod: GP | Performed by: PHYSICAL THERAPIST

## 2019-12-18 ENCOUNTER — THERAPY VISIT (OUTPATIENT)
Dept: PHYSICAL THERAPY | Facility: CLINIC | Age: 54
End: 2019-12-18
Payer: COMMERCIAL

## 2019-12-18 DIAGNOSIS — M25.561 BILATERAL KNEE PAIN: Primary | ICD-10-CM

## 2019-12-18 DIAGNOSIS — M25.562 BILATERAL KNEE PAIN: Primary | ICD-10-CM

## 2019-12-18 PROCEDURE — 97110 THERAPEUTIC EXERCISES: CPT | Mod: GP | Performed by: PHYSICAL THERAPIST

## 2020-01-03 ENCOUNTER — THERAPY VISIT (OUTPATIENT)
Dept: PHYSICAL THERAPY | Facility: CLINIC | Age: 55
End: 2020-01-03
Payer: COMMERCIAL

## 2020-01-03 DIAGNOSIS — M25.562 BILATERAL KNEE PAIN: Primary | ICD-10-CM

## 2020-01-03 DIAGNOSIS — M25.561 BILATERAL KNEE PAIN: Primary | ICD-10-CM

## 2020-01-03 PROCEDURE — 97110 THERAPEUTIC EXERCISES: CPT | Mod: GP | Performed by: PHYSICAL THERAPIST

## 2020-01-10 ENCOUNTER — THERAPY VISIT (OUTPATIENT)
Dept: PHYSICAL THERAPY | Facility: CLINIC | Age: 55
End: 2020-01-10
Payer: COMMERCIAL

## 2020-01-10 DIAGNOSIS — M25.561 BILATERAL KNEE PAIN: Primary | ICD-10-CM

## 2020-01-10 DIAGNOSIS — M25.562 BILATERAL KNEE PAIN: Primary | ICD-10-CM

## 2020-01-10 PROCEDURE — 97110 THERAPEUTIC EXERCISES: CPT | Mod: GP | Performed by: PHYSICAL THERAPIST

## 2020-01-10 PROCEDURE — 97530 THERAPEUTIC ACTIVITIES: CPT | Mod: GP | Performed by: PHYSICAL THERAPIST

## 2020-01-16 ENCOUNTER — THERAPY VISIT (OUTPATIENT)
Dept: PHYSICAL THERAPY | Facility: CLINIC | Age: 55
End: 2020-01-16
Payer: COMMERCIAL

## 2020-01-16 DIAGNOSIS — M25.561 BILATERAL KNEE PAIN: Primary | ICD-10-CM

## 2020-01-16 DIAGNOSIS — M25.562 BILATERAL KNEE PAIN: Primary | ICD-10-CM

## 2020-01-16 PROCEDURE — 97110 THERAPEUTIC EXERCISES: CPT | Mod: GP | Performed by: PHYSICAL THERAPIST

## 2020-01-30 ENCOUNTER — THERAPY VISIT (OUTPATIENT)
Dept: PHYSICAL THERAPY | Facility: CLINIC | Age: 55
End: 2020-01-30
Payer: COMMERCIAL

## 2020-01-30 DIAGNOSIS — M25.562 BILATERAL KNEE PAIN: Primary | ICD-10-CM

## 2020-01-30 DIAGNOSIS — M25.561 BILATERAL KNEE PAIN: Primary | ICD-10-CM

## 2020-01-30 PROCEDURE — 97110 THERAPEUTIC EXERCISES: CPT | Mod: GP | Performed by: PHYSICAL THERAPIST

## 2020-01-30 PROCEDURE — 97112 NEUROMUSCULAR REEDUCATION: CPT | Mod: GP | Performed by: PHYSICAL THERAPIST
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
## 2020-05-01 ENCOUNTER — TELEPHONE (OUTPATIENT)
Dept: CARDIOLOGY | Facility: CLINIC | Age: 55
End: 2020-05-01

## 2020-05-01 NOTE — TELEPHONE ENCOUNTER
Called and left patient a voicemail message regarding her upcoming scheduled appointment with Dr. Greenfield. Patient last saw Joceline Gomez in August 2019 where it was recommended patient wear a 24 hour holter monitor prior to her annual follow-up visit with Dr. Greenfield. Called to discuss with patient getting that scheduled and then rescheduling her visit with Dr. Greenfield so that the results of that holter monitor are in.   Asked patient to please call scheduling back to get those appointments setup and changed. Additionally with current covid19 recommendations informed patient we could push holter monitor and Dr. Greenfield appointment back to a later date. Scheduling phone number provided.

## 2020-05-06 NOTE — TELEPHONE ENCOUNTER
Sent message to scheduling to call pt. She needs a Holter prior to virtual visit with Dr. Greenfield next week, so the Dr. Greenfield visit will have to be delayed.

## 2020-05-14 ENCOUNTER — HOSPITAL ENCOUNTER (OUTPATIENT)
Dept: CARDIOLOGY | Facility: CLINIC | Age: 55
Discharge: HOME | End: 2020-05-14
Attending: PHYSICIAN ASSISTANT | Admitting: PHYSICIAN ASSISTANT
Payer: COMMERCIAL

## 2020-05-14 DIAGNOSIS — I48.20 CHRONIC ATRIAL FIBRILLATION (H): ICD-10-CM

## 2020-05-14 PROCEDURE — 93227 XTRNL ECG REC<48 HR R&I: CPT | Performed by: INTERNAL MEDICINE

## 2020-05-14 PROCEDURE — 93225 XTRNL ECG REC<48 HRS REC: CPT

## 2020-05-28 ENCOUNTER — VIRTUAL VISIT (OUTPATIENT)
Dept: CARDIOLOGY | Facility: CLINIC | Age: 55
End: 2020-05-28
Attending: INTERNAL MEDICINE
Payer: COMMERCIAL

## 2020-05-28 DIAGNOSIS — I10 ESSENTIAL HYPERTENSION: ICD-10-CM

## 2020-05-28 DIAGNOSIS — I10 ESSENTIAL HYPERTENSION, BENIGN: ICD-10-CM

## 2020-05-28 DIAGNOSIS — I48.20 CHRONIC ATRIAL FIBRILLATION (H): ICD-10-CM

## 2020-05-28 PROCEDURE — 99213 OFFICE O/P EST LOW 20 MIN: CPT | Mod: 95 | Performed by: INTERNAL MEDICINE

## 2020-05-28 RX ORDER — LAMOTRIGINE 100 MG/1
200 TABLET ORAL DAILY
COMMUNITY
End: 2023-05-18

## 2020-05-28 RX ORDER — DABIGATRAN ETEXILATE 150 MG/1
150 CAPSULE ORAL EVERY 12 HOURS
Qty: 180 CAPSULE | Refills: 3 | Status: SHIPPED | OUTPATIENT
Start: 2020-05-28 | End: 2021-08-19

## 2020-05-28 RX ORDER — SERTRALINE HYDROCHLORIDE 100 MG/1
200 TABLET, FILM COATED ORAL DAILY
COMMUNITY

## 2020-05-28 RX ORDER — METOPROLOL SUCCINATE 50 MG/1
50 TABLET, EXTENDED RELEASE ORAL 2 TIMES DAILY
Qty: 180 TABLET | Refills: 3 | Status: SHIPPED | OUTPATIENT
Start: 2020-05-28 | End: 2021-05-10

## 2020-05-28 RX ORDER — DILTIAZEM HYDROCHLORIDE 240 MG/1
240 CAPSULE, EXTENDED RELEASE ORAL DAILY
Qty: 90 CAPSULE | Refills: 3 | Status: SHIPPED | OUTPATIENT
Start: 2020-05-28 | End: 2021-05-10

## 2020-05-28 NOTE — LETTER
5/28/2020    Nicole Castellanos PA-C  [address]  [facility] [address]    RE: Mary Gorman       Dear Colleague,    I had the pleasure of seeing Mary Shaista Gorman in the [facility].    Virtual visit note:  Chronic AF, rate control and anticoagulation.  No new cardiac problems over the last yr.  Recent Holter showed average HR 82 bpm.  No other complaints except pain in the knees. She may need knee surgery next yr.    Recommend:  Continue current medications.  See Joceline in 1 yr.      Thank you for allowing me to participate in the care of your patient.    Sincerely,     Jeffrey Greenfield MD     [facility]

## 2020-05-28 NOTE — PROGRESS NOTES
"Review Of Systems  Skin:   Eyes:Ears/Nose/Throat:   Respiratory: NEGATIVE  Cardiovascular:*LLE edema  Gastrointestinal: NEGATIVE  Genitourinary:NEGATIVE   Musculoskeletal: NEGATIVE  Neurologic: NEGATIVE  Psychiatric: NEGATIVE  Hematologic/Lymphatic/Immunologic:   Endocrine:      Reviewed:  RUMA Zuniga  05/28/20    Mary Gorman is a 55 year old female who is being evaluated via a billable video visit.      The patient has been notified of following:     \"This video visit will be conducted via a call between you and your physician/provider. We have found that certain health care needs can be provided without the need for an in-person physical exam.  This service lets us provide the care you need with a video conversation.  If a prescription is necessary we can send it directly to your pharmacy.  If lab work is needed we can place an order for that and you can then stop by our lab to have the test done at a later time.    Video visits are billed at different rates depending on your insurance coverage.  Please reach out to your insurance provider with any questions.    If during the course of the call the physician/provider feels a video visit is not appropriate, you will not be charged for this service.\"    Patient has given verbal consent for Video visit? Yes  05/28/20  - Writer spoke w/pt via phone.  She is expecting the video visit w/Dr. Greenfield.  - Stated she does have some LLE edema  - VS:  She does not have a BP machine nor a scale at home.    RUMA Zuniga    How would you like to obtain your AVS? Jerryhart    Patient would like the video invitation sent by: Text to cell phone: [phone]    Will anyone else be joining your video visit? No     Video-Visit Details    Type of service:  Video Visit    Video Start Time:2:45 pm  Video End Time:3:05 pm    Originating Location (pt. Location): home     Distant Location (provider location):  [facility]     Platform used for Video Visit: " Doximity  Virtual visit note:  Chronic AF, rate control and anticoagulation.  No new cardiac problems over the last yr.  Recent Holter showed average HR 82 bpm.  No other complaints except pain in the knees. She may need knee surgery next yr.    Recommend:  Continue current medications.  See Joceline in 1 yr.    Jeffrey Greenfield MD

## 2020-07-09 DIAGNOSIS — I10 ESSENTIAL HYPERTENSION: ICD-10-CM

## 2020-07-09 RX ORDER — SPIRONOLACTONE 25 MG/1
25 TABLET ORAL DAILY
Qty: 90 TABLET | Refills: 2 | Status: SHIPPED | OUTPATIENT
Start: 2020-07-09 | End: 2021-04-06

## 2020-11-15 DIAGNOSIS — I10 HYPERTENSION GOAL BP (BLOOD PRESSURE) < 140/90: ICD-10-CM

## 2020-11-16 RX ORDER — FOSINOPRIL SODIUM 10 MG/1
TABLET ORAL
Qty: 90 TABLET | Refills: 3 | Status: SHIPPED | OUTPATIENT
Start: 2020-11-16 | End: 2021-06-28

## 2021-01-15 ENCOUNTER — HEALTH MAINTENANCE LETTER (OUTPATIENT)
Age: 56
End: 2021-01-15

## 2021-04-06 DIAGNOSIS — I10 ESSENTIAL HYPERTENSION: ICD-10-CM

## 2021-04-06 RX ORDER — SPIRONOLACTONE 25 MG/1
25 TABLET ORAL DAILY
Qty: 90 TABLET | Refills: 2 | Status: SHIPPED | OUTPATIENT
Start: 2021-04-06 | End: 2021-05-10

## 2021-04-30 ENCOUNTER — IMMUNIZATION (OUTPATIENT)
Dept: NURSING | Facility: CLINIC | Age: 56
End: 2021-04-30
Payer: COMMERCIAL

## 2021-04-30 PROCEDURE — 91300 PR COVID VAC PFIZER DIL RECON 30 MCG/0.3 ML IM: CPT

## 2021-04-30 PROCEDURE — 0001A PR COVID VAC PFIZER DIL RECON 30 MCG/0.3 ML IM: CPT

## 2021-05-08 NOTE — PROGRESS NOTES
"[facility]      Assessment & Plan   Problem List Items Addressed This Visit     Atrial fibrillation (H)    Relevant Orders    Basic metabolic panel (Completed)    N terminal pro BNP outpatient (Completed)    TSH with free T4 reflex (Completed)    CBC with platelets (Completed)    Follow-Up with Cardiac Advanced Practice Provider      Other Visit Diagnoses     SOB (shortness of breath)    -  Primary    Relevant Orders    Basic metabolic panel (Completed)    N terminal pro BNP outpatient (Completed)    TSH with free T4 reflex (Completed)    CBC with platelets (Completed)    Follow-Up with Cardiac Advanced Practice Provider    Essential hypertension        Relevant Medications    diltiazem ER (DILT-XR) 120 MG 24 hr capsule    spironolactone (ALDACTONE) 25 MG tablet    Essential hypertension, benign        Relevant Medications    metoprolol succinate ER (TOPROL-XL) 50 MG 24 hr tablet       I had the pleasure of seeing Mary when she came for follow up of AFib.  This 56 year old sees Dr. Greenfield for her history of:    1. Permanent atrial fibrillation on a rate control/AC strategy given her asymptomatic status.  2. Hypertension under good control  3. Obesity   4. Lower extremity edema with adjustments in Diltiazem dose and addition of spironolactone  5. BALTA - on CPAP    I saw Mary back in 5/2019 at which time she continued to c/o LE edema despite holding Diltiazem. Continued rate control and AC were rec'd and she saw Dr. Greenfield (virtual) 5/2020. He reviewed a Holter showing AFib with avg HR 84 bpm on Diltiazem 240 and metoprolol Xl 50 mg BID. No changes were made.    Interval History:  She's now working from home which has been \"good and bad.\"  She notes that she's been having to \"relearn\" how to be around people.  Her activity level has reduced d/t COVID restrictions and knee pain (has end-stage OA). Exercise has been limited and she's gained weight.     Doesn't check BP at home. Uses oximeter occasionally " "and HR is \"OK.\" No c/o palpitations unless she's SOB going up stairs.    LE swelling is \"always there\" but oftentimes is \"much better.\" No change in salt intake she can think of that affects this.  No orthopnea/PND. Remains with CPAP nightly.    Some lightheadedness when she first gets up in AM which she feels is likely the gabapentin. No syncope.    VITALS:  Vitals: /65   Pulse 91   Ht 1.638 m (5' 4.5\")   Wt 149.4 kg (329 lb 6.4 oz)   BMI 55.67 kg/m      Diagnostic Testing:  EKG today, which I overread, showed AFib 99 bpm  Holter 5/2020 AFib with avg HR 84 bpm on metoprolol XL 50 BID and Dilt 240  Echocardiogram 6/10/2019 showed EF 60% with normal RV size and function. No significant valvular abnormalities.   24 hour Holter 6/7/2019 showed atrial fibrillation with average HR 81 bpm. Range was 54 bpm @ 1431 and max was 236 bpm @ 122. Minimal ectopy. Event button pressed 4 times, correlating with AFib and RVR.       Plan:  Decrease Diltiazem to 120 mg daily  Increase metoprolol XL to 100 in AM and 50 in PM  Video visit 2 weeks  CBC, BMP, TSH, BNP today    Assessment/Plan:    1. Permanent AFib    Remains on Diltiazem 240 and Metoprolol XL 50 mg BID. Holter 2020 on these meds showed avg HR 84 bpm    Remains on AC for CHADSVASc 2 (HTN, sex). Pradaxa 150 mg BID    Her edema appears a bit worse than it had and she states sometimes it really impacts her QOL.     PLAN:    Reduce Diltiazem from 240 to 120 mg daily to see if swelling improved    Increase metoprolol to 100 in AM and 50 in PM    Video visit 2 weeks to see if we've made any difference    CBC and BMP today given Pradaxa use    TSH today    2. HTN    Remains on spironolactone 25, fosinopril 10, metoprolol Xl 50 BID and Diltiazem 240    BPs really look good    PLAN:    NTpBNP, BMP. Will contact with results        Joceline Gomez PA-C, MSPAS      Orders Placed This Encounter   Procedures     Basic metabolic panel     N terminal pro BNP outpatient     TSH with " free T4 reflex     CBC with platelets     Follow-Up with Cardiac Advanced Practice Provider     Orders Placed This Encounter   Medications     gabapentin (NEURONTIN) 300 MG capsule     Sig: Take 600 mg by mouth At Bedtime     tiZANidine (ZANAFLEX) 2 MG tablet     Sig: Take 2 mg by mouth 3 times daily     ibuprofen (ADVIL/MOTRIN) 100 MG tablet     Sig: Take 100 mg by mouth as needed     diltiazem ER (DILT-XR) 120 MG 24 hr capsule     Sig: Take 1 capsule (120 mg) by mouth daily     Dispense:  30 capsule     Refill:  5     Note dose reduction     metoprolol succinate ER (TOPROL-XL) 50 MG 24 hr tablet     Sig: Take 2 tablets (100 mg) by mouth every morning AND 1 tablet (50 mg) every evening.     Dispense:  270 tablet     Refill:  3     Keep on file. Note dose increase     spironolactone (ALDACTONE) 25 MG tablet     Sig: Take 1 tablet (25 mg) by mouth daily     Dispense:  90 tablet     Refill:  3     Medications Discontinued During This Encounter   Medication Reason     fluticasone (FLONASE) 50 MCG/ACT spray Medication Reconciliation Clean Up     UNABLE TO FIND Medication Reconciliation Clean Up     sertraline (ZOLOFT) 50 MG tablet Medication Reconciliation Clean Up     cholecalciferol 2000 UNITS CAPS Medication Reconciliation Clean Up     metoprolol succinate ER (TOPROL-XL) 50 MG 24 hr tablet      diltiazem ER (DILT-XR) 240 MG 24 hr ER beaded capsule      spironolactone (ALDACTONE) 25 MG tablet Reorder         Encounter Diagnoses   Name Primary?     Chronic atrial fibrillation (H)      SOB (shortness of breath) Yes     Essential hypertension      Essential hypertension, benign        CURRENT MEDICATIONS:  Current Outpatient Medications   Medication Sig Dispense Refill     albuterol (PROAIR HFA, PROVENTIL HFA, VENTOLIN HFA) 108 (90 BASE) MCG/ACT inhaler Inhale 2 puffs into the lungs every 6 hours 1 each 0     buPROPion (WELLBUTRIN XL) 150 MG 24 hr tablet Take 3 tablets by mouth every morning. Indications: Major  Depressive Disorder.        cetirizine (ZYRTEC) 10 MG tablet Take 10 mg by mouth as needed for allergies       dabigatran ANTICOAGULANT (PRADAXA ANTICOAGULANT) 150 MG capsule Take 1 capsule (150 mg) by mouth every 12 hours Store in original bottle/blister pack. Use within 120 days of opening. 180 capsule 3     diltiazem ER (DILT-XR) 120 MG 24 hr capsule Take 1 capsule (120 mg) by mouth daily 30 capsule 5     fosinopril (MONOPRIL) 10 MG tablet TAKE ONE TABLET BY MOUTH ONCE DAILY 90 tablet 3     gabapentin (NEURONTIN) 300 MG capsule Take 600 mg by mouth At Bedtime       ibuprofen (ADVIL/MOTRIN) 100 MG tablet Take 100 mg by mouth as needed       lamoTRIgine (LAMICTAL) 100 MG tablet Take 200 mg by mouth daily        medroxyPROGESTERone (PROVERA) 10 MG tablet Take 10 mg by mouth daily       metoprolol succinate ER (TOPROL-XL) 50 MG 24 hr tablet Take 2 tablets (100 mg) by mouth every morning AND 1 tablet (50 mg) every evening. 270 tablet 3     sertraline (ZOLOFT) 100 MG tablet Take 100 mg by mouth daily 2 tabs (200mg) daily       spironolactone (ALDACTONE) 25 MG tablet Take 1 tablet (25 mg) by mouth daily 90 tablet 3     tiZANidine (ZANAFLEX) 2 MG tablet Take 2 mg by mouth 3 times daily         ALLERGIES     Allergies   Allergen Reactions     Dyazide [Hydrochlorothiazide W/Triamterene] Unknown     Nickel Rash     Robaxin [Methocarbamol] Rash     Sulfa Drugs Rash         Review of Systems:  Skin:  Negative     Eyes:  Positive for glasses  ENT:  Negative    Respiratory:  Positive for dyspnea on exertion  Cardiovascular:  Negative for;palpitations;chest pain Positive for;palpitations;dizziness;edema  Gastroenterology: Negative melena;hematochezia  Genitourinary:  Negative    Musculoskeletal:  Positive for arthritis;joint pain  Neurologic:  Negative    Psychiatric:  Positive for excessive stress;depression;anxiety  Heme/Lymph/Imm:  Positive for allergies  Endocrine:  Negative      Physical Exam:  Vitals: /65   Pulse 91  "  Ht 1.638 m (5' 4.5\")   Wt 149.4 kg (329 lb 6.4 oz)   BMI 55.67 kg/m      Constitutional:  cooperative, alert and oriented, well developed, well nourished, in no acute distress        Skin:  warm and dry to the touch        Head:  normocephalic, no masses or lesions        Eyes:  pupils equal and round;conjunctivae and lids unremarkable;sclera white        ENT:  no pallor or cyanosis, dentition good        Neck:  JVP normal;no carotid bruit        Chest:  normal breath sounds, clear to auscultation, normal A-P diameter, normal symmetry, normal respiratory excursion, no use of accessory muscles        Cardiac:   irregularly irregular rhythm           HR 70s    Abdomen:  abdomen soft obese      Vascular: pulses full and equal                                      Extremities and Back:  no deformities, clubbing, cyanosis, erythema observed        Neurological:  no gross motor deficits            PAST MEDICAL HISTORY:  Past Medical History:   Diagnosis Date     Atrial fibrillation (H)      Depression      GERD (gastroesophageal reflux disease)      HTN (hypertension)      Hyperlipidemia      BALTA (obstructive sleep apnea)      Permanent atrial fibrillation (H) 6/5/11       PAST SURGICAL HISTORY:  Past Surgical History:   Procedure Laterality Date     CARDIOVERSION  6/7/11     CHOLECYSTECTOMY       DILATION AND CURETTAGE  4/26/2012    Procedure:DILATION AND CURETTAGE; DILATION AND CURETTAGE; Surgeon:JEAN-PAUL DEL CID; Location:[facility]     DILATION AND CURETTAGE, HYSTEROSCOPY DIAGNOSTIC, COMBINED  12/8/2011    Procedure:COMBINED DILATION AND CURETTAGE, HYSTEROSCOPY DIAGNOSTIC; DILATION AND CURETTAGE, DIAGNOSTIC HYSTEROSCOPY ; Surgeon:JEAN-PAUL DEL CID; Location:[facility]     KNEE SURGERY         FAMILY HISTORY:  Family History   Problem Relation Age of Onset     Hypertension Mother      Heart Disease Mother         A-fib     Arthritis Mother      Heart Disease Father         triple bypass     Cancer Father 50        bladder     " Hypertension Father      Respiratory Father         smoker     Cancer Paternal Grandmother 90        rectal     Unknown/Adopted Brother        SOCIAL HISTORY:  Social History     Socioeconomic History     Marital status: Single     Spouse name: None     Number of children: None     Years of education: None     Highest education level: None   Occupational History     None   Social Needs     Financial resource strain: None     Food insecurity     Worry: None     Inability: None     Transportation needs     Medical: None     Non-medical: None   Tobacco Use     Smoking status: Never Smoker     Smokeless tobacco: Never Used   Substance and Sexual Activity     Alcohol use: Yes     Comment: rarely     Drug use: No     Sexual activity: Never   Lifestyle     Physical activity     Days per week: None     Minutes per session: None     Stress: None   Relationships     Social connections     Talks on phone: None     Gets together: None     Attends Sikhism service: None     Active member of club or organization: None     Attends meetings of clubs or organizations: None     Relationship status: None     Intimate partner violence     Fear of current or ex partner: None     Emotionally abused: None     Physically abused: None     Forced sexual activity: None   Other Topics Concern     Parent/sibling w/ CABG, MI or angioplasty before 65F 55M? No      Service Not Asked     Blood Transfusions Not Asked     Caffeine Concern Not Asked     Occupational Exposure Not Asked     Hobby Hazards Not Asked     Sleep Concern Not Asked     Stress Concern Not Asked     Weight Concern Not Asked     Special Diet No     Back Care Not Asked     Exercise Yes     Comment: walks 2 miles minimum 5 x weekly      Bike Helmet Not Asked     Seat Belt Not Asked     Self-Exams Not Asked   Social History Narrative     None

## 2021-05-10 ENCOUNTER — OFFICE VISIT (OUTPATIENT)
Dept: CARDIOLOGY | Facility: CLINIC | Age: 56
End: 2021-05-10
Attending: INTERNAL MEDICINE
Payer: COMMERCIAL

## 2021-05-10 VITALS
BODY MASS INDEX: 48.82 KG/M2 | HEIGHT: 65 IN | WEIGHT: 293 LBS | DIASTOLIC BLOOD PRESSURE: 65 MMHG | HEART RATE: 91 BPM | SYSTOLIC BLOOD PRESSURE: 111 MMHG

## 2021-05-10 DIAGNOSIS — I48.20 CHRONIC ATRIAL FIBRILLATION (H): ICD-10-CM

## 2021-05-10 DIAGNOSIS — R06.02 SOB (SHORTNESS OF BREATH): ICD-10-CM

## 2021-05-10 DIAGNOSIS — I10 ESSENTIAL HYPERTENSION, BENIGN: ICD-10-CM

## 2021-05-10 DIAGNOSIS — R06.02 SOB (SHORTNESS OF BREATH): Primary | ICD-10-CM

## 2021-05-10 DIAGNOSIS — I10 ESSENTIAL HYPERTENSION: ICD-10-CM

## 2021-05-10 LAB
ANION GAP SERPL CALCULATED.3IONS-SCNC: 7 MMOL/L (ref 3–14)
BUN SERPL-MCNC: 19 MG/DL (ref 7–30)
CALCIUM SERPL-MCNC: 10 MG/DL (ref 8.5–10.1)
CHLORIDE SERPL-SCNC: 109 MMOL/L (ref 94–109)
CO2 SERPL-SCNC: 24 MMOL/L (ref 20–32)
CREAT SERPL-MCNC: 1.12 MG/DL (ref 0.52–1.04)
ERYTHROCYTE [DISTWIDTH] IN BLOOD BY AUTOMATED COUNT: 13.1 % (ref 10–15)
GFR SERPL CREATININE-BSD FRML MDRD: 54 ML/MIN/{1.73_M2}
GLUCOSE SERPL-MCNC: 135 MG/DL (ref 70–99)
HCT VFR BLD AUTO: 46.6 % (ref 35–47)
HGB BLD-MCNC: 15.2 G/DL (ref 11.7–15.7)
MCH RBC QN AUTO: 28.7 PG (ref 26.5–33)
MCHC RBC AUTO-ENTMCNC: 32.6 G/DL (ref 31.5–36.5)
MCV RBC AUTO: 88 FL (ref 78–100)
NT-PROBNP SERPL-MCNC: 353 PG/ML (ref 0–125)
PLATELET # BLD AUTO: 261 10E9/L (ref 150–450)
POTASSIUM SERPL-SCNC: 4.6 MMOL/L (ref 3.4–5.3)
RBC # BLD AUTO: 5.29 10E12/L (ref 3.8–5.2)
SODIUM SERPL-SCNC: 140 MMOL/L (ref 133–144)
TSH SERPL DL<=0.005 MIU/L-ACNC: 1.91 MU/L (ref 0.4–4)
WBC # BLD AUTO: 8.6 10E9/L (ref 4–11)

## 2021-05-10 PROCEDURE — 36415 COLL VENOUS BLD VENIPUNCTURE: CPT | Performed by: PHYSICIAN ASSISTANT

## 2021-05-10 PROCEDURE — 99214 OFFICE O/P EST MOD 30 MIN: CPT | Performed by: PHYSICIAN ASSISTANT

## 2021-05-10 PROCEDURE — 80048 BASIC METABOLIC PNL TOTAL CA: CPT | Performed by: PHYSICIAN ASSISTANT

## 2021-05-10 PROCEDURE — 84443 ASSAY THYROID STIM HORMONE: CPT | Performed by: PHYSICIAN ASSISTANT

## 2021-05-10 PROCEDURE — 85027 COMPLETE CBC AUTOMATED: CPT | Performed by: PHYSICIAN ASSISTANT

## 2021-05-10 PROCEDURE — 83880 ASSAY OF NATRIURETIC PEPTIDE: CPT | Performed by: PHYSICIAN ASSISTANT

## 2021-05-10 PROCEDURE — 93000 ELECTROCARDIOGRAM COMPLETE: CPT | Performed by: PHYSICIAN ASSISTANT

## 2021-05-10 RX ORDER — METOPROLOL SUCCINATE 50 MG/1
TABLET, EXTENDED RELEASE ORAL
Qty: 270 TABLET | Refills: 3 | Status: SHIPPED | OUTPATIENT
Start: 2021-05-10 | End: 2021-07-01

## 2021-05-10 RX ORDER — SPIRONOLACTONE 25 MG/1
25 TABLET ORAL DAILY
Qty: 90 TABLET | Refills: 3 | Status: SHIPPED | OUTPATIENT
Start: 2021-05-10 | End: 2022-07-19

## 2021-05-10 RX ORDER — DILTIAZEM HYDROCHLORIDE 120 MG/1
120 CAPSULE, EXTENDED RELEASE ORAL DAILY
Qty: 30 CAPSULE | Refills: 5 | Status: SHIPPED | OUTPATIENT
Start: 2021-05-10 | End: 2021-07-01

## 2021-05-10 RX ORDER — TIZANIDINE 2 MG/1
2 TABLET ORAL 3 TIMES DAILY
COMMUNITY
End: 2021-06-28

## 2021-05-10 RX ORDER — GABAPENTIN 300 MG/1
600 CAPSULE ORAL AT BEDTIME
COMMUNITY
End: 2022-12-27

## 2021-05-10 ASSESSMENT — MIFFLIN-ST. JEOR: SCORE: 2072.09

## 2021-05-10 NOTE — PATIENT INSTRUCTIONS
Mary - it was nice to see you today!    1. Reviewed EKG showing AFib @ 99 bpm  2. Noted swelling is constant, some days worse than others    PLAN:  1. DECREASE Diltiazem from 240 to 120 mg daily - new Rx sent in. I'm hopeful this will reduce swelling  2. To keep HR under control will INCREASE metoprolol Xl to 100 in AM (2 tabs) and 50 in PM (1 tab). New Rx sent in  3. Check HR 4 times a day and write down. Avoiding Holter d/t rash  4. Video visit in 2ish weeks for update on HR, swelling, breathing on the change in meds  5. Blood work today and will contact with results.  [phone]

## 2021-05-10 NOTE — LETTER
"5/10/2021    Physician No Ref-Primary  No address on file    RE: Mary Gorman       Dear Colleague,    I had the pleasure of seeing Mary Gorman in the [facility].    [facility]      Assessment & Plan   Problem List Items Addressed This Visit     Atrial fibrillation (H)    Relevant Orders    Basic metabolic panel (Completed)    N terminal pro BNP outpatient (Completed)    TSH with free T4 reflex (Completed)    CBC with platelets (Completed)    Follow-Up with Cardiac Advanced Practice Provider      Other Visit Diagnoses     SOB (shortness of breath)    -  Primary    Relevant Orders    Basic metabolic panel (Completed)    N terminal pro BNP outpatient (Completed)    TSH with free T4 reflex (Completed)    CBC with platelets (Completed)    Follow-Up with Cardiac Advanced Practice Provider    Essential hypertension        Relevant Medications    diltiazem ER (DILT-XR) 120 MG 24 hr capsule    spironolactone (ALDACTONE) 25 MG tablet    Essential hypertension, benign        Relevant Medications    metoprolol succinate ER (TOPROL-XL) 50 MG 24 hr tablet       I had the pleasure of seeing Mary when she came for follow up of AFib.  This 56 year old sees Dr. Greenfield for her history of:    1. Permanent atrial fibrillation on a rate control/AC strategy given her asymptomatic status.  2. Hypertension under good control  3. Obesity   4. Lower extremity edema with adjustments in Diltiazem dose and addition of spironolactone  5. BALTA - on CPAP    I saw Mary back in 5/2019 at which time she continued to c/o LE edema despite holding Diltiazem. Continued rate control and AC were rec'd and she saw Dr. Greenfield (virtual) 5/2020. He reviewed a Holter showing AFib with avg HR 84 bpm on Diltiazem 240 and metoprolol Xl 50 mg BID. No changes were made.    Interval History:  She's now working from home which has been \"good and bad.\"  She notes that she's been having " "to \"relearn\" how to be around people.  Her activity level has reduced d/t COVID restrictions and knee pain (has end-stage OA). Exercise has been limited and she's gained weight.     Doesn't check BP at home. Uses oximeter occasionally and HR is \"OK.\" No c/o palpitations unless she's SOB going up stairs.    LE swelling is \"always there\" but oftentimes is \"much better.\" No change in salt intake she can think of that affects this.  No orthopnea/PND. Remains with CPAP nightly.    Some lightheadedness when she first gets up in AM which she feels is likely the gabapentin. No syncope.    VITALS:  Vitals: /65   Pulse 91   Ht 1.638 m (5' 4.5\")   Wt 149.4 kg (329 lb 6.4 oz)   BMI 55.67 kg/m      Diagnostic Testing:  EKG today, which I overread, showed AFib 99 bpm  Holter 5/2020 AFib with avg HR 84 bpm on metoprolol XL 50 BID and Dilt 240  Echocardiogram 6/10/2019 showed EF 60% with normal RV size and function. No significant valvular abnormalities.   24 hour Holter 6/7/2019 showed atrial fibrillation with average HR 81 bpm. Range was 54 bpm @ 1431 and max was 236 bpm @ 122. Minimal ectopy. Event button pressed 4 times, correlating with AFib and RVR.       Plan:  Decrease Diltiazem to 120 mg daily  Increase metoprolol XL to 100 in AM and 50 in PM  Video visit 2 weeks  CBC, BMP, TSH, BNP today    Assessment/Plan:    1. Permanent AFib    Remains on Diltiazem 240 and Metoprolol XL 50 mg BID. Holter 2020 on these meds showed avg HR 84 bpm    Remains on AC for CHADSVASc 2 (HTN, sex). Pradaxa 150 mg BID    Her edema appears a bit worse than it had and she states sometimes it really impacts her QOL.     PLAN:    Reduce Diltiazem from 240 to 120 mg daily to see if swelling improved    Increase metoprolol to 100 in AM and 50 in PM    Video visit 2 weeks to see if we've made any difference    CBC and BMP today given Pradaxa use    TSH today    2. HTN    Remains on spironolactone 25, fosinopril 10, metoprolol Xl 50 BID and " Diltiazem 240    BPs really look good    PLAN:    NTpBNP, BMP. Will contact with results        Joceline Gomez PA-C, MSPAS      Orders Placed This Encounter   Procedures     Basic metabolic panel     N terminal pro BNP outpatient     TSH with free T4 reflex     CBC with platelets     Follow-Up with Cardiac Advanced Practice Provider     Orders Placed This Encounter   Medications     gabapentin (NEURONTIN) 300 MG capsule     Sig: Take 600 mg by mouth At Bedtime     tiZANidine (ZANAFLEX) 2 MG tablet     Sig: Take 2 mg by mouth 3 times daily     ibuprofen (ADVIL/MOTRIN) 100 MG tablet     Sig: Take 100 mg by mouth as needed     diltiazem ER (DILT-XR) 120 MG 24 hr capsule     Sig: Take 1 capsule (120 mg) by mouth daily     Dispense:  30 capsule     Refill:  5     Note dose reduction     metoprolol succinate ER (TOPROL-XL) 50 MG 24 hr tablet     Sig: Take 2 tablets (100 mg) by mouth every morning AND 1 tablet (50 mg) every evening.     Dispense:  270 tablet     Refill:  3     Keep on file. Note dose increase     spironolactone (ALDACTONE) 25 MG tablet     Sig: Take 1 tablet (25 mg) by mouth daily     Dispense:  90 tablet     Refill:  3     Medications Discontinued During This Encounter   Medication Reason     fluticasone (FLONASE) 50 MCG/ACT spray Medication Reconciliation Clean Up     UNABLE TO FIND Medication Reconciliation Clean Up     sertraline (ZOLOFT) 50 MG tablet Medication Reconciliation Clean Up     cholecalciferol 2000 UNITS CAPS Medication Reconciliation Clean Up     metoprolol succinate ER (TOPROL-XL) 50 MG 24 hr tablet      diltiazem ER (DILT-XR) 240 MG 24 hr ER beaded capsule      spironolactone (ALDACTONE) 25 MG tablet Reorder         Encounter Diagnoses   Name Primary?     Chronic atrial fibrillation (H)      SOB (shortness of breath) Yes     Essential hypertension      Essential hypertension, benign        CURRENT MEDICATIONS:  Current Outpatient Medications   Medication Sig Dispense Refill     albuterol  (PROAIR HFA, PROVENTIL HFA, VENTOLIN HFA) 108 (90 BASE) MCG/ACT inhaler Inhale 2 puffs into the lungs every 6 hours 1 each 0     buPROPion (WELLBUTRIN XL) 150 MG 24 hr tablet Take 3 tablets by mouth every morning. Indications: Major Depressive Disorder.        cetirizine (ZYRTEC) 10 MG tablet Take 10 mg by mouth as needed for allergies       dabigatran ANTICOAGULANT (PRADAXA ANTICOAGULANT) 150 MG capsule Take 1 capsule (150 mg) by mouth every 12 hours Store in original bottle/blister pack. Use within 120 days of opening. 180 capsule 3     diltiazem ER (DILT-XR) 120 MG 24 hr capsule Take 1 capsule (120 mg) by mouth daily 30 capsule 5     fosinopril (MONOPRIL) 10 MG tablet TAKE ONE TABLET BY MOUTH ONCE DAILY 90 tablet 3     gabapentin (NEURONTIN) 300 MG capsule Take 600 mg by mouth At Bedtime       ibuprofen (ADVIL/MOTRIN) 100 MG tablet Take 100 mg by mouth as needed       lamoTRIgine (LAMICTAL) 100 MG tablet Take 200 mg by mouth daily        medroxyPROGESTERone (PROVERA) 10 MG tablet Take 10 mg by mouth daily       metoprolol succinate ER (TOPROL-XL) 50 MG 24 hr tablet Take 2 tablets (100 mg) by mouth every morning AND 1 tablet (50 mg) every evening. 270 tablet 3     sertraline (ZOLOFT) 100 MG tablet Take 100 mg by mouth daily 2 tabs (200mg) daily       spironolactone (ALDACTONE) 25 MG tablet Take 1 tablet (25 mg) by mouth daily 90 tablet 3     tiZANidine (ZANAFLEX) 2 MG tablet Take 2 mg by mouth 3 times daily         ALLERGIES     Allergies   Allergen Reactions     Dyazide [Hydrochlorothiazide W/Triamterene] Unknown     Nickel Rash     Robaxin [Methocarbamol] Rash     Sulfa Drugs Rash         Review of Systems:  Skin:  Negative     Eyes:  Positive for glasses  ENT:  Negative    Respiratory:  Positive for dyspnea on exertion  Cardiovascular:  Negative for;palpitations;chest pain Positive for;palpitations;dizziness;edema  Gastroenterology: Negative melena;hematochezia  Genitourinary:  Negative    Musculoskeletal:   "Positive for arthritis;joint pain  Neurologic:  Negative    Psychiatric:  Positive for excessive stress;depression;anxiety  Heme/Lymph/Imm:  Positive for allergies  Endocrine:  Negative      Physical Exam:  Vitals: /65   Pulse 91   Ht 1.638 m (5' 4.5\")   Wt 149.4 kg (329 lb 6.4 oz)   BMI 55.67 kg/m      Constitutional:  cooperative, alert and oriented, well developed, well nourished, in no acute distress        Skin:  warm and dry to the touch        Head:  normocephalic, no masses or lesions        Eyes:  pupils equal and round;conjunctivae and lids unremarkable;sclera white        ENT:  no pallor or cyanosis, dentition good        Neck:  JVP normal;no carotid bruit        Chest:  normal breath sounds, clear to auscultation, normal A-P diameter, normal symmetry, normal respiratory excursion, no use of accessory muscles        Cardiac:   irregularly irregular rhythm           HR 70s    Abdomen:  abdomen soft obese      Vascular: pulses full and equal                                      Extremities and Back:  no deformities, clubbing, cyanosis, erythema observed        Neurological:  no gross motor deficits            PAST MEDICAL HISTORY:  Past Medical History:   Diagnosis Date     Atrial fibrillation (H)      Depression      GERD (gastroesophageal reflux disease)      HTN (hypertension)      Hyperlipidemia      BALTA (obstructive sleep apnea)      Permanent atrial fibrillation (H) 6/5/11       PAST SURGICAL HISTORY:  Past Surgical History:   Procedure Laterality Date     CARDIOVERSION  6/7/11     CHOLECYSTECTOMY       DILATION AND CURETTAGE  4/26/2012    Procedure:DILATION AND CURETTAGE; DILATION AND CURETTAGE; Surgeon:JEAN-PAUL DEL CID; Location:[facility]     DILATION AND CURETTAGE, HYSTEROSCOPY DIAGNOSTIC, COMBINED  12/8/2011    Procedure:COMBINED DILATION AND CURETTAGE, HYSTEROSCOPY DIAGNOSTIC; DILATION AND CURETTAGE, DIAGNOSTIC HYSTEROSCOPY ; Surgeon:JEAN-PAUL DEL CID; Location:[facility]     KNEE SURGERY         FAMILY " HISTORY:  Family History   Problem Relation Age of Onset     Hypertension Mother      Heart Disease Mother         A-fib     Arthritis Mother      Heart Disease Father         triple bypass     Cancer Father 50        bladder     Hypertension Father      Respiratory Father         smoker     Cancer Paternal Grandmother 90        rectal     Unknown/Adopted Brother        SOCIAL HISTORY:  Social History     Socioeconomic History     Marital status: Single     Spouse name: None     Number of children: None     Years of education: None     Highest education level: None   Occupational History     None   Social Needs     Financial resource strain: None     Food insecurity     Worry: None     Inability: None     Transportation needs     Medical: None     Non-medical: None   Tobacco Use     Smoking status: Never Smoker     Smokeless tobacco: Never Used   Substance and Sexual Activity     Alcohol use: Yes     Comment: rarely     Drug use: No     Sexual activity: Never   Lifestyle     Physical activity     Days per week: None     Minutes per session: None     Stress: None   Relationships     Social connections     Talks on phone: None     Gets together: None     Attends [employer] service: None     Active member of club or organization: None     Attends meetings of clubs or organizations: None     Relationship status: None     Intimate partner violence     Fear of current or ex partner: None     Emotionally abused: None     Physically abused: None     Forced sexual activity: None   Other Topics Concern     Parent/sibling w/ CABG, MI or angioplasty before 65F 55M? No      Service Not Asked     Blood Transfusions Not Asked     Caffeine Concern Not Asked     Occupational Exposure Not Asked     Hobby Hazards Not Asked     Sleep Concern Not Asked     Stress Concern Not Asked     Weight Concern Not Asked     Special Diet No     Back Care Not Asked     Exercise Yes     Comment: walks 2 miles minimum 5 x weekly      Bike  Helmet Not Asked     Seat Belt Not Asked     Self-Exams Not Asked   Social History Narrative     None       Thank you for allowing me to participate in the care of your patient.      Sincerely,     Kayleen Gomez PA-C     [facility]    cc:   Jeffrey Greenfield MD  [address]  [address]  [place]  [place] [address]

## 2021-05-21 ENCOUNTER — IMMUNIZATION (OUTPATIENT)
Dept: NURSING | Facility: CLINIC | Age: 56
End: 2021-05-21
Payer: COMMERCIAL

## 2021-05-21 PROCEDURE — 0002A PR COVID VAC PFIZER DIL RECON 30 MCG/0.3 ML IM: CPT

## 2021-05-21 PROCEDURE — 91300 PR COVID VAC PFIZER DIL RECON 30 MCG/0.3 ML IM: CPT

## 2021-06-27 SDOH — ECONOMIC STABILITY: INCOME INSECURITY: IN THE LAST 12 MONTHS, WAS THERE A TIME WHEN YOU WERE NOT ABLE TO PAY THE MORTGAGE OR RENT ON TIME?: NO

## 2021-06-27 ASSESSMENT — ENCOUNTER SYMPTOMS
WEAKNESS: 0
EYE PAIN: 0
ARTHRALGIAS: 1
DIARRHEA: 0
HEADACHES: 0
CHILLS: 0
SHORTNESS OF BREATH: 1
MYALGIAS: 1
SORE THROAT: 0
ABDOMINAL PAIN: 0
PARESTHESIAS: 1
NAUSEA: 0
CONSTIPATION: 0
BREAST MASS: 0
COUGH: 0
HEMATOCHEZIA: 0
HEARTBURN: 0
PALPITATIONS: 0
DIZZINESS: 0
DYSURIA: 0
HEMATURIA: 0
JOINT SWELLING: 1
FEVER: 0
FREQUENCY: 0
NERVOUS/ANXIOUS: 0

## 2021-06-27 ASSESSMENT — PATIENT HEALTH QUESTIONNAIRE - PHQ9
SUM OF ALL RESPONSES TO PHQ QUESTIONS 1-9: 9
10. IF YOU CHECKED OFF ANY PROBLEMS, HOW DIFFICULT HAVE THESE PROBLEMS MADE IT FOR YOU TO DO YOUR WORK, TAKE CARE OF THINGS AT HOME, OR GET ALONG WITH OTHER PEOPLE: SOMEWHAT DIFFICULT
SUM OF ALL RESPONSES TO PHQ QUESTIONS 1-9: 9

## 2021-06-28 ENCOUNTER — TELEPHONE (OUTPATIENT)
Dept: FAMILY MEDICINE | Facility: CLINIC | Age: 56
End: 2021-06-28

## 2021-06-28 ENCOUNTER — OFFICE VISIT (OUTPATIENT)
Dept: FAMILY MEDICINE | Facility: CLINIC | Age: 56
End: 2021-06-28
Payer: COMMERCIAL

## 2021-06-28 VITALS
RESPIRATION RATE: 12 BRPM | TEMPERATURE: 98 F | OXYGEN SATURATION: 98 % | HEIGHT: 65 IN | BODY MASS INDEX: 48.82 KG/M2 | WEIGHT: 293 LBS | DIASTOLIC BLOOD PRESSURE: 80 MMHG | SYSTOLIC BLOOD PRESSURE: 122 MMHG | HEART RATE: 108 BPM

## 2021-06-28 DIAGNOSIS — E78.5 HYPERLIPIDEMIA LDL GOAL <100: ICD-10-CM

## 2021-06-28 DIAGNOSIS — I48.21 PERMANENT ATRIAL FIBRILLATION (H): ICD-10-CM

## 2021-06-28 DIAGNOSIS — M62.838 MUSCLE SPASM: ICD-10-CM

## 2021-06-28 DIAGNOSIS — B37.2 YEAST INFECTION OF THE SKIN: ICD-10-CM

## 2021-06-28 DIAGNOSIS — Z23 NEED FOR VACCINATION: ICD-10-CM

## 2021-06-28 DIAGNOSIS — E66.01 MORBID OBESITY DUE TO EXCESS CALORIES (H): ICD-10-CM

## 2021-06-28 DIAGNOSIS — R73.03 PREDIABETES: ICD-10-CM

## 2021-06-28 DIAGNOSIS — F33.0 MILD RECURRENT MAJOR DEPRESSION (H): ICD-10-CM

## 2021-06-28 DIAGNOSIS — Z00.00 ROUTINE HISTORY AND PHYSICAL EXAMINATION OF ADULT: Primary | ICD-10-CM

## 2021-06-28 DIAGNOSIS — I10 HYPERTENSION GOAL BP (BLOOD PRESSURE) < 140/90: ICD-10-CM

## 2021-06-28 LAB
HBA1C MFR BLD: 6.9 % (ref 0–5.6)
HIV 1+2 AB+HIV1 P24 AG SERPL QL IA: NONREACTIVE

## 2021-06-28 PROCEDURE — 90471 IMMUNIZATION ADMIN: CPT | Performed by: PHYSICIAN ASSISTANT

## 2021-06-28 PROCEDURE — 83036 HEMOGLOBIN GLYCOSYLATED A1C: CPT | Performed by: PHYSICIAN ASSISTANT

## 2021-06-28 PROCEDURE — 90715 TDAP VACCINE 7 YRS/> IM: CPT | Performed by: PHYSICIAN ASSISTANT

## 2021-06-28 PROCEDURE — 87389 HIV-1 AG W/HIV-1&-2 AB AG IA: CPT | Performed by: PHYSICIAN ASSISTANT

## 2021-06-28 PROCEDURE — 80053 COMPREHEN METABOLIC PANEL: CPT | Performed by: PHYSICIAN ASSISTANT

## 2021-06-28 PROCEDURE — 96127 BRIEF EMOTIONAL/BEHAV ASSMT: CPT | Performed by: PHYSICIAN ASSISTANT

## 2021-06-28 PROCEDURE — 99396 PREV VISIT EST AGE 40-64: CPT | Mod: 25 | Performed by: PHYSICIAN ASSISTANT

## 2021-06-28 PROCEDURE — 36415 COLL VENOUS BLD VENIPUNCTURE: CPT | Performed by: PHYSICIAN ASSISTANT

## 2021-06-28 PROCEDURE — 99213 OFFICE O/P EST LOW 20 MIN: CPT | Mod: 25 | Performed by: PHYSICIAN ASSISTANT

## 2021-06-28 PROCEDURE — 80061 LIPID PANEL: CPT | Performed by: PHYSICIAN ASSISTANT

## 2021-06-28 RX ORDER — FOSINOPRIL SODIUM 10 MG/1
10 TABLET ORAL DAILY
Qty: 90 TABLET | Refills: 3 | Status: SHIPPED | OUTPATIENT
Start: 2021-06-28 | End: 2022-07-19

## 2021-06-28 RX ORDER — TIZANIDINE 2 MG/1
2 TABLET ORAL 3 TIMES DAILY
Qty: 30 TABLET | Refills: 1 | Status: SHIPPED | OUTPATIENT
Start: 2021-06-28 | End: 2021-12-28

## 2021-06-28 RX ORDER — NYSTATIN 100000 U/G
CREAM TOPICAL
Qty: 30 G | Refills: 3 | Status: SHIPPED | OUTPATIENT
Start: 2021-06-28 | End: 2022-10-28

## 2021-06-28 ASSESSMENT — ENCOUNTER SYMPTOMS
PARESTHESIAS: 1
HEARTBURN: 0
WEAKNESS: 0
ABDOMINAL PAIN: 0
ARTHRALGIAS: 1
HEMATOCHEZIA: 0
DIZZINESS: 0
HEMATURIA: 0
CONSTIPATION: 0
SHORTNESS OF BREATH: 1
DYSURIA: 0
DIARRHEA: 0
BREAST MASS: 0
HEADACHES: 0
FREQUENCY: 0
CHILLS: 0
EYE PAIN: 0
FEVER: 0
COUGH: 0
PALPITATIONS: 0
JOINT SWELLING: 1
NERVOUS/ANXIOUS: 0
SORE THROAT: 0
MYALGIAS: 1
NAUSEA: 0

## 2021-06-28 ASSESSMENT — PATIENT HEALTH QUESTIONNAIRE - PHQ9: SUM OF ALL RESPONSES TO PHQ QUESTIONS 1-9: 9

## 2021-06-28 ASSESSMENT — MIFFLIN-ST. JEOR: SCORE: 2092.95

## 2021-06-28 NOTE — NURSING NOTE
Prior to immunization administration, verified patients identity using patient s name and date of birth. Please see Immunization Activity for additional information.     Screening Questionnaire for Adult Immunization    Are you sick today?   No   Do you have allergies to medications, food, a vaccine component or latex?   No   Have you ever had a serious reaction after receiving a vaccination?   No   Do you have a long-term health problem with heart, lung, kidney, or metabolic disease (e.g., diabetes), asthma, a blood disorder, no spleen, complement component deficiency, a cochlear implant, or a spinal fluid leak?  Are you on long-term aspirin therapy?   No   Do you have cancer, leukemia, HIV/AIDS, or any other immune system problem?   No   Do you have a parent, brother, or sister with an immune system problem?   No   In the past 3 months, have you taken medications that affect  your immune system, such as prednisone, other steroids, or anticancer drugs; drugs for the treatment of rheumatoid arthritis, Crohn s disease, or psoriasis; or have you had radiation treatments?   No   Have you had a seizure, or a brain or other nervous system problem?   No   During the past year, have you received a transfusion of blood or blood    products, or been given immune (gamma) globulin or antiviral drug?   No   For women: Are you pregnant or is there a chance you could become       pregnant during the next month?   No   Have you received any vaccinations in the past 4 weeks?   No     Immunization questionnaire answers were all negative.        Per orders of Paulina Castellanos PA-C, injection of Tdap given by Laura Figueroa CMA. Patient instructed to remain in clinic for 15 minutes afterwards, and to report any adverse reaction to me immediately.       Screening performed by Laura Figueroa CMA on 6/28/2021 at 1:23 PM.'

## 2021-06-28 NOTE — TELEPHONE ENCOUNTER
----- Message from Nicole Castellanos PA-C sent at 6/28/2021  1:58 PM CDT -----  Please call pt and inform of the following:    - Hemoglobin A1c is a test shows your blood sugar level over the last 2-3 months. A normal result for someone who does not have diabetes is 4-5.7% (fasting blood sugar <100).   - Your A1c is in the diabetic range but we need 2 values of 6.5% or over to diagnose you with diabetes.  Try to decrease sugars and carbohydrates from your diet and increase exercise to keep those numbers down.  We should recheck your labs in 3 months.  You do not need to be fasting.

## 2021-06-28 NOTE — PROGRESS NOTES
SUBJECTIVE:   CC: Mary Gorman is an 56 year old woman who presents for preventive health visit.       Patient has been advised of split billing requirements and indicates understanding: Yes  Healthy Habits:     Getting at least 3 servings of Calcium per day:  Yes    Bi-annual eye exam:  Yes    Dental care twice a year:  Yes    Sleep apnea or symptoms of sleep apnea:  Sleep apnea    Diet:  Regular (no restrictions)    Frequency of exercise:  None    Taking medications regularly:  Yes    Medication side effects:  Not applicable    PHQ-2 Total Score: 4    Additional concerns today:  Yes    Feeling off balance at times unless barefoot. Numbness on lt heel. Question about shingles vaccine.    Today's PHQ-2 Score:   PHQ-2 ( 1999 Pfizer) 6/27/2021   Q1: Little interest or pleasure in doing things 2   Q2: Feeling down, depressed or hopeless 2   PHQ-2 Score 4   Q1: Little interest or pleasure in doing things More than half the days   Q2: Feeling down, depressed or hopeless More than half the days   PHQ-2 Score 4       Abuse: Current or Past (Physical, Sexual or Emotional) - No  Do you feel safe in your environment? Yes        Social History     Tobacco Use     Smoking status: Never Smoker     Smokeless tobacco: Never Used   Substance Use Topics     Alcohol use: Yes     Frequency: Monthly or less     Drinks per session: 1 or 2     Binge frequency: Never     Comment: rarely     If you drink alcohol do you typically have >3 drinks per day or >7 drinks per week? No    No flowsheet data found.    Reviewed orders with patient.  Reviewed health maintenance and updated orders accordingly - Yes  BP Readings from Last 3 Encounters:   06/28/21 122/80   05/10/21 111/65   11/25/19 124/72    Wt Readings from Last 3 Encounters:   06/28/21 (!) 151.5 kg (334 lb)   05/10/21 149.4 kg (329 lb 6.4 oz)   11/25/19 140.6 kg (310 lb)                  Recent Labs   Lab Test 05/10/21  1434 11/25/19  0954 10/25/18  0943 10/25/18  0943  10/17/17  0933 03/21/14  0841 03/21/14  0841   A1C  --  6.4*  --  6.4* 6.2*   < >  --    LDL  --  120*  --  97 86   < > 44   HDL  --  32*  --  35* 35*   < > 38*   TRIG  --  235*  --  200* 211*   < > 138   ALT  --   --   --  32  --   --  30   CR 1.12* 1.11*   < > 1.04  --    < >  --    GFRESTIMATED 54* 56*   < > 55*  --    < >  --    GFRESTBLACK 63 64   < > 67  --    < >  --    POTASSIUM 4.6 4.4   < > 4.3  --    < >  --    TSH 1.91 2.22  --   --   --    < >  --     < > = values in this interval not displayed.        Breast Cancer Screening:  Any new diagnosis of family breast, ovarian, or bowel cancer? No    FHS-7: No flowsheet data found.    Mammogram Screening: Recommended mammography every 1-2 years with patient discussion and risk factor consideration  Pertinent mammograms are reviewed under the imaging tab.    History of abnormal Pap smear: NO - age 30-65 PAP every 5 years with negative HPV co-testing recommended  PAP / HPV Latest Ref Rng & Units 10/17/2017   PAP - NIL   HPV 16 DNA NEG:Negative Negative   HPV 18 DNA NEG:Negative Negative   OTHER HR HPV NEG:Negative Negative     Reviewed and updated as needed this visit by clinical staff  Tobacco  Allergies  Meds  Problems  Med Hx  Surg Hx  Fam Hx  Soc Hx          Reviewed and updated as needed this visit by Provider  Tobacco  Allergies  Meds  Problems  Med Hx  Surg Hx  Fam Hx             Review of Systems   Constitutional: Negative for chills and fever.   HENT: Negative for congestion, ear pain, hearing loss and sore throat.    Eyes: Negative for pain and visual disturbance.   Respiratory: Positive for shortness of breath. Negative for cough.    Cardiovascular: Positive for peripheral edema. Negative for chest pain and palpitations.   Gastrointestinal: Negative for abdominal pain, constipation, diarrhea, heartburn, hematochezia and nausea.   Breasts:  Negative for tenderness, breast mass and discharge.   Genitourinary: Negative for dysuria,  "frequency, genital sores, hematuria, pelvic pain, urgency, vaginal bleeding and vaginal discharge.   Musculoskeletal: Positive for arthralgias, joint swelling and myalgias.   Skin: Negative for rash.   Neurological: Positive for paresthesias. Negative for dizziness, weakness and headaches.   Psychiatric/Behavioral: Positive for mood changes. The patient is not nervous/anxious.      CONSTITUTIONAL: NEGATIVE for fever, chills, change in weight  INTEGUMENTARY/SKIN: POSITIVE for callus on feet  EYES: NEGATIVE for vision changes or irritation  ENT: NEGATIVE for ear, mouth and throat problems  RESP: NEGATIVE for significant cough or SOB  BREAST: NEGATIVE for masses, tenderness or discharge  CV: NEGATIVE for chest pain, palpitations or peripheral edema  GI: NEGATIVE for nausea, abdominal pain, heartburn, or change in bowel habits  : NEGATIVE for unusual urinary or vaginal symptoms. No vaginal bleeding.  MUSCULOSKELETAL: NEGATIVE for significant arthralgias or myalgia  NEURO: NEGATIVE for weakness, dizziness or paresthesias  PSYCHIATRIC: NEGATIVE for changes in mood or affect      OBJECTIVE:   /80 (Cuff Size: Thigh)   Pulse 108   Temp 98  F (36.7  C) (Oral)   Resp 12   Ht 1.638 m (5' 4.5\")   Wt (!) 151.5 kg (334 lb)   SpO2 98%   BMI 56.45 kg/m    Physical Exam  GENERAL APPEARANCE: healthy, alert and no distress  EYES: Eyes grossly normal to inspection, PERRL and conjunctivae and sclerae normal  HENT: ear canals and TM's normal, nose and mouth without ulcers or lesions, oropharynx clear and oral mucous membranes moist  NECK: no adenopathy, no asymmetry, masses, or scars and thyroid normal to palpation  RESP: lungs clear to auscultation - no rales, rhonchi or wheezes  BREAST: normal without masses, tenderness or nipple discharge and no palpable axillary masses or adenopathy  CV: regular rate and rhythm, normal S1 S2, no S3 or S4, no murmur, click or rub, no peripheral edema and peripheral pulses " "strong  ABDOMEN: soft, nontender, no hepatosplenomegaly, no masses and bowel sounds normal  MS: no musculoskeletal defects are noted and gait is age appropriate without ataxia  SKIN: ringworm bilateral axilla.  NEURO: Normal strength and tone, sensory exam grossly normal, mentation intact and speech normal  PSYCH: mentation appears normal and affect normal/bright    Diagnostic Test Results:  Labs reviewed in Epic    ASSESSMENT/PLAN:       ICD-10-CM    1. Routine history and physical examination of adult  Z00.00 Lipid panel reflex to direct LDL Non-fasting     Comprehensive metabolic panel     HIV Antigen Antibody Combo     REVIEW OF HEALTH MAINTENANCE PROTOCOL ORDERS   2. Morbid obesity due to excess calories (H)  E66.01    3. Permanent atrial fibrillation (H)  I48.21    4. Mild recurrent major depression (H)  F33.0    5. Hyperlipidemia LDL goal <100  E78.5    6. Prediabetes  R73.03 Hemoglobin A1c   7. Hypertension goal BP (blood pressure) < 140/90  I10 fosinopril (MONOPRIL) 10 MG tablet   8. Need for vaccination  Z23 TDAP VACCINE (Adacel, Boostrix)  [[id number]]   9. Yeast infection of the skin  B37.2 nystatin (MYCOSTATIN) 918562 UNIT/GM external cream   10. Muscle spasm  M62.838 tiZANidine (ZANAFLEX) 2 MG tablet   Sensation in feet normal except on calloused areas.  Balance good, can tandem walk.  Nystatin for ringworm.    Will check with insurance on shingrix.    Patient has been advised of split billing requirements and indicates understanding: Yes  COUNSELING:  Reviewed preventive health counseling, as reflected in patient instructions    Estimated body mass index is 56.45 kg/m  as calculated from the following:    Height as of this encounter: 1.638 m (5' 4.5\").    Weight as of this encounter: 151.5 kg (334 lb).    Weight management plan: Patient referred to endocrine and/or weight management specialty    She reports that she has never smoked. She has never used smokeless tobacco.      Counseling Resources:  ATP " IV Guidelines  Pooled Cohorts Equation Calculator  Breast Cancer Risk Calculator  BRCA-Related Cancer Risk Assessment: FHS-7 Tool  FRAX Risk Assessment  ICSI Preventive Guidelines  Dietary Guidelines for Americans, 2010  USDA's MyPlate  ASA Prophylaxis  Lung CA Screening    Nicole Castellanos PA-C  [facility]  Answers for HPI/ROS submitted by the patient on 6/27/2021   Annual Exam:  If you checked off any problems, how difficult have these problems made it for you to do your work, take care of things at home, or get along with other people?: Somewhat difficult  PHQ9 TOTAL SCORE: 9

## 2021-06-28 NOTE — LETTER
"June 30, 2021      Mary Gorman  [address] [facility] [id number]        Dear Mary,     We have attempted to reach you by phone and left messages. Here is a result note from your provider:   \"- Hemoglobin A1c is a test shows your blood sugar level over the last 2-3 months. A normal result for someone who does not have diabetes is 4-5.7% (fasting blood sugar <100).   - Your A1c is in the diabetic range but we need 2 values of 6.5% or over to diagnose you with diabetes.  Try to decrease sugars and carbohydrates from your diet and increase exercise to keep those numbers down.  We should recheck your labs in 3 months.  You do not need to be fasting.\"    Please call our clinic at [phone] to schedule a non-fasting lab only appointment in 3 months. Please let us know if you have further questions or concerns.         Sincerely,        Nicole Castellanos PA-C          "

## 2021-06-28 NOTE — TELEPHONE ENCOUNTER
Attempted to reach patient, LVMT[facility]. Need to relay provider result note below and schedule recheck of lab work in 3 months (non-fasting).     Tad RIDER RN

## 2021-06-29 LAB
ALBUMIN SERPL-MCNC: 3.5 G/DL (ref 3.4–5)
ALP SERPL-CCNC: 123 U/L (ref 40–150)
ALT SERPL W P-5'-P-CCNC: 31 U/L (ref 0–50)
ANION GAP SERPL CALCULATED.3IONS-SCNC: 6 MMOL/L (ref 3–14)
AST SERPL W P-5'-P-CCNC: 22 U/L (ref 0–45)
BILIRUB SERPL-MCNC: 0.4 MG/DL (ref 0.2–1.3)
BUN SERPL-MCNC: 14 MG/DL (ref 7–30)
CALCIUM SERPL-MCNC: 10 MG/DL (ref 8.5–10.1)
CHLORIDE SERPL-SCNC: 108 MMOL/L (ref 94–109)
CHOLEST SERPL-MCNC: 226 MG/DL
CO2 SERPL-SCNC: 26 MMOL/L (ref 20–32)
CREAT SERPL-MCNC: 1.26 MG/DL (ref 0.52–1.04)
GFR SERPL CREATININE-BSD FRML MDRD: 47 ML/MIN/{1.73_M2}
GLUCOSE SERPL-MCNC: 141 MG/DL (ref 70–99)
HDLC SERPL-MCNC: 39 MG/DL
LDLC SERPL CALC-MCNC: 140 MG/DL
NONHDLC SERPL-MCNC: 187 MG/DL
POTASSIUM SERPL-SCNC: 4.8 MMOL/L (ref 3.4–5.3)
PROT SERPL-MCNC: 7 G/DL (ref 6.8–8.8)
SODIUM SERPL-SCNC: 140 MMOL/L (ref 133–144)
TRIGL SERPL-MCNC: 237 MG/DL

## 2021-06-29 NOTE — RESULT ENCOUNTER NOTE
Octavio Hernandez,    I just wanted to let you know that your lab results have been reviewed and are attached.    - Your cholesterol is high but the American Heart Association does not recommend starting a medication to lower this at this time.  Please try working on lowering the fat in your diet and increasing exercise.  - Your metabolic panel shows:  normal electrolytes (sodium, potassium, calcium), kidney function (creatinine and GFR), abnormal blood sugar and normal liver function (AST/ALT).  - Your HIV 1 and 2 Antibody test is negative for infection.    Please let me know if you have any questions and have a great week!    Sincerely,    Paulina Castellanos PA-C    [facility]  [address] [place] [address]  Clinic Phone: [phone]

## 2021-06-30 NOTE — PROGRESS NOTES
"Mary Gorman is a 56 year old female who is being evaluated via a billable video visit.    Vitals - Patient Reported  Systolic (Patient Reported): 122  Diastolic (Patient Reported): 80(6/28 at PMD)  Weight (Patient Reported): 151.5 kg (334 lb)  Height (Patient Reported): 166.4 cm (5' 5.5\")  BMI (Based on Pt Reported Ht/Wt): 54.73  SpO2 (Patient Reported): 95(room air)  Pulse (Patient Reported): (84-88 range)    Kenna Winters LPN    How would you like to obtain your AVS? MyChart  If the video visit is dropped, the invitation should be resent by: Send to e-mail at: [email or web address]  ([employer])  Will anyone else be joining your video visit? No        CC:  Edema  AFib    VITALS:  122/80  Weight 334#  O2 95%  HR 84-88    BRIEF HPI:  Mary is a 56 year old yo female who sees Dr. Greenfield for h/o:    1. Permanent atrial fibrillation on a rate control/AC strategy given her asymptomatic status.  2. Hypertension under good control  3. Obesity   4. Lower extremity edema with adjustments in Diltiazem dose and addition of spironolactone  5. BALTA - on CPAP    I saw Mary 5/2021 at which time she noted some mild lightheadedness when first getting up in AM which she felt was d/t gabapentin. Her activity level has reduced d/t COVID restrictions and knee pain (has end-stage OA). Exercise has been limited and she's gained weight. She did c/o LE edema.    I decreased Diltiazem from 240 to 120 mg daily d/t c/o LE edema and increased metoprolol XL to 100 in AM and 50 in PM to continue rate control of her AFib. I got blood work and rec'd 2 week video follow-up.      INTERVAL HISTORY:  Since decreasing diltiazem, edema really hasn't changed much, still worse in the evening. She's \"not good about\" wearing her compression stockings. No correlation with food intake she's been able to determine.    HR at home has been more in the 80s, which she's pleased about. BPs have been good. No c/o lightheadedness, dizziness. Tolerating higher dose of " metoprolol XL without issues.    Remains without CP. No orthopnea, PND    DIAGNOSTICS:  Holter 5/2020 AFib with avg HR 84 bpm on metoprolol XL 50 BID and Dilt 240  Echocardiogram 6/10/2019 showed EF 60% with normal RV size and function. No significant valvular abnormalities.   24 hour Holter 6/7/2019 showed atrial fibrillation with average HR 81 bpm. Range was 54 bpm @ 1431 and max was 236 bpm @ 122. Minimal ectopy. Event button pressed 4 times, correlating with AFib and RVR.   Component      Latest Ref Rng & Units 5/10/2021 6/28/2021   Sodium      133 - 144 mmol/L 140 140   Potassium      3.4 - 5.3 mmol/L 4.6 4.8   Chloride      94 - 109 mmol/L 109 108   Carbon Dioxide      20 - 32 mmol/L 24 26   Anion Gap      3 - 14 mmol/L 7 6   Glucose      70 - 99 mg/dL 135 (H) 141 (H)   Urea Nitrogen      7 - 30 mg/dL 19 14   Creatinine      0.52 - 1.04 mg/dL 1.12 (H) 1.26 (H)   GFR Estimate      >60 mL/min/1.73:m2 54 (L) 47 (L)   GFR Estimate If Black      >60 mL/min/1.73:m2 63 55 (L)   Calcium      8.5 - 10.1 mg/dL 10.0 10.0     Component      Latest Ref Rng & Units 6/28/2021   Bilirubin Total      0.2 - 1.3 mg/dL 0.4   Albumin      3.4 - 5.0 g/dL 3.5   Protein Total      6.8 - 8.8 g/dL 7.0   Alkaline Phosphatase      40 - 150 U/L 123   ALT      0 - 50 U/L 31   AST      0 - 45 U/L 22     Component      Latest Ref Rng & Units 5/10/2021   TSH      0.40 - 4.00 mU/L 1.91   N-Terminal Pro Bnp      0 - 125 pg/mL 353 (H)     REVIEW OF SYSTEMS:  Negative with the exception of that noted above    PHYSICAL EXAM:  122/80  Weight 334#  O2 95%  HR 84-88    Physical Exam  GENERAL: Healthy, alert and no distress  EYES: Eyes grossly normal to inspection.  No discharge or erythema, or obvious scleral/conjunctival abnormalities.  RESP: No audible wheeze, cough, or visible cyanosis.  No visible retractions or increased work of breathing.    SKIN: Visible skin clear. No significant rash, abnormal pigmentation or lesions.  NEURO: Cranial nerves  grossly intact.  Mentation and speech appropriate for age.  PSYCH: Mentation appears normal, affect normal/bright, judgement and insight intact, normal speech and appearance well-groomed.      PLAN:  1. Stop Diltiazem  2. Increase metoprolol XL to 100 mg BID  3. Video visit with me 2 weeks 7/16 @ 1230    ASSESSMENT/PLAN:    1. LE Edema    No real change after reducing in Diltiazem    Doesn't wear compression stockings routinely    Limits sodium - no connection that she's seen    EF wnl 2019     PLAN:    Stop Diltiazem    Compression stockings daily - off at night     Consider Venous Competency studies    Limit sodium in diet    Could consider furosemide, watching renal function closely.      2. AFib; EF wnl    HR under good control on lower dose Diltiazem and higher dose metoprolol /50    As above, swelling no better     PLAN:    Increase Metoprolol XL to 100 mg BID as stopping Diltiazem     Continue to monitor HR    Continue Pradaxa    CURRENT MEDICATIONS:  Current Outpatient Medications   Medication Sig Dispense Refill     albuterol (PROAIR HFA, PROVENTIL HFA, VENTOLIN HFA) 108 (90 BASE) MCG/ACT inhaler Inhale 2 puffs into the lungs every 6 hours 1 each 0     buPROPion (WELLBUTRIN XL) 150 MG 24 hr tablet Take 3 tablets by mouth every morning. Indications: Major Depressive Disorder.        cetirizine (ZYRTEC) 10 MG tablet Take 10 mg by mouth as needed for allergies       dabigatran ANTICOAGULANT (PRADAXA ANTICOAGULANT) 150 MG capsule Take 1 capsule (150 mg) by mouth every 12 hours Store in original bottle/blister pack. Use within 120 days of opening. 180 capsule 3     fosinopril (MONOPRIL) 10 MG tablet Take 1 tablet (10 mg) by mouth daily 90 tablet 3     gabapentin (NEURONTIN) 300 MG capsule Take 600 mg by mouth At Bedtime       ibuprofen (ADVIL/MOTRIN) 100 MG tablet Take 100 mg by mouth as needed       lamoTRIgine (LAMICTAL) 100 MG tablet Take 200 mg by mouth daily        medroxyPROGESTERone (PROVERA) 10 MG  tablet Take 10 mg by mouth daily       metoprolol succinate ER (TOPROL-XL) 50 MG 24 hr tablet Take 2 tablets (100 mg) by mouth 2 times daily       nystatin (MYCOSTATIN) 543140 UNIT/GM external cream Apply to affected area tid until rash resolves, could be up to 3 weeks. 30 g 3     sertraline (ZOLOFT) 100 MG tablet Take 100 mg by mouth daily 2 tabs (200mg) daily       spironolactone (ALDACTONE) 25 MG tablet Take 1 tablet (25 mg) by mouth daily 90 tablet 3     tiZANidine (ZANAFLEX) 2 MG tablet Take 1 tablet (2 mg) by mouth 3 times daily 30 tablet 1         ORDERS PLACED:  Orders Placed This Encounter   Procedures     Follow-Up with Cardiac Advanced Practice Provider     Orders Placed This Encounter   Medications     metoprolol succinate ER (TOPROL-XL) 50 MG 24 hr tablet     Sig: Take 2 tablets (100 mg) by mouth 2 times daily     Medications Discontinued During This Encounter   Medication Reason     diltiazem ER (DILT-XR) 120 MG 24 hr capsule      metoprolol succinate ER (TOPROL-XL) 50 MG 24 hr tablet Reorder         Encounter Diagnoses   Name Primary?     Chronic atrial fibrillation (H)      SOB (shortness of breath)      Essential hypertension, benign          ALLERGIES     Allergies   Allergen Reactions     Dyazide [Hydrochlorothiazide W/Triamterene] Unknown     Nickel Rash     Robaxin [Methocarbamol] Rash     Sulfa Drugs Rash       PAST MEDICAL HISTORY:  Past Medical History:   Diagnosis Date     Atrial fibrillation (H)      Depression      GERD (gastroesophageal reflux disease)      HTN (hypertension)      Hyperlipidemia      BALTA (obstructive sleep apnea)      Permanent atrial fibrillation (H) 6/5/11       PAST SURGICAL HISTORY:  Past Surgical History:   Procedure Laterality Date     CARDIOVERSION  6/7/11     CHOLECYSTECTOMY       DILATION AND CURETTAGE  4/26/2012    Procedure:DILATION AND CURETTAGE; DILATION AND CURETTAGE; Surgeon:JEAN-PAUL DEL CID; [facility]     DILATION AND CURETTAGE, HYSTEROSCOPY DIAGNOSTIC,  COMBINED  12/8/2011    Procedure:COMBINED DILATION AND CURETTAGE, HYSTEROSCOPY DIAGNOSTIC; DILATION AND CURETTAGE, DIAGNOSTIC HYSTEROSCOPY ; Surgeon:JEAN-PAUL DEL CID; Location:[facility]     KNEE SURGERY         FAMILY HISTORY:  Family History   Problem Relation Age of Onset     Hypertension Mother      Heart Disease Mother         A-fib     Arthritis Mother      Heart Disease Father         triple bypass     Cancer Father 50        bladder     Hypertension Father      Respiratory Father         smoker     Cancer Paternal Grandmother 90        rectal     Unknown/Adopted Brother        SOCIAL HISTORY:  Social History     Socioeconomic History     Marital status: Single     Spouse name: None     Number of children: None     Years of education: None     Highest education level: Bachelor's degree (e.g., BA, AB, BS)   Occupational History     None   Social Needs     Financial resource strain: Not hard at all     Food insecurity     Worry: Never true     Inability: Never true     Transportation needs     Medical: No     Non-medical: No   Tobacco Use     Smoking status: Never Smoker     Smokeless tobacco: Never Used   Substance and Sexual Activity     Alcohol use: Yes     Frequency: Monthly or less     Drinks per session: 1 or 2     Binge frequency: Never     Comment: rarely     Drug use: No     Sexual activity: Never   Lifestyle     Physical activity     Days per week: 0 days     Minutes per session: 0 min     Stress: Only a little   Relationships     Social connections     Talks on phone: Twice a week     Gets together: Once a week     Attends [employer]ism service: More than 4 times per year     Active member of club or organization: Yes     Attends meetings of clubs or organizations: More than 4 times per year     Relationship status: Never      Intimate partner violence     Fear of current or ex partner: None     Emotionally abused: None     Physically abused: None     Forced sexual activity: None   Other Topics Concern      Parent/sibling w/ CABG, MI or angioplasty before 65F 55M? No      Service Not Asked     Blood Transfusions Not Asked     Caffeine Concern Not Asked     Occupational Exposure Not Asked     Hobby Hazards Not Asked     Sleep Concern Not Asked     Stress Concern Not Asked     Weight Concern Not Asked     Special Diet No     Back Care Not Asked     Exercise Yes     Comment: walks 2 miles minimum 5 x weekly      Bike Helmet Not Asked     Seat Belt Not Asked     Self-Exams Not Asked   Social History Narrative     None       Video-Visit Details    Type of service:  Video Visit    Video Start Time: 1347  Video End Time:  1359  Duration: 12 minutes    Originating Location (pt. Location): Home    Distant Location (provider location):  [facility]     Platform used for Video Visit: ISA[employer] MacdonaldAS

## 2021-06-30 NOTE — TELEPHONE ENCOUNTER
[facility] to [facility] Letter sent.     Tad RIDER RN

## 2021-07-01 ENCOUNTER — VIRTUAL VISIT (OUTPATIENT)
Dept: CARDIOLOGY | Facility: CLINIC | Age: 56
End: 2021-07-01
Payer: COMMERCIAL

## 2021-07-01 DIAGNOSIS — R06.02 SOB (SHORTNESS OF BREATH): ICD-10-CM

## 2021-07-01 DIAGNOSIS — I48.20 CHRONIC ATRIAL FIBRILLATION (H): ICD-10-CM

## 2021-07-01 DIAGNOSIS — I10 ESSENTIAL HYPERTENSION, BENIGN: ICD-10-CM

## 2021-07-01 PROCEDURE — 99213 OFFICE O/P EST LOW 20 MIN: CPT | Mod: 95 | Performed by: PHYSICIAN ASSISTANT

## 2021-07-01 RX ORDER — METOPROLOL SUCCINATE 50 MG/1
100 TABLET, EXTENDED RELEASE ORAL 2 TIMES DAILY
Start: 2021-07-01 | End: 2021-08-19

## 2021-07-01 NOTE — PATIENT INSTRUCTIONS
Mary - it was nice to speak with you today!    1. Discussed that swelling doesn't seem to be much better despite reducing Diltiazem  2. Reviewed HRs look great, however!      PLAN:  1. For swelling:    Stop Diltiazem    Compression stockings daily - off at night     Consider Venous Competency studies    Limit sodium in diet    Another video visit ~2 weeks  2. For AFib:    Increase metoprolol XL to 100 mg twice daily as stopping Diltiazem    Keep track of HR!    CALL if issues before our next chat!!! [phone]

## 2021-07-01 NOTE — LETTER
"7/1/2021    Nicole Castellanos PA-C  [id number]  [facility] [address]    RE: Mary Gorman       Dear Colleague,    I had the pleasure of seeing Mary Gorman in the [facility].    Mary Gorman is a 56 year old female who is being evaluated via a billable video visit.    Vitals - Patient Reported  Systolic (Patient Reported): 122  Diastolic (Patient Reported): 80(6/28 at PMD)  Weight (Patient Reported): 151.5 kg (334 lb)  Height (Patient Reported): 166.4 cm (5' 5.5\")  BMI (Based on Pt Reported Ht/Wt): 54.73  SpO2 (Patient Reported): 95(room air)  Pulse (Patient Reported): (84-88 range)    Kenna Winters LPN    How would you like to obtain your AVS? MyChart  If the video visit is dropped, the invitation should be resent by: Send to e-mail at: [email or web address]  ([employer])  Will anyone else be joining your video visit? No        CC:  Edema  AFib    VITALS:  122/80  Weight 334#  O2 95%  HR 84-88    BRIEF HPI:  Mary is a 56 year old yo female who sees Dr. Greenfield for h/o:    1. Permanent atrial fibrillation on a rate control/AC strategy given her asymptomatic status.  2. Hypertension under good control  3. Obesity   4. Lower extremity edema with adjustments in Diltiazem dose and addition of spironolactone  5. BALTA - on CPAP    I saw Mary 5/2021 at which time she noted some mild lightheadedness when first getting up in AM which she felt was d/t gabapentin. Her activity level has reduced d/t COVID restrictions and knee pain (has end-stage OA). Exercise has been limited and she's gained weight. She did c/o LE edema.    I decreased Diltiazem from 240 to 120 mg daily d/t c/o LE edema and increased metoprolol XL to 100 in AM and 50 in PM to continue rate control of her AFib. I got blood work and rec'd 2 week video follow-up.      INTERVAL HISTORY:  Since decreasing diltiazem, edema really hasn't changed much, still worse in the evening. " "She's \"not good about\" wearing her compression stockings. No correlation with food intake she's been able to determine.    HR at home has been more in the 80s, which she's pleased about. BPs have been good. No c/o lightheadedness, dizziness. Tolerating higher dose of metoprolol XL without issues.    Remains without CP. No orthopnea, PND    DIAGNOSTICS:  Holter 5/2020 AFib with avg HR 84 bpm on metoprolol XL 50 BID and Dilt 240  Echocardiogram 6/10/2019 showed EF 60% with normal RV size and function. No significant valvular abnormalities.   24 hour Holter 6/7/2019 showed atrial fibrillation with average HR 81 bpm. Range was 54 bpm @ 1431 and max was 236 bpm @ 122. Minimal ectopy. Event button pressed 4 times, correlating with AFib and RVR.   Component      Latest Ref Rng & Units 5/10/2021 6/28/2021   Sodium      133 - 144 mmol/L 140 140   Potassium      3.4 - 5.3 mmol/L 4.6 4.8   Chloride      94 - 109 mmol/L 109 108   Carbon Dioxide      20 - 32 mmol/L 24 26   Anion Gap      3 - 14 mmol/L 7 6   Glucose      70 - 99 mg/dL 135 (H) 141 (H)   Urea Nitrogen      7 - 30 mg/dL 19 14   Creatinine      0.52 - 1.04 mg/dL 1.12 (H) 1.26 (H)   GFR Estimate      >60 mL/min/1.73:m2 54 (L) 47 (L)   GFR Estimate If Black      >60 mL/min/1.73:m2 63 55 (L)   Calcium      8.5 - 10.1 mg/dL 10.0 10.0     Component      Latest Ref Rng & Units 6/28/2021   Bilirubin Total      0.2 - 1.3 mg/dL 0.4   Albumin      3.4 - 5.0 g/dL 3.5   Protein Total      6.8 - 8.8 g/dL 7.0   Alkaline Phosphatase      40 - 150 U/L 123   ALT      0 - 50 U/L 31   AST      0 - 45 U/L 22     Component      Latest Ref Rng & Units 5/10/2021   TSH      0.40 - 4.00 mU/L 1.91   N-Terminal Pro Bnp      0 - 125 pg/mL 353 (H)     REVIEW OF SYSTEMS:  Negative with the exception of that noted above    PHYSICAL EXAM:  122/80  Weight 334#  O2 95%  HR 84-88    Physical Exam  GENERAL: Healthy, alert and no distress  EYES: Eyes grossly normal to inspection.  No discharge or " erythema, or obvious scleral/conjunctival abnormalities.  RESP: No audible wheeze, cough, or visible cyanosis.  No visible retractions or increased work of breathing.    SKIN: Visible skin clear. No significant rash, abnormal pigmentation or lesions.  NEURO: Cranial nerves grossly intact.  Mentation and speech appropriate for age.  PSYCH: Mentation appears normal, affect normal/bright, judgement and insight intact, normal speech and appearance well-groomed.      PLAN:  1. Stop Diltiazem  2. Increase metoprolol XL to 100 mg BID  3. Video visit with me 2 weeks 7/16 @ 1230    ASSESSMENT/PLAN:    1. LE Edema    No real change after reducing in Diltiazem    Doesn't wear compression stockings routinely    Limits sodium - no connection that she's seen    EF wnl 2019     PLAN:    Stop Diltiazem    Compression stockings daily - off at night     Consider Venous Competency studies    Limit sodium in diet    Could consider furosemide, watching renal function closely.      2. AFib; EF wnl    HR under good control on lower dose Diltiazem and higher dose metoprolol /50    As above, swelling no better     PLAN:    Increase Metoprolol XL to 100 mg BID as stopping Diltiazem     Continue to monitor HR    Continue Pradaxa    CURRENT MEDICATIONS:  Current Outpatient Medications   Medication Sig Dispense Refill     albuterol (PROAIR HFA, PROVENTIL HFA, VENTOLIN HFA) 108 (90 BASE) MCG/ACT inhaler Inhale 2 puffs into the lungs every 6 hours 1 each 0     buPROPion (WELLBUTRIN XL) 150 MG 24 hr tablet Take 3 tablets by mouth every morning. Indications: Major Depressive Disorder.        cetirizine (ZYRTEC) 10 MG tablet Take 10 mg by mouth as needed for allergies       dabigatran ANTICOAGULANT (PRADAXA ANTICOAGULANT) 150 MG capsule Take 1 capsule (150 mg) by mouth every 12 hours Store in original bottle/blister pack. Use within 120 days of opening. 180 capsule 3     fosinopril (MONOPRIL) 10 MG tablet Take 1 tablet (10 mg) by mouth daily  90 tablet 3     gabapentin (NEURONTIN) 300 MG capsule Take 600 mg by mouth At Bedtime       ibuprofen (ADVIL/MOTRIN) 100 MG tablet Take 100 mg by mouth as needed       lamoTRIgine (LAMICTAL) 100 MG tablet Take 200 mg by mouth daily        medroxyPROGESTERone (PROVERA) 10 MG tablet Take 10 mg by mouth daily       metoprolol succinate ER (TOPROL-XL) 50 MG 24 hr tablet Take 2 tablets (100 mg) by mouth 2 times daily       nystatin (MYCOSTATIN) 840570 UNIT/GM external cream Apply to affected area tid until rash resolves, could be up to 3 weeks. 30 g 3     sertraline (ZOLOFT) 100 MG tablet Take 100 mg by mouth daily 2 tabs (200mg) daily       spironolactone (ALDACTONE) 25 MG tablet Take 1 tablet (25 mg) by mouth daily 90 tablet 3     tiZANidine (ZANAFLEX) 2 MG tablet Take 1 tablet (2 mg) by mouth 3 times daily 30 tablet 1         ORDERS PLACED:  Orders Placed This Encounter   Procedures     Follow-Up with Cardiac Advanced Practice Provider     Orders Placed This Encounter   Medications     metoprolol succinate ER (TOPROL-XL) 50 MG 24 hr tablet     Sig: Take 2 tablets (100 mg) by mouth 2 times daily     Medications Discontinued During This Encounter   Medication Reason     diltiazem ER (DILT-XR) 120 MG 24 hr capsule      metoprolol succinate ER (TOPROL-XL) 50 MG 24 hr tablet Reorder         Encounter Diagnoses   Name Primary?     Chronic atrial fibrillation (H)      SOB (shortness of breath)      Essential hypertension, benign          ALLERGIES     Allergies   Allergen Reactions     Dyazide [Hydrochlorothiazide W/Triamterene] Unknown     Nickel Rash     Robaxin [Methocarbamol] Rash     Sulfa Drugs Rash       PAST MEDICAL HISTORY:  Past Medical History:   Diagnosis Date     Atrial fibrillation (H)      Depression      GERD (gastroesophageal reflux disease)      HTN (hypertension)      Hyperlipidemia      BALTA (obstructive sleep apnea)      Permanent atrial fibrillation (H) 6/5/11       PAST SURGICAL HISTORY:  Past Surgical  History:   Procedure Laterality Date     CARDIOVERSION  6/7/11     CHOLECYSTECTOMY       DILATION AND CURETTAGE  4/26/2012    Procedure:DILATION AND CURETTAGE; DILATION AND CURETTAGE; Surgeon:JEAN-PAUL DEL CID; Location:[facility]     DILATION AND CURETTAGE, HYSTEROSCOPY DIAGNOSTIC, COMBINED  12/8/2011    Procedure:COMBINED DILATION AND CURETTAGE, HYSTEROSCOPY DIAGNOSTIC; DILATION AND CURETTAGE, DIAGNOSTIC HYSTEROSCOPY ; Surgeon:JEAN-PAUL DEL CID; Location:[facility]     KNEE SURGERY         FAMILY HISTORY:  Family History   Problem Relation Age of Onset     Hypertension Mother      Heart Disease Mother         A-fib     Arthritis Mother      Heart Disease Father         triple bypass     Cancer Father 50        bladder     Hypertension Father      Respiratory Father         smoker     Cancer Paternal Grandmother 90        rectal     Unknown/Adopted Brother        SOCIAL HISTORY:  Social History     Socioeconomic History     Marital status: Single     Spouse name: None     Number of children: None     Years of education: None     Highest education level: Bachelor's degree (e.g., BA, AB, BS)   Occupational History     None   Social Needs     Financial resource strain: Not hard at all     Food insecurity     Worry: Never true     Inability: Never true     Transportation needs     Medical: No     Non-medical: No   Tobacco Use     Smoking status: Never Smoker     Smokeless tobacco: Never Used   Substance and Sexual Activity     Alcohol use: Yes     Frequency: Monthly or less     Drinks per session: 1 or 2     Binge frequency: Never     Comment: rarely     Drug use: No     Sexual activity: Never   Lifestyle     Physical activity     Days per week: 0 days     Minutes per session: 0 min     Stress: Only a little   Relationships     Social connections     Talks on phone: Twice a week     Gets together: Once a week     Attends Shinto service: More than 4 times per year     Active member of club or organization: Yes     Attends meetings of clubs  or organizations: More than 4 times per year     Relationship status: Never      Intimate partner violence     Fear of current or ex partner: None     Emotionally abused: None     Physically abused: None     Forced sexual activity: None   Other Topics Concern     Parent/sibling w/ CABG, MI or angioplasty before 65F 55M? No      Service Not Asked     Blood Transfusions Not Asked     Caffeine Concern Not Asked     Occupational Exposure Not Asked     Hobby Hazards Not Asked     Sleep Concern Not Asked     Stress Concern Not Asked     Weight Concern Not Asked     Special Diet No     Back Care Not Asked     Exercise Yes     Comment: walks 2 miles minimum 5 x weekly      Bike Helmet Not Asked     Seat Belt Not Asked     Self-Exams Not Asked   Social History Narrative     None       Video-Visit Details    Type of service:  Video Visit    Video Start Time: 1347  Video End Time:  1359  Duration: 12 minutes    Originating Location (pt. Location): Home    Distant Location (provider location):  [facility] [place]     Platform used for Video Visit: Sarah Gomez PA-C [facility]                 Thank you for allowing me to participate in the care of your patient.      Sincerely,     Kayleen Gomez PA-C     [facility]  cc:   Kayleen Gomez PA-C  [address]  [place] [address]

## 2021-07-26 NOTE — PROGRESS NOTES
Mary Gorman is a 56 year old female who is being evaluated via a billable video visit.      How would you like to obtain your AVS? MyChart  If the video visit is dropped, the invitation should be resent by: Send to e-mail at: [email or web address]     Will anyone else be joining your video visit? No        CC:  Edema    VITALS:    Vitals - Patient Reported  Systolic (Patient Reported): 110  Diastolic (Patient Reported): 60  Pulse (Patient Reported): 88      BRIEF HPI:  Mary is a 56 year old yo F who sees Dr. Greenfield for h/o:    1. Permanent atrial fibrillation on a rate control/AC strategy given her asymptomatic status.  2. Hypertension under good control  3. Obesity   4. Lower extremity edema with adjustments in Diltiazem dose and addition of spironolactone  5. BALTA - on CPAP       I saw Mary 7/1 at which time LE swelling hadn't changed much after decreasing Diltiazem from 240 to 120 mg daily. HR was better on the lower dose of Diltiazem and increased dose of metoprolol, without further orthostasis in AM. D/t c/o continued LE edema, rec'd she stop Diltiazem completely and wear her compression stockings. Close follow-up rec'd, but had to reschedule d/t my illness.    INTERVAL HISTORY:  Since stopping Diltiazem 120 mg daily completely, swelling has not really changed much. She's wearing compression stockings ~3-4 times/week and doesn't know how much they help. She's always swelled more in the summer and admits to not moving around nearly as much as she did prior to COVID 19 restrictions being put in place.    Since increasing the metoprolol XL to 100AM/50 PM, notes fatigue over the last few months, falling asleep in the chair when sitting watching TV. Nothing she's cancelling plans for and notes that if she were engaged with something/someone, she wouldn't fall asleep. She thinks this has happened on higher dose BB in the past as well. Her HR has been under good control, however, in 70-80s.    No orthopnea, PND. No  CP, change in mild STYLES. No dizziness, lightheadedness/syncope.      DIAGNOSTICS:  EKG 5/10/2021 which I overread, showed AFib 99 bpm  Holter 5/2020 AFib with avg HR 84 bpm on metoprolol XL 50 BID and Dilt 240  Echocardiogram 6/10/2019 showed EF 60% with normal RV size and function. No significant valvular abnormalities.   24 hour Holter 6/7/2019 showed atrial fibrillation with average HR 81 bpm. Range was 54 bpm @ 1431 and max was 236 bpm @ 122. Minimal ectopy. Event button pressed 4 times, correlating with AFib and RVR.   Component      Latest Ref Rng & Units 5/10/2021 6/28/2021   Sodium      133 - 144 mmol/L 140 140   Potassium      3.4 - 5.3 mmol/L 4.6 4.8   Chloride      94 - 109 mmol/L 109 108   Carbon Dioxide      20 - 32 mmol/L 24 26   Anion Gap      3 - 14 mmol/L 7 6   Glucose      70 - 99 mg/dL 135 (H) 141 (H)   Urea Nitrogen      7 - 30 mg/dL 19 14   Creatinine      0.52 - 1.04 mg/dL 1.12 (H) 1.26 (H)   GFR Estimate      >60 mL/min/1.73:m2 54 (L) 47 (L)   GFR Estimate If Black      >60 mL/min/1.73:m2 63 55 (L)   Calcium      8.5 - 10.1 mg/dL 10.0 10.0     Component      Latest Ref Rng & Units 6/28/2021   Bilirubin Total      0.2 - 1.3 mg/dL 0.4   Albumin      3.4 - 5.0 g/dL 3.5   Protein Total      6.8 - 8.8 g/dL 7.0   Alkaline Phosphatase      40 - 150 U/L 123   ALT      0 - 50 U/L 31   AST      0 - 45 U/L 22     Component      Latest Ref Rng & Units 5/10/2021   TSH      0.40 - 4.00 mU/L 1.91   N-Terminal Pro Bnp      0 - 125 pg/mL 353 (H)     REVIEW OF SYSTEMS:  Negative with the exception of that noted above    PHYSICAL EXAM:  VITALS:  /60  HR 88 bpm      Physical Exam  GENERAL: Healthy, alert and no distress  EYES: Eyes grossly normal to inspection.  No discharge or erythema, or obvious scleral/conjunctival abnormalities.  RESP: No audible wheeze, cough, or visible cyanosis.  No visible retractions or increased work of breathing.    SKIN: Visible skin clear. No significant rash, abnormal  pigmentation or lesions.  NEURO: Cranial nerves grossly intact.  Mentation and speech appropriate for age.  PSYCH: Mentation appears normal, affect normal/bright, judgement and insight intact, normal speech and appearance well-groomed.      PLAN:  1. Decrease metoprolol XL back to 50 mg BID d/t fatigue  2. Restart Diltiazem 240 mg daily as no improvement in swelling on 120 or off of it  3. 6 m follow-up with echo and EKG    ASSESSMENT/PLAN:    1. Permanent AFib    As above, rate control had been good but was trying to ameliorate edema by reducing/stopping Diltiazem. No improvement and note HR under good control when on her previous doses (Dilt 240, Metoprolol XL 50BID)    Feels fatigue worse on high dose BB    Remains on Pradaxa 150 mg BID     PLAN:    Continue Pradaxa. New 90 day Rx sent in    Restart Diltiazem 240 mg daily. No Rx needed at this time per Mary    Reduce metoprolol XL back to 50 mg BID    EKG in 6 months      2. HTN    BPs look great     PLAN:    Med changes for HR as above; BP had been well controlled on these meds      3. LE Edema    Reducing and then stopping Diltiazem didn't improve    Doesn't think compression stockings help much     PLAN:    Offered Venous Insufficiency/Vein Clinic evaluation but she politely declined at this time    Reminded to limit sodium, elevate legs    CURRENT MEDICATIONS:  Current Outpatient Medications   Medication Sig Dispense Refill     albuterol (PROAIR HFA, PROVENTIL HFA, VENTOLIN HFA) 108 (90 BASE) MCG/ACT inhaler Inhale 2 puffs into the lungs every 6 hours (Patient taking differently: Inhale 2 puffs into the lungs every 6 hours as needed ) 1 each 0     buPROPion (WELLBUTRIN XL) 150 MG 24 hr tablet Take 3 tablets by mouth every morning. Indications: Major Depressive Disorder.        cetirizine (ZYRTEC) 10 MG tablet Take 10 mg by mouth as needed for allergies       dabigatran ANTICOAGULANT (PRADAXA ANTICOAGULANT) 150 MG capsule Take 1 capsule (150 mg) by mouth  every 12 hours Store in original bottle/blister pack. Use within 120 days of opening. 180 capsule 3     diltiazem ER (DILT-XR) 240 MG 24 hr ER beaded capsule Take 1 capsule (240 mg) by mouth daily       fosinopril (MONOPRIL) 10 MG tablet Take 1 tablet (10 mg) by mouth daily 90 tablet 3     gabapentin (NEURONTIN) 300 MG capsule Take 600 mg by mouth At Bedtime       ibuprofen (ADVIL/MOTRIN) 100 MG tablet Take 100 mg by mouth as needed       lamoTRIgine (LAMICTAL) 100 MG tablet Take 200 mg by mouth daily        medroxyPROGESTERone (PROVERA) 10 MG tablet Take 10 mg by mouth daily       metoprolol succinate ER (TOPROL-XL) 50 MG 24 hr tablet Take 1 tablet (50 mg) by mouth 2 times daily 180 tablet 3     nystatin (MYCOSTATIN) 978185 UNIT/GM external cream Apply to affected area tid until rash resolves, could be up to 3 weeks. 30 g 3     sertraline (ZOLOFT) 100 MG tablet Take 100 mg by mouth daily 2 tabs (200mg) daily       spironolactone (ALDACTONE) 25 MG tablet Take 1 tablet (25 mg) by mouth daily 90 tablet 3     tiZANidine (ZANAFLEX) 2 MG tablet Take 1 tablet (2 mg) by mouth 3 times daily 30 tablet 1         ORDERS PLACED:  Orders Placed This Encounter   Procedures     Follow-Up with Cardiac Advanced Practice Provider     EKG 12-lead complete w/read - Clinics     Echocardiogram Complete     Orders Placed This Encounter   Medications     dabigatran ANTICOAGULANT (PRADAXA ANTICOAGULANT) 150 MG capsule     Sig: Take 1 capsule (150 mg) by mouth every 12 hours Store in original bottle/blister pack. Use within 120 days of opening.     Dispense:  180 capsule     Refill:  3     diltiazem ER (DILT-XR) 240 MG 24 hr ER beaded capsule     Sig: Take 1 capsule (240 mg) by mouth daily     metoprolol succinate ER (TOPROL-XL) 50 MG 24 hr tablet     Sig: Take 1 tablet (50 mg) by mouth 2 times daily     Dispense:  180 tablet     Refill:  3     Medications Discontinued During This Encounter   Medication Reason     dabigatran ANTICOAGULANT  (PRADAXA ANTICOAGULANT) 150 MG capsule Reorder     metoprolol succinate ER (TOPROL-XL) 50 MG 24 hr tablet Reorder         Encounter Diagnoses   Name Primary?     Chronic atrial fibrillation (H)      SOB (shortness of breath)      Essential hypertension, benign          ALLERGIES     Allergies   Allergen Reactions     Dyazide [Hydrochlorothiazide W/Triamterene] Unknown     Nickel Rash     Robaxin [Methocarbamol] Rash     Sulfa Drugs Rash       PAST MEDICAL HISTORY:  Past Medical History:   Diagnosis Date     Atrial fibrillation (H)      Depression      GERD (gastroesophageal reflux disease)      HTN (hypertension)      Hyperlipidemia      BATLA (obstructive sleep apnea)      Permanent atrial fibrillation (H) 6/5/11       PAST SURGICAL HISTORY:  Past Surgical History:   Procedure Laterality Date     CARDIOVERSION  6/7/11     CHOLECYSTECTOMY       DILATION AND CURETTAGE  4/26/2012    Procedure:DILATION AND CURETTAGE; DILATION AND CURETTAGE; Surgeon:JEAN-PAUL DEL CID; Location:[facility], [place]     DILATION AND CURETTAGE, HYSTEROSCOPY DIAGNOSTIC, COMBINED  12/8/2011    Procedure:COMBINED DILATION AND CURETTAGE, HYSTEROSCOPY DIAGNOSTIC; DILATION AND CURETTAGE, DIAGNOSTIC HYSTEROSCOPY ; Surgeon:JEAN-PAUL DEL CID; Location:[facility], [place]     KNEE SURGERY         FAMILY HISTORY:  Family History   Problem Relation Age of Onset     Hypertension Mother      Heart Disease Mother         A-fib     Arthritis Mother      Heart Disease Father         triple bypass     Cancer Father 50        bladder     Hypertension Father      Respiratory Father         smoker     Cancer Paternal Grandmother 90        rectal     Unknown/Adopted Brother        SOCIAL HISTORY:  Social History     Socioeconomic History     Marital status: Single     Spouse name: Not on file     Number of children: Not on file     Years of education: Not on file     Highest education level: Bachelor's degree (e.g., BA, AB, BS)   Occupational History     Not on file   Tobacco Use     Smoking status: Never  Smoker     Smokeless tobacco: Never Used   Substance and Sexual Activity     Alcohol use: Yes     Comment: rarely     Drug use: No     Sexual activity: Never   Other Topics Concern     Parent/sibling w/ CABG, MI or angioplasty before 65F 55M? No      Service Not Asked     Blood Transfusions Not Asked     Caffeine Concern Not Asked     Occupational Exposure Not Asked     Hobby Hazards Not Asked     Sleep Concern Not Asked     Stress Concern Not Asked     Weight Concern Not Asked     Special Diet No     Back Care Not Asked     Exercise Yes     Comment: walks 2 miles minimum 5 x weekly      Bike Helmet Not Asked     Seat Belt Not Asked     Self-Exams Not Asked   Social History Narrative     Not on file     Social Determinants of Health     Financial Resource Strain: Low Risk      Difficulty of Paying Living Expenses: Not hard at all   Food Insecurity: No Food Insecurity     Worried About Running Out of Food in the Last Year: Never true     Ran Out of Food in the Last Year: Never true   Transportation Needs: No Transportation Needs     Lack of Transportation (Medical): No     Lack of Transportation (Non-Medical): No   Physical Activity: Inactive     Days of Exercise per Week: 0 days     Minutes of Exercise per Session: 0 min   Stress: No Stress Concern Present     Feeling of Stress : Only a little   Social Connections: Moderately Integrated     Frequency of Communication with Friends and Family: Twice a week     Frequency of Social Gatherings with Friends and Family: Once a week     Attends Jew Services: More than 4 times per year     Active Member of Clubs or Organizations: Yes     Attends Club or Organization Meetings: More than 4 times per year     Marital Status: Never    Intimate Partner Violence:      Fear of Current or Ex-Partner:      Emotionally Abused:      Physically Abused:      Sexually Abused:        Video-Visit Details    Type of service:  Video Visit    Video Start Time: 1221  Video  End Time:  1232  Duration: 11 minutes    Originating Location (pt. Location): Home    Distant Location (provider location):  [facility] HEART [facility]     Platform used for Video Visit: ISAAC MacdonaldAS

## 2021-08-19 ENCOUNTER — VIRTUAL VISIT (OUTPATIENT)
Dept: CARDIOLOGY | Facility: CLINIC | Age: 56
End: 2021-08-19
Payer: COMMERCIAL

## 2021-08-19 DIAGNOSIS — R06.02 SOB (SHORTNESS OF BREATH): ICD-10-CM

## 2021-08-19 DIAGNOSIS — I48.20 CHRONIC ATRIAL FIBRILLATION (H): ICD-10-CM

## 2021-08-19 DIAGNOSIS — I10 ESSENTIAL HYPERTENSION, BENIGN: ICD-10-CM

## 2021-08-19 PROCEDURE — 99214 OFFICE O/P EST MOD 30 MIN: CPT | Mod: 95 | Performed by: PHYSICIAN ASSISTANT

## 2021-08-19 RX ORDER — DABIGATRAN ETEXILATE 150 MG/1
150 CAPSULE ORAL EVERY 12 HOURS
Qty: 180 CAPSULE | Refills: 3 | Status: SHIPPED | OUTPATIENT
Start: 2021-08-19 | End: 2021-12-13

## 2021-08-19 RX ORDER — METOPROLOL SUCCINATE 50 MG/1
50 TABLET, EXTENDED RELEASE ORAL 2 TIMES DAILY
Qty: 180 TABLET | Refills: 3 | Status: SHIPPED | OUTPATIENT
Start: 2021-08-19 | End: 2022-07-19

## 2021-08-19 RX ORDER — DILTIAZEM HYDROCHLORIDE 240 MG/1
240 CAPSULE, EXTENDED RELEASE ORAL DAILY
Start: 2021-08-19 | End: 2021-09-20

## 2021-08-19 NOTE — PATIENT INSTRUCTIONS
"Mary -  Nice to \"see\" you today!    1. Reviewed that swelling no better off of Diltiazem. Compression stockings don't seem to help much  2. HR control appears good on the metoprolol 100/50 BUT you're more fatigue    PLAN:  1. As no change in swelling with stopping Diltiazem, RESTART Diltiazem 240 mg daily. CALL if you need Rx!  2. Decrease metoprolol XL back to 50 mg twice a day and see if this improves fatigue - Rx sent  3. Consider venous insufficiency studies if legs continue to bother  4. Pradaxa Rx sent in.  5. Return visit with echo and EKG ~6 months but CALL if issues sooner! [phone]  "

## 2021-08-19 NOTE — LETTER
8/19/2021    Nicole Castellanos PA-C  [id number] [address]  [facility] [id number]    RE: Mary Gorman       Dear Colleague,    I had the pleasure of seeing Mary Gorman in the [facility].    Mary Gorman is a 56 year old female who is being evaluated via a billable video visit.      How would you like to obtain your AVS? MyChart  If the video visit is dropped, the invitation should be resent by: Send to e-mail at: [email or web address]     Will anyone else be joining your video visit? No        CC:  Edema    VITALS:    Vitals - Patient Reported  Systolic (Patient Reported): 110  Diastolic (Patient Reported): 60  Pulse (Patient Reported): 88      BRIEF HPI:  Mary is a 56 year old yo F who sees Dr. Greenfield for h/o:    1. Permanent atrial fibrillation on a rate control/AC strategy given her asymptomatic status.  2. Hypertension under good control  3. Obesity   4. Lower extremity edema with adjustments in Diltiazem dose and addition of spironolactone  5. BALTA - on CPAP       I saw Mary 7/1 at which time LE swelling hadn't changed much after decreasing Diltiazem from 240 to 120 mg daily. HR was better on the lower dose of Diltiazem and increased dose of metoprolol, without further orthostasis in AM. D/t c/o continued LE edema, rec'd she stop Diltiazem completely and wear her compression stockings. Close follow-up rec'd, but had to reschedule d/t my illness.    INTERVAL HISTORY:  Since stopping Diltiazem 120 mg daily completely, swelling has not really changed much. She's wearing compression stockings ~3-4 times/week and doesn't know how much they help. She's always swelled more in the summer and admits to not moving around nearly as much as she did prior to COVID 19 restrictions being put in place.    Since increasing the metoprolol XL to 100AM/50 PM, notes fatigue over the last few months, falling asleep in the chair when sitting watching TV.  Nothing she's cancelling plans for and notes that if she were engaged with something/someone, she wouldn't fall asleep. She thinks this has happened on higher dose BB in the past as well. Her HR has been under good control, however, in 70-80s.    No orthopnea, PND. No CP, change in mild STYLES. No dizziness, lightheadedness/syncope.      DIAGNOSTICS:  EKG 5/10/2021 which I overread, showed AFib 99 bpm  Holter 5/2020 AFib with avg HR 84 bpm on metoprolol XL 50 BID and Dilt 240  Echocardiogram 6/10/2019 showed EF 60% with normal RV size and function. No significant valvular abnormalities.   24 hour Holter 6/7/2019 showed atrial fibrillation with average HR 81 bpm. Range was 54 bpm @ 1431 and max was 236 bpm @ 122. Minimal ectopy. Event button pressed 4 times, correlating with AFib and RVR.   Component      Latest Ref Rng & Units 5/10/2021 6/28/2021   Sodium      133 - 144 mmol/L 140 140   Potassium      3.4 - 5.3 mmol/L 4.6 4.8   Chloride      94 - 109 mmol/L 109 108   Carbon Dioxide      20 - 32 mmol/L 24 26   Anion Gap      3 - 14 mmol/L 7 6   Glucose      70 - 99 mg/dL 135 (H) 141 (H)   Urea Nitrogen      7 - 30 mg/dL 19 14   Creatinine      0.52 - 1.04 mg/dL 1.12 (H) 1.26 (H)   GFR Estimate      >60 mL/min/1.73:m2 54 (L) 47 (L)   GFR Estimate If Black      >60 mL/min/1.73:m2 63 55 (L)   Calcium      8.5 - 10.1 mg/dL 10.0 10.0     Component      Latest Ref Rng & Units 6/28/2021   Bilirubin Total      0.2 - 1.3 mg/dL 0.4   Albumin      3.4 - 5.0 g/dL 3.5   Protein Total      6.8 - 8.8 g/dL 7.0   Alkaline Phosphatase      40 - 150 U/L 123   ALT      0 - 50 U/L 31   AST      0 - 45 U/L 22     Component      Latest Ref Rng & Units 5/10/2021   TSH      0.40 - 4.00 mU/L 1.91   N-Terminal Pro Bnp      0 - 125 pg/mL 353 (H)     REVIEW OF SYSTEMS:  Negative with the exception of that noted above    PHYSICAL EXAM:  VITALS:  /60  HR 88 bpm      Physical Exam  GENERAL: Healthy, alert and no distress  EYES: Eyes grossly  normal to inspection.  No discharge or erythema, or obvious scleral/conjunctival abnormalities.  RESP: No audible wheeze, cough, or visible cyanosis.  No visible retractions or increased work of breathing.    SKIN: Visible skin clear. No significant rash, abnormal pigmentation or lesions.  NEURO: Cranial nerves grossly intact.  Mentation and speech appropriate for age.  PSYCH: Mentation appears normal, affect normal/bright, judgement and insight intact, normal speech and appearance well-groomed.      PLAN:  1. Decrease metoprolol XL back to 50 mg BID d/t fatigue  2. Restart Diltiazem 240 mg daily as no improvement in swelling on 120 or off of it  3. 6 m follow-up with echo and EKG    ASSESSMENT/PLAN:    1. Permanent AFib    As above, rate control had been good but was trying to ameliorate edema by reducing/stopping Diltiazem. No improvement and note HR under good control when on her previous doses (Dilt 240, Metoprolol XL 50BID)    Feels fatigue worse on high dose BB    Remains on Pradaxa 150 mg BID     PLAN:    Continue Pradaxa. New 90 day Rx sent in    Restart Diltiazem 240 mg daily. No Rx needed at this time per Mary    Reduce metoprolol XL back to 50 mg BID    EKG in 6 months      2. HTN    BPs look great     PLAN:    Med changes for HR as above; BP had been well controlled on these meds      3. LE Edema    Reducing and then stopping Diltiazem didn't improve    Doesn't think compression stockings help much     PLAN:    Offered Venous Insufficiency/Vein Clinic evaluation but she politely declined at this time    Reminded to limit sodium, elevate legs    CURRENT MEDICATIONS:  Current Outpatient Medications   Medication Sig Dispense Refill     albuterol (PROAIR HFA, PROVENTIL HFA, VENTOLIN HFA) 108 (90 BASE) MCG/ACT inhaler Inhale 2 puffs into the lungs every 6 hours (Patient taking differently: Inhale 2 puffs into the lungs every 6 hours as needed ) 1 each 0     buPROPion (WELLBUTRIN XL) 150 MG 24 hr tablet Take  3 tablets by mouth every morning. Indications: Major Depressive Disorder.        cetirizine (ZYRTEC) 10 MG tablet Take 10 mg by mouth as needed for allergies       dabigatran ANTICOAGULANT (PRADAXA ANTICOAGULANT) 150 MG capsule Take 1 capsule (150 mg) by mouth every 12 hours Store in original bottle/blister pack. Use within 120 days of opening. 180 capsule 3     diltiazem ER (DILT-XR) 240 MG 24 hr ER beaded capsule Take 1 capsule (240 mg) by mouth daily       fosinopril (MONOPRIL) 10 MG tablet Take 1 tablet (10 mg) by mouth daily 90 tablet 3     gabapentin (NEURONTIN) 300 MG capsule Take 600 mg by mouth At Bedtime       ibuprofen (ADVIL/MOTRIN) 100 MG tablet Take 100 mg by mouth as needed       lamoTRIgine (LAMICTAL) 100 MG tablet Take 200 mg by mouth daily        medroxyPROGESTERone (PROVERA) 10 MG tablet Take 10 mg by mouth daily       metoprolol succinate ER (TOPROL-XL) 50 MG 24 hr tablet Take 1 tablet (50 mg) by mouth 2 times daily 180 tablet 3     nystatin (MYCOSTATIN) 550292 UNIT/GM external cream Apply to affected area tid until rash resolves, could be up to 3 weeks. 30 g 3     sertraline (ZOLOFT) 100 MG tablet Take 100 mg by mouth daily 2 tabs (200mg) daily       spironolactone (ALDACTONE) 25 MG tablet Take 1 tablet (25 mg) by mouth daily 90 tablet 3     tiZANidine (ZANAFLEX) 2 MG tablet Take 1 tablet (2 mg) by mouth 3 times daily 30 tablet 1         ORDERS PLACED:  Orders Placed This Encounter   Procedures     Follow-Up with Cardiac Advanced Practice Provider     EKG 12-lead complete w/read - Clinics     Echocardiogram Complete     Orders Placed This Encounter   Medications     dabigatran ANTICOAGULANT (PRADAXA ANTICOAGULANT) 150 MG capsule     Sig: Take 1 capsule (150 mg) by mouth every 12 hours Store in original bottle/blister pack. Use within 120 days of opening.     Dispense:  180 capsule     Refill:  3     diltiazem ER (DILT-XR) 240 MG 24 hr ER beaded capsule     Sig: Take 1 capsule (240 mg) by mouth  daily     metoprolol succinate ER (TOPROL-XL) 50 MG 24 hr tablet     Sig: Take 1 tablet (50 mg) by mouth 2 times daily     Dispense:  180 tablet     Refill:  3     Medications Discontinued During This Encounter   Medication Reason     dabigatran ANTICOAGULANT (PRADAXA ANTICOAGULANT) 150 MG capsule Reorder     metoprolol succinate ER (TOPROL-XL) 50 MG 24 hr tablet Reorder         Encounter Diagnoses   Name Primary?     Chronic atrial fibrillation (H)      SOB (shortness of breath)      Essential hypertension, benign          ALLERGIES     Allergies   Allergen Reactions     Dyazide [Hydrochlorothiazide W/Triamterene] Unknown     Nickel Rash     Robaxin [Methocarbamol] Rash     Sulfa Drugs Rash       PAST MEDICAL HISTORY:  Past Medical History:   Diagnosis Date     Atrial fibrillation (H)      Depression      GERD (gastroesophageal reflux disease)      HTN (hypertension)      Hyperlipidemia      BALTA (obstructive sleep apnea)      Permanent atrial fibrillation (H) 6/5/11       PAST SURGICAL HISTORY:  Past Surgical History:   Procedure Laterality Date     CARDIOVERSION  6/7/11     CHOLECYSTECTOMY       DILATION AND CURETTAGE  4/26/2012    Procedure:DILATION AND CURETTAGE; DILATION AND CURETTAGE; Surgeon:JEAN-PAUL DEL CID; Location:[facility]     DILATION AND CURETTAGE, HYSTEROSCOPY DIAGNOSTIC, COMBINED  12/8/2011    Procedure:COMBINED DILATION AND CURETTAGE, HYSTEROSCOPY DIAGNOSTIC; DILATION AND CURETTAGE, DIAGNOSTIC HYSTEROSCOPY ; Surgeon:JEAN-PAUL DEL CID; Location:[facility]     KNEE SURGERY         FAMILY HISTORY:  Family History   Problem Relation Age of Onset     Hypertension Mother      Heart Disease Mother         A-fib     Arthritis Mother      Heart Disease Father         triple bypass     Cancer Father 50        bladder     Hypertension Father      Respiratory Father         smoker     Cancer Paternal Grandmother 90        rectal     Unknown/Adopted Brother        SOCIAL HISTORY:  Social History     Socioeconomic History     Marital  status: Single     Spouse name: Not on file     Number of children: Not on file     Years of education: Not on file     Highest education level: Bachelor's degree (e.g., BA, AB, BS)   Occupational History     Not on file   Tobacco Use     Smoking status: Never Smoker     Smokeless tobacco: Never Used   Substance and Sexual Activity     Alcohol use: Yes     Comment: rarely     Drug use: No     Sexual activity: Never   Other Topics Concern     Parent/sibling w/ CABG, MI or angioplasty before 65F 55M? No      Service Not Asked     Blood Transfusions Not Asked     Caffeine Concern Not Asked     Occupational Exposure Not Asked     Hobby Hazards Not Asked     Sleep Concern Not Asked     Stress Concern Not Asked     Weight Concern Not Asked     Special Diet No     Back Care Not Asked     Exercise Yes     Comment: walks 2 miles minimum 5 x weekly      Bike Helmet Not Asked     Seat Belt Not Asked     Self-Exams Not Asked   Social History Narrative     Not on file     Social Determinants of Health     Financial Resource Strain: Low Risk      Difficulty of Paying Living Expenses: Not hard at all   Food Insecurity: No Food Insecurity     Worried About Running Out of Food in the Last Year: Never true     Ran Out of Food in the Last Year: Never true   Transportation Needs: No Transportation Needs     Lack of Transportation (Medical): No     Lack of Transportation (Non-Medical): No   Physical Activity: Inactive     Days of Exercise per Week: 0 days     Minutes of Exercise per Session: 0 min   Stress: No Stress Concern Present     Feeling of Stress : Only a little   Social Connections: Moderately Integrated     Frequency of Communication with Friends and Family: Twice a week     Frequency of Social Gatherings with Friends and Family: Once a week     Attends Jainism Services: More than 4 times per year     Active Member of Clubs or Organizations: Yes     Attends Club or Organization Meetings: More than 4 times per year      Marital Status: Never    Intimate Partner Violence:      Fear of Current or Ex-Partner:      Emotionally Abused:      Physically Abused:      Sexually Abused:        Video-Visit Details    Type of service:  Video Visit    Video Start Time: 1221  Video End Time:  1232  Duration: 11 minutes    Originating Location (pt. Location): Home    Distant Location (provider location):  [facility] [place]     Platform used for Video Visit: Sarah Gomez PA-C [facility]                 Thank you for allowing me to participate in the care of your patient.      Sincerely,     Kayleen Gomez PA-C     [facility]  cc:   Kayleen Gomez PA-C  [address]  [place]  [place] [address]

## 2021-09-04 ENCOUNTER — HEALTH MAINTENANCE LETTER (OUTPATIENT)
Age: 56
End: 2021-09-04

## 2021-09-20 ENCOUNTER — TRANSFERRED RECORDS (OUTPATIENT)
Dept: HEALTH INFORMATION MANAGEMENT | Facility: CLINIC | Age: 56
End: 2021-09-20

## 2021-10-29 ENCOUNTER — TELEPHONE (OUTPATIENT)
Dept: FAMILY MEDICINE | Facility: CLINIC | Age: 56
End: 2021-10-29

## 2021-10-29 NOTE — TELEPHONE ENCOUNTER
HOLD for PCP     I am guessing 6 year return is error   Did you want 6 month or 1 year return visit     Emilia Duncan RN

## 2021-11-02 ENCOUNTER — ANCILLARY PROCEDURE (OUTPATIENT)
Dept: GENERAL RADIOLOGY | Facility: CLINIC | Age: 56
End: 2021-11-02
Attending: STUDENT IN AN ORGANIZED HEALTH CARE EDUCATION/TRAINING PROGRAM
Payer: COMMERCIAL

## 2021-11-02 ENCOUNTER — OFFICE VISIT (OUTPATIENT)
Dept: ORTHOPEDICS | Facility: CLINIC | Age: 56
End: 2021-11-02
Payer: COMMERCIAL

## 2021-11-02 VITALS
HEIGHT: 65 IN | DIASTOLIC BLOOD PRESSURE: 82 MMHG | SYSTOLIC BLOOD PRESSURE: 122 MMHG | BODY MASS INDEX: 48.82 KG/M2 | WEIGHT: 293 LBS

## 2021-11-02 DIAGNOSIS — M17.12 OSTEOARTHROSIS, LOCALIZED, PRIMARY, KNEE, LEFT: ICD-10-CM

## 2021-11-02 DIAGNOSIS — N18.31 STAGE 3A CHRONIC KIDNEY DISEASE (H): ICD-10-CM

## 2021-11-02 DIAGNOSIS — E66.01 MORBID OBESITY (H): ICD-10-CM

## 2021-11-02 DIAGNOSIS — M17.12 OSTEOARTHROSIS, LOCALIZED, PRIMARY, KNEE, LEFT: Primary | ICD-10-CM

## 2021-11-02 PROBLEM — N18.30 CHRONIC KIDNEY DISEASE, STAGE 3 (H): Status: ACTIVE | Noted: 2021-11-02

## 2021-11-02 PROCEDURE — 77073 BONE LENGTH STUDIES: CPT | Performed by: RADIOLOGY

## 2021-11-02 PROCEDURE — 99204 OFFICE O/P NEW MOD 45 MIN: CPT | Performed by: STUDENT IN AN ORGANIZED HEALTH CARE EDUCATION/TRAINING PROGRAM

## 2021-11-02 RX ORDER — TRANEXAMIC ACID 650 MG/1
1950 TABLET ORAL ONCE
Status: CANCELLED | OUTPATIENT
Start: 2021-11-02 | End: 2021-11-02

## 2021-11-02 ASSESSMENT — KOOS JR
STANDING UPRIGHT: SEVERE
STRAIGHTENING KNEE FULLY: MODERATE
GOING UP OR DOWN STAIRS: MODERATE
RISING FROM SITTING: MODERATE
KOOS JR SCORING: 50.01
BENDING TO THE FLOOR TO PICK UP OBJECT: MODERATE
TWISING OR PIVOTING ON KNEE: SEVERE
HOW SEVERE IS YOUR KNEE STIFFNESS AFTER FIRST WAKING IN MORNING: MILD

## 2021-11-02 ASSESSMENT — MIFFLIN-ST. JEOR: SCORE: 2082.75

## 2021-11-02 NOTE — PROGRESS NOTES
"    [facility]  Orthopaedic Surgery Consultation by Armand Martin M.D.    Mary Gorman MRN# [id number]   Age: 56 year old YOB: 1965     Requesting physician: No ref. provider found  Nicole Castellanos     Background history:  DX:  1. Lumbago  2. OSAS  3. GERD  4. Morbid obesity  5. Hyperlipidemia  6. Prediabetes  7. Hypertension  8. Chronic kidney disease stage III  9. Atrial fibrillation on Pradaxa  10. Depressive disorder    TREATMENTS:  1. 11/5/2007, laparoscopic cholecystectomy, Dr. Buckley           History of Present Illness:   56 year old female who was referred to our clinic by Dr. Hurtado from [facility] because of chronic left knee pain and instability.  This pain has been present for multiple years and has become progressive over time.  The discomfort does not radiate.  It greatly limits her in her daily activities.  She is no longer able to walk her dog.  It significantly reduces her quality of life.  Patient endorses some night pain, initiation stiffness and soreness.  No significant hip or back pain.  No motor or sensory deficits.  To get the pain patient has trialed home exercise regimen, over-the-counter analgesics and intra-articular injection which only divided relief for approximately 1 day.    Patient was previously seen at [facility] and was indicated for a total knee arthroplasty.  Due to the complexity and her BMI being > 50 she was referred to the [facility].    Social:   Occupation: nurse  Living situation: alone   Hobbies / Sports: everything    Smoking: No  Alcohol: 1 x a year  Illicit drug use: No         Physical Exam:     EXAMINATION pertinent findings:   PSYCH: Pleasant, healthy-appearing, alert, oriented x3, cooperative. Normal mood and affect.  VITAL SIGNS: Blood pressure 122/82, height 1.651 m (5' 5\"), weight 149.7 kg (330 lb), not currently breastfeeding.  Reviewed nursing intake notes.   Body mass index is 54.91 kg/m .  RESP: non labored " breathing   ABD: benign, soft, non-tender, no acute peritoneal findings  SKIN: grossly normal   LYMPHATIC: grossly normal, no adenopathy, no extremity edema  NEURO: grossly normal , no motor deficits  VASCULAR: satisfactory perfusion of all extremities   MUSCULOSKELETAL:   Alignment: Appears to be slight varus alignment.     The left leg exhibits a full range of motion.  No pain upon rotations.  Lasegue's test is negative     L knee: Wound slightly lateral well-healed incision anterior knee.  ROM 90-5-0  . Straight leg raise +. No redness, warmth or skin changes present. Effusion Unable to reliably test. Ligamentously stable in both ML and AP direction.  No gross laxity in ML direction.  Normal PF tracking with crepitus.  There is some tenderness to palpation over the joint line.     Left LE:   Thigh and leg compartments soft and compressible   +Quad/TA/GSC/FHL/EHL   SILT DP/SP/Ziyad/Saph/Tib nerve distributions   Palpable dorsalis pedis pulse              Data:   All laboratory data reviewed  All imaging studies reviewed by me personally.    XR knee left 9/20/2021:  My interpretation: Severe end-stage osteoarthritic changes in all 3 compartments of the knee.  Significant joint space narrowing, sclerosis, subchondral cysts and osteophyte formation.  Appears to be a ad latum translation of the tibia.           Assessment and Plan:   Assessment:  56-year-old female with chronic left knee pain and instability due to end-stage osteoarthritic changes in all 3 compartments.  Insufficiently responding to nonoperative treatment measures.     Plan:  I had a long discussion with the patient regarding ongoing management options.  Reviewed surgical and nonsurgical treatments.  The non-surgical options include activity modification, pain medication, PT, bracing and injection therapy. As for surgery we discussed the option total knee replacement surgery. We reviewed total knee replacement in detail including the procedure, the  implants, the recovery process, and long-term outcomes.  Because of her nickel allergy we will use the [employer] system.  The goal will be to use a highly constrained liner and potentially a stemmed tibial component with medial augment.   We reviewed that the risks of the surgery include but are not limited to infection, wound problems, stiffness, persistent pain, swelling, clicking, loosening, revision surgery.  We also reviewed less common risks such as neurovascular injury fracture, and other implant-related issues.  We reviewed other medical complications such as a blood clot.  We discussed that the vast majority of cases have a highly successful outcome.  However there is a small subset of patients that do experience complications or problems following the knee replacement and these problems can be very debilitating and painful and sometimes do not improve.   Based on a discussion of the risks and benefits, we believe that the benefits far outweigh the risks at this point and the patient  would like to proceed with left total knee replacement surgery and removal of hardware surgery.  We will work on scheduling surgery at a time that works well for patient in the next few months.  Because of patient's BMI> 50 we will proceed with a surgical case on the [facility]. Patient will contact us if there are any questions or concerns leading up to surgery.  A referral to the comprehensive weight loss clinic was provided.  Before surgery the patient will attend a joint replacement class and will be seen by the PCP and dentist.     More information on joint replacements can be found on : [email or web address]    Thank you for your referral.      Armand Martin MD, PhD     Adult Reconstruction  [facility] Department of Orthopaedic Surgery  Pager [phone]      DATA for DOCUMENTATION:         Past Medical History:     Patient  Active Problem List   Diagnosis     BALTA (obstructive sleep apnea)     Low back pain     Lumbar radiculitis     Hyperlipidemia LDL goal <100     Mild recurrent major depression (H)     Hypertension goal BP (blood pressure) < 140/90     ACP (advance care planning)     Permanent atrial fibrillation (H)     Lipoma of skin and subcutaneous tissue     Morbid obesity due to excess calories (H)     Prediabetes     Hip pain, right     Bilateral knee pain     Past Medical History:   Diagnosis Date     Atrial fibrillation (H)      Depression      GERD (gastroesophageal reflux disease)      HTN (hypertension)      Hyperlipidemia      BALTA (obstructive sleep apnea)      Permanent atrial fibrillation (H) 6/5/11       Also see scanned health assessment forms.       Past Surgical History:     Past Surgical History:   Procedure Laterality Date     CARDIOVERSION  6/7/11     CHOLECYSTECTOMY       DILATION AND CURETTAGE  4/26/2012    Procedure:DILATION AND CURETTAGE; DILATION AND CURETTAGE; Surgeon:JEAN-PAUL DEL CID; Location:[facility]     DILATION AND CURETTAGE, HYSTEROSCOPY DIAGNOSTIC, COMBINED  12/8/2011    Procedure:COMBINED DILATION AND CURETTAGE, HYSTEROSCOPY DIAGNOSTIC; DILATION AND CURETTAGE, DIAGNOSTIC HYSTEROSCOPY ; Surgeon:JEAN-PAUL DEL CID; Location:[facility]     KNEE SURGERY              Social History:     Social History     Socioeconomic History     Marital status: Single     Spouse name: Not on file     Number of children: Not on file     Years of education: Not on file     Highest education level: Bachelor's degree (e.g., BA, AB, BS)   Occupational History     Not on file   Tobacco Use     Smoking status: Never Smoker     Smokeless tobacco: Never Used   Substance and Sexual Activity     Alcohol use: Yes     Comment: rarely     Drug use: No     Sexual activity: Never   Other Topics Concern     Parent/sibling w/ CABG, MI or angioplasty before 65F 55M? No      Service Not Asked     Blood Transfusions Not Asked     Caffeine Concern Not  Asked     Occupational Exposure Not Asked     Hobby Hazards Not Asked     Sleep Concern Not Asked     Stress Concern Not Asked     Weight Concern Not Asked     Special Diet No     Back Care Not Asked     Exercise Yes     Comment: walks 2 miles minimum 5 x weekly      Bike Helmet Not Asked     Seat Belt Not Asked     Self-Exams Not Asked   Social History Narrative     Not on file     Social Determinants of Health     Financial Resource Strain: Low Risk      Difficulty of Paying Living Expenses: Not hard at all   Food Insecurity: No Food Insecurity     Worried About Running Out of Food in the Last Year: Never true     Ran Out of Food in the Last Year: Never true   Transportation Needs: No Transportation Needs     Lack of Transportation (Medical): No     Lack of Transportation (Non-Medical): No   Physical Activity: Inactive     Days of Exercise per Week: 0 days     Minutes of Exercise per Session: 0 min   Stress: No Stress Concern Present     Feeling of Stress : Only a little   Social Connections: Moderately Integrated     Frequency of Communication with Friends and Family: Twice a week     Frequency of Social Gatherings with Friends and Family: Once a week     Attends [employer] Services: More than 4 times per year     Active Member of Clubs or Organizations: Yes     Attends Club or Organization Meetings: More than 4 times per year     Marital Status: Never    Intimate Partner Violence:      Fear of Current or Ex-Partner:      Emotionally Abused:      Physically Abused:      Sexually Abused:             Family History:       Family History   Problem Relation Age of Onset     Hypertension Mother      Heart Disease Mother         A-fib     Arthritis Mother      Heart Disease Father         triple bypass     Cancer Father 50        bladder     Hypertension Father      Respiratory Father         smoker     Cancer Paternal Grandmother 90        rectal     Unknown/Adopted Brother             Medications:     Current  Outpatient Medications   Medication Sig     albuterol (PROAIR HFA, PROVENTIL HFA, VENTOLIN HFA) 108 (90 BASE) MCG/ACT inhaler Inhale 2 puffs into the lungs every 6 hours (Patient taking differently: Inhale 2 puffs into the lungs every 6 hours as needed )     buPROPion (WELLBUTRIN XL) 150 MG 24 hr tablet Take 3 tablets by mouth every morning. Indications: Major Depressive Disorder.      cetirizine (ZYRTEC) 10 MG tablet Take 10 mg by mouth as needed for allergies     dabigatran ANTICOAGULANT (PRADAXA ANTICOAGULANT) 150 MG capsule Take 1 capsule (150 mg) by mouth every 12 hours Store in original bottle/blister pack. Use within 120 days of opening.     diltiazem ER (DILT-XR) 240 MG 24 hr ER beaded capsule Take 1 capsule (240 mg) by mouth daily     fosinopril (MONOPRIL) 10 MG tablet Take 1 tablet (10 mg) by mouth daily     gabapentin (NEURONTIN) 300 MG capsule Take 600 mg by mouth At Bedtime     ibuprofen (ADVIL/MOTRIN) 100 MG tablet Take 100 mg by mouth as needed     lamoTRIgine (LAMICTAL) 100 MG tablet Take 200 mg by mouth daily      medroxyPROGESTERone (PROVERA) 10 MG tablet Take 10 mg by mouth daily     metoprolol succinate ER (TOPROL-XL) 50 MG 24 hr tablet Take 1 tablet (50 mg) by mouth 2 times daily     nystatin (MYCOSTATIN) 682138 UNIT/GM external cream Apply to affected area tid until rash resolves, could be up to 3 weeks.     sertraline (ZOLOFT) 100 MG tablet Take 100 mg by mouth daily 2 tabs (200mg) daily     spironolactone (ALDACTONE) 25 MG tablet Take 1 tablet (25 mg) by mouth daily     tiZANidine (ZANAFLEX) 2 MG tablet Take 1 tablet (2 mg) by mouth 3 times daily     No current facility-administered medications for this visit.              Review of Systems:   A comprehensive 10 point review of systems (constitutional, ENT, cardiac, peripheral vascular, lymphatic, respiratory, GI, , Musculoskeletal, skin, Neurological) was performed and found to be negative except as described in this note.     See intake  form completed by patient

## 2021-11-02 NOTE — LETTER
11/2/2021         RE: Mary Gorman  [address]  [facility] [address]        Dear Colleague,    Thank you for referring your patient, Mary Gorman, to the [facility]. Please see a copy of my visit note below.        [facility]  Orthopaedic Surgery Consultation by Armand Martin M.D.    Mary Gorman MRN# [id number]   Age: 56 year old YOB: 1965     Requesting physician: No ref. provider found  Nicole Castellanos     Background history:  DX:  1. Lumbago  2. OSAS  3. GERD  4. Morbid obesity  5. Hyperlipidemia  6. Prediabetes  7. Hypertension  8. Chronic kidney disease stage III  9. Atrial fibrillation on Pradaxa  10. Depressive disorder    TREATMENTS:  1. 11/5/2007, laparoscopic cholecystectomy, Dr. Buckley           History of Present Illness:   56 year old female who was referred to our clinic by Dr. Hurtado from [facility] because of chronic left knee pain and instability.  This pain has been present for multiple years and has become progressive over time.  The discomfort does not radiate.  It greatly limits her in her daily activities.  She is no longer able to walk her dog.  It significantly reduces her quality of life.  Patient endorses some night pain, initiation stiffness and soreness.  No significant hip or back pain.  No motor or sensory deficits.  To get the pain patient has trialed home exercise regimen, over-the-counter analgesics and intra-articular injection which only divided relief for approximately 1 day.    Patient was previously seen at [facility] and was indicated for a total knee arthroplasty.  Due to the complexity and her BMI being > 50 she was referred to the [facility].    Social:   Occupation: nurse  Living situation: alone   Hobbies / Sports: everything    Smoking: No  Alcohol: 1 x a year  Illicit drug use: No         Physical Exam:     EXAMINATION pertinent findings:   PSYCH: Pleasant,  "healthy-appearing, alert, oriented x3, cooperative. Normal mood and affect.  VITAL SIGNS: Blood pressure 122/82, height 1.651 m (5' 5\"), weight 149.7 kg (330 lb), not currently breastfeeding.  Reviewed nursing intake notes.   Body mass index is 54.91 kg/m .  RESP: non labored breathing   ABD: benign, soft, non-tender, no acute peritoneal findings  SKIN: grossly normal   LYMPHATIC: grossly normal, no adenopathy, no extremity edema  NEURO: grossly normal , no motor deficits  VASCULAR: satisfactory perfusion of all extremities   MUSCULOSKELETAL:   Alignment: Appears to be slight varus alignment.     The left leg exhibits a full range of motion.  No pain upon rotations.  Lasegue's test is negative     L knee: Wound slightly lateral well-healed incision anterior knee.  ROM 90-5-0  . Straight leg raise +. No redness, warmth or skin changes present. Effusion Unable to reliably test. Ligamentously stable in both ML and AP direction.  No gross laxity in ML direction.  Normal PF tracking with crepitus.  There is some tenderness to palpation over the joint line.     Left LE:   Thigh and leg compartments soft and compressible   +Quad/TA/GSC/FHL/EHL   SILT DP/SP/Ziyad/Saph/Tib nerve distributions   Palpable dorsalis pedis pulse              Data:   All laboratory data reviewed  All imaging studies reviewed by me personally.    XR knee left 9/20/2021:  My interpretation: Severe end-stage osteoarthritic changes in all 3 compartments of the knee.  Significant joint space narrowing, sclerosis, subchondral cysts and osteophyte formation.  Appears to be a ad latum translation of the tibia.           Assessment and Plan:   Assessment:  56-year-old female with chronic left knee pain and instability due to end-stage osteoarthritic changes in all 3 compartments.  Insufficiently responding to nonoperative treatment measures.     Plan:  I had a long discussion with the patient regarding ongoing management options.  Reviewed surgical and " nonsurgical treatments.  The non-surgical options include activity modification, pain medication, PT, bracing and injection therapy. As for surgery we discussed the option total knee replacement surgery. We reviewed total knee replacement in detail including the procedure, the implants, the recovery process, and long-term outcomes.  Because of her nickel allergy we will use the [employer] system.  The goal will be to use a highly constrained liner and potentially a stemmed tibial component with medial augment.   We reviewed that the risks of the surgery include but are not limited to infection, wound problems, stiffness, persistent pain, swelling, clicking, loosening, revision surgery.  We also reviewed less common risks such as neurovascular injury fracture, and other implant-related issues.  We reviewed other medical complications such as a blood clot.  We discussed that the vast majority of cases have a highly successful outcome.  However there is a small subset of patients that do experience complications or problems following the knee replacement and these problems can be very debilitating and painful and sometimes do not improve.   Based on a discussion of the risks and benefits, we believe that the benefits far outweigh the risks at this point and the patient  would like to proceed with left total knee replacement surgery and removal of hardware surgery.  We will work on scheduling surgery at a time that works well for patient in the next few months.  Because of patient's BMI> 50 we will proceed with a surgical case on the [facility]. Patient will contact us if there are any questions or concerns leading up to surgery.  A referral to the comprehensive weight loss clinic was provided.  Before surgery the patient will attend a joint replacement class and will be seen by the PCP and dentist.     More information on joint replacements can be found on :  [email or web address]    Thank you for your referral.      Armand Martin MD, PhD     Adult Reconstruction  [facility] Department of Orthopaedic Surgery  Pager [phone]      DATA for DOCUMENTATION:         Past Medical History:     Patient Active Problem List   Diagnosis     BALTA (obstructive sleep apnea)     Low back pain     Lumbar radiculitis     Hyperlipidemia LDL goal <100     Mild recurrent major depression (H)     Hypertension goal BP (blood pressure) < 140/90     ACP (advance care planning)     Permanent atrial fibrillation (H)     Lipoma of skin and subcutaneous tissue     Morbid obesity due to excess calories (H)     Prediabetes     Hip pain, right     Bilateral knee pain     Past Medical History:   Diagnosis Date     Atrial fibrillation (H)      Depression      GERD (gastroesophageal reflux disease)      HTN (hypertension)      Hyperlipidemia      BALTA (obstructive sleep apnea)      Permanent atrial fibrillation (H) 6/5/11       Also see scanned health assessment forms.       Past Surgical History:     Past Surgical History:   Procedure Laterality Date     CARDIOVERSION  6/7/11     CHOLECYSTECTOMY       DILATION AND CURETTAGE  4/26/2012    Procedure:DILATION AND CURETTAGE; DILATION AND CURETTAGE; Surgeon:JEAN-PAUL DEL CID; Location:[facility]     DILATION AND CURETTAGE, HYSTEROSCOPY DIAGNOSTIC, COMBINED  12/8/2011    Procedure:COMBINED DILATION AND CURETTAGE, HYSTEROSCOPY DIAGNOSTIC; DILATION AND CURETTAGE, DIAGNOSTIC HYSTEROSCOPY ; Surgeon:JEAN-PAUL DEL CID; Location:[facility]     KNEE SURGERY              Social History:     Social History     Socioeconomic History     Marital status: Single     Spouse name: Not on file     Number of children: Not on file     Years of education: Not on file     Highest education level: Bachelor's degree (e.g., BA, AB, BS)   Occupational History     Not on file   Tobacco Use     Smoking status: Never Smoker      Smokeless tobacco: Never Used   Substance and Sexual Activity     Alcohol use: Yes     Comment: rarely     Drug use: No     Sexual activity: Never   Other Topics Concern     Parent/sibling w/ CABG, MI or angioplasty before 65F 55M? No      Service Not Asked     Blood Transfusions Not Asked     Caffeine Concern Not Asked     Occupational Exposure Not Asked     Hobby Hazards Not Asked     Sleep Concern Not Asked     Stress Concern Not Asked     Weight Concern Not Asked     Special Diet No     Back Care Not Asked     Exercise Yes     Comment: walks 2 miles minimum 5 x weekly      Bike Helmet Not Asked     Seat Belt Not Asked     Self-Exams Not Asked   Social History Narrative     Not on file     Social Determinants of Health     Financial Resource Strain: Low Risk      Difficulty of Paying Living Expenses: Not hard at all   Food Insecurity: No Food Insecurity     Worried About Running Out of Food in the Last Year: Never true     Ran Out of Food in the Last Year: Never true   Transportation Needs: No Transportation Needs     Lack of Transportation (Medical): No     Lack of Transportation (Non-Medical): No   Physical Activity: Inactive     Days of Exercise per Week: 0 days     Minutes of Exercise per Session: 0 min   Stress: No Stress Concern Present     Feeling of Stress : Only a little   Social Connections: Moderately Integrated     Frequency of Communication with Friends and Family: Twice a week     Frequency of Social Gatherings with Friends and Family: Once a week     Attends [employer] Services: More than 4 times per year     Active Member of Clubs or Organizations: Yes     Attends Club or Organization Meetings: More than 4 times per year     Marital Status: Never    Intimate Partner Violence:      Fear of Current or Ex-Partner:      Emotionally Abused:      Physically Abused:      Sexually Abused:             Family History:       Family History   Problem Relation Age of Onset     Hypertension  Mother      Heart Disease Mother         A-fib     Arthritis Mother      Heart Disease Father         triple bypass     Cancer Father 50        bladder     Hypertension Father      Respiratory Father         smoker     Cancer Paternal Grandmother 90        rectal     Unknown/Adopted Brother             Medications:     Current Outpatient Medications   Medication Sig     albuterol (PROAIR HFA, PROVENTIL HFA, VENTOLIN HFA) 108 (90 BASE) MCG/ACT inhaler Inhale 2 puffs into the lungs every 6 hours (Patient taking differently: Inhale 2 puffs into the lungs every 6 hours as needed )     buPROPion (WELLBUTRIN XL) 150 MG 24 hr tablet Take 3 tablets by mouth every morning. Indications: Major Depressive Disorder.      cetirizine (ZYRTEC) 10 MG tablet Take 10 mg by mouth as needed for allergies     dabigatran ANTICOAGULANT (PRADAXA ANTICOAGULANT) 150 MG capsule Take 1 capsule (150 mg) by mouth every 12 hours Store in original bottle/blister pack. Use within 120 days of opening.     diltiazem ER (DILT-XR) 240 MG 24 hr ER beaded capsule Take 1 capsule (240 mg) by mouth daily     fosinopril (MONOPRIL) 10 MG tablet Take 1 tablet (10 mg) by mouth daily     gabapentin (NEURONTIN) 300 MG capsule Take 600 mg by mouth At Bedtime     ibuprofen (ADVIL/MOTRIN) 100 MG tablet Take 100 mg by mouth as needed     lamoTRIgine (LAMICTAL) 100 MG tablet Take 200 mg by mouth daily      medroxyPROGESTERone (PROVERA) 10 MG tablet Take 10 mg by mouth daily     metoprolol succinate ER (TOPROL-XL) 50 MG 24 hr tablet Take 1 tablet (50 mg) by mouth 2 times daily     nystatin (MYCOSTATIN) 969206 UNIT/GM external cream Apply to affected area tid until rash resolves, could be up to 3 weeks.     sertraline (ZOLOFT) 100 MG tablet Take 100 mg by mouth daily 2 tabs (200mg) daily     spironolactone (ALDACTONE) 25 MG tablet Take 1 tablet (25 mg) by mouth daily     tiZANidine (ZANAFLEX) 2 MG tablet Take 1 tablet (2 mg) by mouth 3 times daily     No current  facility-administered medications for this visit.              Review of Systems:   A comprehensive 10 point review of systems (constitutional, ENT, cardiac, peripheral vascular, lymphatic, respiratory, GI, , Musculoskeletal, skin, Neurological) was performed and found to be negative except as described in this note.     See intake form completed by patient          Again, thank you for allowing me to participate in the care of your patient.        Sincerely,        Armand Martin MD

## 2021-11-02 NOTE — PATIENT INSTRUCTIONS
1. Osteoarthrosis, localized, primary, knee, left    2. Morbid obesity (H)    3. Stage 3a chronic kidney disease (H)      Schedule Left Total Knee surgery.   Anthony Surgery Scheduler will contact you to assist with scheduling surgery.   You can contact her directly at [phone].   Prior to your scheduled surgery we advise scheduling with your dentist to obtain clearance for surgery, and to complete any recommended dental work prior.     For mor information regarding total joint surgery please visit our website:   [email or web address]    FORMS:   If you are needing any forms completed relating to your upcoming procedure, please send them to our office with a completed Release of Information.   Forms will be completed AFTER your procedure. A letter can be sent to your employer prior to surgery to inform them of your anticipated time off.    Please notify our staff if you would like a letter to do so.   Forms can be faxed directly to our clinic at [phone].     DO NOT BRING FORMS ON THE DATE OF SURGERY.     MEDICATION REFILL:   Please allow 3 business days for completion of medication refills for any surgery related prescription.   Medication refills submitted on Friday, may not be addressed until the following Monday.  You may request a refill via [employer], or by calling our Nurse Triage at [phone], option 3.     Referral placed for weight loss has been placed. Please call to schedule the consultation.     Call my office with any questions or concerns, [phone].

## 2021-11-05 ENCOUNTER — TELEPHONE (OUTPATIENT)
Dept: ORTHOPEDICS | Facility: CLINIC | Age: 56
End: 2021-11-05
Payer: COMMERCIAL

## 2021-11-05 DIAGNOSIS — M17.12 OSTEOARTHROSIS, LOCALIZED, PRIMARY, KNEE, LEFT: ICD-10-CM

## 2021-11-05 DIAGNOSIS — Z11.59 ENCOUNTER FOR SCREENING FOR OTHER VIRAL DISEASES: Primary | ICD-10-CM

## 2021-11-05 DIAGNOSIS — Z96.652 S/P TOTAL KNEE ARTHROPLASTY, LEFT: ICD-10-CM

## 2021-11-05 DIAGNOSIS — Z47.89 ORTHOPEDIC AFTERCARE: ICD-10-CM

## 2021-11-05 NOTE — TELEPHONE ENCOUNTER
Scheduled surgery    ATC: please place covid order and PT order.     Type of surgery: left TKA and hardware removal  Location of surgery: [place]  Date and time of surgery: 1/20/21 @ 215pm   Surgeon: Veronica  Pre-Op Appt Date: 1/10/22  Post-Op Appt Date: 2/8/21   Packet sent out: Yes  Pre-cert/Authorization completed:  No  Date: 11/5/21      Anthony Norris, Surgery Scheduler

## 2021-12-12 ENCOUNTER — MYC MEDICAL ADVICE (OUTPATIENT)
Dept: CARDIOLOGY | Facility: CLINIC | Age: 56
End: 2021-12-12
Payer: COMMERCIAL

## 2021-12-12 DIAGNOSIS — I48.0 PAROXYSMAL ATRIAL FIBRILLATION (H): Primary | ICD-10-CM

## 2021-12-13 NOTE — TELEPHONE ENCOUNTER
12/13/21 Pt sent [employer] message stating insurance will no longer cover Pradaxa after the New Year. Eliquis or Xarelto will be covered.  Will check with Dr Greenfield and notify pt.    Eliquis  Age 56  Wt 149.7 kg on 11/1/21  Cr 1.26 on 6/28/21    Xarelto  Last Cr Clearance 47 on 6/28/21    KHerroRN 928 am

## 2021-12-13 NOTE — TELEPHONE ENCOUNTER
12/13/21 Ok  from Dr Greenfield for pt to take Eliquis 5 mg BID.  Spoke w pt who has enough Pradaxa until the end of January A prescription for Eliquis  sent to  Mail order.Pt voiced understanding and agreement with plan.   Anabelle 245 pm   No

## 2021-12-15 ENCOUNTER — TELEPHONE (OUTPATIENT)
Dept: SURGERY | Facility: CLINIC | Age: 56
End: 2021-12-15
Payer: COMMERCIAL

## 2021-12-15 NOTE — TELEPHONE ENCOUNTER
Spoke to patient reminding them to fill out the new patient questionnaire before their appointment.    Candy MELO MA

## 2021-12-16 ENCOUNTER — VIRTUAL VISIT (OUTPATIENT)
Dept: SURGERY | Facility: CLINIC | Age: 56
End: 2021-12-16
Payer: COMMERCIAL

## 2021-12-16 VITALS — BODY MASS INDEX: 48.82 KG/M2 | WEIGHT: 293 LBS | HEIGHT: 65 IN

## 2021-12-16 DIAGNOSIS — M17.12 OSTEOARTHROSIS, LOCALIZED, PRIMARY, KNEE, LEFT: ICD-10-CM

## 2021-12-16 DIAGNOSIS — Z53.9 ERRONEOUS ENCOUNTER--DISREGARD: Primary | ICD-10-CM

## 2021-12-16 DIAGNOSIS — E66.01 MORBID OBESITY (H): ICD-10-CM

## 2021-12-16 ASSESSMENT — MIFFLIN-ST. JEOR: SCORE: 2082.75

## 2021-12-16 NOTE — PATIENT INSTRUCTIONS
It was great meeting you today!                                Protein Link:  Protein Sources for Weight Loss  [email or web address]

## 2021-12-27 DIAGNOSIS — M62.838 MUSCLE SPASM: ICD-10-CM

## 2021-12-28 RX ORDER — TIZANIDINE 2 MG/1
2 TABLET ORAL 3 TIMES DAILY
Qty: 30 TABLET | Refills: 1 | Status: SHIPPED | OUTPATIENT
Start: 2021-12-28 | End: 2022-07-19

## 2022-01-04 SDOH — HEALTH STABILITY: PHYSICAL HEALTH: ON AVERAGE, HOW MANY MINUTES DO YOU ENGAGE IN EXERCISE AT THIS LEVEL?: 0 MIN

## 2022-01-04 SDOH — ECONOMIC STABILITY: FOOD INSECURITY: WITHIN THE PAST 12 MONTHS, THE FOOD YOU BOUGHT JUST DIDN'T LAST AND YOU DIDN'T HAVE MONEY TO GET MORE.: NEVER TRUE

## 2022-01-04 SDOH — ECONOMIC STABILITY: INCOME INSECURITY: IN THE LAST 12 MONTHS, WAS THERE A TIME WHEN YOU WERE NOT ABLE TO PAY THE MORTGAGE OR RENT ON TIME?: NO

## 2022-01-04 SDOH — ECONOMIC STABILITY: TRANSPORTATION INSECURITY
IN THE PAST 12 MONTHS, HAS THE LACK OF TRANSPORTATION KEPT YOU FROM MEDICAL APPOINTMENTS OR FROM GETTING MEDICATIONS?: NO

## 2022-01-04 SDOH — ECONOMIC STABILITY: TRANSPORTATION INSECURITY
IN THE PAST 12 MONTHS, HAS LACK OF TRANSPORTATION KEPT YOU FROM MEETINGS, WORK, OR FROM GETTING THINGS NEEDED FOR DAILY LIVING?: NO

## 2022-01-04 SDOH — HEALTH STABILITY: PHYSICAL HEALTH: ON AVERAGE, HOW MANY DAYS PER WEEK DO YOU ENGAGE IN MODERATE TO STRENUOUS EXERCISE (LIKE A BRISK WALK)?: 0 DAYS

## 2022-01-04 SDOH — ECONOMIC STABILITY: FOOD INSECURITY: WITHIN THE PAST 12 MONTHS, YOU WORRIED THAT YOUR FOOD WOULD RUN OUT BEFORE YOU GOT MONEY TO BUY MORE.: NEVER TRUE

## 2022-01-04 SDOH — ECONOMIC STABILITY: INCOME INSECURITY: HOW HARD IS IT FOR YOU TO PAY FOR THE VERY BASICS LIKE FOOD, HOUSING, MEDICAL CARE, AND HEATING?: NOT HARD AT ALL

## 2022-01-04 ASSESSMENT — SOCIAL DETERMINANTS OF HEALTH (SDOH)
ARE YOU MARRIED, WIDOWED, DIVORCED, SEPARATED, NEVER MARRIED, OR LIVING WITH A PARTNER?: NEVER MARRIED
IN A TYPICAL WEEK, HOW MANY TIMES DO YOU TALK ON THE PHONE WITH FAMILY, FRIENDS, OR NEIGHBORS?: MORE THAN THREE TIMES A WEEK
HOW OFTEN DO YOU ATTEND CHURCH OR RELIGIOUS SERVICES?: MORE THAN 4 TIMES PER YEAR
DO YOU BELONG TO ANY CLUBS OR ORGANIZATIONS SUCH AS CHURCH GROUPS UNIONS, FRATERNAL OR ATHLETIC GROUPS, OR SCHOOL GROUPS?: YES
HOW OFTEN DO YOU GET TOGETHER WITH FRIENDS OR RELATIVES?: ONCE A WEEK

## 2022-01-04 ASSESSMENT — LIFESTYLE VARIABLES
HOW MANY STANDARD DRINKS CONTAINING ALCOHOL DO YOU HAVE ON A TYPICAL DAY: 1 OR 2
HOW OFTEN DO YOU HAVE SIX OR MORE DRINKS ON ONE OCCASION: NEVER
HOW OFTEN DO YOU HAVE A DRINK CONTAINING ALCOHOL: MONTHLY OR LESS

## 2022-01-04 NOTE — PROGRESS NOTES
Pre-Visit Planning   Next 5 appointments (look out 90 days)    Lee 10, 2022  1:00 PM  (Arrive by 12:40 PM)  Pre-Operative Physical with Nicole Castellanos PA-C  [facility] ([facility] ) [address] [facility] [id number] [phone]   Jan 16, 2022  2:00 PM  Pre-procedure Covid with  COVID TESTING HUB 1  [facility] ([facility] ) [address] [address]  [phone]   Feb 08, 2022 11:00 AM  Return Visit with Armand Martin MD  [facility] ([facility] ) [address] [facility]  [address] [place] [address]  [phone]   Mar 08, 2022 11:00 AM  Return Visit with Armand Martin MD  [facility] ([facility] ) [address] [facility]  [address] [place] [address]  [phone]         Appointment Notes for this encounter:    pre-op for left TKA, Dr Martin, DOS 1/20/22    Questionnaires Reviewed/Assigned   PHQ 9 updated     Contacted patient via [employer]. Are there any additional questions or concerns you'd like to review with your provider during your visit? No    pre op    Meds  Home Meds reviewed and updated    Entered patient-preferred pharmacy.     Current Outpatient Medications   Medication     albuterol (PROAIR HFA, PROVENTIL HFA, VENTOLIN HFA) 108 (90 BASE) MCG/ACT inhaler     apixaban ANTICOAGULANT (ELIQUIS) 5 MG tablet     buPROPion (WELLBUTRIN XL) 150 MG 24 hr tablet     cetirizine (ZYRTEC) 10 MG tablet     diltiazem ER (DILT-XR) 240 MG 24 hr ER beaded capsule     fosinopril (MONOPRIL) 10 MG tablet     gabapentin (NEURONTIN) 300 MG capsule     ibuprofen (ADVIL/MOTRIN) 100 MG tablet     lamoTRIgine (LAMICTAL) 100 MG tablet     medroxyPROGESTERone (PROVERA) 10 MG tablet     metoprolol  succinate ER (TOPROL-XL) 50 MG 24 hr tablet     nystatin (MYCOSTATIN) 395384 UNIT/GM external cream     sertraline (ZOLOFT) 100 MG tablet     spironolactone (ALDACTONE) 25 MG tablet     tiZANidine (ZANAFLEX) 2 MG tablet     No current facility-administered medications for this visit.     Health Maintenance   Health Maintenance Due   Topic Date Due     URINALYSIS  Never done     MICROALBUMIN  11/25/2020     COVID-19 Vaccine (3 - Booster for Pfizer series) 11/21/2021     MAMMO SCREENING  11/29/2021     ZOSTER IMMUNIZATION (2 of 2) 12/13/2021     Health Maintenance reviewed and Health Maintenance orders pended    [employer]  Patient is active on "[employer]    Call Summary  Advised patient to call back at [phone] if needed.

## 2022-01-10 ENCOUNTER — OFFICE VISIT (OUTPATIENT)
Dept: FAMILY MEDICINE | Facility: CLINIC | Age: 57
End: 2022-01-10
Payer: COMMERCIAL

## 2022-01-10 VITALS
OXYGEN SATURATION: 97 % | TEMPERATURE: 99.1 F | SYSTOLIC BLOOD PRESSURE: 138 MMHG | WEIGHT: 293 LBS | DIASTOLIC BLOOD PRESSURE: 76 MMHG | HEART RATE: 83 BPM | HEIGHT: 65 IN | BODY MASS INDEX: 48.82 KG/M2 | RESPIRATION RATE: 18 BRPM

## 2022-01-10 DIAGNOSIS — E66.01 MORBID OBESITY DUE TO EXCESS CALORIES (H): ICD-10-CM

## 2022-01-10 DIAGNOSIS — M25.562 LEFT KNEE PAIN, UNSPECIFIED CHRONICITY: ICD-10-CM

## 2022-01-10 DIAGNOSIS — Z01.818 PREOP GENERAL PHYSICAL EXAM: Primary | ICD-10-CM

## 2022-01-10 DIAGNOSIS — E11.9 CONTROLLED TYPE 2 DIABETES MELLITUS WITHOUT COMPLICATION, WITHOUT LONG-TERM CURRENT USE OF INSULIN (H): ICD-10-CM

## 2022-01-10 DIAGNOSIS — F33.0 MILD RECURRENT MAJOR DEPRESSION (H): ICD-10-CM

## 2022-01-10 LAB
ANION GAP SERPL CALCULATED.3IONS-SCNC: 6 MMOL/L (ref 3–14)
BUN SERPL-MCNC: 15 MG/DL (ref 7–30)
CALCIUM SERPL-MCNC: 10.1 MG/DL (ref 8.5–10.1)
CHLORIDE BLD-SCNC: 108 MMOL/L (ref 94–109)
CO2 SERPL-SCNC: 23 MMOL/L (ref 20–32)
CREAT SERPL-MCNC: 1.19 MG/DL (ref 0.52–1.04)
GFR SERPL CREATININE-BSD FRML MDRD: 53 ML/MIN/1.73M2
GLUCOSE BLD-MCNC: 143 MG/DL (ref 70–99)
HBA1C MFR BLD: 7 % (ref 0–5.6)
POTASSIUM BLD-SCNC: 4.7 MMOL/L (ref 3.4–5.3)
SODIUM SERPL-SCNC: 137 MMOL/L (ref 133–144)

## 2022-01-10 PROCEDURE — 99214 OFFICE O/P EST MOD 30 MIN: CPT | Performed by: PHYSICIAN ASSISTANT

## 2022-01-10 PROCEDURE — 83036 HEMOGLOBIN GLYCOSYLATED A1C: CPT | Performed by: PHYSICIAN ASSISTANT

## 2022-01-10 PROCEDURE — 80048 BASIC METABOLIC PNL TOTAL CA: CPT | Performed by: PHYSICIAN ASSISTANT

## 2022-01-10 PROCEDURE — 93000 ELECTROCARDIOGRAM COMPLETE: CPT | Performed by: PHYSICIAN ASSISTANT

## 2022-01-10 PROCEDURE — 36415 COLL VENOUS BLD VENIPUNCTURE: CPT | Performed by: PHYSICIAN ASSISTANT

## 2022-01-10 RX ORDER — DABIGATRAN ETEXILATE 75 MG/1
150 CAPSULE ORAL 2 TIMES DAILY
COMMUNITY
End: 2022-07-19

## 2022-01-10 RX ORDER — BUPROPION HYDROCHLORIDE 150 MG/1
150 TABLET ORAL EVERY MORNING
Qty: 90 TABLET | Refills: 0 | COMMUNITY
Start: 2022-01-10 | End: 2022-07-19

## 2022-01-10 RX ORDER — BUPROPION HYDROCHLORIDE 300 MG/1
300 TABLET ORAL EVERY MORNING
Qty: 90 TABLET | Refills: 0 | COMMUNITY
Start: 2022-01-10 | End: 2022-07-19

## 2022-01-10 RX ORDER — METFORMIN HCL 500 MG
500 TABLET, EXTENDED RELEASE 24 HR ORAL
Qty: 30 TABLET | Refills: 1 | Status: SHIPPED | OUTPATIENT
Start: 2022-01-10 | End: 2022-04-01

## 2022-01-10 ASSESSMENT — MIFFLIN-ST. JEOR: SCORE: 2085.47

## 2022-01-10 NOTE — PROGRESS NOTES
[facility]  [address] [facility] [address]  Phone: [phone]  Primary Provider: Nicole Castellanos        PREOPERATIVE EVALUATION:  Today's date: 1/10/2022    Mary Gorman is a 56 year old female who presents for a preoperative evaluation.    Surgical Information:  Surgery/Procedure: Left TKA  Surgery Location: [facility]  Surgeon: Dr. Briscoe  Surgery Date: 01/20/22   Time of Surgery: 1:00pm  Where patient plans to recover: At home with family  Fax number for surgical facility: Note does not need to be faxed, will be available electronically in Epic.    Type of Anesthesia Anticipated: General    Assessment & Plan     The proposed surgical procedure is considered INTERMEDIATE risk.    Preop general physical exam    - Hemoglobin A1c  - Basic metabolic panel  - EKG 12-lead complete w/read - Clinics    Left knee pain, unspecified chronicity      Controlled type 2 diabetes mellitus without complication, without long-term current use of insulin (H)  New diagnosis.  Will start on metformin and follow up after recovery.  - Hemoglobin A1c  - Albumin Random Urine Quantitative with Creat Ratio  - AMB Adult Diabetes Educator Referral  - metFORMIN (GLUCOPHAGE-XR) 500 MG 24 hr tablet  Dispense: 30 tablet; Refill: 1    Morbid obesity due to excess calories (H)  BMI 55    Mild recurrent major depression (H)    - buPROPion (WELLBUTRIN XL) 150 MG 24 hr tablet  Dispense: 90 tablet; Refill: 0  - buPROPion (WELLBUTRIN XL) 300 MG 24 hr tablet  Dispense: 90 tablet; Refill: 0           Risks and Recommendations:  The patient has the following additional risks and recommendations for perioperative complications:   - Morbid obesity (BMI >40)  Diabetes:  - Patient is not on insulin therapy: regular NPO guidelines can be followed.         Medication Instructions:  Patient is to take all scheduled medications on the day of surgery EXCEPT for modifications listed below:   -  dabigatran (Pradaxa): Bleeding risk is moderate or high for this procedure AND CrCl (>=) 50 mL/min. HOLD 2-3 days before surgery.    - metformin: HOLD day of surgery.    RECOMMENDATION:  APPROVAL GIVEN to proceed with proposed procedure, without further diagnostic evaluation.      Subjective     HPI related to upcoming procedure: Left knee osteoarthritis.    Preop Questions 1/4/2022   1. Have you ever had a heart attack or stroke? No   2. Have you ever had surgery on your heart or blood vessels, such as a stent placement, a coronary artery bypass, or surgery on an artery in your head, neck, heart, or legs? No   3. Do you have chest pain with activity? No   4. Do you have a history of  heart failure? No   5. Do you currently have a cold, bronchitis or symptoms of other infection? No   6. Do you have a cough, shortness of breath, or wheezing? No   7. Do you or anyone in your family have previous history of blood clots? No   8. Do you or does anyone in your family have a serious bleeding problem such as prolonged bleeding following surgeries or cuts? No   9. Have you ever had problems with anemia or been told to take iron pills? No   10. Have you had any abnormal blood loss such as black, tarry or bloody stools, or abnormal vaginal bleeding? No   11. Have you ever had a blood transfusion? No   12. Are you willing to have a blood transfusion if it is medically needed before, during, or after your surgery? Yes   13. Have you or any of your relatives ever had problems with anesthesia? No   14. Do you have sleep apnea, excessive snoring or daytime drowsiness? YES - BALTA   14a. Do you have a CPAP machine? Yes   15. Do you have any artifical heart valves or other implanted medical devices like a pacemaker, defibrillator, or continuous glucose monitor? No   16. Do you have artificial joints? No   17. Are you allergic to latex? No   18. Is there any chance that you may be pregnant? No       Health Care Directive:  Patient has a  Health Care Directive on file      Preoperative Review of :   reviewed - no record of controlled substances prescribed.      Status of Chronic Conditions:  See problem list for active medical problems.  Problems all longstanding and stable, except as noted/documented.  See ROS for pertinent symptoms related to these conditions.      Review of Systems  Constitutional, neuro, ENT, endocrine, pulmonary, cardiac, gastrointestinal, genitourinary, musculoskeletal, integument and psychiatric systems are negative, except as otherwise noted.    Patient Active Problem List    Diagnosis Date Noted     Osteoarthrosis, localized, primary, knee, left 12/16/2021     Priority: Medium     Chronic kidney disease, stage 3 11/02/2021     Priority: Medium     Hip pain, right 06/09/2017     Priority: Medium     Bilateral knee pain 06/09/2017     Priority: Medium     Prediabetes 12/08/2016     Priority: Medium     Diabetic diagnosis occurred during use of antipsychotic medications with improvement in A1C since d/c antipsychotics x 2 years.       Morbid obesity due to excess calories (H) 12/06/2016     Priority: Medium     Lipoma of skin and subcutaneous tissue 11/10/2016     Priority: Medium     11/10/2016: Present for about a year, located on LUQ of abdomen, about 19 cm in width, 12 cm in length.       ACP (advance care planning) 11/18/2014     Priority: Medium     Advance Care Planning: Receipt of ACP document:  Received: Health Care Directive which was witnessed or notarized on 7-15-11.  Document previously scanned on 7-18-11 in [facility]-moved to epic 12-29-14.  Validation form completed and sent to be scanned.  Code Status needs to be updated to reflect choices in most recent ACP document.  Confirmed/documented designated decision maker(s). See permanent comments section of demographics in clinical tab. View document(s) and details by clicking on code status. Added by Tricia Hubbard RN, System ACP Coordinator [employer] on  12/29/2014.             Hypertension goal BP (blood pressure) < 140/90 05/15/2014     Priority: Medium     Mild recurrent major depression (H) 06/23/2013     Priority: Medium     Dr Varela's office asking results be faxed to their office- see contact info  wellbutrin, lamictal, gabapentin, zoloft       Hyperlipidemia LDL goal <100 05/03/2013     Priority: Medium     Low back pain 03/26/2013     Priority: Medium     Lumbar radiculitis 03/26/2013     Priority: Medium     BALTA (obstructive sleep apnea) 12/02/2011     Priority: Medium     AHI 39 and RDI of 60, desaturations to as 89%       Permanent atrial fibrillation (H) 06/05/2011     Priority: Medium      Past Medical History:   Diagnosis Date     Atrial fibrillation (H)      Depression      GERD (gastroesophageal reflux disease)      HTN (hypertension)      Hyperlipidemia      BALTA (obstructive sleep apnea)      Permanent atrial fibrillation (H) 6/5/11     Past Surgical History:   Procedure Laterality Date     CARDIOVERSION  6/7/11     CHOLECYSTECTOMY       DILATION AND CURETTAGE  4/26/2012    Procedure:DILATION AND CURETTAGE; DILATION AND CURETTAGE; Surgeon:JEAN-PAUL DEL CID; Location:[facility]     DILATION AND CURETTAGE, HYSTEROSCOPY DIAGNOSTIC, COMBINED  12/8/2011    Procedure:COMBINED DILATION AND CURETTAGE, HYSTEROSCOPY DIAGNOSTIC; DILATION AND CURETTAGE, DIAGNOSTIC HYSTEROSCOPY ; Surgeon:JEAN-PAUL DEL CID; Location:[facility]     KNEE SURGERY       Current Outpatient Medications   Medication Sig Dispense Refill     buPROPion (WELLBUTRIN XL) 150 MG 24 hr tablet Take 1 tablet (150 mg) by mouth every morning Take with 300 mg for total of 450 mg 90 tablet 0     buPROPion (WELLBUTRIN XL) 300 MG 24 hr tablet Take 1 tablet (300 mg) by mouth every morning Take with 150 mg for total 450 mg 90 tablet 0     cetirizine (ZYRTEC) 10 MG tablet Take 10 mg by mouth as needed for allergies       dabigatran ANTICOAGULANT (PRADAXA) 75 MG capsule Take 75 mg by mouth 2 times daily Store in original  "'s bottle or blister pack; use within 120 days of opening.       diltiazem ER (DILT-XR) 240 MG 24 hr ER beaded capsule Take 1 capsule (240 mg) by mouth daily 90 capsule 3     fosinopril (MONOPRIL) 10 MG tablet Take 1 tablet (10 mg) by mouth daily 90 tablet 3     gabapentin (NEURONTIN) 300 MG capsule Take 600 mg by mouth At Bedtime       lamoTRIgine (LAMICTAL) 100 MG tablet Take 200 mg by mouth daily        medroxyPROGESTERone (PROVERA) 10 MG tablet Take 10 mg by mouth daily       metFORMIN (GLUCOPHAGE-XR) 500 MG 24 hr tablet Take 1 tablet (500 mg) by mouth daily (with dinner) 30 tablet 1     metoprolol succinate ER (TOPROL-XL) 50 MG 24 hr tablet Take 1 tablet (50 mg) by mouth 2 times daily 180 tablet 3     nystatin (MYCOSTATIN) 749518 UNIT/GM external cream Apply to affected area tid until rash resolves, could be up to 3 weeks. 30 g 3     sertraline (ZOLOFT) 100 MG tablet Take 100 mg by mouth daily 2 tabs (200mg) daily       spironolactone (ALDACTONE) 25 MG tablet Take 1 tablet (25 mg) by mouth daily 90 tablet 3     tiZANidine (ZANAFLEX) 2 MG tablet Take 1 tablet (2 mg) by mouth 3 times daily 30 tablet 1     albuterol (PROAIR HFA, PROVENTIL HFA, VENTOLIN HFA) 108 (90 BASE) MCG/ACT inhaler Inhale 2 puffs into the lungs every 6 hours (Patient taking differently: Inhale 2 puffs into the lungs every 6 hours as needed ) 1 each 0       Allergies   Allergen Reactions     Dyazide [Hydrochlorothiazide W/Triamterene] Unknown     Nickel Rash     Robaxin [Methocarbamol] Rash     Sulfa Drugs Rash        Social History     Tobacco Use     Smoking status: Never Smoker     Smokeless tobacco: Never Used   Substance Use Topics     Alcohol use: Yes     Comment: rarely       History   Drug Use No         Objective     /76   Pulse 83   Temp 99.1  F (37.3  C) (Oral)   Resp 18   Ht 1.651 m (5' 5\")   Wt 150 kg (330 lb 9.6 oz)   SpO2 97%   BMI 55.01 kg/m      Physical Exam    GENERAL APPEARANCE: healthy, alert and " no distress     EYES: EOMI, PERRL     HENT: ear canals and TM's normal and nose and mouth without ulcers or lesions     NECK: no adenopathy, no asymmetry, masses, or scars and thyroid normal to palpation     RESP: lungs clear to auscultation - no rales, rhonchi or wheezes     CV: regular rates and rhythm, normal S1 S2, no S3 or S4 and no murmur, click or rub     ABDOMEN:  soft, nontender, no HSM or masses and bowel sounds normal     MS: extremities normal- no gross deformities noted, no evidence of inflammation in joints, FROM in all extremities.     SKIN: no suspicious lesions or rashes     NEURO: Normal strength and tone, sensory exam grossly normal, mentation intact and speech normal     PSYCH: mentation appears normal. and affect normal/bright     LYMPHATICS: No cervical adenopathy    Recent Labs   Lab Test 06/28/21  1321 05/10/21  1434   HGB  --  15.2   PLT  --  261    140   POTASSIUM 4.8 4.6   CR 1.26* 1.12*   A1C 6.9*  --         Diagnostics:  Recent Results (from the past 168 hour(s))   Hemoglobin A1c    Collection Time: 01/10/22  1:25 PM   Result Value Ref Range    Hemoglobin A1C 7.0 (H) 0.0 - 5.6 %   Basic metabolic panel    Collection Time: 01/10/22  1:25 PM   Result Value Ref Range    Sodium 137 133 - 144 mmol/L    Potassium 4.7 3.4 - 5.3 mmol/L    Chloride 108 94 - 109 mmol/L    Carbon Dioxide (CO2) 23 20 - 32 mmol/L    Anion Gap 6 3 - 14 mmol/L    Urea Nitrogen 15 7 - 30 mg/dL    Creatinine 1.19 (H) 0.52 - 1.04 mg/dL    Calcium 10.1 8.5 - 10.1 mg/dL    Glucose 143 (H) 70 - 99 mg/dL    GFR Estimate 53 (L) >60 mL/min/1.73m2      EKG: atrial fibrillation, rate 90, normal axis, normal intervals, no acute ST/T changes c/w ischemia, no LVH by voltage criteria, unchanged from previous tracings    Revised Cardiac Risk Index (RCRI):  The patient has the following serious cardiovascular risks for perioperative complications:   - No serious cardiac risks = 0 points     RCRI Interpretation: 0 points: Class I  (very low risk - 0.4% complication rate)           Signed Electronically by: Nicole Castellanos PA-C  Copy of this evaluation report is provided to requesting physician.

## 2022-01-12 ENCOUNTER — TELEPHONE (OUTPATIENT)
Dept: FAMILY MEDICINE | Facility: CLINIC | Age: 57
End: 2022-01-12
Payer: COMMERCIAL

## 2022-01-12 NOTE — TELEPHONE ENCOUNTER
Diabetes Education Scheduling Outreach #1:    Call to patient to schedule. Patient declined to schedule and will call us back after her knee surgery.    Michelle Lucero OnCall  Diabetes and Nutrition Scheduling

## 2022-01-16 ENCOUNTER — LAB (OUTPATIENT)
Dept: URGENT CARE | Facility: URGENT CARE | Age: 57
End: 2022-01-16
Payer: COMMERCIAL

## 2022-01-16 DIAGNOSIS — Z11.59 ENCOUNTER FOR SCREENING FOR OTHER VIRAL DISEASES: ICD-10-CM

## 2022-01-16 PROCEDURE — U0003 INFECTIOUS AGENT DETECTION BY NUCLEIC ACID (DNA OR RNA); SEVERE ACUTE RESPIRATORY SYNDROME CORONAVIRUS 2 (SARS-COV-2) (CORONAVIRUS DISEASE [COVID-19]), AMPLIFIED PROBE TECHNIQUE, MAKING USE OF HIGH THROUGHPUT TECHNOLOGIES AS DESCRIBED BY CMS-2020-01-R: HCPCS

## 2022-01-16 PROCEDURE — U0005 INFEC AGEN DETEC AMPLI PROBE: HCPCS

## 2022-01-17 LAB — SARS-COV-2 RNA RESP QL NAA+PROBE: NEGATIVE

## 2022-01-19 ENCOUNTER — DOCUMENTATION ONLY (OUTPATIENT)
Dept: ORTHOPEDICS | Facility: CLINIC | Age: 57
End: 2022-01-19
Payer: COMMERCIAL

## 2022-01-19 ENCOUNTER — ANESTHESIA EVENT (OUTPATIENT)
Dept: SURGERY | Facility: CLINIC | Age: 57
End: 2022-01-19
Payer: COMMERCIAL

## 2022-01-19 NOTE — PROGRESS NOTES
FMLA forms complete and faxed to [facility] services [phone].  Sent to be scanned into chart    Kayleen Yoo

## 2022-01-19 NOTE — ANESTHESIA PREPROCEDURE EVALUATION
Anesthesia Pre-Procedure Evaluation    Patient: Mary Gorman   MRN: [id number] : 1965        Preoperative Diagnosis: Osteoarthrosis, localized, primary, knee, left [M17.12]    Procedure : Procedure(s):  ARTHROPLASTY, LEFT KNEE, TOTAL  REMOVAL, HARDWARE, LEFT LOWER EXTREMITY          Past Medical History:   Diagnosis Date     Atrial fibrillation (H)      Depression      Diabetes (H)      GERD (gastroesophageal reflux disease)      HTN (hypertension)      Hyperlipidemia      Obese      BALTA (obstructive sleep apnea)     uses CPAP     Permanent atrial fibrillation (H) 11      Past Surgical History:   Procedure Laterality Date     CARDIOVERSION  11     CHOLECYSTECTOMY       DILATION AND CURETTAGE  2012    Procedure:DILATION AND CURETTAGE; DILATION AND CURETTAGE; Surgeon:JEAN-PAUL DEL CID; Location:[facility]     DILATION AND CURETTAGE, HYSTEROSCOPY DIAGNOSTIC, COMBINED  2011    Procedure:COMBINED DILATION AND CURETTAGE, HYSTEROSCOPY DIAGNOSTIC; DILATION AND CURETTAGE, DIAGNOSTIC HYSTEROSCOPY ; Surgeon:JEAN-PAUL DEL CID; Location:[facility]     KNEE SURGERY        Allergies   Allergen Reactions     Dyazide [Hydrochlorothiazide W/Triamterene] Unknown     Nickel Rash     Robaxin [Methocarbamol] Rash     Sulfa Drugs Rash      Social History     Tobacco Use     Smoking status: Never Smoker     Smokeless tobacco: Never Used   Substance Use Topics     Alcohol use: Yes     Comment: rarely      Wt Readings from Last 1 Encounters:   01/10/22 150 kg (330 lb 9.6 oz)        Anesthesia Evaluation            ROS/MED HX  ENT/Pulmonary:     (+) sleep apnea (AHI 39 RDI 60 desat 89%), uses CPAP,     Neurologic:       Cardiovascular:     (+) Dyslipidemia hypertension-----Taking blood thinners dysrhythmias (rate control metoprolol diltizem), a-fib, Previous cardiac testing   Echo: Date: 2019 Results:  1. The left ventricle is normal in structure, function and size. The visual ejection fraction is estimated at 60%.  2. The right  ventricle is normal in structure, function and size.  3. No valve disease.  No changes from echo in 2014.  Stress Test: Date: Results:    ECG Reviewed: Date: 1/10/22 Results:  A fib rate 90  Cath: Date: Results:   (-) irregular heartbeat/palpitations and valvular problems/murmurs   METS/Exercise Tolerance: 3 - Able to walk 1-2 blocks without stopping    Hematologic:       Musculoskeletal: Comment: Low back pain      GI/Hepatic:     (+) GERD, Asymptomatic on medication,     Renal/Genitourinary:     (+) renal disease (stage 3), type: CRI,     Endo:     (+) type II DM, Last HgA1c: 7.0, date: 1/10/22, Not using insulin, - not using insulin pump. Obesity (bmi 55),  (-) thyroid disease and chronic steroid usage   Psychiatric/Substance Use:     (+) psychiatric history depression     Infectious Disease:  - neg infectious disease ROS     Malignancy:  - neg malignancy ROS     Other:            Physical Exam    Airway        Mallampati: III   TM distance: > 3 FB   Neck ROM: full   Mouth opening: > 3 cm    Respiratory Devices and Support         Dental  no notable dental history         Cardiovascular   cardiovascular exam normal       Rhythm and rate: regular and normal     Pulmonary   pulmonary exam normal        breath sounds clear to auscultation           OUTSIDE LABS:  CBC:   Lab Results   Component Value Date    WBC 8.6 05/10/2021    WBC 7.8 11/25/2019    HGB 15.2 05/10/2021    HGB 14.3 11/25/2019    HCT 46.6 05/10/2021    HCT 43.2 11/25/2019     05/10/2021     11/25/2019     BMP:   Lab Results   Component Value Date     01/10/2022     06/28/2021    POTASSIUM 4.7 01/10/2022    POTASSIUM 4.8 06/28/2021    CHLORIDE 108 01/10/2022    CHLORIDE 108 06/28/2021    CO2 23 01/10/2022    CO2 26 06/28/2021    BUN 15 01/10/2022    BUN 14 06/28/2021    CR 1.19 (H) 01/10/2022    CR 1.26 (H) 06/28/2021     (H) 01/10/2022     (H) 06/28/2021     COAGS:   Lab Results   Component Value Date    INR  1.19 (H) 08/03/2011     POC:   Lab Results   Component Value Date     (H) 12/08/2011    HCGS Negative 12/08/2011     HEPATIC:   Lab Results   Component Value Date    ALBUMIN 3.5 06/28/2021    PROTTOTAL 7.0 06/28/2021    ALT 31 06/28/2021    AST 22 06/28/2021    ALKPHOS 123 06/28/2021    BILITOTAL 0.4 06/28/2021     OTHER:   Lab Results   Component Value Date    PH 7.42 07/11/2011    A1C 7.0 (H) 01/10/2022    CHERYL 10.1 01/10/2022    MAG 1.8 08/03/2011    TSH 1.91 05/10/2021       Anesthesia Plan    ASA Status:  3   NPO Status:  NPO Appropriate    Anesthesia Type: General.     - Airway: ETT   Induction: Intravenous, Propofol.   Maintenance: Inhalation.        Consents    Anesthesia Plan(s) and associated risks, benefits, and realistic alternatives discussed. Questions answered and patient/representative(s) expressed understanding.    - Discussed:     - Discussed with:  Patient      - Extended Intubation/Ventilatory Support Discussed: No.      - Patient is DNR/DNI Status: No    Use of blood products discussed: Yes.     - Discussed with: Patient.     - Consented: consented to blood products            Reason for refusal: other.     Postoperative Care    Pain management: IV analgesics, Oral pain medications, Multi-modal analgesia.   PONV prophylaxis: Ondansetron (or other 5HT-3), Dexamethasone or Solumedrol     Comments:                Sari Holcomb MD

## 2022-01-20 ENCOUNTER — HOSPITAL ENCOUNTER (OUTPATIENT)
Facility: CLINIC | Age: 57
Discharge: HOME | End: 2022-01-21
Attending: STUDENT IN AN ORGANIZED HEALTH CARE EDUCATION/TRAINING PROGRAM | Admitting: STUDENT IN AN ORGANIZED HEALTH CARE EDUCATION/TRAINING PROGRAM
Payer: COMMERCIAL

## 2022-01-20 ENCOUNTER — APPOINTMENT (OUTPATIENT)
Dept: GENERAL RADIOLOGY | Facility: CLINIC | Age: 57
End: 2022-01-20
Attending: STUDENT IN AN ORGANIZED HEALTH CARE EDUCATION/TRAINING PROGRAM
Payer: COMMERCIAL

## 2022-01-20 ENCOUNTER — ANESTHESIA (OUTPATIENT)
Dept: SURGERY | Facility: CLINIC | Age: 57
End: 2022-01-20
Payer: COMMERCIAL

## 2022-01-20 DIAGNOSIS — M17.12 OSTEOARTHROSIS, LOCALIZED, PRIMARY, KNEE, LEFT: Primary | ICD-10-CM

## 2022-01-20 PROBLEM — Z96.652 S/P TOTAL KNEE ARTHROPLASTY, LEFT: Status: ACTIVE | Noted: 2022-01-20

## 2022-01-20 LAB
CREAT SERPL-MCNC: 1.11 MG/DL (ref 0.52–1.04)
GFR SERPL CREATININE-BSD FRML MDRD: 58 ML/MIN/1.73M2
GLUCOSE BLDC GLUCOMTR-MCNC: 128 MG/DL (ref 70–99)
GLUCOSE BLDC GLUCOMTR-MCNC: 160 MG/DL (ref 70–99)
HGB BLD-MCNC: 15.6 G/DL (ref 11.7–15.7)
HOLD SPECIMEN: NORMAL
POTASSIUM BLD-SCNC: 4.4 MMOL/L (ref 3.4–5.3)

## 2022-01-20 PROCEDURE — 84132 ASSAY OF SERUM POTASSIUM: CPT | Performed by: ANESTHESIOLOGY

## 2022-01-20 PROCEDURE — 999N000141 HC STATISTIC PRE-PROCEDURE NURSING ASSESSMENT: Performed by: STUDENT IN AN ORGANIZED HEALTH CARE EDUCATION/TRAINING PROGRAM

## 2022-01-20 PROCEDURE — 250N000011 HC RX IP 250 OP 636: Performed by: STUDENT IN AN ORGANIZED HEALTH CARE EDUCATION/TRAINING PROGRAM

## 2022-01-20 PROCEDURE — 99204 OFFICE O/P NEW MOD 45 MIN: CPT | Performed by: INTERNAL MEDICINE

## 2022-01-20 PROCEDURE — 250N000013 HC RX MED GY IP 250 OP 250 PS 637: Performed by: STUDENT IN AN ORGANIZED HEALTH CARE EDUCATION/TRAINING PROGRAM

## 2022-01-20 PROCEDURE — 36415 COLL VENOUS BLD VENIPUNCTURE: CPT | Performed by: ANESTHESIOLOGY

## 2022-01-20 PROCEDURE — C1713 ANCHOR/SCREW BN/BN,TIS/BN: HCPCS | Performed by: STUDENT IN AN ORGANIZED HEALTH CARE EDUCATION/TRAINING PROGRAM

## 2022-01-20 PROCEDURE — 250N000011 HC RX IP 250 OP 636: Performed by: ANESTHESIOLOGY

## 2022-01-20 PROCEDURE — 82962 GLUCOSE BLOOD TEST: CPT

## 2022-01-20 PROCEDURE — 250N000025 HC SEVOFLURANE, PER MIN: Performed by: STUDENT IN AN ORGANIZED HEALTH CARE EDUCATION/TRAINING PROGRAM

## 2022-01-20 PROCEDURE — 258N000001 HC RX 258: Performed by: STUDENT IN AN ORGANIZED HEALTH CARE EDUCATION/TRAINING PROGRAM

## 2022-01-20 PROCEDURE — 278N000051 HC OR IMPLANT GENERAL: Performed by: STUDENT IN AN ORGANIZED HEALTH CARE EDUCATION/TRAINING PROGRAM

## 2022-01-20 PROCEDURE — 250N000013 HC RX MED GY IP 250 OP 250 PS 637: Performed by: ANESTHESIOLOGY

## 2022-01-20 PROCEDURE — 999N000065 XR KNEE PORT LEFT 1/2 VIEWS: Mod: LT

## 2022-01-20 PROCEDURE — 27447 TOTAL KNEE ARTHROPLASTY: CPT | Mod: LT | Performed by: STUDENT IN AN ORGANIZED HEALTH CARE EDUCATION/TRAINING PROGRAM

## 2022-01-20 PROCEDURE — 250N000009 HC RX 250: Performed by: STUDENT IN AN ORGANIZED HEALTH CARE EDUCATION/TRAINING PROGRAM

## 2022-01-20 PROCEDURE — 258N000003 HC RX IP 258 OP 636: Performed by: NURSE ANESTHETIST, CERTIFIED REGISTERED

## 2022-01-20 PROCEDURE — 250N000011 HC RX IP 250 OP 636: Performed by: NURSE ANESTHETIST, CERTIFIED REGISTERED

## 2022-01-20 PROCEDURE — 272N000001 HC OR GENERAL SUPPLY STERILE: Performed by: STUDENT IN AN ORGANIZED HEALTH CARE EDUCATION/TRAINING PROGRAM

## 2022-01-20 PROCEDURE — 82565 ASSAY OF CREATININE: CPT | Performed by: STUDENT IN AN ORGANIZED HEALTH CARE EDUCATION/TRAINING PROGRAM

## 2022-01-20 PROCEDURE — 360N000084 HC SURGERY LEVEL 4 W/ FLUORO, PER MIN: Performed by: STUDENT IN AN ORGANIZED HEALTH CARE EDUCATION/TRAINING PROGRAM

## 2022-01-20 PROCEDURE — 36415 COLL VENOUS BLD VENIPUNCTURE: CPT | Performed by: STUDENT IN AN ORGANIZED HEALTH CARE EDUCATION/TRAINING PROGRAM

## 2022-01-20 PROCEDURE — 250N000009 HC RX 250: Performed by: NURSE ANESTHETIST, CERTIFIED REGISTERED

## 2022-01-20 PROCEDURE — 85018 HEMOGLOBIN: CPT | Performed by: ANESTHESIOLOGY

## 2022-01-20 PROCEDURE — 250N000013 HC RX MED GY IP 250 OP 250 PS 637: Performed by: INTERNAL MEDICINE

## 2022-01-20 PROCEDURE — C1776 JOINT DEVICE (IMPLANTABLE): HCPCS | Performed by: STUDENT IN AN ORGANIZED HEALTH CARE EDUCATION/TRAINING PROGRAM

## 2022-01-20 PROCEDURE — 710N000010 HC RECOVERY PHASE 1, LEVEL 2, PER MIN: Performed by: STUDENT IN AN ORGANIZED HEALTH CARE EDUCATION/TRAINING PROGRAM

## 2022-01-20 PROCEDURE — 370N000017 HC ANESTHESIA TECHNICAL FEE, PER MIN: Performed by: STUDENT IN AN ORGANIZED HEALTH CARE EDUCATION/TRAINING PROGRAM

## 2022-01-20 PROCEDURE — 258N000003 HC RX IP 258 OP 636: Performed by: STUDENT IN AN ORGANIZED HEALTH CARE EDUCATION/TRAINING PROGRAM

## 2022-01-20 DEVICE — LEGION POSTERIOR STABILIZED                                    OXINIUM FEMORAL SIZE 6 LEFT
Type: IMPLANTABLE DEVICE | Site: KNEE | Status: FUNCTIONAL
Brand: LEGION

## 2022-01-20 DEVICE — GENESIS II NON-POROUS TIBIAL                                    BASEPLATE SIZE 3 LEFT
Type: IMPLANTABLE DEVICE | Site: KNEE | Status: FUNCTIONAL
Brand: GENESIS II

## 2022-01-20 DEVICE — IMPLANTABLE DEVICE: Type: IMPLANTABLE DEVICE | Site: KNEE | Status: FUNCTIONAL

## 2022-01-20 DEVICE — GEN II 7.5MM RESUR PAT 35MM
Type: IMPLANTABLE DEVICE | Site: KNEE | Status: FUNCTIONAL
Brand: GENESIS II

## 2022-01-20 DEVICE — BONE CEMENT SIMPLEX FULL DOSE 6191-1-001: Type: IMPLANTABLE DEVICE | Site: KNEE | Status: FUNCTIONAL

## 2022-01-20 RX ORDER — ACETAMINOPHEN 325 MG/1
975 TABLET ORAL EVERY 8 HOURS
Status: DISCONTINUED | OUTPATIENT
Start: 2022-01-20 | End: 2022-01-21 | Stop reason: HOSPADM

## 2022-01-20 RX ORDER — MAGNESIUM HYDROXIDE 1200 MG/15ML
LIQUID ORAL PRN
Status: DISCONTINUED | OUTPATIENT
Start: 2022-01-20 | End: 2022-01-20 | Stop reason: HOSPADM

## 2022-01-20 RX ORDER — ONDANSETRON 4 MG/1
4 TABLET, ORALLY DISINTEGRATING ORAL EVERY 30 MIN PRN
Status: DISCONTINUED | OUTPATIENT
Start: 2022-01-20 | End: 2022-01-20 | Stop reason: HOSPADM

## 2022-01-20 RX ORDER — SERTRALINE HYDROCHLORIDE 100 MG/1
200 TABLET, FILM COATED ORAL DAILY
Status: DISCONTINUED | OUTPATIENT
Start: 2022-01-21 | End: 2022-01-21 | Stop reason: HOSPADM

## 2022-01-20 RX ORDER — TRANEXAMIC ACID 650 MG/1
1950 TABLET ORAL ONCE
Status: COMPLETED | OUTPATIENT
Start: 2022-01-20 | End: 2022-01-20

## 2022-01-20 RX ORDER — CEFAZOLIN SODIUM IN 0.9 % NACL 3 G/100 ML
3 INTRAVENOUS SOLUTION, PIGGYBACK (ML) INTRAVENOUS
Status: COMPLETED | OUTPATIENT
Start: 2022-01-20 | End: 2022-01-20

## 2022-01-20 RX ORDER — NALOXONE HYDROCHLORIDE 0.4 MG/ML
0.4 INJECTION, SOLUTION INTRAMUSCULAR; INTRAVENOUS; SUBCUTANEOUS
Status: DISCONTINUED | OUTPATIENT
Start: 2022-01-20 | End: 2022-01-21 | Stop reason: HOSPADM

## 2022-01-20 RX ORDER — LIDOCAINE 40 MG/G
CREAM TOPICAL
Status: DISCONTINUED | OUTPATIENT
Start: 2022-01-20 | End: 2022-01-21 | Stop reason: HOSPADM

## 2022-01-20 RX ORDER — SODIUM CHLORIDE, SODIUM LACTATE, POTASSIUM CHLORIDE, CALCIUM CHLORIDE 600; 310; 30; 20 MG/100ML; MG/100ML; MG/100ML; MG/100ML
INJECTION, SOLUTION INTRAVENOUS CONTINUOUS
Status: DISCONTINUED | OUTPATIENT
Start: 2022-01-20 | End: 2022-01-20 | Stop reason: HOSPADM

## 2022-01-20 RX ORDER — DEXTROSE MONOHYDRATE 25 G/50ML
25-50 INJECTION, SOLUTION INTRAVENOUS
Status: DISCONTINUED | OUTPATIENT
Start: 2022-01-20 | End: 2022-01-21 | Stop reason: HOSPADM

## 2022-01-20 RX ORDER — HYDROMORPHONE HCL IN WATER/PF 6 MG/30 ML
0.2 PATIENT CONTROLLED ANALGESIA SYRINGE INTRAVENOUS
Status: DISCONTINUED | OUTPATIENT
Start: 2022-01-20 | End: 2022-01-21 | Stop reason: HOSPADM

## 2022-01-20 RX ORDER — METOPROLOL SUCCINATE 25 MG/1
50 TABLET, EXTENDED RELEASE ORAL 2 TIMES DAILY
Status: DISCONTINUED | OUTPATIENT
Start: 2022-01-20 | End: 2022-01-21 | Stop reason: HOSPADM

## 2022-01-20 RX ORDER — BISACODYL 10 MG
10 SUPPOSITORY, RECTAL RECTAL DAILY PRN
Status: DISCONTINUED | OUTPATIENT
Start: 2022-01-20 | End: 2022-01-21 | Stop reason: HOSPADM

## 2022-01-20 RX ORDER — DEXAMETHASONE SODIUM PHOSPHATE 4 MG/ML
4 INJECTION, SOLUTION INTRA-ARTICULAR; INTRALESIONAL; INTRAMUSCULAR; INTRAVENOUS; SOFT TISSUE
Status: DISCONTINUED | OUTPATIENT
Start: 2022-01-20 | End: 2022-01-20 | Stop reason: HOSPADM

## 2022-01-20 RX ORDER — DIMENHYDRINATE 50 MG/ML
25 INJECTION, SOLUTION INTRAMUSCULAR; INTRAVENOUS
Status: DISCONTINUED | OUTPATIENT
Start: 2022-01-20 | End: 2022-01-20 | Stop reason: HOSPADM

## 2022-01-20 RX ORDER — FENTANYL CITRATE 50 UG/ML
INJECTION, SOLUTION INTRAMUSCULAR; INTRAVENOUS PRN
Status: DISCONTINUED | OUTPATIENT
Start: 2022-01-20 | End: 2022-01-20

## 2022-01-20 RX ORDER — NALOXONE HYDROCHLORIDE 0.4 MG/ML
0.2 INJECTION, SOLUTION INTRAMUSCULAR; INTRAVENOUS; SUBCUTANEOUS
Status: DISCONTINUED | OUTPATIENT
Start: 2022-01-20 | End: 2022-01-21 | Stop reason: HOSPADM

## 2022-01-20 RX ORDER — BUPROPION HYDROCHLORIDE 150 MG/1
150 TABLET ORAL EVERY MORNING
Status: DISCONTINUED | OUTPATIENT
Start: 2022-01-21 | End: 2022-01-21 | Stop reason: HOSPADM

## 2022-01-20 RX ORDER — POLYETHYLENE GLYCOL 3350 17 G/17G
17 POWDER, FOR SOLUTION ORAL DAILY
Status: DISCONTINUED | OUTPATIENT
Start: 2022-01-21 | End: 2022-01-21 | Stop reason: HOSPADM

## 2022-01-20 RX ORDER — IBUPROFEN 600 MG/1
600 TABLET, FILM COATED ORAL EVERY 6 HOURS PRN
Qty: 30 TABLET | Refills: 0 | Status: SHIPPED | OUTPATIENT
Start: 2022-01-20 | End: 2022-07-19

## 2022-01-20 RX ORDER — METOPROLOL TARTRATE 1 MG/ML
INJECTION, SOLUTION INTRAVENOUS PRN
Status: DISCONTINUED | OUTPATIENT
Start: 2022-01-20 | End: 2022-01-20

## 2022-01-20 RX ORDER — DABIGATRAN ETEXILATE 150 MG/1
150 CAPSULE ORAL 2 TIMES DAILY
Status: DISCONTINUED | OUTPATIENT
Start: 2022-01-20 | End: 2022-01-21 | Stop reason: HOSPADM

## 2022-01-20 RX ORDER — DIPHENHYDRAMINE HCL 25 MG
25 CAPSULE ORAL EVERY 6 HOURS PRN
Status: DISCONTINUED | OUTPATIENT
Start: 2022-01-20 | End: 2022-01-20 | Stop reason: HOSPADM

## 2022-01-20 RX ORDER — LAMOTRIGINE 200 MG/1
200 TABLET ORAL DAILY
Status: DISCONTINUED | OUTPATIENT
Start: 2022-01-20 | End: 2022-01-21 | Stop reason: HOSPADM

## 2022-01-20 RX ORDER — CEFAZOLIN SODIUM 2 G/100ML
2 INJECTION, SOLUTION INTRAVENOUS EVERY 8 HOURS
Status: COMPLETED | OUTPATIENT
Start: 2022-01-20 | End: 2022-01-21

## 2022-01-20 RX ORDER — AMOXICILLIN 250 MG
1-2 CAPSULE ORAL 2 TIMES DAILY
Qty: 30 TABLET | Refills: 0 | Status: SHIPPED | OUTPATIENT
Start: 2022-01-20 | End: 2022-06-28

## 2022-01-20 RX ORDER — OXYCODONE HYDROCHLORIDE 5 MG/1
5 TABLET ORAL EVERY 4 HOURS PRN
Status: DISCONTINUED | OUTPATIENT
Start: 2022-01-20 | End: 2022-01-21 | Stop reason: HOSPADM

## 2022-01-20 RX ORDER — DIPHENHYDRAMINE HYDROCHLORIDE 50 MG/ML
25 INJECTION INTRAMUSCULAR; INTRAVENOUS EVERY 6 HOURS PRN
Status: DISCONTINUED | OUTPATIENT
Start: 2022-01-20 | End: 2022-01-20 | Stop reason: HOSPADM

## 2022-01-20 RX ORDER — CETIRIZINE HYDROCHLORIDE 10 MG/1
10 TABLET ORAL DAILY
Status: DISCONTINUED | OUTPATIENT
Start: 2022-01-20 | End: 2022-01-21 | Stop reason: HOSPADM

## 2022-01-20 RX ORDER — GABAPENTIN 300 MG/1
600 CAPSULE ORAL AT BEDTIME
Status: DISCONTINUED | OUTPATIENT
Start: 2022-01-20 | End: 2022-01-21 | Stop reason: HOSPADM

## 2022-01-20 RX ORDER — ACETAMINOPHEN 325 MG/1
650 TABLET ORAL EVERY 4 HOURS PRN
Status: DISCONTINUED | OUTPATIENT
Start: 2022-01-23 | End: 2022-01-21 | Stop reason: HOSPADM

## 2022-01-20 RX ORDER — OXYCODONE HYDROCHLORIDE 5 MG/1
5-10 TABLET ORAL
Qty: 30 TABLET | Refills: 0 | Status: SHIPPED | OUTPATIENT
Start: 2022-01-20 | End: 2022-06-28

## 2022-01-20 RX ORDER — LIDOCAINE 40 MG/G
CREAM TOPICAL
Status: DISCONTINUED | OUTPATIENT
Start: 2022-01-20 | End: 2022-01-20 | Stop reason: HOSPADM

## 2022-01-20 RX ORDER — DILTIAZEM HYDROCHLORIDE 120 MG/1
240 CAPSULE, COATED, EXTENDED RELEASE ORAL ONCE
Status: COMPLETED | OUTPATIENT
Start: 2022-01-20 | End: 2022-01-20

## 2022-01-20 RX ORDER — DILTIAZEM HYDROCHLORIDE 240 MG/1
240 CAPSULE, EXTENDED RELEASE ORAL DAILY
Status: DISCONTINUED | OUTPATIENT
Start: 2022-01-21 | End: 2022-01-21 | Stop reason: HOSPADM

## 2022-01-20 RX ORDER — ONDANSETRON 2 MG/ML
4 INJECTION INTRAMUSCULAR; INTRAVENOUS EVERY 30 MIN PRN
Status: DISCONTINUED | OUTPATIENT
Start: 2022-01-20 | End: 2022-01-20 | Stop reason: HOSPADM

## 2022-01-20 RX ORDER — NICOTINE POLACRILEX 4 MG
15-30 LOZENGE BUCCAL
Status: DISCONTINUED | OUTPATIENT
Start: 2022-01-20 | End: 2022-01-21 | Stop reason: HOSPADM

## 2022-01-20 RX ORDER — LABETALOL HYDROCHLORIDE 5 MG/ML
10 INJECTION, SOLUTION INTRAVENOUS
Status: DISCONTINUED | OUTPATIENT
Start: 2022-01-20 | End: 2022-01-20 | Stop reason: HOSPADM

## 2022-01-20 RX ORDER — HYDROMORPHONE HCL IN WATER/PF 6 MG/30 ML
0.4 PATIENT CONTROLLED ANALGESIA SYRINGE INTRAVENOUS
Status: DISCONTINUED | OUTPATIENT
Start: 2022-01-20 | End: 2022-01-21 | Stop reason: HOSPADM

## 2022-01-20 RX ORDER — PROCHLORPERAZINE MALEATE 10 MG
10 TABLET ORAL EVERY 6 HOURS PRN
Status: DISCONTINUED | OUTPATIENT
Start: 2022-01-20 | End: 2022-01-21 | Stop reason: HOSPADM

## 2022-01-20 RX ORDER — ACETAMINOPHEN 325 MG/1
650 TABLET ORAL EVERY 4 HOURS PRN
Qty: 100 TABLET | Refills: 0 | Status: SHIPPED | OUTPATIENT
Start: 2022-01-20 | End: 2022-06-28

## 2022-01-20 RX ORDER — LIDOCAINE HYDROCHLORIDE 20 MG/ML
INJECTION, SOLUTION INFILTRATION; PERINEURAL PRN
Status: DISCONTINUED | OUTPATIENT
Start: 2022-01-20 | End: 2022-01-20

## 2022-01-20 RX ORDER — METOPROLOL SUCCINATE 50 MG/1
50 TABLET, EXTENDED RELEASE ORAL ONCE
Status: COMPLETED | OUTPATIENT
Start: 2022-01-20 | End: 2022-01-20

## 2022-01-20 RX ORDER — BUPROPION HYDROCHLORIDE 300 MG/1
300 TABLET ORAL EVERY MORNING
Status: DISCONTINUED | OUTPATIENT
Start: 2022-01-21 | End: 2022-01-21 | Stop reason: HOSPADM

## 2022-01-20 RX ORDER — ONDANSETRON 2 MG/ML
4 INJECTION INTRAMUSCULAR; INTRAVENOUS EVERY 6 HOURS PRN
Status: DISCONTINUED | OUTPATIENT
Start: 2022-01-20 | End: 2022-01-21 | Stop reason: HOSPADM

## 2022-01-20 RX ORDER — SODIUM CHLORIDE, SODIUM LACTATE, POTASSIUM CHLORIDE, CALCIUM CHLORIDE 600; 310; 30; 20 MG/100ML; MG/100ML; MG/100ML; MG/100ML
INJECTION, SOLUTION INTRAVENOUS CONTINUOUS PRN
Status: DISCONTINUED | OUTPATIENT
Start: 2022-01-20 | End: 2022-01-20

## 2022-01-20 RX ORDER — CEFAZOLIN SODIUM IN 0.9 % NACL 3 G/100 ML
3 INTRAVENOUS SOLUTION, PIGGYBACK (ML) INTRAVENOUS SEE ADMIN INSTRUCTIONS
Status: DISCONTINUED | OUTPATIENT
Start: 2022-01-20 | End: 2022-01-20 | Stop reason: HOSPADM

## 2022-01-20 RX ORDER — POLYETHYLENE GLYCOL 3350 17 G/17G
1 POWDER, FOR SOLUTION ORAL DAILY
Qty: 7 PACKET | Refills: 0 | Status: SHIPPED | OUTPATIENT
Start: 2022-01-20 | End: 2022-06-28

## 2022-01-20 RX ORDER — ONDANSETRON 4 MG/1
4 TABLET, ORALLY DISINTEGRATING ORAL EVERY 6 HOURS PRN
Status: DISCONTINUED | OUTPATIENT
Start: 2022-01-20 | End: 2022-01-21 | Stop reason: HOSPADM

## 2022-01-20 RX ORDER — KETOROLAC TROMETHAMINE 15 MG/ML
15 INJECTION, SOLUTION INTRAMUSCULAR; INTRAVENOUS EVERY 6 HOURS
Status: COMPLETED | OUTPATIENT
Start: 2022-01-20 | End: 2022-01-21

## 2022-01-20 RX ORDER — ACETAMINOPHEN 325 MG/1
975 TABLET ORAL ONCE
Status: DISCONTINUED | OUTPATIENT
Start: 2022-01-20 | End: 2022-01-20 | Stop reason: HOSPADM

## 2022-01-20 RX ORDER — SODIUM CHLORIDE, SODIUM LACTATE, POTASSIUM CHLORIDE, CALCIUM CHLORIDE 600; 310; 30; 20 MG/100ML; MG/100ML; MG/100ML; MG/100ML
INJECTION, SOLUTION INTRAVENOUS CONTINUOUS
Status: DISCONTINUED | OUTPATIENT
Start: 2022-01-20 | End: 2022-01-21 | Stop reason: HOSPADM

## 2022-01-20 RX ORDER — ONDANSETRON 2 MG/ML
INJECTION INTRAMUSCULAR; INTRAVENOUS PRN
Status: DISCONTINUED | OUTPATIENT
Start: 2022-01-20 | End: 2022-01-20

## 2022-01-20 RX ORDER — PROPOFOL 10 MG/ML
INJECTION, EMULSION INTRAVENOUS PRN
Status: DISCONTINUED | OUTPATIENT
Start: 2022-01-20 | End: 2022-01-20

## 2022-01-20 RX ORDER — OXYCODONE HYDROCHLORIDE 10 MG/1
10 TABLET ORAL EVERY 4 HOURS PRN
Status: DISCONTINUED | OUTPATIENT
Start: 2022-01-20 | End: 2022-01-21 | Stop reason: HOSPADM

## 2022-01-20 RX ORDER — SERTRALINE HYDROCHLORIDE 100 MG/1
100 TABLET, FILM COATED ORAL DAILY
Status: DISCONTINUED | OUTPATIENT
Start: 2022-01-20 | End: 2022-01-20

## 2022-01-20 RX ORDER — AMOXICILLIN 250 MG
1 CAPSULE ORAL 2 TIMES DAILY
Status: DISCONTINUED | OUTPATIENT
Start: 2022-01-20 | End: 2022-01-21 | Stop reason: HOSPADM

## 2022-01-20 RX ORDER — FENTANYL CITRATE 50 UG/ML
25 INJECTION, SOLUTION INTRAMUSCULAR; INTRAVENOUS EVERY 5 MIN PRN
Status: DISCONTINUED | OUTPATIENT
Start: 2022-01-20 | End: 2022-01-20 | Stop reason: HOSPADM

## 2022-01-20 RX ORDER — HYDROMORPHONE HYDROCHLORIDE 1 MG/ML
0.2 INJECTION, SOLUTION INTRAMUSCULAR; INTRAVENOUS; SUBCUTANEOUS EVERY 5 MIN PRN
Status: DISCONTINUED | OUTPATIENT
Start: 2022-01-20 | End: 2022-01-20 | Stop reason: HOSPADM

## 2022-01-20 RX ADMIN — HYDROMORPHONE HYDROCHLORIDE 0.25 MG: 1 INJECTION, SOLUTION INTRAMUSCULAR; INTRAVENOUS; SUBCUTANEOUS at 15:52

## 2022-01-20 RX ADMIN — TRANEXAMIC ACID 1950 MG: 650 TABLET ORAL at 12:27

## 2022-01-20 RX ADMIN — ROCURONIUM BROMIDE 70 MG: 50 INJECTION, SOLUTION INTRAVENOUS at 13:44

## 2022-01-20 RX ADMIN — METOPROLOL SUCCINATE 50 MG: 50 TABLET, EXTENDED RELEASE ORAL at 12:27

## 2022-01-20 RX ADMIN — CETIRIZINE HYDROCHLORIDE 10 MG: 10 TABLET, FILM COATED ORAL at 20:16

## 2022-01-20 RX ADMIN — HYDROMORPHONE HYDROCHLORIDE 0.25 MG: 1 INJECTION, SOLUTION INTRAMUSCULAR; INTRAVENOUS; SUBCUTANEOUS at 16:24

## 2022-01-20 RX ADMIN — MIDAZOLAM 2 MG: 1 INJECTION INTRAMUSCULAR; INTRAVENOUS at 13:38

## 2022-01-20 RX ADMIN — FENTANYL CITRATE 50 MCG: 50 INJECTION, SOLUTION INTRAMUSCULAR; INTRAVENOUS at 15:33

## 2022-01-20 RX ADMIN — PHENYLEPHRINE HYDROCHLORIDE 0.5 MCG/KG/MIN: 10 INJECTION INTRAVENOUS at 13:52

## 2022-01-20 RX ADMIN — SODIUM CHLORIDE, POTASSIUM CHLORIDE, SODIUM LACTATE AND CALCIUM CHLORIDE: 600; 310; 30; 20 INJECTION, SOLUTION INTRAVENOUS at 20:13

## 2022-01-20 RX ADMIN — METOPROLOL SUCCINATE 50 MG: 25 TABLET, EXTENDED RELEASE ORAL at 20:16

## 2022-01-20 RX ADMIN — LIDOCAINE HYDROCHLORIDE 100 MG: 20 INJECTION, SOLUTION INFILTRATION; PERINEURAL at 13:44

## 2022-01-20 RX ADMIN — HYDROMORPHONE HYDROCHLORIDE 0.2 MG: 1 INJECTION, SOLUTION INTRAMUSCULAR; INTRAVENOUS; SUBCUTANEOUS at 17:45

## 2022-01-20 RX ADMIN — FENTANYL CITRATE 25 MCG: 50 INJECTION INTRAMUSCULAR; INTRAVENOUS at 17:24

## 2022-01-20 RX ADMIN — PHENYLEPHRINE HYDROCHLORIDE 200 MCG: 10 INJECTION INTRAVENOUS at 13:46

## 2022-01-20 RX ADMIN — CEFAZOLIN SODIUM 2 G: 2 INJECTION, SOLUTION INTRAVENOUS at 21:57

## 2022-01-20 RX ADMIN — METOPROLOL TARTRATE 1 MG: 5 INJECTION INTRAVENOUS at 16:35

## 2022-01-20 RX ADMIN — FENTANYL CITRATE 100 MCG: 50 INJECTION, SOLUTION INTRAMUSCULAR; INTRAVENOUS at 13:44

## 2022-01-20 RX ADMIN — DILTIAZEM HYDROCHLORIDE 240 MG: 120 CAPSULE, COATED, EXTENDED RELEASE ORAL at 12:27

## 2022-01-20 RX ADMIN — PROPOFOL 50 MG: 10 INJECTION, EMULSION INTRAVENOUS at 14:09

## 2022-01-20 RX ADMIN — SUGAMMADEX 300 MG: 100 INJECTION, SOLUTION INTRAVENOUS at 16:44

## 2022-01-20 RX ADMIN — ACETAMINOPHEN 975 MG: 325 TABLET, FILM COATED ORAL at 20:15

## 2022-01-20 RX ADMIN — METOPROLOL TARTRATE 1 MG: 5 INJECTION INTRAVENOUS at 16:03

## 2022-01-20 RX ADMIN — SODIUM CHLORIDE, POTASSIUM CHLORIDE, SODIUM LACTATE AND CALCIUM CHLORIDE: 600; 310; 30; 20 INJECTION, SOLUTION INTRAVENOUS at 15:44

## 2022-01-20 RX ADMIN — HYDROMORPHONE HYDROCHLORIDE 0.5 MG: 1 INJECTION, SOLUTION INTRAMUSCULAR; INTRAVENOUS; SUBCUTANEOUS at 15:25

## 2022-01-20 RX ADMIN — SODIUM CHLORIDE, POTASSIUM CHLORIDE, SODIUM LACTATE AND CALCIUM CHLORIDE: 600; 310; 30; 20 INJECTION, SOLUTION INTRAVENOUS at 13:38

## 2022-01-20 RX ADMIN — PHENYLEPHRINE HYDROCHLORIDE 200 MCG: 10 INJECTION INTRAVENOUS at 13:48

## 2022-01-20 RX ADMIN — ONDANSETRON 4 MG: 2 INJECTION INTRAMUSCULAR; INTRAVENOUS at 16:23

## 2022-01-20 RX ADMIN — HYDROMORPHONE HYDROCHLORIDE 0.2 MG: 1 INJECTION, SOLUTION INTRAMUSCULAR; INTRAVENOUS; SUBCUTANEOUS at 17:56

## 2022-01-20 RX ADMIN — FENTANYL CITRATE 25 MCG: 50 INJECTION INTRAMUSCULAR; INTRAVENOUS at 17:41

## 2022-01-20 RX ADMIN — FENTANYL CITRATE 25 MCG: 50 INJECTION INTRAMUSCULAR; INTRAVENOUS at 17:33

## 2022-01-20 RX ADMIN — METOPROLOL TARTRATE 1 MG: 5 INJECTION INTRAVENOUS at 15:37

## 2022-01-20 RX ADMIN — FENTANYL CITRATE 50 MCG: 50 INJECTION, SOLUTION INTRAMUSCULAR; INTRAVENOUS at 14:12

## 2022-01-20 RX ADMIN — FENTANYL CITRATE 50 MCG: 50 INJECTION, SOLUTION INTRAMUSCULAR; INTRAVENOUS at 16:57

## 2022-01-20 RX ADMIN — HYDROMORPHONE HYDROCHLORIDE 0.2 MG: 1 INJECTION, SOLUTION INTRAMUSCULAR; INTRAVENOUS; SUBCUTANEOUS at 18:06

## 2022-01-20 RX ADMIN — FENTANYL CITRATE 50 MCG: 50 INJECTION, SOLUTION INTRAMUSCULAR; INTRAVENOUS at 14:28

## 2022-01-20 RX ADMIN — OXYCODONE HYDROCHLORIDE 5 MG: 5 TABLET ORAL at 21:55

## 2022-01-20 RX ADMIN — DABIGATRAN ETEXILATE MESYLATE 150 MG: 150 CAPSULE ORAL at 21:55

## 2022-01-20 RX ADMIN — KETOROLAC TROMETHAMINE 15 MG: 15 INJECTION, SOLUTION INTRAMUSCULAR; INTRAVENOUS at 17:18

## 2022-01-20 RX ADMIN — Medication 3 G: at 13:53

## 2022-01-20 RX ADMIN — PROPOFOL 250 MG: 10 INJECTION, EMULSION INTRAVENOUS at 13:44

## 2022-01-20 RX ADMIN — FENTANYL CITRATE 25 MCG: 50 INJECTION INTRAMUSCULAR; INTRAVENOUS at 17:14

## 2022-01-20 RX ADMIN — PROPOFOL 50 MG: 10 INJECTION, EMULSION INTRAVENOUS at 14:01

## 2022-01-20 ASSESSMENT — ENCOUNTER SYMPTOMS: DYSRHYTHMIAS: 1

## 2022-01-20 ASSESSMENT — MIFFLIN-ST. JEOR: SCORE: 2090.88

## 2022-01-20 NOTE — ANESTHESIA CARE TRANSFER NOTE
Patient: Mary Gorman    Procedure: Procedure(s):  ARTHROPLASTY, LEFT KNEE, TOTAL  REMOVAL, HARDWARE, LEFT LOWER EXTREMITY       Diagnosis: Osteoarthrosis, localized, primary, knee, left [M17.12]  Diagnosis Additional Information: No value filed.    Anesthesia Type:   General     Note:    Oropharynx: oropharynx clear of all foreign objects and spontaneously breathing  Level of Consciousness: drowsy  Oxygen Supplementation: face mask  Level of Supplemental Oxygen (L/min / FiO2): 8  Independent Airway: airway patency satisfactory and stable  Dentition: dentition unchanged  Vital Signs Stable: post-procedure vital signs reviewed and stable  Report to RN Given: handoff report given  Patient transferred to: PACU    Handoff Report: Identifed the Patient, Identified the Reponsible Provider, Reviewed the pertinent medical history, Discussed the surgical course, Reviewed Intra-OP anesthesia mangement and issues during anesthesia, Set expectations for post-procedure period and Allowed opportunity for questions and acknowledgement of understanding      Vitals:  Vitals Value Taken Time   /105 01/20/22 1703   Temp     Pulse 99 01/20/22 1711   Resp 19 01/20/22 1711   SpO2 96 % 01/20/22 1711   Vitals shown include unvalidated device data.    Electronically Signed By: YAMIL Resendiz CRNA  January 20, 2022  5:12 PM

## 2022-01-20 NOTE — ANESTHESIA PROCEDURE NOTES
Airway       Patient location during procedure: OR       Procedure Start/Stop Times: 1/20/2022 1:47 PM  Staff -        CRNA: Fred Morse APRN CRNA       Performed By: CRNA  Consent for Airway        Urgency: elective  Indications and Patient Condition       Indications for airway management: rafa-procedural       Induction type:intravenous       Mask difficulty assessment: 2 - vent by mask + OA or adjuvant +/- NMBA    Final Airway Details       Final airway type: endotracheal airway       Successful airway: ETT - single  Endotracheal Airway Details        ETT size (mm): 7.0       Cuffed: yes       Successful intubation technique: video laryngoscopy       VL Blade Size: MAC 4       Grade View of Cords: 1       Adjucts: stylet       Position: Right       Measured from: lips       Secured at (cm): 22       Bite block used: None    Post intubation assessment        Placement verified by: capnometry, equal breath sounds and chest rise        Number of attempts at approach: 1       Number of other approaches attempted: 0       Secured with: silk tape       Ease of procedure: easy       Dentition: Intact and Unchanged

## 2022-01-20 NOTE — OP NOTE
[facility]    Operative Note    Pre-operative diagnosis: 56-year-old female with chronic left knee pain and instability due to end-stage osteoarthritic changes in all 3 compartments.  Insufficiently responding to nonoperative treatment measures.  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  Post-operative diagnosis Same as pre-operative diagnosis    Procedure: Procedure(s):  1. ARTHROPLASTY, LEFT KNEE, TOTAL  2. REMOVAL, HARDWARE, LEFT LOWER EXTREMITY  Surgeon: Surgeon(s) and Role:     * Armand Martin MD - Primary     * Ta Iyer PA-C - Assisting     * Skyler Titus MD - Fellow - Assisting  Anesthesia: Choice   Estimated Blood Loss: Less than 100 ml    Drains: None  Specimens: * No specimens in log *  Findings:   Severe end-stage ascitic changes in all 3 compartments.  Uncomplicated placement total knee arthroplasty.  Uncomplicated removal of hardware.  Complications: None.  Implants:   Implant Name Type Inv. Item Serial No.  Lot No. LRB No. Used Action   BONE CEMENT SIMPLEX FULL DOSE 6191-1-001 - [id number] Cement, Bone BONE CEMENT SIMPLEX FULL DOSE 6191-1-001  [employer] [id number] Left 2 Implanted   ONE SCREW REMOVED FROM LEFT KNEE      Left 1 Explanted   PATELLA GII RESURF 7.5X35MM - [id number] Total Joint Component/Insert PATELLA GII RESURF 7.5X35MM  [employer] [id number] Left 1 Implanted   IMP BASEPLATE TIBIAL GABRIEL II SZ 3 LT TI [id number] - [id number] Total Joint Component/Insert IMP BASEPLATE TIBIAL GABRIEL II SZ 3 LT TI [id number]  [employer] [id number] Left 1 Implanted   REVISION PRESS FIT NON-SLOTEED STEM 10 MM X 100 MM    [employer] [id number] Left 1 Implanted   SIZE 6 LEFT POSTERIOR STABILIZED OXINIUM FEMORAL COMPONENT    [employer] [id number] Left 1 Implanted   IMP INSERT TIBIAL S&N LEGION XLPE PS SZ3-4 13MM [id number] - [id number] Total Joint Component/Insert IMP INSERT TIBIAL S&N  LEGION XLPE PS SZ3-4 13MM [id number]  GRIFFIN  [id number] Left 1 Implanted       Components used:  1. [employer] size 6 Oxinium femoral PS component  2. [employer] Legion size 3 Tibial Component with 10 x 100 mm stem extender  3. [employer] Nephew size 35 mm x 7.5 mm Patellar Button    4. [employer] Legion PS polyethylene Liner, Size  13 mm     Description of procedure:      The presence of  BEATRIZ Mia was necessary throughout the entirety of the procedure to help with positioning the patient, retract soft tissues and help close the incision.     Patient was seen in the preoperative area where procedure, risks and complications, expectations and rehabilitation were discussed once more.  All questions were answered.  Patient understands and agrees to the treatment plan as set forth.  Informed consent was obtained and the left lower extremity was marked.  Patient was then taken to the operating room where general anesthesia was induced.     Patient was positioned supine with a bump under the ipsilateral buttock. Bony prominences were well padded and the patient was secured to the table in the standard fashion.  An SCD was placed on the nonoperative leg.  Preoperative range of motion showed a flexion/extension of 85-5-0.      A tourniquet was placed on the operative thigh and the patient was prepped and draped in the normal sterile fashion.        After the completion of a timeout procedure a standard midline incision was made. Dissection was carried down to the knee retinaculum and the quadriceps tendon. A standard medial parapatellar arthrotomy was performed. Subperiosteal dissection was carried out along the medial proximal tibia.  Old Ethibond sutures were removed.  The fat pad was removed.  Care was taken to protect the patellar tendon and extensor mechanism during fat pad removal. The tissue along the anterior aspect of the distal femur was also removed.  The patella had extensive osteophyte  formation and was unable to reliably sublux.  Therefore a lateral facetectomy and extensive osteophyte resection was performed.  The lateral release was done in order to further mobilize the patella.  Next the patella was subluxed and the knee was flexed.       The ACL was no longer present and the PCL was released.  A drill was advanced down the femoral canal in a retrograde direction. The sword was set at 6 degrees of valgus in accordance with the preoperative plan and was advanced into the canal.  The distal femoral cutting block was then placed at +2 mm resection t and pinned into place. The luis was removed and the distal femur cuts were made medially and laterally.  The cutting block was removed and the luis with the alignment piece was inserted into the canal to ensure proper angle of the cut and a flat cut.       Attention was then directed towards the proximal tibia. The meniscus and soft tissue was removed to expose the medial and lateral tibial plateau. Retractors were placed around the knee to obtain good visualization, then the extramedullary tibial alignment guide was placed in the appropriate position. The cutting guide was pinned into position taking into account the most appropriate tibial cut. With care directed towards preserving the posterior nerves and blood vessels and the medial collateral ligament, the saw was used to cut the proximal tibia.  Next, after resecting meniscal remnants both medially and laterally, the 10 mm sizing block was put in place to ensure proper alignment.  Unfortunately the extension gap was too small and it was decided given the significant scar and osteophyte to resect an additional 5 mm of the tibia.  Once more the retractors were placed around the need to obtain good visualization and protect the MCL LCL and posterior structures.  Using the oscillating a recut of the tibia was performed.  This piece of bone was removed.  Now we were able to place the 10 mm sizing  block. It showed adequate alignment using the drop luis as well as good balancing of soft tissues.     We turned our attention to the back to the distal femur.  The posterior referencing femoral sizing block was used and the femur was measured to be a size  6.  Two holes were drilled to obtain 3 degrees of external rotation with respect to the posterior condylar axis of the femur.  The femoral cutting block was then slid over the pins and fixed onto the bone.  The anterior cut, posterior cut, anterior and posterior chamfer cuts were made.  The block was then removed and excess bone fragments were removed.   Now we visualized the posterior knee. A curved osteotome was used to remove posterior osteophytes. Our anesthetic injection was then introduced through the posterior capsule with extreme care directed towards not injuring the posterior neurovascular structures. The trial components (femur size 6) were placed and confirmed appropriate sizing of the flexion and extension gaps.  The drop luis was used to confirm proper alignment of the cut.  A range of motion of 0 to 110 degrees with a well balanced knee in both flexion and extension was obtained. The box cutting guide was used and the notch cut was performed. The excess bone was removed.        The patella was then exposed.  Soft tissues were removed around the patella for visualization. The patella thickness was measured with a caliper to be 23, and a measured resection of 7 mm was made.  A caliper was then used to measure the residual thickness of the patella to be 16.  The patella was measured with the lollipops and found to be a size 35 mm. The drill guide was placed and the three lug holes were drilled. The patella trial was then placed and the knee was ranged. The patella was found to track well.      The femoral trial component was removed the proximal tibia was again exposed and sized. The tibia was measured to be a size  3.   The tibial trial was placed into  proper rotation and pinned into place.    First the reamer to accommodate for the stem extender was drilled.  We now encountered the screw that was in place and it was clear that this would have to be removed.    The head of the screw through the tibial tuberosity was identified.  A longitudinal incision was made.  Using [place]'s the soft tissues were removed from the cruciate screw head.  Using the cruciate screwdriver the screw was removed without complication.      Next the wing punch was used. The tibial trial was then removed.      The patellar and femoral trials were removed. Pulsitile lavage was used to clean the bone in preparation for cementing. The bone was dried. Cement was mixed, and then all the components were cemented into place. While the cement was hardening, the remainder of the anesthetic injection was spread around the deep retinacular layer and the subcutaneous layer with a spinal needle.   The knee was irrigated with betadine lavage for approximately 3 minutes.  Once the cement had hardened, the excess cement was removed.    The tourniquet was released.  Hemostasis was obtained.       The  PS 13 mm liner fit best and the knee had full extension to 110 degrees and was stable to anterior and posterior stress in flexion and extension as well as varus/valgus stress in 0, 30, and 90 degrees of flexion.  The trial liner was removed and the real liner was placed.       The knee was again copiously irrigated.   Closure was performed with a #1 symmetric Vicryl interrupted sutures in the retinacular layer, 0 Vicryl and 2-0 Vicryl interrupted suture in the  subcutaneous tissues, and 3-0 monocryl in the skin.  A sterile dressing was applied.  The patient was then awakened, transferred to the [facility], and brought to the recovery room in stable condition. There were no complications.     POSTOPERATIVE PLAN:  Weight bearing: Weightbearing as tolerated  Anticoagulation:  Restart Pradaxa 150 mg twice daily as  preop  Precautions: No precautions  Pain control and monitoring  XR in PACU  PT/OT  Discharge today or tomorrow when pain well controlled and ambulating safely.        Armand Martin MD      Adult Reconstruction  [facility] Department of Orthopaedic Surgery  Pager [phone]

## 2022-01-20 NOTE — OR NURSING
PACU to Inpatient Nursing Handoff    Patient Mary Gorman is a 56 year old female who speaks English.   Procedure Procedure(s):  ARTHROPLASTY, LEFT KNEE, TOTAL  REMOVAL, HARDWARE, LEFT LOWER EXTREMITY   Surgeon(s) Primary: Armand Martin MD  Assisting: Ta Iyer PA-C  Fellow - Assisting: Skyler Titus MD     Allergies   Allergen Reactions     Dyazide [Hydrochlorothiazide W/Triamterene] Unknown     Nickel Rash     Robaxin [Methocarbamol] Rash     Sulfa Drugs Rash       Isolation  none    Past Medical History   has a past medical history of Atrial fibrillation (H), Depression, Diabetes (H), GERD (gastroesophageal reflux disease), HTN (hypertension), Hyperlipidemia, Obese, BALTA (obstructive sleep apnea), and Permanent atrial fibrillation (H) (6/5/11).    Anesthesia Choice   Dermatome Level  NA   Preop Meds diltiazem ER & Toprol XL, TNA @ 1227; declines Tylenol  - time given: asfd   Nerve block Not applicable   Intraop Meds fentanyl (Sublimaze): 300 mcg total  hydromorphone (Dilaudid): 1 mg total  ondansetron (Zofran): last given at 1623  Versed 2 mg @ 1338; Metoprolol, last given @ 1635   Local Meds Yes - Local Cocktail (morphine, ropivacaine, epinephrine, Toradol)   Antibiotics cefazolin (Ancef) - last given at 1353     Pain Patient Currently in Pain: yes   PACU meds  fentanyl (Sublimaze): 100 mcg (total dose) last given at 1741   hydromorphone (Dilaudid): 0.6 mg (total dose) last given at 1806   ketorolac (Toradol): 15 mg last given at 1718   PCA / epidural No   Capnography Respiratory Monitoring (EtCO2): 41 mmHg  Integrated Pulmonary Index (IPI): 8-9ETCO2 40, IPI 8-9   Telemetry ECG Rhythm: Atrial fibrillation   Inpatient Telemetry Monitor Ordered? No        Labs Glucose Lab Results   Component Value Date     01/20/2022     01/10/2022     06/28/2021       Hgb Lab Results   Component Value Date    HGB 15.6 01/20/2022    HGB 15.2 05/10/2021       INR Lab Results   Component Value  Date    INR 1.19 08/03/2011      PACU Imaging Not applicable     Wound/Incision Incision/Surgical Site 01/20/22 Anterior;Left;Midline Knee (Active)   Incision Assessment UTV 01/20/22 1750   Vickie-Incision Assessment UTV 01/20/22 1750   Closure Sutures;Approximated;Adhesive strip(s) 01/20/22 1612   Incision Drainage Amount None 01/20/22 1750   Dressing Intervention Clean, dry, intact 01/20/22 1750   Number of days: 0      CMS  baseline tingling in bilateral heels; no change post op.   Pulses 2+, warm, equal movement, strength 5/5.      Equipment ice pack   Other LDA  NA     IV Access Peripheral IV 01/20/22 Anterior;Left Lower forearm (Active)   Site Assessment [facility] 01/20/22 1700   Line Status Infusing 01/20/22 1700   Phlebitis Scale 0-->no symptoms 01/20/22 1700   Infiltration Scale 0 01/20/22 1700   Number of days: 0       Peripheral IV 01/20/22 Left Wrist (Active)   Site Assessment [facility] 01/20/22 1700   Line Status Saline locked 01/20/22 1700   Phlebitis Scale 0-->no symptoms 01/20/22 1700   Infiltration Scale 0 01/20/22 1700   Number of days: 0      Blood Products Not applicable  mL   Intake/Output Date 01/20/22 0700 - 01/21/22 0659   Shift 9576-5130 7704-8785 3642-4794 24 Hour Total   INTAKE   P.O.  200  200   I.V.  1280  1280   Shift Total(mL/kg)  1480(9.83)  1480(9.83)   OUTPUT   Shift Total(mL/kg)       Weight (kg) 150.5 150.5 150.5 150.5      Drains / [place]  NA   Time of void PreOp Void Prior to Procedure: 1200 (01/20/22 1214)    PostOp  NA    Diapered? No   Bladder Scan Bladder Scan Volume (mL): 103 ml (01/20/22 1748)    mL (ice chips and pop) (01/20/22 1748)  crackers and water     Vitals    B/P: 135/73  T: 98.8  F (37.1  C)    Temp src: Oral  P:  Pulse: 105 (01/20/22 1800)          R: 21  O2:  SpO2: 95 %    O2 Device: Nasal cannula (01/20/22 1800)    Oxygen Delivery: 2.5 LPM (01/20/22 1800)         Family/support present mother   Patient belongings  walker, belongings bag x 2, home CPAP   Patient  transported on cart   DC meds/scripts (obs/outpt) Yes, meds   Inpatient Pain Meds Released? Yes       Special needs/considerations None   Tasks needing completion None       Michelle Jason, RN  ASCOM [id number]

## 2022-01-21 ENCOUNTER — APPOINTMENT (OUTPATIENT)
Dept: PHYSICAL THERAPY | Facility: CLINIC | Age: 57
End: 2022-01-21
Attending: STUDENT IN AN ORGANIZED HEALTH CARE EDUCATION/TRAINING PROGRAM
Payer: COMMERCIAL

## 2022-01-21 ENCOUNTER — APPOINTMENT (OUTPATIENT)
Dept: OCCUPATIONAL THERAPY | Facility: CLINIC | Age: 57
End: 2022-01-21
Attending: STUDENT IN AN ORGANIZED HEALTH CARE EDUCATION/TRAINING PROGRAM
Payer: COMMERCIAL

## 2022-01-21 VITALS
TEMPERATURE: 98.2 F | HEART RATE: 90 BPM | WEIGHT: 293 LBS | SYSTOLIC BLOOD PRESSURE: 121 MMHG | HEIGHT: 65 IN | DIASTOLIC BLOOD PRESSURE: 63 MMHG | OXYGEN SATURATION: 95 % | RESPIRATION RATE: 16 BRPM | BODY MASS INDEX: 48.82 KG/M2

## 2022-01-21 LAB
ANION GAP SERPL CALCULATED.3IONS-SCNC: 3 MMOL/L (ref 3–14)
BUN SERPL-MCNC: 24 MG/DL (ref 7–30)
CALCIUM SERPL-MCNC: 8.6 MG/DL (ref 8.5–10.1)
CHLORIDE BLD-SCNC: 107 MMOL/L (ref 94–109)
CO2 SERPL-SCNC: 27 MMOL/L (ref 20–32)
CREAT SERPL-MCNC: 1.26 MG/DL (ref 0.52–1.04)
GFR SERPL CREATININE-BSD FRML MDRD: 50 ML/MIN/1.73M2
GLUCOSE BLD-MCNC: 171 MG/DL (ref 70–99)
GLUCOSE BLDC GLUCOMTR-MCNC: 133 MG/DL (ref 70–99)
GLUCOSE BLDC GLUCOMTR-MCNC: 166 MG/DL (ref 70–99)
HGB BLD-MCNC: 12.1 G/DL (ref 11.7–15.7)
POTASSIUM BLD-SCNC: 5.1 MMOL/L (ref 3.4–5.3)
SODIUM SERPL-SCNC: 137 MMOL/L (ref 133–144)

## 2022-01-21 PROCEDURE — 97161 PT EVAL LOW COMPLEX 20 MIN: CPT | Mod: GP | Performed by: PHYSICAL THERAPIST

## 2022-01-21 PROCEDURE — 250N000011 HC RX IP 250 OP 636: Performed by: STUDENT IN AN ORGANIZED HEALTH CARE EDUCATION/TRAINING PROGRAM

## 2022-01-21 PROCEDURE — 250N000013 HC RX MED GY IP 250 OP 250 PS 637: Performed by: STUDENT IN AN ORGANIZED HEALTH CARE EDUCATION/TRAINING PROGRAM

## 2022-01-21 PROCEDURE — 99214 OFFICE O/P EST MOD 30 MIN: CPT | Performed by: INTERNAL MEDICINE

## 2022-01-21 PROCEDURE — 258N000003 HC RX IP 258 OP 636: Performed by: STUDENT IN AN ORGANIZED HEALTH CARE EDUCATION/TRAINING PROGRAM

## 2022-01-21 PROCEDURE — 82962 GLUCOSE BLOOD TEST: CPT

## 2022-01-21 PROCEDURE — 80048 BASIC METABOLIC PNL TOTAL CA: CPT | Performed by: INTERNAL MEDICINE

## 2022-01-21 PROCEDURE — 97530 THERAPEUTIC ACTIVITIES: CPT | Mod: GP | Performed by: PHYSICAL THERAPIST

## 2022-01-21 PROCEDURE — 85018 HEMOGLOBIN: CPT | Performed by: STUDENT IN AN ORGANIZED HEALTH CARE EDUCATION/TRAINING PROGRAM

## 2022-01-21 PROCEDURE — 97535 SELF CARE MNGMENT TRAINING: CPT | Mod: GO

## 2022-01-21 PROCEDURE — 97110 THERAPEUTIC EXERCISES: CPT | Mod: GP | Performed by: PHYSICAL THERAPIST

## 2022-01-21 PROCEDURE — 250N000013 HC RX MED GY IP 250 OP 250 PS 637: Performed by: INTERNAL MEDICINE

## 2022-01-21 PROCEDURE — 97165 OT EVAL LOW COMPLEX 30 MIN: CPT | Mod: GO

## 2022-01-21 PROCEDURE — 36415 COLL VENOUS BLD VENIPUNCTURE: CPT | Performed by: STUDENT IN AN ORGANIZED HEALTH CARE EDUCATION/TRAINING PROGRAM

## 2022-01-21 RX ADMIN — KETOROLAC TROMETHAMINE 15 MG: 15 INJECTION, SOLUTION INTRAMUSCULAR; INTRAVENOUS at 00:13

## 2022-01-21 RX ADMIN — KETOROLAC TROMETHAMINE 15 MG: 15 INJECTION, SOLUTION INTRAMUSCULAR; INTRAVENOUS at 11:19

## 2022-01-21 RX ADMIN — CEFAZOLIN SODIUM 2 G: 2 INJECTION, SOLUTION INTRAVENOUS at 05:58

## 2022-01-21 RX ADMIN — DILTIAZEM HYDROCHLORIDE 240 MG: 240 CAPSULE, EXTENDED RELEASE ORAL at 08:31

## 2022-01-21 RX ADMIN — DOCUSATE SODIUM AND SENNOSIDES 1 TABLET: 8.6; 5 TABLET ORAL at 08:29

## 2022-01-21 RX ADMIN — SODIUM CHLORIDE, POTASSIUM CHLORIDE, SODIUM LACTATE AND CALCIUM CHLORIDE: 600; 310; 30; 20 INJECTION, SOLUTION INTRAVENOUS at 06:05

## 2022-01-21 RX ADMIN — SERTRALINE HYDROCHLORIDE 200 MG: 100 TABLET ORAL at 08:28

## 2022-01-21 RX ADMIN — DABIGATRAN ETEXILATE MESYLATE 150 MG: 150 CAPSULE ORAL at 08:30

## 2022-01-21 RX ADMIN — BUPROPION HYDROCHLORIDE 300 MG: 300 TABLET, FILM COATED, EXTENDED RELEASE ORAL at 08:30

## 2022-01-21 RX ADMIN — METOPROLOL SUCCINATE 50 MG: 25 TABLET, EXTENDED RELEASE ORAL at 08:28

## 2022-01-21 RX ADMIN — BUPROPION HYDROCHLORIDE 150 MG: 150 TABLET, EXTENDED RELEASE ORAL at 08:27

## 2022-01-21 RX ADMIN — OXYCODONE HYDROCHLORIDE 5 MG: 5 TABLET ORAL at 05:08

## 2022-01-21 RX ADMIN — LAMOTRIGINE 200 MG: 200 TABLET ORAL at 08:28

## 2022-01-21 RX ADMIN — ACETAMINOPHEN 975 MG: 325 TABLET, FILM COATED ORAL at 12:17

## 2022-01-21 RX ADMIN — KETOROLAC TROMETHAMINE 15 MG: 15 INJECTION, SOLUTION INTRAMUSCULAR; INTRAVENOUS at 05:58

## 2022-01-21 RX ADMIN — OXYCODONE HYDROCHLORIDE 5 MG: 5 TABLET ORAL at 09:29

## 2022-01-21 ASSESSMENT — ACTIVITIES OF DAILY LIVING (ADL): IADL_COMMENTS: WILL HAVE ASSIST WITH IADLS

## 2022-01-21 NOTE — OR NURSING
Patient transferred to UR 5 Ortho room 532 on nasal cannula with capnography and oximetry. Full hand off given to Shikha Forrester RN- prescribed discharge home meds (5 meds, including oxycodone) given to Shikha for secure storage. All questions answered. Patient stable upon arrival to floor.

## 2022-01-21 NOTE — PLAN OF CARE
[facility]      OUTPATIENT OCCUPATIONAL THERAPY  EVALUATION  PLAN OF TREATMENT FOR OUTPATIENT REHABILITATION  (COMPLETE FOR INITIAL CLAIMS ONLY)  Patient's Last Name, First Name, M.I.  YOB: 1965  SherifMary  ZOYA                          Provider's Name  [facility] Medical Record No.  [id number]                               Onset Date:  01/20/22   Start of Care Date:  01/21/22     Type:     ___PT   _X_OT   ___SLP Medical Diagnosis:  s/p L TKA                        OT Diagnosis:  Decreased functional mobility and ADLs   Visits from SOC:  1   _________________________________________________________________________________  Plan of Treatment/Functional Goals    Planned Interventions: ADL retraining,transfer training   Goals: See Occupational Therapy Goals on Care Plan in [facility] electronic health record.    Therapy Frequency: 1x eval and treat  Predicted Duration of Therapy Intervention: 1 day  _________________________________________________________________________________    I CERTIFY THE NEED FOR THESE SERVICES FURNISHED UNDER        THIS PLAN OF TREATMENT AND WHILE UNDER MY CARE     (Physician co-signature of this document indicates review and certification of the therapy plan).              Certification date from: 01/21/22, Certification date to: 01/22/22    Referring Physician: Armand Martin MD            Initial Assessment        See Occupational Therapy evaluation dated 01/21/22 in Epic electronic health record.

## 2022-01-21 NOTE — PLAN OF CARE
Pt arrived to unit around 1900, received general anesthesia and local cocktail. .    VS: VSS. Denies CP/SOB. A/O x4.    O2: >90% on 1 LPM, desats with sleeping. H/o BALTA- brought CPAP from home.   Output: Voided x1. PVR 66. Up to bathroom   Last BM: 1/19   Activity: Up with 1 assist, GB and walker. WBAT. Ambulated to bathroom well    Skin: L knee surgical incision    Pain: Pain in LLE ,PRN oxy given. Scheduled tylenol and Toradol.    CMS: Baseline neuropathy in heels. Otherwise intact    Dressing: CDI    Diet: Regular   LDA: PIV infusing    Equipment: IV pole, PCDs, capno, walker    Plan: TBD    Additional Info:      Patient vital signs are at baseline: Yes  Patient able to ambulate as they were prior to admission or with assist devices provided by therapies during their stay:  Yes  Patient MUST void prior to discharge:  Yes  Patient able to tolerate oral intake:  Yes  Pain has adequate pain control using Oral analgesics:  Yes

## 2022-01-21 NOTE — ANESTHESIA POSTPROCEDURE EVALUATION
Patient: Mary Gorman    Procedure: Procedure(s):  ARTHROPLASTY, LEFT KNEE, TOTAL  REMOVAL, HARDWARE, LEFT LOWER EXTREMITY       Diagnosis:Osteoarthrosis, localized, primary, knee, left [M17.12]  Diagnosis Additional Information: No value filed.    Anesthesia Type:  General    Note:  Disposition: Inpatient   Postop Pain Control: Uneventful            Sign Out: Well controlled pain   PONV: No   Neuro/Psych: Uneventful            Sign Out: Acceptable/Baseline neuro status   Airway/Respiratory: Uneventful            Sign Out: Acceptable/Baseline resp. status   CV/Hemodynamics: Uneventful            Sign Out: Acceptable CV status; No obvious hypovolemia; No obvious fluid overload   Other NRE: NONE   DID A NON-ROUTINE EVENT OCCUR? No           Last vitals:  Vitals Value Taken Time   /66 01/20/22 1815   Temp 36.6  C (97.9  F) 01/20/22 1815   Pulse 88 01/20/22 1823   Resp 21 01/20/22 1823   SpO2 94 % 01/20/22 1815   Vitals shown include unvalidated device data.    Electronically Signed By: Tiffanie Conti MD  January 20, 2022  6:23 PM

## 2022-01-21 NOTE — PLAN OF CARE
[facility]      OUTPATIENT PHYSICAL THERAPY EVALUATION  PLAN OF TREATMENT FOR OUTPATIENT REHABILITATION  (COMPLETE FOR INITIAL CLAIMS ONLY)  Patient's Last Name, First Name, M.I.  YOB: 1965  SherifMary  ZOYA                        Provider's Name  [facility] Medical Record No.  [id number]                               Onset Date:  01/20/22   Start of Care Date:  01/21/22      Type:     _X_PT   ___OT   ___SLP Medical Diagnosis:  s/p left TKA                        PT Diagnosis:  Functional mobility deficits   Visits from SOC:  1   _________________________________________________________________________________  Plan of Treatment/Functional Goals    Planned Interventions: bed mobility training,gait training,home exercise program,patient/family education,strengthening,transfer training     Goals: See Physical Therapy Goals on Care Plan in [place] electronic health record.    Therapy Frequency: One time eval and treatment only  Predicted Duration of Therapy Intervention: 1x  _________________________________________________________________________________    I CERTIFY THE NEED FOR THESE SERVICES FURNISHED UNDER        THIS PLAN OF TREATMENT AND WHILE UNDER MY CARE     (Physician co-signature of this document indicates review and certification of the therapy plan).              Certification date from: 01/21/22, Certification date to: 01/21/22    Referring Physician: Armand Martin MD            Initial Assessment        See Physical Therapy evaluation dated 01/21/22 in Epic electronic health record.

## 2022-01-21 NOTE — PLAN OF CARE
Physical Therapy Discharge Summary    Reason for therapy discharge:    All goals and outcomes met, no further needs identified.    Progress towards therapy goal(s). See goals on Care Plan in [facility] electronic health record for goal details.  Goals met    Therapy recommendation(s):    Continued therapy is recommended.  Rationale/Recommendations:  Pt would benefit from further PT to build functional strength for improved safety and activity tolerance. Has outpatient PT scheduled starting next week.

## 2022-01-21 NOTE — CONSULTS
HOSPITALIST INITIAL CONSULT NOTE    Referring Provider:  Armand Martin MD      Reason for Consult         History of Present Illness     Mary Gorman is 56 year old year old female with hx of A fibrillation, DM, GERD, HTN, HLD, BALTA is being admitted s/p Left TKA, Removal hardware on 01/20/2021     Currently report pain is well controlled. No nausea, vomiting, chest pain, shortness of breath. No lightheadedness or dizziness.           Past Medical History     Past Medical History:   Diagnosis Date     Atrial fibrillation (H)      Depression      Diabetes (H)      GERD (gastroesophageal reflux disease)      HTN (hypertension)      Hyperlipidemia      Obese      BALTA (obstructive sleep apnea)     uses CPAP     Permanent atrial fibrillation (H) 6/5/11          Past Surgical History     Past Surgical History:   Procedure Laterality Date     CARDIOVERSION  6/7/11     CHOLECYSTECTOMY       DILATION AND CURETTAGE  4/26/2012    Procedure:DILATION AND CURETTAGE; DILATION AND CURETTAGE; Surgeon:JEAN-PAUL DEL CID; Location:[facility]     DILATION AND CURETTAGE, HYSTEROSCOPY DIAGNOSTIC, COMBINED  12/8/2011    Procedure:COMBINED DILATION AND CURETTAGE, HYSTEROSCOPY DIAGNOSTIC; DILATION AND CURETTAGE, DIAGNOSTIC HYSTEROSCOPY ; Surgeon:JEAN-PAUL DEL CID; Location:[facility]     KNEE SURGERY            Medications     All his current medications are reviewed in current medication section of [facility].  Home medications are reviewed.  Current Facility-Administered Medications   Medication     [START ON 1/23/2022] acetaminophen (TYLENOL) tablet 650 mg     acetaminophen (TYLENOL) tablet 975 mg     dabigatran ANTICOAGULANT (PRADAXA) capsule 150 mg     dexamethasone (DECADRON) injection 4 mg     dimenhyDRINATE (DRAMAMINE) injection 25 mg     diphenhydrAMINE (BENADRYL) capsule 25 mg    Or     diphenhydrAMINE (BENADRYL) injection 25 mg     fentaNYL (PF) (SUBLIMAZE) injection 25 mcg     HYDROmorphone (DILAUDID) injection 0.2 mg    Or     HYDROmorphone (DILAUDID)  injection 0.4 mg     HYDROmorphone (PF) (DILAUDID) injection 0.2 mg     ketorolac (TORADOL) injection 15 mg     labetalol (NORMODYNE/TRANDATE) injection 10 mg     lactated ringers infusion     ondansetron (ZOFRAN-ODT) ODT tab 4 mg    Or     ondansetron (ZOFRAN) injection 4 mg     oxyCODONE (ROXICODONE) tablet 5 mg    Or     oxyCODONE IR (ROXICODONE) tablet 10 mg        Allergies        Allergies   Allergen Reactions     Dyazide [Hydrochlorothiazide W/Triamterene] Unknown     Nickel Rash     Robaxin [Methocarbamol] Rash     Sulfa Drugs Rash        Family History     Family History   Problem Relation Age of Onset     Hypertension Mother      Heart Disease Mother         A-fib     Arthritis Mother      Heart Disease Father         triple bypass     Cancer Father 50        bladder     Hypertension Father      Respiratory Father         smoker     Cancer Paternal Grandmother 90        rectal     Unknown/Adopted Brother           Social History     Social History     Socioeconomic History     Marital status: Single     Spouse name: Not on file     Number of children: Not on file     Years of education: Not on file     Highest education level: Bachelor's degree (e.g., BA, AB, BS)   Occupational History     Not on file   Tobacco Use     Smoking status: Never Smoker     Smokeless tobacco: Never Used   Vaping Use     Vaping Use: Never used   Substance and Sexual Activity     Alcohol use: Yes     Comment: rarely     Drug use: No     Sexual activity: Never   Other Topics Concern     Parent/sibling w/ CABG, MI or angioplasty before 65F 55M? No      Service Not Asked     Blood Transfusions Not Asked     Caffeine Concern Not Asked     Occupational Exposure Not Asked     Hobby Hazards Not Asked     Sleep Concern Not Asked     Stress Concern Not Asked     Weight Concern Not Asked     Special Diet No     Back Care Not Asked     Exercise Yes     Comment: walks 2 miles minimum 5 x weekly      Bike Helmet Not Asked     Seat Belt  "Not Asked     Self-Exams Not Asked   Social History Narrative     Not on file     Social Determinants of Health     Financial Resource Strain: Low Risk      Difficulty of Paying Living Expenses: Not hard at all   Food Insecurity: No Food Insecurity     Worried About Running Out of Food in the Last Year: Never true     Ran Out of Food in the Last Year: Never true   Transportation Needs: No Transportation Needs     Lack of Transportation (Medical): No     Lack of Transportation (Non-Medical): No   Physical Activity: Inactive     Days of Exercise per Week: 0 days     Minutes of Exercise per Session: 0 min   Stress: Stress Concern Present     Feeling of Stress : To some extent   Social Connections: Moderately Integrated     Frequency of Communication with Friends and Family: More than three times a week     Frequency of Social Gatherings with Friends and Family: Once a week     Attends Gnosticist Services: More than 4 times per year     Active Member of Clubs or Organizations: Yes     Attends Club or Organization Meetings: More than 4 times per year     Marital Status: Never    Intimate Partner Violence: Not on file   Housing Stability: Low Risk      Unable to Pay for Housing in the Last Year: No     Number of Places Lived in the Last Year: 1     Unstable Housing in the Last Year: No      Tobacco:  Alcohol:  Illicit Drug:      Review of Systems   A 10 point review of systems was taken and largely negative except for that which was stated above.      Physical Exam       Vital signs:    Blood pressure 120/66, pulse 95, temperature 97.9  F (36.6  C), temperature source Oral, resp. rate 25, height 1.651 m (5' 5\"), weight (!) 150.5 kg (331 lb 12.7 oz), SpO2 94 %, not currently breastfeeding.  Estimated body mass index is 55.21 kg/m  as calculated from the following:    Height as of this encounter: 1.651 m (5' 5\").    Weight as of this encounter: 150.5 kg (331 lb 12.7 oz).      Intake/Output Summary (Last 24 hours) at " 1/20/2022 1830  Last data filed at 1/20/2022 1821  Gross per 24 hour   Intake 1585 ml   Output --   Net 1585 ml      Constitutional:   Eye: No icterus, no pallor  Mouth/ENT:   Cardiovascular: S1, S2 normal  Respiratory: B/L CTA  GI: Soft, NT, BS+  :   Neurology: Alert, awake, and oriented. No tremors  Psych:   MSK: Left knee dressing in place.   Integumentary:   Heme/Lymph/Imm:        Laboratory and Imaging Studies     Laboratory and Imaging studies reviewed in the results review section of Epic. Pertinent studies are as below:    CMP  Recent Labs   Lab 01/20/22  1152 01/20/22  1136   POTASSIUM 4.4  --    GLC  --  128*     CBC  Recent Labs   Lab 01/20/22  1152   HGB 15.6     INRNo lab results found in last 7 days.  Arterial Blood GasNo lab results found in last 7 days.       Impression/Recommendations     56 year old year old female with hx of A fibrillation, DM, GERD, HTN, HLD, BALTA is being admitted s/p Left TKA, Removal hardware on 01/20/2021     # s/p Left TKA, Hardware removal on 01/202/2021:   On Analgesics, and dressing in place   ml   - Wound cares, Dressings, Surgical pain management, DVT Prophylaxis  per primary team.   - Monitor anemia, hemodynamics, Input/Output  - Monitor Capnogrphy  - Encourage Incentive spirometry  - Laxatives for constipation prophylaxis      # Cardiac issues:  Hx of HTN, Atrial Fibrillation  PTA on Metoprolol XL, Dilitiazem, Fosinopril, Spirinolactone Dabigatran  - Continue Metoprolol XL, Diltiazem with hold parameter  - Hold Fosinopril, Spirinolactone  - Restart Dabigatran when okay with surgery    # DM: On Metformin  - Hold Metformin  - Start Low intensity correction Aspart    # BALTA: PTA on CPAP  - Monitor on pulse oximetry/capnography  - Continue CPAP    #  Depression, Anxiety: PTA on Bupropion, Sertraline, Lamotrigine  - Continue             Stevenson Isaura  Internal Medicine/Hospitalist  [facility]  [phone]

## 2022-01-21 NOTE — DISCHARGE SUMMARY
[facility] Orthopaedic Surgery Discharge Summary    Mary Gorman MRN# [id number]   Age: 56 year old YOB: 1965       Date of Admission:  1/20/2022  Date of Discharge::  1/21/2022   Admitting Physician:  Armand Martin MD  Discharge To:  Home   Primary Care Physician:      Nicole Castellanos          Admission Diagnoses:   56-year-old female with chronic left knee pain and instability due to end-stage osteoarthritic changes in all 3 compartments.  Insufficiently responding to nonoperative treatment measures.  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         Discharge Diagnosis:   56-year-old female with chronic left knee pain and instability due to end-stage osteoarthritic changes in all 3 compartments.  Insufficiently responding to nonoperative treatment measures.  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         Procedures Performed:   1. Left TKA  2. JP LLE              Consultations:   INTERNAL MEDICINE ADULT IP CONSULT FOR [facility]  PHYSICAL THERAPY ADULT IP CONSULT  OCCUPATIONAL THERAPY ADULT IP CONSULT          Brief History:   56-year-old female with chronic left knee pain and instability due to end-stage osteoarthritic changes in all 3 compartments.  Insufficiently responding to nonoperative treatment measures.  she had tried multiple non-operative modalities without sustained relief.          Hospital Course:   Patient did well post operatively.  Transitioned from iv medication to oral meds.  Tolerated diet, resumed normal bowel and bladder function.  Was seen by PT/OT prior to discharge.           Medications Prior to Admission:     Medications Prior to Admission   Medication Sig Dispense Refill Last Dose    albuterol (PROAIR HFA, PROVENTIL HFA, VENTOLIN HFA) 108 (90 BASE) MCG/ACT inhaler Inhale 2 puffs into the lungs every 6 hours (Patient taking differently: Inhale 2 puffs into the lungs every 6 hours as needed ) 1 each 0  Unknown at Unknown time    buPROPion (WELLBUTRIN XL) 150 MG 24 hr tablet Take 1 tablet (150 mg) by mouth every morning Take with 300 mg for total of 450 mg 90 tablet 0 1/19/2022 at 1000    buPROPion (WELLBUTRIN XL) 300 MG 24 hr tablet Take 1 tablet (300 mg) by mouth every morning Take with 150 mg for total 450 mg 90 tablet 0 1/19/2022 at 1000    cetirizine (ZYRTEC) 10 MG tablet Take 10 mg by mouth as needed for allergies   1/19/2022 at 2300    dabigatran ANTICOAGULANT (PRADAXA) 75 MG capsule Take 150 mg by mouth 2 times daily Store in original 's bottle or blister pack; use within 120 days of opening.   Past Week at Unknown time    diltiazem ER (DILT-XR) 240 MG 24 hr ER beaded capsule Take 1 capsule (240 mg) by mouth daily 90 capsule 3 1/20/2022 at 1000    fosinopril (MONOPRIL) 10 MG tablet Take 1 tablet (10 mg) by mouth daily 90 tablet 3 1/19/2022 at 1000    gabapentin (NEURONTIN) 300 MG capsule Take 600 mg by mouth At Bedtime   1/19/2022 at 2300    lamoTRIgine (LAMICTAL) 100 MG tablet Take 200 mg by mouth daily    1/19/2022 at 1000    medroxyPROGESTERone (PROVERA) 10 MG tablet Take 10 mg by mouth daily   1/19/2022 at 2300    metoprolol succinate ER (TOPROL-XL) 50 MG 24 hr tablet Take 1 tablet (50 mg) by mouth 2 times daily 180 tablet 3 1/20/2022 at 2300    nystatin (MYCOSTATIN) 641394 UNIT/GM external cream Apply to affected area tid until rash resolves, could be up to 3 weeks. 30 g 3 More than a month at Unknown time    sertraline (ZOLOFT) 100 MG tablet Take 200 mg by mouth daily 2 tabs (200mg) daily    1/19/2022 at 1000    spironolactone (ALDACTONE) 25 MG tablet Take 1 tablet (25 mg) by mouth daily 90 tablet 3 1/19/2022 at 1000    tiZANidine (ZANAFLEX) 2 MG tablet Take 1 tablet (2 mg) by mouth 3 times daily 30 tablet 1 More than a month at Unknown time    metFORMIN (GLUCOPHAGE-XR) 500 MG 24 hr tablet Take 1 tablet (500 mg) by mouth daily (with dinner) 30 tablet 1             Discharge Medications:         Review of your medicines        UNREVIEWED medicines. Ask your doctor about these medicines        Dose / Directions   albuterol 108 (90 Base) MCG/ACT inhaler  Commonly known as: PROAIR HFA/PROVENTIL HFA/VENTOLIN HFA  Used for: Wheezing      Dose: 2 puff  Inhale 2 puffs into the lungs every 6 hours  Quantity: 1 each  Refills: 0     * buPROPion 150 MG 24 hr tablet  Commonly known as: WELLBUTRIN XL  Indication: Major Depressive Disorder  Used for: Mild recurrent major depression (H)      Dose: 150 mg  Take 1 tablet (150 mg) by mouth every morning Take with 300 mg for total of 450 mg  Quantity: 90 tablet  Refills: 0     * buPROPion 300 MG 24 hr tablet  Commonly known as: WELLBUTRIN XL  Used for: Mild recurrent major depression (H)      Dose: 300 mg  Take 1 tablet (300 mg) by mouth every morning Take with 150 mg for total 450 mg  Quantity: 90 tablet  Refills: 0     cetirizine 10 MG tablet  Commonly known as: zyrTEC      Dose: 10 mg  Take 10 mg by mouth as needed for allergies  Refills: 0     dabigatran ANTICOAGULANT 75 MG capsule  Commonly known as: PRADAXA      Dose: 150 mg  Take 150 mg by mouth 2 times daily Store in original 's bottle or blister pack; use within 120 days of opening.  Refills: 0     diltiazem  MG 24 hr ER beaded capsule  Commonly known as: DILT-XR  Used for: Chronic atrial fibrillation (H)      Dose: 240 mg  Take 1 capsule (240 mg) by mouth daily  Quantity: 90 capsule  Refills: 3     fosinopril 10 MG tablet  Commonly known as: MONOPRIL  Used for: Hypertension goal BP (blood pressure) < 140/90      Dose: 10 mg  Take 1 tablet (10 mg) by mouth daily  Quantity: 90 tablet  Refills: 3     gabapentin 300 MG capsule  Commonly known as: NEURONTIN      Dose: 600 mg  Take 600 mg by mouth At Bedtime  Refills: 0     lamoTRIgine 100 MG tablet  Commonly known as: LaMICtal      Dose: 200 mg  Take 200 mg by mouth daily  Refills: 0     medroxyPROGESTERone 10 MG tablet  Commonly known as:  PROVERA      Dose: 10 mg  Take 10 mg by mouth daily  Refills: 0     metFORMIN 500 MG 24 hr tablet  Commonly known as: GLUCOPHAGE-XR  Used for: Controlled type 2 diabetes mellitus without complication, without long-term current use of insulin (H)      Dose: 500 mg  Take 1 tablet (500 mg) by mouth daily (with dinner)  Quantity: 30 tablet  Refills: 1     metoprolol succinate ER 50 MG 24 hr tablet  Commonly known as: TOPROL-XL  Used for: Essential hypertension, benign      Dose: 50 mg  Take 1 tablet (50 mg) by mouth 2 times daily  Quantity: 180 tablet  Refills: 3     nystatin 914271 UNIT/GM external cream  Commonly known as: MYCOSTATIN  Used for: Yeast infection of the skin      Apply to affected area tid until rash resolves, could be up to 3 weeks.  Quantity: 30 g  Refills: 3     sertraline 100 MG tablet  Commonly known as: ZOLOFT      Dose: 200 mg  Take 200 mg by mouth daily 2 tabs (200mg) daily  Refills: 0     spironolactone 25 MG tablet  Commonly known as: Aldactone  Used for: Essential hypertension      Dose: 25 mg  Take 1 tablet (25 mg) by mouth daily  Quantity: 90 tablet  Refills: 3     tiZANidine 2 MG tablet  Commonly known as: ZANAFLEX  Used for: Muscle spasm      Dose: 2 mg  Take 1 tablet (2 mg) by mouth 3 times daily  Quantity: 30 tablet  Refills: 1           * This list has 2 medication(s) that are the same as other medications prescribed for you. Read the directions carefully, and ask your doctor or other care provider to review them with you.                START taking        Dose / Directions   acetaminophen 325 MG tablet  Commonly known as: TYLENOL  Used for: Osteoarthrosis, localized, primary, knee, left      Dose: 650 mg  Take 2 tablets (650 mg) by mouth every 4 hours as needed for other (mild pain)  Quantity: 100 tablet  Refills: 0     ibuprofen 600 MG tablet  Commonly known as: ADVIL/MOTRIN  Used for: Osteoarthrosis, localized, primary, knee, left      Dose: 600 mg  Take 1 tablet (600 mg) by mouth  "every 6 hours as needed (mild)  Quantity: 30 tablet  Refills: 0     oxyCODONE 5 MG tablet  Commonly known as: ROXICODONE  Used for: Osteoarthrosis, localized, primary, knee, left      Dose: 5-10 mg  Take 1-2 tablets (5-10 mg) by mouth every 3 hours as needed for pain (Moderate to Severe)  Quantity: 30 tablet  Refills: 0     polyethylene glycol 17 g packet  Commonly known as: MIRALAX  Used for: Osteoarthrosis, localized, primary, knee, left      Dose: 1 packet  Take 17 g by mouth daily  Quantity: 7 packet  Refills: 0     senna-docusate 8.6-50 MG tablet  Commonly known as: SENOKOT-S/PERICOLACE  Used for: Osteoarthrosis, localized, primary, knee, left      Dose: 1-2 tablet  Take 1-2 tablets by mouth 2 times daily Take while on oral narcotics to prevent or treat constipation.  Quantity: 30 tablet  Refills: 0               Where to get your medicines        These medications were sent to [facility] [address]  [address] [address]      Phone: [phone]   acetaminophen 325 MG tablet  ibuprofen 600 MG tablet  oxyCODONE 5 MG tablet  polyethylene glycol 17 g packet  senna-docusate 8.6-50 MG tablet                 Pending Results at Discharge:   None         Discharge Instructions:     Discharge Procedure Orders   Reason for your hospital stay   Order Comments: Status post left total knee arthroplasty     When to call - Contact Surgeon Team   Order Comments: You may experience symptoms that require follow-up before your scheduled appointment. Refer to the \"Stoplight Tool\" for instructions on when to contact your Surgeon Team if you are concerned about pain control, blood clots, constipation, or if you are unable to urinate.     When to call - Reach out to Urgent Care   Order Comments: If you are not able to reach your Surgeon Team and you need immediate care, go to the Orthopedic Walk-in Clinic or Urgent Care at your Surgeon's office.  Do NOT go to the " Emergency Room unless you have shortness of breath, chest pain, or other signs of a medical emergency.     When to call - Reasons to Call 911   Order Comments: Call 911 immediately if you experience sudden-onset chest pain, arm weakness/numbness, slurred speech, or shortness of breath     Discharge Instruction - Breathing exercises   Order Comments: Perform breathing exercises using your Incentive Spirometer 10 times per hour while awake for 2 weeks.     Symptoms - Fever Management   Order Comments: A low grade fever can be expected after surgery.  Use acetaminophen (TYLENOL) as needed for fever management.  Contact your Surgeon Team if you have a fever greater than 101.5 F, chills, and/or night sweats.     Symptoms - Constipation management   Order Comments: Constipation (hard, dry bowel movements) is expected after surgery due to the combination of being less active, the anesthetic, and the opioid pain medication.  You can do the following to help reduce constipation:  ~  FLUIDS:  Drink clear liquids (water or Gatorade), or juice (apple/prune).  ~  DIET:  Eat a fiber rich diet.    ~  ACTIVITY:  Get up and move around several times a day.  Increase your activity as you are able.  MEDICATIONS:  Reduce the risk of constipation by starting medications before you are constipated.  You can take Miralax   (1 packet as directed) and/or a stool softener (Senokot 1-2 tablets 1-2 times a day).  If you already have constipation and these medications are not working, you can get magnesium citrate and use as directed.  If you continue to have constipation you can try an over the counter suppository or enema.  Call your Surgeon Team if it has been greater than 3 days since your last bowel movement.     Symptoms - Reduced Urine Output   Order Comments: Changes in the amount of fluids you drank before and after surgery may result in problems urinating.  It is important to stay well-hydrated after surgery and drink plenty of water.  If it has been greater than 8 hours since you have urinated despite drinking plenty of water, call your Surgeon Team.     Activity - Exercises to prevent blood clots   Order Comments: Unless otherwise directed by your Surgeon team, perform the following exercises at least three times per day for the first four weeks after surgery to prevent blood clots in your legs: 1) Point and flex your feet (Ankle Pumps), 2) Move your ankle around in big circles, 3) Wiggle your toes, 4) Walk, even for short distances, several times a day, will help decrease the risk of blood clots.     Order Specific Question Answer Comments   Is discharge order? Yes      Comfort and Pain Management - Pain after Surgery   Order Comments: Pain after surgery is normal and expected.  You will have some amount of pain for several weeks after surgery.  Your pain will improve with time.  There are several things you can do to help reduce your pain including: rest, ice, elevation, and using pain medications as needed. Contact your Surgeon Team if you have pain that persists or worsens after surgery despite rest, ice, elevation, and taking your medication(s) as prescribed. Contact your Surgeon Team if you have new numbness, tingling, or weakness in your operative extremity.     Comfort and Pain Management - Swelling after Surgery   Order Comments: Swelling and/or bruising of the surgical extremity is common and may persist for several months after surgery. In addition to frequent icing and elevation, gentle compressive support with an ACE wrap or tubigrip may help with swelling. Apply compression regularly, removing at least twice daily to perform skin checks. Contact your Surgeon Team if your swelling increases and is NOT associated with an increase in your activity level, or if your swelling increases and is associated with redness and pain.     Comfort and Pain Management - Cold therapy   Order Comments: Ice can be used to control swelling and  discomfort after surgery. Place a thin towel over your operative site and apply the ice pack overtop. Leave ice pack in place for 20 minutes, then remove for 20 minutes. Repeat this 20 minutes on/20 minutes off routine as often as tolerated.     Medication Instructions - Acetaminophen (TYLENOL) Instructions   Order Comments: You were discharged with acetaminophen (TYLENOL) for pain management after surgery. Acetaminophen most effectively manages pain symptoms when it is taken on a schedule without missing doses (every four, six, or eight hours). Your Provider will prescribe a safe daily dose between 3000 - 4000 mg.  Do NOT exceed this daily dose. Most patients use acetaminophen for pain control for the first four weeks after surgery.  You can wean from this medication as your pain decreases.     Medication Instructions - NSAID Instructions   Order Comments: You were discharged with an anti-inflammatory medication for pain management to use in combination with acetaminophen (TYLENOL) and the narcotic pain medication.  Take this medication exactly as directed.  You should only take one anti-inflammatory at a time.  Some common anti-inflammatories include: ibuprofen (ADVIL, MOTRIN), naproxen (ALEVE, NAPROSYN), celecoxib (CELEBREX), meloxicam (MOBIC), ketorolac (TORADOL).  Take this medication with food and water.     Medication Instructions - Opioids - Tapering Instructions   Order Comments: In the first three days following surgery, your symptoms may warrant use of the narcotic pain medication every four to six hours as prescribed. This is normal. As your pain symptoms improve, focus your efforts on decreasing (tapering) use of narcotic medications. The most successful tapering strategy is to first, decrease the number of tablets you take every 4-6 hours to the minimum prescribed. Then, increase the amount of time between doses.  For example:  First, taper to   or 1 tablet every 4-6 hours.  Then, taper to   or 1  tablet every 6-8 hours.  Then, taper to   or 1 tablet every 8-10 hours.  Then, taper to   or 1 tablet every 10-12 hours.  Then, taper to   or 1 tablet at bedtime.  The bedtime dose can help with comfort during sleep and is typically the last dose to be discontinued after surgery.     Follow Up Care   Order Comments: Follow-up with your Surgeon Team in 2 weeks for wound check.     Activity - Total Knee Arthroplasty     Order Specific Question Answer Comments   Is discharge order? Yes      Return to Driving   Order Comments: Return to driving - Driving is NOT permitted until directed by your provider. Under no circumstance are you permitted to drive while using narcotic pain medications.     Order Specific Question Answer Comments   Is discharge order? Yes      NO Precautions   Order Comments: No precautions directed by your Provider.  You may perform range of motion activities as tolerated.     Order Specific Question Answer Comments   Is discharge order? Yes      Weight bearing as tolerated   Order Comments: Weight bearing as tolerated on your operative extremity.     Order Specific Question Answer Comments   Is discharge order? Yes      Dressing / Wound Care - Wound   Order Comments: You have a clean dressing on your surgical wound. Dressing change instructions as follows: remove your dressing in 7-10 days, and leave incision open to air. Contact your Surgeon Team if you have increased redness, warmth around the surgical wound, and/or drainage from the surgical wound.     Dressing / Wound Care - NO Tub Bathing   Order Comments: Tub bathing, swimming, or any other activities that will cause your incision to be submerged in water should be avoided until allowed by your Surgeon.     Medication instructions - Anticoagulation - other   Order Comments: Take the Pradaxa as prescribed before surgery.  This is given to help minimize your risk of blood clot     Comfort and Pain Management - LOWER Extremity Elevation   Order  "Comments: Swelling is expected for several months after surgery. This type of swelling is usually associated with gravity and activity, and can be improved with elevation.   The best way to do this is to get your \"toes above your nose\" by laying down and placing several pillows lengthwise under your calf and heel. When elevating your leg keep your knee completely straight. Perform this elevation as often as possible especially for the first two weeks after surgery.     Medication Instructions - Opioid Instructions (Less than 65 years)   Order Comments: You were discharged with an opioid medication (hydromorphone, oxycodone, hydrocodone, or tramadol). This medication should only be taken for breakthrough pain that is not controlled with acetaminophen (TYLENOL). If you rate your pain less than 3 you do not need this medication.  Pain rating 0-3:  You do not need this medication.  Pain rating 4-6:  Take 1 tablet every 4-6 hours as needed  Pain rating 7-10:  Take 2 tablets every 4-6 hours as needed.  Do not exceed 6 tablets per day     Dressing / Wound care - Shower with wound/dressing covered   Order Comments: You must COVER your dressing or incision with saran wrap (or any other non-permeable covering) to allow the incision to remain dry while showering.  You may shower 2-3 days after surgery as long as the surgical wound stays dry. Continue to cover your dressing or incision for showering until your first office visit.  You are strictly prohibited from soaking   or submerging the surgical wound underwater.     No VTE Prophylaxis   Order Comments: Please prescribe Pradaxa upon discharge     Discharge Instruction - Regular Diet Adult   Order Comments: Return to your pre-surgery diet unless instructed otherwise     Order Specific Question Answer Comments   Is discharge order? Yes      Future Appointments   Date Time Provider Department [place]   1/21/2022 10:00 AM Kyleigh Clark OT [facility] [place]   1/21/2022 10:45 AM " Samantha Chacon Pt, PT [facility] [place]   1/21/2022  2:30 PM Samantha Chacon Pt, PT [facility] [place]   1/24/2022  1:50 PM Dinorah Ruiz, PT [facility]   1/27/2022 12:30 PM Dinorah Ruiz, PT [facility]   2/8/2022 11:00 AM Armand Martin MD [facility] [place]   3/8/2022 11:00 AM Armand Martin MD [facility] MD [place] PGY-4   Orthopaedic Surgery Resident   Pager [phone]

## 2022-01-21 NOTE — PROGRESS NOTES
[facility]    Medicine Progress Note - Hospitalist Service, GOLD TEAM 17    Date of Admission:  1/20/2022    Assessment & Plan          56 year old year old female with hx of A fibrillation, DM, GERD, HTN, HLD, BALTA is being admitted s/p Left TKA, Removal hardware on 01/20/2021      # s/p Left TKA, Hardware removal on 01/202/2021:   - Wound cares, Dressings, Surgical pain management, DVT Prophylaxis  per primary team.   - Monitor anemia, hemodynamics, Input/Output  - Encourage Incentive spirometry  - Laxatives for constipation prophylaxis  - Doing well after surgery      # Cardiac issues:  Hx of HTN, Atrial Fibrillation  PTA on Metoprolol XL, Dilitiazem, Fosinopril, Spirinolactone Dabigatran  - Continue Metoprolol XL, Diltiazem with hold parameter  - Restart PTA Fosinopril and Spironolactone as outpatient   - Ortho okay to restart PTA Dabigatran  - Renal function at baseline. Lytes stable.      # DM: On Metformin  - Restart PTA Metformin as outpatient     # BALTA: PTA on CPAP  - Monitor on pulse oximetry/capnography  - Continue CPAP     # Depression, Anxiety: PTA on Bupropion, Sertraline, Lamotrigine  - Continue        Diet: Advance Diet as Tolerated: Regular Diet Adult  Discharge Instruction - Regular Diet Adult    DVT Prophylaxis: Defer to primary service  Whalen Catheter: Not present  Central Lines: None  Cardiac Monitoring: None  Code Status: Full Code       The patient's care was discussed with the Patient.    Radames Feliz MD  Hospitalist Service, GOLD TEAM 17  [facility]  Securely message with the Vocera Web Console (learn more here)  Text page via [employer] Paging/Directory   Please see signed in provider for up to date coverage information    ____________________________________________________________________    Interval History     No acute events overnight.   She was pleasant.   Knee pain well controlled.   No  shortness of breath, nausea, vomit, fever, chills, chest pain or palpitations.   + passing gas.   Good po intake.     Data reviewed today: I reviewed all medications, new labs and imaging results over the last 24 hours. I personally reviewed no images or EKG's today.    Physical Exam   Vital Signs: Temp: 98.2  F (36.8  C) Temp src: Oral BP: 121/63 Pulse: 90   Resp: 16 SpO2: 95 % O2 Device: None (Room air) Oxygen Delivery: 1 LPM  Weight: 331 lbs 12.68 oz  General Appearance: Alert, obese, room air, no acute distress.   Respiratory: Normal respiratory effort, clear lungs.   Cardiovascular: RRR, no murmur.   GI: Soft, + BS, no tenderness to palpation.   Skin: No rash.   Other: Alert, oriented, moving all extremities. LE's well perfused, neuro vascular intact.     Data   Recent Labs   Lab 01/21/22  1118 01/21/22  0748 01/21/22  0720 01/20/22  2258 01/20/22  1950 01/20/22  1152   HGB  --   --  12.1  --   --  15.6   NA  --   --  137  --   --   --    POTASSIUM  --   --  5.1  --   --  4.4   CHLORIDE  --   --  107  --   --   --    CO2  --   --  27  --   --   --    BUN  --   --  24  --   --   --    CR  --   --  1.26*  --  1.11*  --    ANIONGAP  --   --  3  --   --   --    CHERYL  --   --  8.6  --   --   --    * 166* 171*   < >  --   --     < > = values in this interval not displayed.     Recent Results (from the past 24 hour(s))   XR Knee Port Left 1/2 Views    Narrative    EXAM: XR KNEE PORT LEFT 1/2 VIEWS  LOCATION: [facility]  DATE/TIME: 1/20/2022 5:06 PM    INDICATION: Post-Op Total Knee  COMPARISON: 09/20/2021      Impression    IMPRESSION: Status post recent left total knee arthroplasty. No immediate hardware complication. Expected postsurgical soft tissue edema, subcutaneous emphysema, and gas containing joint effusion. No acute fracture or malalignment.     Medications     lactated ringers 50 mL/hr at 01/21/22 0605       acetaminophen  975 mg Oral Q8H     buPROPion   150 mg Oral QAM     buPROPion  300 mg Oral QAM     cetirizine  10 mg Oral Daily     dabigatran ANTICOAGULANT  150 mg Oral BID     diltiazem ER  240 mg Oral Daily     gabapentin  600 mg Oral At Bedtime     insulin aspart  1-7 Units Subcutaneous TID AC     insulin aspart  1-5 Units Subcutaneous At Bedtime     lamoTRIgine  200 mg Oral Daily     metoprolol succinate ER  50 mg Oral BID     polyethylene glycol  17 g Oral Daily     senna-docusate  1 tablet Oral BID     sertraline  200 mg Oral Daily     sodium chloride (PF)  3 mL Intracatheter Q8H

## 2022-01-21 NOTE — PROGRESS NOTES
01/21/22 1300   Quick Adds   Quick Adds Certification   Type of Visit Initial Occupational Therapy Evaluation   Living Environment   People in home alone;parent(s)  (staying with mom intially, typically lives alone )   Current Living Arrangements condominium   Home Accessibility no concerns   Transportation Anticipated family or friend will provide   Living Environment Comments Patient plans to stay at mom's [place] which is 55+, has walk in shower with grab bars, shower chair,and HH shower head. Standard-taller toilet. Areas she can walk.    Self-Care   Usual Activity Tolerance fair   Current Activity Tolerance fair   Regular Exercise No   Equipment Currently Used at Home none  (has 4ww, shower chair, reacher, LH shoe horn )   Instrumental Activities of Daily Living (IADL)   IADL Comments will have assist with IADLs   Disability/Function   Hearing Difficulty or Deaf no   Wear Glasses or Blind yes   Vision Management glasses   Concentrating, Remembering or Making Decisions Difficulty no   Difficulty Communicating no   Difficulty Eating/Swallowing no   Walking or Climbing Stairs Difficulty yes   Walking or Climbing Stairs other (see comments)   Mobility Management slower pace, use of handrail    Dressing/Bathing Difficulty no  (socks were getting more difficult )   Toileting issues no   Doing Errands Independently Difficulty (such as shopping) no   Fall history within last six months no   General Information   Onset of Illness/Injury or Date of Surgery 01/20/22   Referring Physician Armand Martin MD   Patient/Family Therapy Goal Statement (OT) Home today    Additional Occupational Profile Info/Pertinent History of Current Problem s/p L TKA   Existing Precautions/Restrictions fall;other (see comments)  (no resting with pillow under L knee)   Left Lower Extremity (Weight-bearing Status) weight-bearing as tolerated (WBAT)   Cognitive Status Examination   Cognitive Status Comments no concerns   Visual Perception    Visual Impairment/Limitations corrective lenses full-time   Sensory   Sensory Quick Adds No deficits were identified   Pain Assessment   Patient Currently in Pain Yes, see Vital Sign flowsheet   Range of Motion Comprehensive   General Range of Motion bilateral upper extremity ROM WNL   Strength Comprehensive (MMT)   Comment, General Manual Muscle Testing (MMT) Assessment overall deconditioned   Coordination   Upper Extremity Coordination No deficits were identified   Bed Mobility   Comment (Bed Mobility) IND   Transfers   Transfer Comments SBA-supervision using 4ww   Lower Body Dressing Assessment   [place] Level (Lower Body Dressing) modified independence   Assistive Devices (Lower Body Dressing) sock-aid   Position (Lower Body Dressing) edge of bed sitting   Toileting   [place] Level (Toileting) supervision;verbal cues   Assistive Devices (Toileting) grab bar, toilet   Clinical Impression   Criteria for Skilled Therapeutic Interventions Met (OT) yes   OT Diagnosis Decreased functional mobility and ADLs   OT Problem List-Impairments impacting ADL pain;range of motion (ROM);strength   Assessment of Occupational Performance 3-5 Performance Deficits   Identified Performance Deficits dressing, bathing, transfers, home mgmt   Planned Therapy Interventions (OT) ADL retraining;transfer training   Clinical Decision Making Complexity (OT) low complexity   Therapy Frequency (OT) 1x eval and treat   Predicted Duration of Therapy 1 day   Anticipated Equipment Needs Upon Discharge (OT) dressing equipment;shower chair;walker, rolling   Risk & Benefits of therapy have been explained care plan/treatment goals reviewed;patient   OT Discharge Planning    OT Discharge Recommendation (DC Rec) Home with assist   OT Rationale for DC Rec Patient would benefit from assist with ADLs prn/IADLs.    OT Brief overview of current status  SBA-supervision using 4ww for transfers and mobility. Mod I to don socks using AE. Supervision  toilet task.    Therapy Certification   Start of Care Date 01/21/22   Certification date from 01/21/22   Certification date to 01/22/22   Medical Diagnosis s/p L TKA   Total Evaluation Time (Minutes)   Total Evaluation Time (Minutes) 8

## 2022-01-21 NOTE — PHARMACY-ADMISSION MEDICATION HISTORY
Admission Medication History Completed by Pharmacy    See [facility] Admission Navigator for allergy information, preferred outpatient pharmacy, prior to admission medications and immunization status.     Medication History Sources:     Patient     Dispense History    Changes made to PTA medication list (reason):    Added: None    Deleted: None    Changed:   o Dabigatran 75mg BID->150mg BID  o Sertraline 100mg->200mg    Additional Information:    Patient was unsure of dabigatran but states to take it twice daily. Dispense history is indicative of 150mg twice daily     Patient stated to take medroxyprogesterone on a 10 days off + 20 days on regimen and believes to be about 7 days into the 20 days schedule    Patient has not yet started metformin    Patient reported to take sertraline 200mg daily, this is consistent with dispense history    Prior to Admission medications    Medication Sig Last Dose Taking? Auth Provider   acetaminophen (TYLENOL) 325 MG tablet Take 2 tablets (650 mg) by mouth every 4 hours as needed for other (mild pain)  Yes Armand Martin MD   albuterol (PROAIR HFA, PROVENTIL HFA, VENTOLIN HFA) 108 (90 BASE) MCG/ACT inhaler Inhale 2 puffs into the lungs every 6 hours  Patient taking differently: Inhale 2 puffs into the lungs every 6 hours as needed  Unknown at Unknown time Yes Helen Rizzo PA-C   buPROPion (WELLBUTRIN XL) 150 MG 24 hr tablet Take 1 tablet (150 mg) by mouth every morning Take with 300 mg for total of 450 mg 1/19/2022 at 1000 Yes Nicole Castellanos PA-C   buPROPion (WELLBUTRIN XL) 300 MG 24 hr tablet Take 1 tablet (300 mg) by mouth every morning Take with 150 mg for total 450 mg 1/19/2022 at 1000 Yes Nicole Castellanos PA-C   cetirizine (ZYRTEC) 10 MG tablet Take 10 mg by mouth as needed for allergies 1/19/2022 at 2300 Yes Reported, Patient   dabigatran ANTICOAGULANT (PRADAXA) 75 MG capsule Take 150 mg by mouth 2 times daily Store in original  's bottle or blister pack; use within 120 days of opening. Past Week at Unknown time Yes Reported, Patient   diltiazem ER (DILT-XR) 240 MG 24 hr ER beaded capsule Take 1 capsule (240 mg) by mouth daily 1/20/2022 at 1000 Yes Kayleen Gomez PA-C   fosinopril (MONOPRIL) 10 MG tablet Take 1 tablet (10 mg) by mouth daily 1/19/2022 at 1000 Yes Nicole Castellanos PA-C   gabapentin (NEURONTIN) 300 MG capsule Take 600 mg by mouth At Bedtime 1/19/2022 at 2300 Yes Reported, Patient   ibuprofen (ADVIL/MOTRIN) 600 MG tablet Take 1 tablet (600 mg) by mouth every 6 hours as needed (mild)  Yes Armand Martin MD   lamoTRIgine (LAMICTAL) 100 MG tablet Take 200 mg by mouth daily  1/19/2022 at 1000 Yes Reported, Patient   medroxyPROGESTERone (PROVERA) 10 MG tablet Take 10 mg by mouth daily 1/19/2022 at 2300 Yes Reported, Patient   metoprolol succinate ER (TOPROL-XL) 50 MG 24 hr tablet Take 1 tablet (50 mg) by mouth 2 times daily 1/20/2022 at 2300 Yes Kayleen Gomez PA-C   nystatin (MYCOSTATIN) 432151 UNIT/GM external cream Apply to affected area tid until rash resolves, could be up to 3 weeks. More than a month at Unknown time Yes Nicole Castellanos PA-C   oxyCODONE (ROXICODONE) 5 MG tablet Take 1-2 tablets (5-10 mg) by mouth every 3 hours as needed for pain (Moderate to Severe)  Yes Armand Martin MD   polyethylene glycol (MIRALAX) 17 g packet Take 17 g by mouth daily  Yes Armand Martin MD   senna-docusate (SENOKOT-S/PERICOLACE) 8.6-50 MG tablet Take 1-2 tablets by mouth 2 times daily Take while on oral narcotics to prevent or treat constipation.  Yes Armand Martin MD   sertraline (ZOLOFT) 100 MG tablet Take 200 mg by mouth daily 2 tabs (200mg) daily  1/19/2022 at 1000 Yes Reported, Patient   spironolactone (ALDACTONE) 25 MG tablet Take 1 tablet (25 mg) by mouth daily 1/19/2022 at 1000 Yes Kayleen Gomez PA-C   tiZANidine (ZANAFLEX) 2 MG tablet Take 1  tablet (2 mg) by mouth 3 times daily More than a month at Unknown time Yes Nicole Castellanos PA-C   metFORMIN (GLUCOPHAGE-XR) 500 MG 24 hr tablet Take 1 tablet (500 mg) by mouth daily (with dinner)   Nicole Castellanos PA-C       Date completed: 01/20/22    Medication history completed by: АННА CRUZ [facility]

## 2022-01-21 NOTE — PROGRESS NOTES
"  Orthopaedic Surgery Daily Progress Note     Subjective: Mary Gorman is a 56 year old female POD # 1 s/p left TKA  Pain is well  controlled. Tolerating an oral diet.  No fever, chills. No chest pain or shortness of breath. No abdominal pain, nausea, vomiting. No diarrhea or constipation. No urinary difficulties.     Physical Exam   /59 (BP Location: Right arm)   Pulse 82   Temp 97.2  F (36.2  C) (Oral)   Resp 18   Ht 1.651 m (5' 5\")   Wt (!) 150.5 kg (331 lb 12.7 oz)   SpO2 93%   BMI 55.21 kg/m      Intake/Output Summary (Last 24 hours) at 1/21/2022 0555  Last data filed at 1/20/2022 2154  Gross per 24 hour   Intake 1585 ml   Output 100 ml   Net 1485 ml     General: Alert, well-appearing  in no acute distress.  Respiratory: Non-labored breathing.   Cardiovascular: Extremities warm and well perfused   Extremities: moving all four extremities.   LLE:  -Sens: SILT dp/sp/s/s/t  -Motor: 5/5 EHL/FHL/TA/GSc  -Vasc: 2+ pulses, wwp, brisk cap refill    Dressing CDI    Skin: As noted above. No rashes or lesions appreciated.    Labs    Complete Blood Count   Recent Labs   Lab 01/20/22  1152   HGB 15.6     Basic Metabolic Panel  Recent Labs   Lab 01/20/22  2258 01/20/22  1950 01/20/22  1152 01/20/22  1136   POTASSIUM  --   --  4.4  --    CR  --  1.11*  --   --    *  --   --  128*     Coagulation Profile  No lab results found in last 7 days.    Assessment/Plan:  Assessment and Plan:  Mary Gorman is a 56 year old female  s/p left TKA on 1/20 with Dr. Martin      Orthopedic Surgery Primary  Activity: Up with assist until independent. Knee ROM as tolerated.   Weight bearing status: WBAT.  Pain management: Transition from IV to PO as tolerated.    Antibiotics: Ancef x 24 hours  Diet: Begin with clear fluids and progress diet as tolerated.   DVT prophylaxis: Restart pradaxa 150 mg BID   Imaging: completed   Labs: Monitor Hgb   Bracing/Splinting: None.   Dressings: Keep clean, dry and intact "   Elevation: Elevate LLE on pillows to keep above the level of the heart as much as possible - no pillows under knee.   Physical Therapy/Occupational Therapy: Eval and treat.  Consults: IM,  Follow-up: Clinic 2 weeks  Disposition: likely home in 1-3 days pending PT     Nnamdi Tai MD   Orthopaedic Surgery Resident, PGY-4  P: [phone]    Discussed with Dr. Martin

## 2022-01-21 NOTE — PLAN OF CARE
VSS. AOx4. CPAP use overnight. Denies SOB & CP. Ax1 with walker and gait belt to BR. Continent of bowel and bladder. LBM 1/19/22- prior to surgery. Pt reports passing flatus. Skin intact ex incision to L knee. Dressing CDI. Pt c/o pain in L knee; managed with toradol and PRN oxy. Baseline neuropathy in bilateral heels. L arm PIV SL. L forearm PIV infusing LR at 50 mL/hr. Call light within reach and pt able to make needs known. Continue with POC.

## 2022-01-21 NOTE — PLAN OF CARE
"  VS: /63 (BP Location: Right arm)   Pulse 90   Temp 98.2  F (36.8  C) (Oral)   Resp 16   Ht 1.651 m (5' 5\")   Wt (!) 150.5 kg (331 lb 12.7 oz)   SpO2 95%   BMI 55.21 kg/m     O2: 95% on Room Air.  Patient denies any shortness of breath.  Lung sounds are clear & equal bilaterally.     Output: Patient voids spontaneously in bathroom.   Last BM: 1/19/22   Activity: Up with assist x1, walker, and gait belt.    Up for meals? Up in bed for meals.     Skin: Skin is clean, warm, dry, and intact.  ACE wrap in place, JM skin of left leg.     Pain: Patient reports pain of 2 out of 10 of left knee. Patient given oxycodone 5mg prior to PT.    CMS: Patient is A/O x4.  Patient denies any overall numbness or tingling.  Patient states she has baseline numbness of left heel.     Dressing: Patient has small amount of blood on dressing at proximal and distal ends of the dressing.    Diet: Regular diet, BG checks prior to meals.  Patient received 1 unit of Rapid Insulin before breakfast. ()   LDA: Left arm & Left hand PIV, both saline locked & removed prior to discharge.    Equipment: IV Pole, capno, patient's walker, gait belt, patient's CPAP machine, patient's personal belongings.    Plan: Patient to discharge to home after PT & OT clears for discharge.    Additional Info:      DISCHARGE SUMMARY    Pt discharging to:  [facility].   Transportation:  Patient's nephew Yuniel will be picking her up.   AVS given and discussed:  Completed by Michelle PURVIS RN.   Stoplight Tool given and discussed:  Completed by Michelle PURVIS RN.   Medications given: Completed by Michelle PURVIS RN.   Belongings returned: All patient belongings given to patient upon discharge.   Comments:         "

## 2022-01-21 NOTE — PLAN OF CARE
Occupational Therapy Discharge Summary    Reason for therapy discharge:    All goals and outcomes met, no further needs identified.    Progress towards therapy goal(s). See goals on Care Plan in [facility] electronic health record for goal details.  Goals met    Therapy recommendation(s):    No further therapy is recommended. Home with assist as needed.

## 2022-01-24 ENCOUNTER — THERAPY VISIT (OUTPATIENT)
Dept: PHYSICAL THERAPY | Facility: CLINIC | Age: 57
End: 2022-01-24
Attending: STUDENT IN AN ORGANIZED HEALTH CARE EDUCATION/TRAINING PROGRAM
Payer: COMMERCIAL

## 2022-01-24 ENCOUNTER — PATIENT OUTREACH (OUTPATIENT)
Dept: NURSING | Facility: CLINIC | Age: 57
End: 2022-01-24
Payer: COMMERCIAL

## 2022-01-24 DIAGNOSIS — Z47.89 ORTHOPEDIC AFTERCARE: ICD-10-CM

## 2022-01-24 DIAGNOSIS — M17.12 OSTEOARTHROSIS, LOCALIZED, PRIMARY, KNEE, LEFT: ICD-10-CM

## 2022-01-24 DIAGNOSIS — Z47.1 AFTERCARE FOLLOWING LEFT KNEE JOINT REPLACEMENT SURGERY: ICD-10-CM

## 2022-01-24 DIAGNOSIS — Z96.652 S/P TOTAL KNEE ARTHROPLASTY, LEFT: ICD-10-CM

## 2022-01-24 DIAGNOSIS — Z96.652 AFTERCARE FOLLOWING LEFT KNEE JOINT REPLACEMENT SURGERY: ICD-10-CM

## 2022-01-24 PROCEDURE — 97161 PT EVAL LOW COMPLEX 20 MIN: CPT | Mod: GP | Performed by: PHYSICAL THERAPIST

## 2022-01-24 PROCEDURE — 97110 THERAPEUTIC EXERCISES: CPT | Mod: GP | Performed by: PHYSICAL THERAPIST

## 2022-01-24 PROCEDURE — 97010 HOT OR COLD PACKS THERAPY: CPT | Mod: GP | Performed by: PHYSICAL THERAPIST

## 2022-01-24 ASSESSMENT — ACTIVITIES OF DAILY LIVING (ADL)
HOW_WOULD_YOU_RATE_THE_CURRENT_FUNCTION_OF_YOUR_KNEE_DURING_YOUR_USUAL_DAILY_ACTIVITIES_ON_A_SCALE_FROM_0_TO_100_WITH_100_BEING_YOUR_LEVEL_OF_KNEE_FUNCTION_PRIOR_TO_YOUR_INJURY_AND_0_BEING_THE_INABILITY_TO_PERFORM_ANY_OF_YOUR_USUAL_DAILY_ACTIVITIES?: 50
KNEEL ON THE FRONT OF YOUR KNEE: I AM UNABLE TO DO THE ACTIVITY
PAIN: THE SYMPTOM AFFECTS MY ACTIVITY MODERATELY
AS_A_RESULT_OF_YOUR_KNEE_INJURY,_HOW_WOULD_YOU_RATE_YOUR_CURRENT_LEVEL_OF_DAILY_ACTIVITY?: ABNORMAL
SIT WITH YOUR KNEE BENT: ACTIVITY IS FAIRLY DIFFICULT
WEAKNESS: THE SYMPTOM AFFECTS MY ACTIVITY SLIGHTLY
RAW_SCORE: 26
SQUAT: I AM UNABLE TO DO THE ACTIVITY
SWELLING: THE SYMPTOM AFFECTS MY ACTIVITY SLIGHTLY
GO UP STAIRS: I AM UNABLE TO DO THE ACTIVITY
STAND: ACTIVITY IS SOMEWHAT DIFFICULT
HOW_WOULD_YOU_RATE_THE_OVERALL_FUNCTION_OF_YOUR_KNEE_DURING_YOUR_USUAL_DAILY_ACTIVITIES?: ABNORMAL
KNEE_ACTIVITY_OF_DAILY_LIVING_SCORE: 37.14
LIMPING: THE SYMPTOM AFFECTS MY ACTIVITY MODERATELY
STIFFNESS: THE SYMPTOM AFFECTS MY ACTIVITY MODERATELY
GO DOWN STAIRS: I AM UNABLE TO DO THE ACTIVITY
GIVING WAY, BUCKLING OR SHIFTING OF KNEE: I HAVE THE SYMPTOM BUT IT DOES NOT AFFECT MY ACTIVITY
WALK: ACTIVITY IS FAIRLY DIFFICULT
KNEE_ACTIVITY_OF_DAILY_LIVING_SUM: 26
RISE FROM A CHAIR: ACTIVITY IS SOMEWHAT DIFFICULT
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
## 2022-01-24 NOTE — PROGRESS NOTES
Clinic Care Coordination Contact  [facility]: Post-Discharge Note  SITUATION                                                      Admission:    Admission Date: 01/20/22   Reason for Admission: Status post left total knee arthroplasty  Discharge:   Discharge Date: 01/21/22  Discharge Diagnosis: Status post left total knee arthroplasty    BACKGROUND                                                      56-year-old female with chronic left knee pain and instability due to end-stage osteoarthritic changes in all 3 compartments.  Insufficiently responding to nonoperative treatment measures.  she had tried multiple non-operative modalities without sustained relief.     ASSESSMENT      Enrollment  Primary Care Care Coordination Status: Declined    Discharge Assessment  How are your symptoms? (Red Flag symptoms escalate to triage hotline per guidelines): Improved (Patient is experieincing pain, soreness, and discomfort)  Do you feel your condition is stable enough to be safe at home until your provider visit?: Yes  Does the patient have their discharge instructions? : Yes  Does the patient have questions regarding their discharge instructions? : No  Were you started on any new medications or were there changes to any of your previous medications? : Yes  Does the patient have all of their medications?: Yes  Do you have questions regarding any of your medications? : No  Do you have all of your needed medical supplies or equipment (DME)?  (i.e. oxygen tank, CPAP, cane, etc.): Yes  Discharge follow-up appointment scheduled within 14 calendar days? : Yes  Discharge Follow Up Appointment Date: 02/08/22  Discharge Follow Up Appointment Scheduled with?: Specialty Care Provider              Care Management   Community Health Worker Initial Outreach    CHW Initial Information Gathering:  Referral Source: IP Handoff  Equipment Currently Used at Home: walker, standard,cane, straight,crutches  Informal Support system:: Family  No  "PCP office visit in Past Year: Yes       Patient accepts CC: No, the patient declined Care Coordination. Patient will be sent Care Coordination introduction letter for future reference.     1-24, CHW:    The CHW introduced self and Care Coordination.    The patient described feeling pain and discomfort. The CHW highlighted the \"After Care\" component of the patient's Discharge Summary as to what can be expected post surgery and how to alleviate that with ice and elevation. The CHW encouraed the patient to continue referencing their discharge instructions; the patient agreed.     The patient is currently staying with their mom and feels well supported at this time. The CHW encouraged the patient to reach out to CC in the future with any questions or concerns; the patient agreed.     PLAN                                                      Outpatient Plan:  PT Rehab and Surgeon Team return visits.    Future Appointments   Date Time Provider Department Center   1/24/2022  1:50 PM [facility]   1/27/2022 12:30 PM Dinorah Ruiz PT [facility]   2/8/2022 11:00 AM Armand Martin MD [facility]   3/8/2022 11:00 AM Armand Martin MD [facility]         For any urgent concerns, please contact our 24 hour nurse triage line: [phone] ([phone])         IVY Da Silva. Public Health  Community Health Worker  [facility]  Clinic Care Coordination  [phone]  "

## 2022-01-24 NOTE — LETTER
[facility] CARE COORDINATION  [facility]  January 24, 2022    Mary Gorman  [address] [address] [address]      Dear Mary,    I am a clinic community health worker who works with Nicole Castellanos PA-C at the [facility]. I wanted to introduce myself and provide you with my contact information for you to be able to call me with any questions or concerns. Below is a description of clinic care coordination and how I can further assist you.      The clinic care coordination team is made up of a registered nurse,  and community health worker who understand the health care system. The goal of clinic care coordination is to help you manage your health and improve access to the health care system in the most efficient manner. The team can assist you in meeting your health care goals by providing education, coordinating services, strengthening the communication among your providers and supporting you with any resource needs.    Please feel free to contact me at [phone] with any questions or concerns. We are focused on providing you with the highest-quality healthcare experience possible and that all starts with you.     Sincerely,     IVY Da Silva. Public Health  Community Health Worker  [facility]  Clinic Care Coordination  [phone]

## 2022-01-26 ENCOUNTER — TELEPHONE (OUTPATIENT)
Dept: ORTHOPEDICS | Facility: CLINIC | Age: 57
End: 2022-01-26
Payer: COMMERCIAL

## 2022-01-26 NOTE — TELEPHONE ENCOUNTER
Phone call to patient and informed of Dr. Martin concurring with recommendations provided previously.   Asked that he call if drainage significantly worsens. Appointment moved up to 2/1/22 in [place] with Dr. Martin. She verbalized understanding.     SOHEILA Walton RN

## 2022-01-26 NOTE — TELEPHONE ENCOUNTER
Patient had L TKA on 1/20/22 by Dr. Martin at [facility].     Phone call to patient. She states the swelling has stayed about the same.   However, she took her ACE bandage off on 1/23/22 stating it was uncomfortable.   There was some dark reddish drainage on her dressing (sounds like a Tegaderm + Alginate waterproof dressing) that has been spreading. It is approximately 1/2 saturated mostly at the top of the dressing and part has spread past the bend in her knee. She marked the drainage, and it has spread approximately dime or nickel sized since yesterday.   She denies any new injury or fall.     Discussed that the drainage could be wicking through the dressing or due to not wearing an ACE bandage for compression.     She is managing pain well mostly with Ibuprofen and Tylenol, using Oxycodone sparingly.   She states she has been fairly active with doing her exercises throughout the day and walking in her home several times a day. She is icing after each walk or set of exercises. She reports that she has been elevating approximately only 2 hrs a day, otherwise, when at rest is sitting with feet/legs in a dependent position.     Recommended that she elevate her surgical leg anytime she is at rest above heart level. Also recommended she continue the ACE wrap for swelling control. Discussed starting at the foot and wrapping to mid thigh.     Will discuss drainage with provider and get back with her since she is post op day #6.     Ok to leave message : YES    Please advise if drainage sounds normal at this point and if you concur with recommendations provided. Please advise if there are further recommendations or if you want the dressing to be changed.     SOHEILA Walton RN

## 2022-01-26 NOTE — TELEPHONE ENCOUNTER
Reason for call:  Patient had left TKA and experiencing increased swelling and bleeding after surgery.    Cell number on file:    Telephone Information:   Mobile [phone]

## 2022-01-26 NOTE — TELEPHONE ENCOUNTER
Per Dr. Martin :      I agree with your plan of elevation and follow-up closely for now.  If the draining increases we should see her back in clinic

## 2022-01-27 ENCOUNTER — THERAPY VISIT (OUTPATIENT)
Dept: PHYSICAL THERAPY | Facility: CLINIC | Age: 57
End: 2022-01-27
Payer: COMMERCIAL

## 2022-01-27 DIAGNOSIS — Z47.1 AFTERCARE FOLLOWING LEFT KNEE JOINT REPLACEMENT SURGERY: ICD-10-CM

## 2022-01-27 DIAGNOSIS — Z96.652 AFTERCARE FOLLOWING LEFT KNEE JOINT REPLACEMENT SURGERY: ICD-10-CM

## 2022-01-27 PROCEDURE — 97530 THERAPEUTIC ACTIVITIES: CPT | Mod: GP | Performed by: PHYSICAL THERAPIST

## 2022-01-27 PROCEDURE — 97110 THERAPEUTIC EXERCISES: CPT | Mod: GP | Performed by: PHYSICAL THERAPIST

## 2022-01-31 ENCOUNTER — TELEPHONE (OUTPATIENT)
Dept: ORTHOPEDICS | Facility: CLINIC | Age: 57
End: 2022-01-31

## 2022-01-31 NOTE — TELEPHONE ENCOUNTER
Reason for call:  Calling to update on drainage of her wound from tka. Drainage is increasing. Wants to know if there is anything she needs to do before her appointment tomorrow 02/01/22.  Phone number to reach patient:  Cell number on file:    Telephone Information:   Mobile [phone]       Best Time: any    Can we leave a detailed message on this number?  NO

## 2022-02-01 ENCOUNTER — OFFICE VISIT (OUTPATIENT)
Dept: ORTHOPEDICS | Facility: CLINIC | Age: 57
End: 2022-02-01
Payer: COMMERCIAL

## 2022-02-01 VITALS — SYSTOLIC BLOOD PRESSURE: 112 MMHG | DIASTOLIC BLOOD PRESSURE: 66 MMHG

## 2022-02-01 DIAGNOSIS — Z96.652 S/P TOTAL KNEE ARTHROPLASTY, LEFT: Primary | ICD-10-CM

## 2022-02-01 PROCEDURE — 99024 POSTOP FOLLOW-UP VISIT: CPT | Performed by: STUDENT IN AN ORGANIZED HEALTH CARE EDUCATION/TRAINING PROGRAM

## 2022-02-01 ASSESSMENT — KOOS JR
STRAIGHTENING KNEE FULLY: MILD
GOING UP OR DOWN STAIRS: MODERATE
STANDING UPRIGHT: MILD
TWISING OR PIVOTING ON KNEE: MODERATE
BENDING TO THE FLOOR TO PICK UP OBJECT: MILD
HOW SEVERE IS YOUR KNEE STIFFNESS AFTER FIRST WAKING IN MORNING: MILD
RISING FROM SITTING: MILD
KOOS JR SCORING: 63.78

## 2022-02-01 NOTE — PROGRESS NOTES
[facility]  Orthopaedic Surgery Consultation by Armand Martin M.D.    Mary Gorman MRN# [id number]   Age: 56 year old YOB: 1965     Requesting physician: No ref. provider found  iNcole Castellanos     Background history:  DX:  1. Lumbago  2. OSAS  3. GERD  4. Morbid obesity  5. Hyperlipidemia  6. Prediabetes  7. Hypertension  8. Chronic kidney disease stage III  9. Atrial fibrillation on Pradaxa  10. Depressive disorder    TREATMENTS:  1. 11/5/2007, laparoscopic cholecystectomy, Dr. Buckley  2. 1/20/2022, left total knee arthroplasty with removal of hardware, Dr. Martin           History of Present Illness:   56 year old female who is 2 weeks status post left total knee arthroplasty with removal of hardware by Dr. Martin.  Overall she states she is doing very well and her pain is well controlled with Tylenol.  She has stopped taking her oxycodone.  She is taking Pradaxa for DVT prophylaxis.  She is concerned about drainage she noted 2 days ago from the incision.  Her surgical dressings have remained in place and the drainage has started seeping from the bottom of the dressings.  Due to the drainage she has stopped doing flexion exercises in physical therapy.  She is currently ambulating with a walker.  She denies any chest pain shortness of breath balance fever or calf pain.    Social:   Occupation: nurse  Living situation: alone   Hobbies / Sports: everything    Smoking: No  Alcohol: 1 x a year  Illicit drug use: No         Physical Exam:     EXAMINATION pertinent findings:   PSYCH: Pleasant, healthy-appearing, alert, oriented x3, cooperative. Normal mood and affect.  VITAL SIGNS: not currently breastfeeding.  Reviewed nursing intake notes.   There is no height or weight on file to calculate BMI.  RESP: non labored breathing   ABD: benign, soft, non-tender, no acute peritoneal findings  SKIN: grossly normal   LYMPHATIC: grossly normal, no adenopathy, no  extremity edema  NEURO: grossly normal , no motor deficits  VASCULAR: satisfactory perfusion of all extremities   MUSCULOSKELETAL:   Alignment: Neutral      L knee: Incision is clean and intact.  Very small area on the superior side of the incision where she has minimal serosanguineous drainage.    There is no erythema surrounding the incision and no dehiscence.  The area surrounding the incision is moderate maceration secondary from the moisture of the dressing. Flexion/extension 80-0-0.  Ligamentously stable in both AP and ML direction.  Normal patellofemoral tracking without significant crepitus.  Calf soft and non-tender with no signs of DVT.    Left LE:   Thigh and leg compartments soft and compressible   +Quad/TA/GSC/FHL/EHL   SILT DP/SP/Ziyad/Saph/Tib nerve distributions   Palpable dorsalis pedis pulse              Data:   All laboratory data reviewed  All imaging studies reviewed by me personally.         Assessment and Plan:   Assessment:  56-year-old female with chronic left knee pain and instability due to end-stage osteoarthritic changes in all 3 compartments.  Insufficiently responding to nonoperative treatment measures who underwent a left total knee arthroplasty with Dr. Martin on 1/20/2022     Plan:  I extensively discussed my findings with the patient.  We discussed the intraoperative findings and today's findings.  Patient will continue to work with physical therapy on range of motion, strengthening and gait training.  Patient will continue his DVT prophylaxis.  Suture tails were removed and new steri strips placed.  A 4 x 4 was placed over the area of incisional drainage and a new Ace wrap was applied.  Explained that now that the dressing is removed incision should dry up the drainage should stop.  She notes any saturation of the Ace wrap and instructed her to move it else I hope this remains in place for 3 to 5 days.  I reinforced importance of elevation and icing for edema control.  If she  notes any redness or signs of infection in structure her to call us at this point I have no concerns for cellulitis around incision.  All questions were answered.  Patient understands and agrees to the treatment plan as set forth.  We will follow-up with patient at the 6-week postoperative date with renewed radiographic imaging studies.    Ta Iyer PA-C  2/1/2022 12:20 PM  Physician Assistant   Oncology and Adult Reconstructive Surgery  Dept Orthopaedic Surgery, [facility]     Thank you for allowing me to participate in this patient's care. Please page me directly any questions/concerns.   Securely message with the Vocera Web Console (learn more here)  Text page via [employer] Paging/Directory    If there is no response, if it is a weekend, or if it is during evening hours, please page the orthopaedic surgery resident on call via [id number] Paging/Directory

## 2022-02-01 NOTE — PATIENT INSTRUCTIONS
Continue with Physical Therapy.     Follow up with Dr. Martin in 4 weeks as scheduled 3/8/22.     Call my office with any questions or concerns, [phone].

## 2022-02-01 NOTE — LETTER
2/1/2022         RE: Mary Gorman  [address]  [place] [address]        Dear Colleague,    Thank you for referring your patient, Mary Gorman, to the [facility] [place]. Please see a copy of my visit note below.        [facility]  Orthopaedic Surgery Consultation by Armand Martin M.D.    Mary Gorman MRN# [id number]   Age: 56 year old YOB: 1965     Requesting physician: No ref. provider found  Nicole Castellanos     Background history:  DX:  1. Lumbago  2. OSAS  3. GERD  4. Morbid obesity  5. Hyperlipidemia  6. Prediabetes  7. Hypertension  8. Chronic kidney disease stage III  9. Atrial fibrillation on Pradaxa  10. Depressive disorder    TREATMENTS:  1. 11/5/2007, laparoscopic cholecystectomy, Dr. Buckley  2. 1/20/2022, left total knee arthroplasty with removal of hardware, Dr. Martin           History of Present Illness:   56 year old female who is 2 weeks status post left total knee arthroplasty with removal of hardware by Dr. Martin.  Overall she states she is doing very well and her pain is well controlled with Tylenol.  She has stopped taking her oxycodone.  She is taking Pradaxa for DVT prophylaxis.  She is concerned about drainage she noted 2 days ago from the incision.  Her surgical dressings have remained in place and the drainage has started seeping from the bottom of the dressings.  Due to the drainage she has stopped doing flexion exercises in physical therapy.  She is currently ambulating with a walker.  She denies any chest pain shortness of breath balance fever or calf pain.    Social:   Occupation: nurse  Living situation: alone   Hobbies / Sports: everything    Smoking: No  Alcohol: 1 x a year  Illicit drug use: No         Physical Exam:     EXAMINATION pertinent findings:   PSYCH: Pleasant, healthy-appearing, alert, oriented x3, cooperative. Normal mood and affect.  VITAL SIGNS: not currently  breastfeeding.  Reviewed nursing intake notes.   There is no height or weight on file to calculate BMI.  RESP: non labored breathing   ABD: benign, soft, non-tender, no acute peritoneal findings  SKIN: grossly normal   LYMPHATIC: grossly normal, no adenopathy, no extremity edema  NEURO: grossly normal , no motor deficits  VASCULAR: satisfactory perfusion of all extremities   MUSCULOSKELETAL:   Alignment: Neutral      L knee: Incision is clean and intact.  Very small area on the superior side of the incision where she has minimal serosanguineous drainage.    There is no erythema surrounding the incision and no dehiscence.  The area surrounding the incision is moderate maceration secondary from the moisture of the dressing. Flexion/extension 80-0-0.  Ligamentously stable in both AP and ML direction.  Normal patellofemoral tracking without significant crepitus.  Calf soft and non-tender with no signs of DVT.    Left LE:   Thigh and leg compartments soft and compressible   +Quad/TA/GSC/FHL/EHL   SILT DP/SP/Ziyad/Saph/Tib nerve distributions   Palpable dorsalis pedis pulse              Data:   All laboratory data reviewed  All imaging studies reviewed by me personally.         Assessment and Plan:   Assessment:  56-year-old female with chronic left knee pain and instability due to end-stage osteoarthritic changes in all 3 compartments.  Insufficiently responding to nonoperative treatment measures who underwent a left total knee arthroplasty with Dr. Martin on 1/20/2022     Plan:  I extensively discussed my findings with the patient.  We discussed the intraoperative findings and today's findings.  Patient will continue to work with physical therapy on range of motion, strengthening and gait training.  Patient will continue his DVT prophylaxis.  Suture tails were removed and new steri strips placed.  A 4 x 4 was placed over the area of incisional drainage and a new Ace wrap was applied.  Explained that now that the  dressing is removed incision should dry up the drainage should stop.  She notes any saturation of the Ace wrap and instructed her to move it else I hope this remains in place for 3 to 5 days.  I reinforced importance of elevation and icing for edema control.  If she notes any redness or signs of infection in structure her to call us at this point I have no concerns for cellulitis around incision.  All questions were answered.  Patient understands and agrees to the treatment plan as set forth.  We will follow-up with patient at the 6-week postoperative date with renewed radiographic imaging studies.    Ta Iyer PA-C  2/1/2022 12:20 PM  Physician Assistant   Oncology and Adult Reconstructive Surgery  Dept Orthopaedic Surgery, [facility]     Thank you for allowing me to participate in this patient's care. Please page me directly any questions/concerns.   Securely message with the Vocera Web Console (learn more here)  Text page via [employer] Paging/Directory    If there is no response, if it is a weekend, or if it is during evening hours, please page the orthopaedic surgery resident on call via [facility] Paging/Directory          Again, thank you for allowing me to participate in the care of your patient.        Sincerely,        Armand Martin MD

## 2022-02-02 ENCOUNTER — THERAPY VISIT (OUTPATIENT)
Dept: PHYSICAL THERAPY | Facility: CLINIC | Age: 57
End: 2022-02-02
Payer: COMMERCIAL

## 2022-02-02 DIAGNOSIS — Z96.652 AFTERCARE FOLLOWING LEFT KNEE JOINT REPLACEMENT SURGERY: ICD-10-CM

## 2022-02-02 DIAGNOSIS — Z47.1 AFTERCARE FOLLOWING LEFT KNEE JOINT REPLACEMENT SURGERY: ICD-10-CM

## 2022-02-02 PROCEDURE — 97110 THERAPEUTIC EXERCISES: CPT | Mod: GP | Performed by: PHYSICAL THERAPIST

## 2022-02-02 PROCEDURE — 97530 THERAPEUTIC ACTIVITIES: CPT | Mod: GP | Performed by: PHYSICAL THERAPIST

## 2022-02-07 ENCOUNTER — THERAPY VISIT (OUTPATIENT)
Dept: PHYSICAL THERAPY | Facility: CLINIC | Age: 57
End: 2022-02-07
Payer: COMMERCIAL

## 2022-02-07 DIAGNOSIS — Z96.652 AFTERCARE FOLLOWING LEFT KNEE JOINT REPLACEMENT SURGERY: ICD-10-CM

## 2022-02-07 DIAGNOSIS — Z47.1 AFTERCARE FOLLOWING LEFT KNEE JOINT REPLACEMENT SURGERY: ICD-10-CM

## 2022-02-07 PROCEDURE — 97110 THERAPEUTIC EXERCISES: CPT | Mod: GP | Performed by: PHYSICAL THERAPIST

## 2022-02-10 ENCOUNTER — THERAPY VISIT (OUTPATIENT)
Dept: PHYSICAL THERAPY | Facility: CLINIC | Age: 57
End: 2022-02-10
Payer: COMMERCIAL

## 2022-02-10 DIAGNOSIS — Z96.652 AFTERCARE FOLLOWING LEFT KNEE JOINT REPLACEMENT SURGERY: ICD-10-CM

## 2022-02-10 DIAGNOSIS — Z47.1 AFTERCARE FOLLOWING LEFT KNEE JOINT REPLACEMENT SURGERY: ICD-10-CM

## 2022-02-10 PROCEDURE — 97110 THERAPEUTIC EXERCISES: CPT | Mod: GP | Performed by: PHYSICAL THERAPIST

## 2022-02-14 ENCOUNTER — THERAPY VISIT (OUTPATIENT)
Dept: PHYSICAL THERAPY | Facility: CLINIC | Age: 57
End: 2022-02-14
Payer: COMMERCIAL

## 2022-02-14 DIAGNOSIS — Z47.1 AFTERCARE FOLLOWING LEFT KNEE JOINT REPLACEMENT SURGERY: ICD-10-CM

## 2022-02-14 DIAGNOSIS — Z96.652 AFTERCARE FOLLOWING LEFT KNEE JOINT REPLACEMENT SURGERY: ICD-10-CM

## 2022-02-14 PROCEDURE — 97110 THERAPEUTIC EXERCISES: CPT | Mod: GP | Performed by: PHYSICAL THERAPIST

## 2022-02-17 ENCOUNTER — THERAPY VISIT (OUTPATIENT)
Dept: PHYSICAL THERAPY | Facility: CLINIC | Age: 57
End: 2022-02-17
Payer: COMMERCIAL

## 2022-02-17 DIAGNOSIS — Z47.1 AFTERCARE FOLLOWING LEFT KNEE JOINT REPLACEMENT SURGERY: ICD-10-CM

## 2022-02-17 DIAGNOSIS — Z96.652 AFTERCARE FOLLOWING LEFT KNEE JOINT REPLACEMENT SURGERY: ICD-10-CM

## 2022-02-17 PROCEDURE — 97110 THERAPEUTIC EXERCISES: CPT | Mod: GP | Performed by: PHYSICAL THERAPIST

## 2022-02-19 ENCOUNTER — HEALTH MAINTENANCE LETTER (OUTPATIENT)
Age: 57
End: 2022-02-19

## 2022-02-21 ENCOUNTER — THERAPY VISIT (OUTPATIENT)
Dept: PHYSICAL THERAPY | Facility: CLINIC | Age: 57
End: 2022-02-21
Payer: COMMERCIAL

## 2022-02-21 DIAGNOSIS — Z96.652 AFTERCARE FOLLOWING LEFT KNEE JOINT REPLACEMENT SURGERY: ICD-10-CM

## 2022-02-21 DIAGNOSIS — Z47.1 AFTERCARE FOLLOWING LEFT KNEE JOINT REPLACEMENT SURGERY: ICD-10-CM

## 2022-02-21 PROCEDURE — 97110 THERAPEUTIC EXERCISES: CPT | Mod: GP | Performed by: PHYSICAL THERAPIST

## 2022-02-28 ENCOUNTER — THERAPY VISIT (OUTPATIENT)
Dept: PHYSICAL THERAPY | Facility: CLINIC | Age: 57
End: 2022-02-28
Payer: COMMERCIAL

## 2022-02-28 DIAGNOSIS — Z47.1 AFTERCARE FOLLOWING LEFT KNEE JOINT REPLACEMENT SURGERY: ICD-10-CM

## 2022-02-28 DIAGNOSIS — Z96.652 AFTERCARE FOLLOWING LEFT KNEE JOINT REPLACEMENT SURGERY: ICD-10-CM

## 2022-02-28 PROCEDURE — 97110 THERAPEUTIC EXERCISES: CPT | Mod: GP | Performed by: PHYSICAL THERAPIST

## 2022-03-07 ENCOUNTER — THERAPY VISIT (OUTPATIENT)
Dept: PHYSICAL THERAPY | Facility: CLINIC | Age: 57
End: 2022-03-07
Payer: COMMERCIAL

## 2022-03-07 DIAGNOSIS — Z96.652 AFTERCARE FOLLOWING LEFT KNEE JOINT REPLACEMENT SURGERY: ICD-10-CM

## 2022-03-07 DIAGNOSIS — Z47.1 AFTERCARE FOLLOWING LEFT KNEE JOINT REPLACEMENT SURGERY: ICD-10-CM

## 2022-03-07 PROCEDURE — 97110 THERAPEUTIC EXERCISES: CPT | Mod: GP | Performed by: PHYSICAL THERAPIST

## 2022-03-07 NOTE — PROGRESS NOTES
"Subjective:  HPI  Physical Exam                    Objective:  System    Physical Exam    General     ROS    Assessment/Plan:    PROGRESS  REPORT    Progress reporting period is from 1-24-22 to 3-7-22, 10 visits.       SUBJECTIVE   Improving.  Only pain is with stretching the knee.  Using SEC vs walker, no AD for short in-home distances.  Has been able to do wallslide now that can get to the downstairs bedroom.  Able to drive without issue, now can get into car almost normally (without sitting and then rotating into car).  Stairs nonciprocal up and down, railing and SEC up (leads with left) harder than going down (leads with right) and railing.  Continues to have \"click\" left knee with each step, has had since surgery.     Current Pain level: 0/10.      Initial Pain level:  (3-8/10).   Changes in function:  Yes (See Goal flowsheet attached for changes in current functional level)  Adverse reaction to treatment or activity: None    OBJECTIVE  Ambulating with SEC on the right.  Good liana.  Able to transfer sit-stand with minimal UE assist.  AROM left knee in sitting 10-91 degrees, PROM to 99, knee extension supine to 0.    Unable to do SLS on the left without light UE assist due to weakness.    Able to do 4\" step up on left with min bilateral UE assist.   Minimal quad lag left with SLR flexion.      ASSESSMENT/PLAN  Updated problem list and treatment plan: Diagnosis 1:  S/p left TKA    Decreased ROM/flexibility - therapeutic exercise and home program  Decreased strength - therapeutic exercise and home program  STG/LTGs have been met or progress has been made towards goals:  Yes (See Goal flow sheet completed today.)  Assessment of Progress: The patient's condition is improving.  Self Management Plans:  Patient is independent in a home treatment program.      Recommendations:  Progressing/doing well.  May benefit for 1-2 additional visits, focus on ROM and progressing strengthening.  Patient will assess this week, " cancel next appt if continuing to progress.   Patient returning to work (working from home) 3-9-22.      Please refer to the daily flowsheet for treatment today, total treatment time and time spent performing 1:1 timed codes.    5-12-22 ADDENDUM:  Patient did not return for further visits.  Will discharge PT chart, see note above for last known status.  Dinorah Ruiz PT

## 2022-03-14 ENCOUNTER — OFFICE VISIT (OUTPATIENT)
Dept: ORTHOPEDICS | Facility: CLINIC | Age: 57
End: 2022-03-14
Payer: COMMERCIAL

## 2022-03-14 ENCOUNTER — ANCILLARY PROCEDURE (OUTPATIENT)
Dept: GENERAL RADIOLOGY | Facility: CLINIC | Age: 57
End: 2022-03-14
Attending: PHYSICIAN ASSISTANT
Payer: COMMERCIAL

## 2022-03-14 DIAGNOSIS — Z96.652 S/P TOTAL KNEE ARTHROPLASTY, LEFT: Primary | ICD-10-CM

## 2022-03-14 DIAGNOSIS — Z96.652 S/P TOTAL KNEE ARTHROPLASTY, LEFT: ICD-10-CM

## 2022-03-14 PROCEDURE — 99024 POSTOP FOLLOW-UP VISIT: CPT | Performed by: PHYSICIAN ASSISTANT

## 2022-03-14 PROCEDURE — 73562 X-RAY EXAM OF KNEE 3: CPT | Mod: LT | Performed by: RADIOLOGY

## 2022-03-14 NOTE — PATIENT INSTRUCTIONS
Workability Form faxed to [facility] [employer].   Able to return to work without restrictions 3/15/22.     Continue with physical therapy, and exercises at home for several months after surgery.    No invasive procedures such as dentistry or elective surgery for 4 months after surgery.    Follow-up as needed, or at 1 year postop surgery with Dr. Martin.

## 2022-03-14 NOTE — PROGRESS NOTES
HISTORY OF PRESENT ILLNESS:    Mary Gorman is a 57 year old female who is seen in follow up for Left TKA, DOS 01/20/2022, Dr. Martin.  Present symptoms: States doing well.  PT progressing.  Ready to return to work.  Area at proximal incision with couple drops of clear yellow liquid once in a while. No new injuries or complaints with left knee.  Right  Denies Chest pain, Calve pain, Fever, Chills.    Current Treatment: postop.    PHYSICAL EXAM:  There were no vitals taken for this visit.  There is no height or weight on file to calculate BMI.   GENERAL APPEARANCE: healthy, alert and no distress   PSYCH:  mentation appears normal and affect normal/bright    MSK:  Left: KNee .  Gross:  See photo, areas that are not healed with proximal open area, but is clean and dry today.  Ambulates: slowly with cane contralaterally but with normal stride..  Appropriate incisional erythema.   No Ecchymosis.  No calve pain on palpation.  Edema difficult to assess due to BH.  CMS: rafa incisional numbness, otherwise grossly intact.  AROM Flexion 0-100.          IMAGING INTERPRETATION:      Recent Results (from the past 24 hour(s))   XR Knee Left 3 Views    Narrative    KNEE THREE VIEWS LEFT  3/14/2022 10:45 AM     HISTORY: s/p left TKA, DOS 1/20/2022, Dr. Martin; S/P total knee  arthroplasty, left    COMPARISON: 1/20/2022      Impression    IMPRESSION: Status post left total knee arthroplasty. No hardware  complication. No acute fracture or malalignment. Small joint effusion,  although evaluation is limited. Small debris along the medial and  lateral joint lines.    JULIEN BARAJAS MD         SYSTEM ID:  [id number]          ASSESSMENT:  Mary Gorman is a 57 year old female S/P Left TKA, DOS 01/20/2022, Dr. Martin.    Area at proximal incision does not appear infected, but is not healed.  Functionally improving.    PLAN:  - Surgery discussed, images reviewed if applicable, and all questions were answered at this  time.  - care instructions given and verbally acknowledged.  - Medications: as current.  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    Return to clinic 6, weeks if needed or at 1 year postop.    Paulino Moyer PA-C    Dept. Orthopedic Surgery  [facility]   3/14/2022

## 2022-04-13 ENCOUNTER — MYC MEDICAL ADVICE (OUTPATIENT)
Dept: FAMILY MEDICINE | Facility: CLINIC | Age: 57
End: 2022-04-13
Payer: COMMERCIAL

## 2022-04-13 NOTE — LETTER
May 11, 2022      Mary Gorman  [address] [address] [address]        Dear Mary,     We have reached out to you by phone and My Chart but have not heard back from you.     You are due for your yearly physical with Paulina anytime after June 28, 2022.   Schedules are booking out quickly.    You can make an appointment through my chart or by calling the clinic at [phone].    Please let me know if you have any further questions.         Sincerely,        Emilia Duncan RN

## 2022-04-16 ENCOUNTER — HEALTH MAINTENANCE LETTER (OUTPATIENT)
Age: 57
End: 2022-04-16

## 2022-04-19 ENCOUNTER — TELEPHONE (OUTPATIENT)
Dept: ORTHOPEDICS | Facility: CLINIC | Age: 57
End: 2022-04-19
Payer: COMMERCIAL

## 2022-04-19 DIAGNOSIS — Z96.652 S/P TOTAL KNEE ARTHROPLASTY, LEFT: Primary | ICD-10-CM

## 2022-04-19 RX ORDER — AMOXICILLIN 500 MG/1
2000 CAPSULE ORAL ONCE
Qty: 4 CAPSULE | Refills: 11 | Status: SHIPPED | OUTPATIENT
Start: 2022-04-19 | End: 2022-04-19

## 2022-04-19 NOTE — TELEPHONE ENCOUNTER
Patient left voicemail stating she had a TKA in January by Dr. Martin. She recently broke a tooth and needs to get in to get it fixed. She is asking for a pre-medication and wasn't sure if she was to call our office or her PCP. She is calling our office first.   She can be reached at: [phone].     Patient had L TKA on 1/20/22.     Phone call to patient. She had a back molar or filling break off today. Her dentist office is closed all week for training and she is trying to schedule an appointment but was waiting to hear back from us first. Informed of the need for antibiotics one hour prior to dental treatment. She denies allergy to Amoxicillin as far as she is aware.   Will send prescription to pharmacy.   Asked that if her dentist office needs something from us in writing to contact us. Our fax number was provided.   She verbalized understanding.     Prescription sent.     SOHEILA Walton RN

## 2022-05-11 NOTE — TELEPHONE ENCOUNTER
RADHA for call back-  Pt has read my chart for follow up appt request     Letter sent    Emilia Duncan RN

## 2022-06-09 ENCOUNTER — VIRTUAL VISIT (OUTPATIENT)
Dept: EDUCATION SERVICES | Facility: CLINIC | Age: 57
End: 2022-06-09
Payer: COMMERCIAL

## 2022-06-09 DIAGNOSIS — E11.9 DIABETES MELLITUS (H): Primary | ICD-10-CM

## 2022-06-09 PROCEDURE — G0108 DIAB MANAGE TRN  PER INDIV: HCPCS | Mod: 95 | Performed by: DIETITIAN, REGISTERED

## 2022-06-09 RX ORDER — BLOOD SUGAR DIAGNOSTIC
STRIP MISCELLANEOUS
Qty: 100 STRIP | Refills: 11 | Status: SHIPPED | OUTPATIENT
Start: 2022-06-09 | End: 2023-11-15

## 2022-06-09 RX ORDER — LANCETS
EACH MISCELLANEOUS
Qty: 100 EACH | Refills: 11 | Status: SHIPPED | OUTPATIENT
Start: 2022-06-09 | End: 2023-11-15

## 2022-06-09 RX ORDER — BLOOD-GLUCOSE METER
1 EACH MISCELLANEOUS DAILY
Qty: 1 KIT | Refills: 1 | Status: SHIPPED | OUTPATIENT
Start: 2022-06-09 | End: 2023-11-15

## 2022-06-09 NOTE — LETTER
"    6/9/2022         RE: Mary Gorman  [address] [address] [facility] [address]        Dear Colleague,    Thank you for referring your patient, Mary Gorman, to the [facility]ABET[facility] [place]. Please see a copy of my visit note below.    Diabetes Self-Management Education & Support    Presents for: Individual review  Type of Service: Telephone Visit    Originating Location (Patient Location): Home  Distant Location (Provider Location): Home  Mode of Communication:  Telephone    Telephone Visit Start Time:  203  Telephone Visit End Time (telephone visit stop time): 243    SUBJECTIVE/OBJECTIVE:  Presents for: Individual review  Accompanied by: Self  Diabetes education in the past 24mo: No  Diabetes type: Type 2  How confident are you filling out medical forms by yourself:: Extremely  Other concerns:: None  Cultural Influences/Ethnic Background:  Not  or     Diabetes Symptoms & Complications:  Not assessed at this visit     Patient Problem List and Family Medical History reviewed for relevant medical history, current medical status, and diabetes risk factors.    Vitals:  There were no vitals taken for this visit.  Estimated body mass index is 55.21 kg/m  as calculated from the following:    Height as of 1/20/22: 1.651 m (5' 5\").    Weight as of 1/20/22: 150.5 kg (331 lb 12.7 oz).   Last 3 BP:   BP Readings from Last 3 Encounters:   02/01/22 112/66   01/21/22 121/63   01/10/22 138/76       History   Smoking Status     Never Smoker   Smokeless Tobacco     Never Used       Labs:  Lab Results   Component Value Date    A1C 7.0 01/10/2022    A1C 6.9 06/28/2021     Lab Results   Component Value Date     01/21/2022     01/21/2022     06/28/2021     Lab Results   Component Value Date     06/28/2021     HDL Cholesterol   Date Value Ref Range Status   06/28/2021 39 (L) >49 mg/dL Final   ]  GFR Estimate   Date Value Ref Range Status   01/21/2022 50 (L) >60 " mL/min/1.73m2 Final     Comment:     Effective December 21, 2021 eGFRcr in adults is calculated using the 2021 CKD-EPI creatinine equation which includes age and gender (Phillip et al., NE, DOI: 10.1056/YRZWpd9847909)   06/28/2021 47 (L) >60 mL/min/[1.73_m2] Final     Comment:     Non  GFR Calc  Starting 12/18/2018, serum creatinine based estimated GFR (eGFR) will be   calculated using the Chronic Kidney Disease Epidemiology Collaboration   (CKD-EPI) equation.       GFR Estimate If Black   Date Value Ref Range Status   06/28/2021 55 (L) >60 mL/min/[1.73_m2] Final     Comment:      GFR Calc  Starting 12/18/2018, serum creatinine based estimated GFR (eGFR) will be   calculated using the Chronic Kidney Disease Epidemiology Collaboration   (CKD-EPI) equation.       Lab Results   Component Value Date    CR 1.26 01/21/2022    CR 1.26 06/28/2021     No results found for: MICROALBUMIN    Healthy Eating:  Healthy Eating Assessed Today: Yes  Notes many carbohydrates in diet and is working on moderation   Dislikes vegetables    Being Active:  Being Active Assessed Today: Yes  Had one TKA an now waiting on the other.  Pain right now and not exercising  Cane when walking   Steps are hard     Monitoring:  Monitoring Assessed Today: Yes  Reviewed     Taking Medications:  Diabetes Medication(s)     Biguanides       metFORMIN (GLUCOPHAGE XR) 500 MG 24 hr tablet    TAKE ONE TABLET BY MOUTH ONCE DAILY WITH DINNER          Taking Medication Assessed Today: Yes    Problem Solving:  Not assessed at this visit     Reducing Risks:  Reducing Risks Assessed Today: Yes    Healthy Coping:  Healthy Coping Assessed Today: Yes  Emotional response to diabetes: Ready to learn  Stage of change: ACTION (Actively working towards change)  Patient Activation Measure Survey Score:  BRYANT Score (Last Two) 5/28/2014 6/27/2021   BRYANT Raw Score 48 33   Activation Score 80 65.8   BRYANT Level 4 3     Diabetes knowledge and skills  assessment:   Patient is knowledgeable in diabetes management concepts related to: Healthy Eating, Being Active, Monitoring and Taking Medication  Continue education on the following diabetes management concepts: Problem Solving, Reducing Risks and Healthy Coping  Based on learning assessment above, most appropriate setting for further diabetes education would be: Individual setting.    INTERVENTIONS:  Education provided today on:  AADE Self-Care Behaviors:  Diabetes Pathophysiology  Healthy Eating: carbohydrate counting, consistency in amount, composition, and timing of food intake, portion control and label reading  Being Active: relationship to blood glucose  Monitoring: log and interpret results, individual blood glucose targets and frequency of monitoring    Opportunities for ongoing education and support in diabetes-self management were discussed.  Pt verbalized understanding of concepts discussed and recommendations provided today.       Education Materials Provided:  [employer] Healthy Living with Diabetes Book    ASSESSMENT:  Recent diagnosis of Diabetes.  Reviewed pathophysiology and discussed aggressive glycemic management and early intervention for halting disease process. 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 Also was familiar with concepts having had  been borderline on the A1c and pre Diabetes. Tolerating metformin.  Sent in accu check supplies.      Patient's most recent   Lab Results   Component Value Date    A1C 7.0 01/10/2022    A1C 6.9 06/28/2021    is not meeting goal of <7.0    PLAN  Continue with positive diet & lifestyle changes   Follow up 7/26, evan anytime with questions     Molly De La Cruz RD, LD, [facility]  Diabetes Education    Time Spent: 40 minutes  Encounter Type: Individual    A copy of this encounter was shared with the provider.

## 2022-06-09 NOTE — PROGRESS NOTES
"Diabetes Self-Management Education & Support    Presents for: Individual review  Type of Service: Telephone Visit    Originating Location (Patient Location): Home  Distant Location (Provider Location): Home  Mode of Communication:  Telephone    Telephone Visit Start Time:  203  Telephone Visit End Time (telephone visit stop time): 243    SUBJECTIVE/OBJECTIVE:  Presents for: Individual review  Accompanied by: Self  Diabetes education in the past 24mo: No  Diabetes type: Type 2  How confident are you filling out medical forms by yourself:: Extremely  Other concerns:: None  Cultural Influences/Ethnic Background:  Not  or     Diabetes Symptoms & Complications:  Not assessed at this visit     Patient Problem List and Family Medical History reviewed for relevant medical history, current medical status, and diabetes risk factors.    Vitals:  There were no vitals taken for this visit.  Estimated body mass index is 55.21 kg/m  as calculated from the following:    Height as of 1/20/22: 1.651 m (5' 5\").    Weight as of 1/20/22: 150.5 kg (331 lb 12.7 oz).   Last 3 BP:   BP Readings from Last 3 Encounters:   02/01/22 112/66   01/21/22 121/63   01/10/22 138/76       History   Smoking Status     Never Smoker   Smokeless Tobacco     Never Used       Labs:  Lab Results   Component Value Date    A1C 7.0 01/10/2022    A1C 6.9 06/28/2021     Lab Results   Component Value Date     01/21/2022     01/21/2022     06/28/2021     Lab Results   Component Value Date     06/28/2021     HDL Cholesterol   Date Value Ref Range Status   06/28/2021 39 (L) >49 mg/dL Final   ]  GFR Estimate   Date Value Ref Range Status   01/21/2022 50 (L) >60 mL/min/1.73m2 Final     Comment:     Effective December 21, 2021 eGFRcr in adults is calculated using the 2021 CKD-EPI creatinine equation which includes age and gender (Phillip hdez al., NEJ, DOI: 10.1056/QUHXax0464525)   06/28/2021 47 (L) >60 mL/min/[1.73_m2] Final     " Comment:     Non  GFR Calc  Starting 12/18/2018, serum creatinine based estimated GFR (eGFR) will be   calculated using the Chronic Kidney Disease Epidemiology Collaboration   (CKD-EPI) equation.       GFR Estimate If Black   Date Value Ref Range Status   06/28/2021 55 (L) >60 mL/min/[1.73_m2] Final     Comment:      GFR Calc  Starting 12/18/2018, serum creatinine based estimated GFR (eGFR) will be   calculated using the Chronic Kidney Disease Epidemiology Collaboration   (CKD-EPI) equation.       Lab Results   Component Value Date    CR 1.26 01/21/2022    CR 1.26 06/28/2021     No results found for: MICROALBUMIN    Healthy Eating:  Healthy Eating Assessed Today: Yes  Notes many carbohydrates in diet and is working on moderation   Dislikes vegetables    Being Active:  Being Active Assessed Today: Yes  Had one TKA an now waiting on the other.  Pain right now and not exercising  Cane when walking   Steps are hard     Monitoring:  Monitoring Assessed Today: Yes  Reviewed     Taking Medications:  Diabetes Medication(s)     Biguanides       metFORMIN (GLUCOPHAGE XR) 500 MG 24 hr tablet    TAKE ONE TABLET BY MOUTH ONCE DAILY WITH DINNER          Taking Medication Assessed Today: Yes    Problem Solving:  Not assessed at this visit     Reducing Risks:  Reducing Risks Assessed Today: Yes    Healthy Coping:  Healthy Coping Assessed Today: Yes  Emotional response to diabetes: Ready to learn  Stage of change: ACTION (Actively working towards change)  Patient Activation Measure Survey Score:  BRYANT Score (Last Two) 5/28/2014 6/27/2021   BRYANT Raw Score 48 33   Activation Score 80 65.8   BRYANT Level 4 3     Diabetes knowledge and skills assessment:   Patient is knowledgeable in diabetes management concepts related to: Healthy Eating, Being Active, Monitoring and Taking Medication  Continue education on the following diabetes management concepts: Problem Solving, Reducing Risks and Healthy Coping  Based  on learning assessment above, most appropriate setting for further diabetes education would be: Individual setting.    INTERVENTIONS:  Education provided today on:  AADE Self-Care Behaviors:  Diabetes Pathophysiology  Healthy Eating: carbohydrate counting, consistency in amount, composition, and timing of food intake, portion control and label reading  Being Active: relationship to blood glucose  Monitoring: log and interpret results, individual blood glucose targets and frequency of monitoring    Opportunities for ongoing education and support in diabetes-self management were discussed.  Pt verbalized understanding of concepts discussed and recommendations provided today.       Education Materials Provided:  [employer] Healthy Living with Diabetes Book    ASSESSMENT:  Recent diagnosis of Diabetes.  Reviewed pathophysiology and discussed aggressive glycemic management and early intervention for halting disease process. 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 Also was familiar with concepts having had  been borderline on the A1c and pre Diabetes. Tolerating metformin.  Sent in accu check supplies.      Patient's most recent   Lab Results   Component Value Date    A1C 7.0 01/10/2022    A1C 6.9 06/28/2021    is not meeting goal of <7.0    PLAN  Continue with positive diet & lifestyle changes   Follow up 7/26, evan anytime with questions     Molly De La Cruz RD, LD, [facility]  Diabetes Education    Time Spent: 40 minutes  Encounter Type: Individual    A copy of this encounter was shared with the provider.

## 2022-06-23 ASSESSMENT — PATIENT HEALTH QUESTIONNAIRE - PHQ9: SUM OF ALL RESPONSES TO PHQ QUESTIONS 1-9: 8

## 2022-06-28 ASSESSMENT — PATIENT HEALTH QUESTIONNAIRE - PHQ9
10. IF YOU CHECKED OFF ANY PROBLEMS, HOW DIFFICULT HAVE THESE PROBLEMS MADE IT FOR YOU TO DO YOUR WORK, TAKE CARE OF THINGS AT HOME, OR GET ALONG WITH OTHER PEOPLE: SOMEWHAT DIFFICULT
SUM OF ALL RESPONSES TO PHQ QUESTIONS 1-9: 8

## 2022-07-07 ENCOUNTER — MYC MEDICAL ADVICE (OUTPATIENT)
Dept: FAMILY MEDICINE | Facility: CLINIC | Age: 57
End: 2022-07-07

## 2022-07-08 NOTE — PROGRESS NOTES
Pre-Visit Planning   Next 5 appointments (look out 90 days)    Jul 19, 2022  2:00 PM  (Arrive by 1:40 PM)  Adult Preventative Visit with Nicole Castellanos PA-C  [facility] [address] [facility] [address] [phone]        Appointment Notes for this encounter:  annual- last 6/28/2021    Questionnaires Reviewed/Assigned  No additional questionnaires are needed     E check in done      Emilia Duncan RN

## 2022-07-18 ASSESSMENT — ENCOUNTER SYMPTOMS
EYE PAIN: 0
FEVER: 0
SHORTNESS OF BREATH: 0
BREAST MASS: 0
PARESTHESIAS: 0
FREQUENCY: 0
HEMATOCHEZIA: 0
PALPITATIONS: 0
SORE THROAT: 0
CHILLS: 0
DYSURIA: 0
CONSTIPATION: 0
NERVOUS/ANXIOUS: 0
DIZZINESS: 0
JOINT SWELLING: 1
WEAKNESS: 0
NAUSEA: 0
ABDOMINAL PAIN: 0
COUGH: 0
HEMATURIA: 0
HEARTBURN: 0
MYALGIAS: 1
HEADACHES: 0
DIARRHEA: 0
ARTHRALGIAS: 1

## 2022-07-18 ASSESSMENT — ANXIETY QUESTIONNAIRES
6. BECOMING EASILY ANNOYED OR IRRITABLE: NOT AT ALL
3. WORRYING TOO MUCH ABOUT DIFFERENT THINGS: SEVERAL DAYS
2. NOT BEING ABLE TO STOP OR CONTROL WORRYING: NOT AT ALL
GAD7 TOTAL SCORE: 2
7. FEELING AFRAID AS IF SOMETHING AWFUL MIGHT HAPPEN: NOT AT ALL
1. FEELING NERVOUS, ANXIOUS, OR ON EDGE: SEVERAL DAYS
7. FEELING AFRAID AS IF SOMETHING AWFUL MIGHT HAPPEN: NOT AT ALL
GAD7 TOTAL SCORE: 2
GAD7 TOTAL SCORE: 2
4. TROUBLE RELAXING: NOT AT ALL
5. BEING SO RESTLESS THAT IT IS HARD TO SIT STILL: NOT AT ALL
8. IF YOU CHECKED OFF ANY PROBLEMS, HOW DIFFICULT HAVE THESE MADE IT FOR YOU TO DO YOUR WORK, TAKE CARE OF THINGS AT HOME, OR GET ALONG WITH OTHER PEOPLE?: NOT DIFFICULT AT ALL

## 2022-07-19 ENCOUNTER — TELEPHONE (OUTPATIENT)
Dept: OTHER | Facility: CLINIC | Age: 57
End: 2022-07-19

## 2022-07-19 ENCOUNTER — OFFICE VISIT (OUTPATIENT)
Dept: FAMILY MEDICINE | Facility: CLINIC | Age: 57
End: 2022-07-19
Payer: COMMERCIAL

## 2022-07-19 VITALS
SYSTOLIC BLOOD PRESSURE: 126 MMHG | OXYGEN SATURATION: 96 % | DIASTOLIC BLOOD PRESSURE: 74 MMHG | WEIGHT: 293 LBS | HEIGHT: 65 IN | BODY MASS INDEX: 48.82 KG/M2 | HEART RATE: 108 BPM | RESPIRATION RATE: 16 BRPM | TEMPERATURE: 98 F

## 2022-07-19 DIAGNOSIS — M62.838 MUSCLE SPASM: ICD-10-CM

## 2022-07-19 DIAGNOSIS — B37.2 YEAST INFECTION OF THE SKIN: ICD-10-CM

## 2022-07-19 DIAGNOSIS — I10 ESSENTIAL HYPERTENSION, BENIGN: ICD-10-CM

## 2022-07-19 DIAGNOSIS — Z12.31 VISIT FOR SCREENING MAMMOGRAM: ICD-10-CM

## 2022-07-19 DIAGNOSIS — I10 ESSENTIAL HYPERTENSION: ICD-10-CM

## 2022-07-19 DIAGNOSIS — E11.9 CONTROLLED TYPE 2 DIABETES MELLITUS WITHOUT COMPLICATION, WITHOUT LONG-TERM CURRENT USE OF INSULIN (H): ICD-10-CM

## 2022-07-19 DIAGNOSIS — Z00.00 ROUTINE GENERAL MEDICAL EXAMINATION AT A HEALTH CARE FACILITY: Primary | ICD-10-CM

## 2022-07-19 DIAGNOSIS — I87.2 STASIS DERMATITIS OF BOTH LEGS: ICD-10-CM

## 2022-07-19 DIAGNOSIS — Z12.4 PAP SMEAR FOR CERVICAL CANCER SCREENING: ICD-10-CM

## 2022-07-19 DIAGNOSIS — F33.0 MILD RECURRENT MAJOR DEPRESSION (H): ICD-10-CM

## 2022-07-19 DIAGNOSIS — E78.5 HYPERLIPIDEMIA LDL GOAL <100: ICD-10-CM

## 2022-07-19 DIAGNOSIS — I10 HYPERTENSION GOAL BP (BLOOD PRESSURE) < 140/90: ICD-10-CM

## 2022-07-19 DIAGNOSIS — Z12.11 SCREEN FOR COLON CANCER: ICD-10-CM

## 2022-07-19 PROBLEM — N18.30 CHRONIC KIDNEY DISEASE, STAGE 3 (H): Status: RESOLVED | Noted: 2021-11-02 | Resolved: 2022-07-19

## 2022-07-19 LAB
ALBUMIN SERPL-MCNC: 3.5 G/DL (ref 3.4–5)
ALP SERPL-CCNC: 154 U/L (ref 40–150)
ALT SERPL W P-5'-P-CCNC: 42 U/L (ref 0–50)
ANION GAP SERPL CALCULATED.3IONS-SCNC: 10 MMOL/L (ref 3–14)
AST SERPL W P-5'-P-CCNC: 34 U/L (ref 0–45)
BILIRUB SERPL-MCNC: 0.4 MG/DL (ref 0.2–1.3)
BUN SERPL-MCNC: 16 MG/DL (ref 7–30)
CALCIUM SERPL-MCNC: 9.4 MG/DL (ref 8.5–10.1)
CHLORIDE BLD-SCNC: 109 MMOL/L (ref 94–109)
CHOLEST SERPL-MCNC: 182 MG/DL
CO2 SERPL-SCNC: 21 MMOL/L (ref 20–32)
CREAT SERPL-MCNC: 0.98 MG/DL (ref 0.52–1.04)
ERYTHROCYTE [DISTWIDTH] IN BLOOD BY AUTOMATED COUNT: 15.5 % (ref 10–15)
FASTING STATUS PATIENT QL REPORTED: NO
GFR SERPL CREATININE-BSD FRML MDRD: 67 ML/MIN/1.73M2
GLUCOSE BLD-MCNC: 140 MG/DL (ref 70–99)
HBA1C MFR BLD: 6.4 % (ref 0–5.6)
HCT VFR BLD AUTO: 43.8 % (ref 35–47)
HDLC SERPL-MCNC: 48 MG/DL
HGB BLD-MCNC: 14.4 G/DL (ref 11.7–15.7)
LDLC SERPL CALC-MCNC: 97 MG/DL
MCH RBC QN AUTO: 29.1 PG (ref 26.5–33)
MCHC RBC AUTO-ENTMCNC: 32.9 G/DL (ref 31.5–36.5)
MCV RBC AUTO: 89 FL (ref 78–100)
NONHDLC SERPL-MCNC: 134 MG/DL
PLATELET # BLD AUTO: 264 10E3/UL (ref 150–450)
POTASSIUM BLD-SCNC: 4.7 MMOL/L (ref 3.4–5.3)
PROT SERPL-MCNC: 7.4 G/DL (ref 6.8–8.8)
RBC # BLD AUTO: 4.95 10E6/UL (ref 3.8–5.2)
SODIUM SERPL-SCNC: 140 MMOL/L (ref 133–144)
TRIGL SERPL-MCNC: 184 MG/DL
WBC # BLD AUTO: 8.2 10E3/UL (ref 4–11)

## 2022-07-19 PROCEDURE — G0145 SCR C/V CYTO,THINLAYER,RESCR: HCPCS | Performed by: PHYSICIAN ASSISTANT

## 2022-07-19 PROCEDURE — 36415 COLL VENOUS BLD VENIPUNCTURE: CPT | Performed by: PHYSICIAN ASSISTANT

## 2022-07-19 PROCEDURE — 99396 PREV VISIT EST AGE 40-64: CPT | Performed by: PHYSICIAN ASSISTANT

## 2022-07-19 PROCEDURE — 83036 HEMOGLOBIN GLYCOSYLATED A1C: CPT | Performed by: PHYSICIAN ASSISTANT

## 2022-07-19 PROCEDURE — 87624 HPV HI-RISK TYP POOLED RSLT: CPT | Performed by: PHYSICIAN ASSISTANT

## 2022-07-19 PROCEDURE — 85027 COMPLETE CBC AUTOMATED: CPT | Performed by: PHYSICIAN ASSISTANT

## 2022-07-19 PROCEDURE — 80053 COMPREHEN METABOLIC PANEL: CPT | Performed by: PHYSICIAN ASSISTANT

## 2022-07-19 PROCEDURE — 80061 LIPID PANEL: CPT | Performed by: PHYSICIAN ASSISTANT

## 2022-07-19 RX ORDER — METOPROLOL SUCCINATE 50 MG/1
50 TABLET, EXTENDED RELEASE ORAL 2 TIMES DAILY
Qty: 180 TABLET | Refills: 3 | Status: SHIPPED | OUTPATIENT
Start: 2022-07-19 | End: 2023-09-01

## 2022-07-19 RX ORDER — NYSTATIN 100000 U/G
CREAM TOPICAL
Qty: 30 G | Refills: 3 | Status: CANCELLED | OUTPATIENT
Start: 2022-07-19

## 2022-07-19 RX ORDER — METFORMIN HCL 500 MG
TABLET, EXTENDED RELEASE 24 HR ORAL
Qty: 90 TABLET | Refills: 1 | Status: SHIPPED | OUTPATIENT
Start: 2022-07-19 | End: 2022-12-06

## 2022-07-19 RX ORDER — FOSINOPRIL SODIUM 10 MG/1
10 TABLET ORAL DAILY
Qty: 90 TABLET | Refills: 3 | Status: SHIPPED | OUTPATIENT
Start: 2022-07-19 | End: 2022-12-06

## 2022-07-19 RX ORDER — TRIAMCINOLONE ACETONIDE 1 MG/G
CREAM TOPICAL
Qty: 80 G | Refills: 1 | Status: SHIPPED | OUTPATIENT
Start: 2022-07-19 | End: 2022-10-28

## 2022-07-19 RX ORDER — SPIRONOLACTONE 25 MG/1
25 TABLET ORAL DAILY
Qty: 90 TABLET | Refills: 3 | Status: SHIPPED | OUTPATIENT
Start: 2022-07-19 | End: 2023-09-01

## 2022-07-19 RX ORDER — BUPROPION HYDROCHLORIDE 150 MG/1
150 TABLET ORAL EVERY MORNING
Qty: 90 TABLET | Refills: 1 | Status: SHIPPED | OUTPATIENT
Start: 2022-07-19

## 2022-07-19 RX ORDER — TIZANIDINE 2 MG/1
2 TABLET ORAL 3 TIMES DAILY
Qty: 30 TABLET | Refills: 1 | Status: SHIPPED | OUTPATIENT
Start: 2022-07-19 | End: 2022-12-06

## 2022-07-19 RX ORDER — APIXABAN 5 MG/1
TABLET, FILM COATED ORAL
COMMUNITY
Start: 2022-07-06 | End: 2022-10-04

## 2022-07-19 RX ORDER — BUPROPION HYDROCHLORIDE 300 MG/1
300 TABLET ORAL EVERY MORNING
Qty: 90 TABLET | Refills: 1 | Status: SHIPPED | OUTPATIENT
Start: 2022-07-19

## 2022-07-19 ASSESSMENT — ENCOUNTER SYMPTOMS
SORE THROAT: 0
HEMATOCHEZIA: 0
CHILLS: 0
HEARTBURN: 0
HEMATURIA: 0
COUGH: 0
DYSURIA: 0
NERVOUS/ANXIOUS: 0
PALPITATIONS: 0
JOINT SWELLING: 1
FEVER: 0
HEADACHES: 0
BREAST MASS: 0
NAUSEA: 0
MYALGIAS: 1
DIZZINESS: 0
EYE PAIN: 0
ARTHRALGIAS: 1
CONSTIPATION: 0
FREQUENCY: 0
SHORTNESS OF BREATH: 0
DIARRHEA: 0
WEAKNESS: 0
PARESTHESIAS: 0
ABDOMINAL PAIN: 0

## 2022-07-19 NOTE — PROGRESS NOTES
SUBJECTIVE:   CC: Mary Gorman is an 57 year old woman who presents for preventive health visit.       Patient has been advised of split billing requirements and indicates understanding: Yes  Healthy Habits:     Getting at least 3 servings of Calcium per day:  Yes    Bi-annual eye exam:  Yes    Dental care twice a year:  Yes    Sleep apnea or symptoms of sleep apnea:  Sleep apnea    Diet:  Regular (no restrictions)    Frequency of exercise:  None    Taking medications regularly:  Yes    Medication side effects:  Not applicable    PHQ-2 Total Score: 4    Additional concerns today:  No      Today's PHQ-2 Score:   PHQ-2 ( 1999 [employer]) 7/18/2022   Q1: Little interest or pleasure in doing things 2   Q2: Feeling down, depressed or hopeless 2   PHQ-2 Score 4   PHQ-2 Total Score (12-17 Years)- Positive if 3 or more points; Administer PHQ-A if positive -   Q1: Little interest or pleasure in doing things More than half the days   Q2: Feeling down, depressed or hopeless More than half the days   PHQ-2 Score 4       Abuse: Current or Past (Physical, Sexual or Emotional) - No  Do you feel safe in your environment? Yes      Social History     Tobacco Use     Smoking status: Never Smoker     Smokeless tobacco: Never Used   Substance Use Topics     Alcohol use: Yes     Comment: once in a while, few times a year       Alcohol Use 7/18/2022   Prescreen: >3 drinks/day or >7 drinks/week? No   Prescreen: >3 drinks/day or >7 drinks/week? -       Reviewed orders with patient.  Reviewed health maintenance and updated orders accordingly - Yes  BP Readings from Last 3 Encounters:   07/19/22 126/74   02/01/22 112/66   01/21/22 121/63    Wt Readings from Last 3 Encounters:   07/19/22 (!) 157.4 kg (347 lb)   01/20/22 (!) 150.5 kg (331 lb 12.7 oz)   01/10/22 150 kg (330 lb 9.6 oz)                  Recent Labs   Lab Test 07/19/22  1357 01/21/22  0720 01/20/22  1950 01/20/22  1152 01/10/22  1325 01/10/22  1325 06/28/21  1321 05/10/21  1434  11/25/19  0954 08/30/19  1330 10/25/18  0943   A1C 6.4*  --   --   --   --  7.0* 6.9*  --  6.4*  --  6.4*   LDL  --   --   --   --   --   --  140*  --  120*  --  97   HDL  --   --   --   --   --   --  39*  --  32*  --  35*   TRIG  --   --   --   --   --   --  237*  --  235*  --  200*   ALT  --   --   --   --   --   --  31  --   --   --  32   CR  --  1.26* 1.11*  --    < > 1.19* 1.26* 1.12* 1.11*   < > 1.04   GFRESTIMATED  --  50* 58*  --    < > 53* 47* 54* 56*   < > 55*   GFRESTBLACK  --   --   --   --   --   --  55* 63 64   < > 67   POTASSIUM  --  5.1  --  4.4   < > 4.7 4.8 4.6 4.4   < > 4.3   TSH  --   --   --   --   --   --   --  1.91 2.22  --   --     < > = values in this interval not displayed.        Breast Cancer Screening:    FHS-7: No flowsheet data found.    Mammogram Screening: Recommended mammography every 1-2 years with patient discussion and risk factor consideration  Pertinent mammograms are reviewed under the imaging tab.    History of abnormal Pap smear: NO - age 30-65 PAP every 5 years with negative HPV co-testing recommended  PAP / HPV Latest Ref Rng & Units 10/17/2017   PAP (Historical) - NIL   HPV16 NEG:Negative Negative   HPV18 NEG:Negative Negative   HRHPV NEG:Negative Negative     Reviewed and updated as needed this visit by clinical staff   Tobacco  Allergies  Meds  Problems  Med Hx  Surg Hx  Fam Hx  Soc   Hx          Reviewed and updated as needed this visit by Provider   Tobacco  Allergies  Meds  Problems  Med Hx  Surg Hx  Fam Hx             Review of Systems   Constitutional: Negative for chills and fever.   HENT: Negative for congestion, ear pain, hearing loss and sore throat.    Eyes: Negative for pain and visual disturbance.   Respiratory: Negative for cough and shortness of breath.    Cardiovascular: Positive for peripheral edema. Negative for chest pain and palpitations.   Gastrointestinal: Negative for abdominal pain, constipation, diarrhea, heartburn, hematochezia and  "nausea.   Breasts:  Negative for tenderness, breast mass and discharge.   Genitourinary: Negative for dysuria, frequency, genital sores, hematuria, pelvic pain, urgency, vaginal bleeding and vaginal discharge.   Musculoskeletal: Positive for arthralgias, joint swelling and myalgias.   Skin: Negative for rash.   Neurological: Negative for dizziness, weakness, headaches and paresthesias.   Psychiatric/Behavioral: Negative for mood changes. The patient is not nervous/anxious.         OBJECTIVE:   /74   Pulse 108   Temp 98  F (36.7  C) (Oral)   Resp 16   Ht 1.651 m (5' 5\")   Wt (!) 157.4 kg (347 lb)   SpO2 96%   BMI 57.74 kg/m         Physical Exam  GENERAL APPEARANCE: healthy, alert and no distress  EYES: Eyes grossly normal to inspection, PERRL and conjunctivae and sclerae normal  HENT: ear canals and TM's normal, nose and mouth without ulcers or lesions, oropharynx clear and oral mucous membranes moist  NECK: no adenopathy, no asymmetry, masses, or scars and thyroid normal to palpation  RESP: lungs clear to auscultation - no rales, rhonchi or wheezes  BREAST: normal without masses, tenderness or nipple discharge and no palpable axillary masses or adenopathy  CV: regular rate and rhythm, normal S1 S2, no S3 or S4, no murmur, click or rub, no peripheral edema and peripheral pulses strong  ABDOMEN: soft, nontender, no hepatosplenomegaly, no masses and bowel sounds normal   (female): normal female external genitalia, normal urethral meatus, vaginal mucosal atrophy noted, normal cervix, adnexae, and uterus without masses or abnormal discharge  MS: no musculoskeletal defects are noted and gait is age appropriate without ataxia  SKIN: no suspicious lesions or rashes  NEURO: Normal strength and tone, sensory exam grossly normal, mentation intact and speech normal  PSYCH: mentation appears normal and affect normal/bright    Diagnostic Test Results:  Results for orders placed or performed in visit on 07/19/22 "   Hemoglobin A1c     Status: Abnormal   Result Value Ref Range    Hemoglobin A1C 6.4 (H) 0.0 - 5.6 %   CBC with platelets     Status: Abnormal   Result Value Ref Range    WBC Count 8.2 4.0 - 11.0 10e3/uL    RBC Count 4.95 3.80 - 5.20 10e6/uL    Hemoglobin 14.4 11.7 - 15.7 g/dL    Hematocrit 43.8 35.0 - 47.0 %    MCV 89 78 - 100 fL    MCH 29.1 26.5 - 33.0 pg    MCHC 32.9 31.5 - 36.5 g/dL    RDW 15.5 (H) 10.0 - 15.0 %    Platelet Count 264 150 - 450 10e3/uL      ASSESSMENT/PLAN:       ICD-10-CM    1. Routine general medical examination at a health care facility  Z00.00 Hemoglobin A1c     Lipid panel reflex to direct LDL Non-fasting     Comprehensive metabolic panel     CBC with platelets   2. Controlled type 2 diabetes mellitus without complication, without long-term current use of insulin (H)  E11.9 Hemoglobin A1c     Albumin Random Urine Quantitative with Creat Ratio     metFORMIN (GLUCOPHAGE XR) 500 MG 24 hr tablet   3. Screen for colon cancer  Z12.11 Adult GI  Referral - Procedure Only   4. Essential hypertension, benign  I10 metoprolol succinate ER (TOPROL XL) 50 MG 24 hr tablet   5. Hyperlipidemia LDL goal <100  E78.5    6. Visit for screening mammogram  Z12.31 MA Screening Digital Bilateral   7. Pap smear for cervical cancer screening  Z12.4 Pap screen with HPV - recommended age 30 - 65 years   8. Yeast infection of the skin  B37.2    9. Essential hypertension  I10 spironolactone (ALDACTONE) 25 MG tablet   10. Mild recurrent major depression (H)  F33.0 buPROPion (WELLBUTRIN XL) 150 MG 24 hr tablet     buPROPion (WELLBUTRIN XL) 300 MG 24 hr tablet   11. Stasis dermatitis of both legs  I87.2 triamcinolone (KENALOG) 0.1 % external cream     Vascular Medicine Referral   12. Hypertension goal BP (blood pressure) < 140/90  I10 fosinopril (MONOPRIL) 10 MG tablet   13. Muscle spasm  M62.838 tiZANidine (ZANAFLEX) 2 MG tablet       Patient has been advised of split billing requirements and indicates  "understanding: Yes    COUNSELING:  Reviewed preventive health counseling, as reflected in patient instructions    Estimated body mass index is 57.74 kg/m  as calculated from the following:    Height as of this encounter: 1.651 m (5' 5\").    Weight as of this encounter: 157.4 kg (347 lb).    Weight management plan: Discussed healthy diet and exercise guidelines    She reports that she has never smoked. She has never used smokeless tobacco.      Counseling Resources:  ATP IV Guidelines  Pooled Cohorts Equation Calculator  Breast Cancer Risk Calculator  BRCA-Related Cancer Risk Assessment: FHS-7 Tool  FRAX Risk Assessment  ICSI Preventive Guidelines  Dietary Guidelines for Americans, 2010  [employer]'s MyPlate  ASA Prophylaxis  Lung CA Screening    Nicole Castellanos PA-C  [facility]  Answers for HPI/ROS submitted by the patient on 6/28/2022  If you checked off any problems, how difficult have these problems made it for you to do your work, take care of things at home, or get along with other people?: Somewhat difficult  PHQ9 TOTAL SCORE: 8  [place]QU[place]LE 7 TOTAL SCORE: 2      "

## 2022-07-19 NOTE — TELEPHONE ENCOUNTER
Referred to [facility] by Nicole Castellanos PA-C for peripheral edema    Pt needs to be scheduled for new patient consult with Vascular Medicine.  Will route to scheduling to coordinate an appointment at next available.    MER DanielsonN, RN  [facility]

## 2022-07-19 NOTE — PATIENT INSTRUCTIONS
"[facility]:  MD Martin Groves NP, MD    [facility]:  DO Taty Juares MD, MD Clinic:  [facility]    [place]:  ISAAC Lopez PA-C Lavanya Namballa, MD    Recommend checking the [employer] Website for each clinic and clicking on \"Care Team\".  Each provider should have a little bio and maybe video where they introduce themselves.    Preventive Health Recommendations  Female Ages 50 - 64    Yearly exam: See your health care provider every year in order to  Review health changes.   Discuss preventive care.    Review your medicines if your doctor has prescribed any.    Get a Pap test every three years (unless you have an abnormal result and your provider advises testing more often).  If you get Pap tests with HPV test, you only need to test every 5 years, unless you have an abnormal result.   You do not need a Pap test if your uterus was removed (hysterectomy) and you have not had cancer.  You should be tested each year for STDs (sexually transmitted diseases) if you're at risk.   Have a mammogram every 1 to 2 years.  Have a colonoscopy at age 50, or have a yearly FIT test (stool test). These exams screen for colon cancer.    Have a cholesterol test every 5 years, or more often if advised.  Have a diabetes test (fasting glucose) every three years. If you are at risk for diabetes, you should have this test more often.   If you are at risk for osteoporosis (brittle bone disease), think about having a bone density scan (DEXA).    Shots: Get a flu shot each year. Get a tetanus shot every 10 years.    Nutrition:   Eat at least 5 servings of fruits and vegetables each day.  Eat whole-grain bread, whole-wheat pasta and brown rice instead of white grains and rice.  Get adequate Calcium and Vitamin D.     Lifestyle  Exercise at least 150 minutes a week (30 minutes a " day, 5 days a week). This will help you control your weight and prevent disease.  Limit alcohol to one drink per day.  No smoking.   Wear sunscreen to prevent skin cancer.   See your dentist every six months for an exam and cleaning.  See your eye doctor every 1 to 2 years.

## 2022-07-20 NOTE — RESULT ENCOUNTER NOTE
Octavio Hernandez,    I just wanted to let you know that your lab results have been reviewed and are attached.    - Your lab results look stable; everything is normal.  Your glucose is high due to your history of diabetes but your A1c has improved significant since last time.    Please let me know if you have any questions and have a great week!    Sincerely,    Paulina Castellanos PA-C    [facility]  [address] [place] [address]  Clinic Phone: [phone]

## 2022-07-21 LAB
BKR LAB AP GYN ADEQUACY: NORMAL
BKR LAB AP GYN INTERPRETATION: NORMAL
BKR LAB AP HPV REFLEX: NORMAL
BKR LAB AP PREVIOUS ABNORMAL: NORMAL
PATH REPORT.COMMENTS IMP SPEC: NORMAL
PATH REPORT.COMMENTS IMP SPEC: NORMAL
PATH REPORT.RELEVANT HX SPEC: NORMAL

## 2022-07-25 LAB
HUMAN PAPILLOMA VIRUS 16 DNA: NEGATIVE
HUMAN PAPILLOMA VIRUS 18 DNA: NEGATIVE
HUMAN PAPILLOMA VIRUS FINAL DIAGNOSIS: NORMAL
HUMAN PAPILLOMA VIRUS OTHER HR: NEGATIVE

## 2022-07-26 ENCOUNTER — VIRTUAL VISIT (OUTPATIENT)
Dept: EDUCATION SERVICES | Facility: CLINIC | Age: 57
End: 2022-07-26
Payer: COMMERCIAL

## 2022-07-26 ENCOUNTER — HOSPITAL ENCOUNTER (OUTPATIENT)
Facility: CLINIC | Age: 57
End: 2022-07-26
Attending: INTERNAL MEDICINE | Admitting: INTERNAL MEDICINE
Payer: COMMERCIAL

## 2022-07-26 ENCOUNTER — TELEPHONE (OUTPATIENT)
Dept: CARDIOLOGY | Facility: CLINIC | Age: 57
End: 2022-07-26

## 2022-07-26 DIAGNOSIS — E11.9 DIABETES MELLITUS (H): Primary | ICD-10-CM

## 2022-07-26 PROCEDURE — 98967 PH1 ASSMT&MGMT NQHP 11-20: CPT | Mod: 95 | Performed by: DIETITIAN, REGISTERED

## 2022-07-26 NOTE — PROGRESS NOTES
Diabetes Education Follow-up    Subjective/Objective:  Type of Service: Telephone Visit  Originating Location (Patient Location): Home  Distant Location (Provider Location): Home  Mode of Communication:  Telephone  Telephone Visit Start Time: 205  Telephone Visit End Time (telephone visit stop time): 219    Diabetes is being managed with   Lifestyle (diet/activity), Diabetes Medications   Diabetes Medication(s)     Biguanides       metFORMIN (GLUCOPHAGE XR) 500 MG 24 hr tablet    TAKE ONE TABLET BY MOUTH ONCE DAILY WITH DINNER          Lab Results   Component Value Date    A1C 6.4 07/19/2022    A1C 7.0 01/10/2022    A1C 6.9 06/28/2021    A1C 6.4 11/25/2019    A1C 6.4 10/25/2018    A1C 6.2 10/17/2017    A1C 6.2 05/31/2017       Assessment:  Significant improvement in A1c to 6.4,now in target.  Discussed aggressive glycemic management and early intervention for halting disease process. Reviewed long term management for risk reduction.   Has been using the [employer] program - has checked 4-5 times in a few weeks.  All bsg within normal limits.  Working on diet / activity; has read through the Healthy Living with Diabetes book and looking at ideas / areas to change / experiment with food.  Will know more about upcoming surgery soon which impacts activity levels.   Recommend mycharting as needed for questions and for a follow up later this summer / fall.    Plan/Response:  Continue with positive diet & lifestyle changes   Follow up in 2-4 months     Molly De La Cruz RD, JENNIFER, CDCES  Diabetes Education  Time spent: 14 minutes

## 2022-07-26 NOTE — TELEPHONE ENCOUNTER
[facility] Call Center    Phone Message    May a detailed message be left on voicemail: yes     Reason for Call: Order(s): Other:   Reason for requested: Ziopatch and Echo  Date needed: 22  Provider name: Joceline Gomez PA-C    Pt called to schedule follow up with Joceline, scheduled for 10/28/22. Orders for ziopatch and echo will be , please send new orders and contact pt to coordinate. Thank you!      Action Taken: Other: Cardiology    Travel Screening: Not Applicable

## 2022-08-02 NOTE — TELEPHONE ENCOUNTER
Patient Quality Outreach    Patient is due for the following:   Breast Cancer Screening - Mammogram    NEXT STEPS:   Schedule Mammo    Type of outreach:    Has had outreach in the past 90 days.  No contact at this time.      Questions for provider review:    None     Rebecca Mcarthur MA

## 2022-08-09 ENCOUNTER — OFFICE VISIT (OUTPATIENT)
Dept: OTHER | Facility: CLINIC | Age: 57
End: 2022-08-09
Attending: PHYSICIAN ASSISTANT
Payer: COMMERCIAL

## 2022-08-09 VITALS
HEART RATE: 67 BPM | OXYGEN SATURATION: 96 % | BODY MASS INDEX: 56.8 KG/M2 | SYSTOLIC BLOOD PRESSURE: 128 MMHG | WEIGHT: 293 LBS | DIASTOLIC BLOOD PRESSURE: 81 MMHG

## 2022-08-09 DIAGNOSIS — E78.5 HYPERLIPIDEMIA LDL GOAL <70: ICD-10-CM

## 2022-08-09 DIAGNOSIS — E11.9 TYPE 2 DIABETES MELLITUS WITHOUT COMPLICATION, WITHOUT LONG-TERM CURRENT USE OF INSULIN (H): ICD-10-CM

## 2022-08-09 DIAGNOSIS — I48.21 PERMANENT ATRIAL FIBRILLATION (H): ICD-10-CM

## 2022-08-09 DIAGNOSIS — E66.01 MORBID OBESITY (H): ICD-10-CM

## 2022-08-09 DIAGNOSIS — I89.0 LYMPHEDEMA OF BOTH LOWER EXTREMITIES: Primary | ICD-10-CM

## 2022-08-09 DIAGNOSIS — G47.33 OSA (OBSTRUCTIVE SLEEP APNEA): ICD-10-CM

## 2022-08-09 DIAGNOSIS — I87.2 STASIS DERMATITIS OF BOTH LEGS: ICD-10-CM

## 2022-08-09 PROCEDURE — G0463 HOSPITAL OUTPT CLINIC VISIT: HCPCS

## 2022-08-09 PROCEDURE — 99205 OFFICE O/P NEW HI 60 MIN: CPT | Performed by: INTERNAL MEDICINE

## 2022-08-09 RX ORDER — ROSUVASTATIN CALCIUM 10 MG/1
10 TABLET, COATED ORAL DAILY
Qty: 90 TABLET | Refills: 1 | Status: SHIPPED | OUTPATIENT
Start: 2022-08-09 | End: 2022-12-27

## 2022-08-09 NOTE — PROGRESS NOTES
[facility]        Patient is here for a consult.    Pt is currently taking Eliquis.    /81 (BP Location: Other (Comment), Patient Position: Chair, Cuff Size: Adult Regular)   Pulse 67   Wt (!) 341 lb 4.8 oz (154.8 kg)   SpO2 96%   Breastfeeding No   BMI 56.80 kg/m      The provider has been notified that the patient has no concerns.     Questions patient would like addressed today are: N/A.    Refills are needed: N/A    Has homecare services and agency name:  Yovana Rolle MA

## 2022-08-09 NOTE — PROGRESS NOTES
[facility] INITIAL VASCULAR MEDICINE CONSULT    ( New Patient Visit)       PRIMARY HEALTH CARE PROVIDER:  Nicole Castellanos PA-C      REFERRING HEALTH CARE PROVIDER;  Nicole Gonzales      REASON FOR CONSULT: Evaluation and management of vascular causes of bilateral lower extremity swelling left more than right side with dermatitis in a morbidly obese female with history of multiple comorbidities.      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  She is also planning for right knee replacement surgery.  She is unable to do any type of exercise due to her DJD of the knees and weight.  She takes gabapentin for sleep regulation 600 mg at bedtime.  She currently follows up with cardiologist and recently they decrease the dose of diltiazem and eventually stopped and that did not change her leg swelling then restarted back.  She is scheduled to undergo echocardiogram in October.  Previous echo normal LV function.  She denies any chest pain, shortness of breath or palpitations    She is new to this clinic reviewed extensive records in the epic and updated chart      PAST MEDICAL HISTORY  Past Medical History:   Diagnosis Date     Arthritis Nov 2019     Atrial fibrillation (H)      COPD (chronic obstructive pulmonary disease) (H)     colds turn to bronchitis easily     Depression      Depressive disorder     in chart     Diabetes (H)      GERD (gastroesophageal reflux disease)      HTN (hypertension)      Hyperlipidemia      Obese      BALTA (obstructive sleep apnea)     uses CPAP     Permanent atrial fibrillation (H)  06/05/2011     Uncomplicated asthma     mentioned by urgent care md       CURRENT MEDICATIONS  blood glucose (ACCU-CHEK GUIDE) test strip, Use to test blood sugar 1 times daily  (OK to substitute alternate brand per insurance formulary.  If One Touch only give Verio not Ultra)  blood glucose monitoring (SOFTCLIX) lancets, Use to test blood sugar 1 times daily (OK to substitute alternate brand per insurance formulary.  If One Touch only give Verio not Ultra)  Blood Glucose Monitoring Suppl (ACCU-CHEK GUIDE) w/Device KIT, 1 Device daily (OK to substitute alternate brand per insurance formulary.  If One Touch only give Verio not Ultra)  buPROPion (WELLBUTRIN XL) 150 MG 24 hr tablet, Take 1 tablet (150 mg) by mouth every morning Take with 300 mg for total of 450 mg  buPROPion (WELLBUTRIN XL) 300 MG 24 hr tablet, Take 1 tablet (300 mg) by mouth every morning Take with 150 mg for total 450 mg  cetirizine (ZYRTEC) 10 MG tablet, Take 10 mg by mouth as needed for allergies  diltiazem ER (DILT-XR) 240 MG 24 hr ER beaded capsule, Take 1 capsule (240 mg) by mouth daily  ELIQUIS ANTICOAGULANT 5 MG tablet,   fosinopril (MONOPRIL) 10 MG tablet, Take 1 tablet (10 mg) by mouth daily  gabapentin (NEURONTIN) 300 MG capsule, Take 600 mg by mouth At Bedtime  lamoTRIgine (LAMICTAL) 100 MG tablet, Take 200 mg by mouth daily   metFORMIN (GLUCOPHAGE XR) 500 MG 24 hr tablet, TAKE ONE TABLET BY MOUTH ONCE DAILY WITH DINNER  metoprolol succinate ER (TOPROL XL) 50 MG 24 hr tablet, Take 1 tablet (50 mg) by mouth 2 times daily  nystatin (MYCOSTATIN) 823953 UNIT/GM external cream, Apply to affected area tid until rash resolves, could be up to 3 weeks.  sertraline (ZOLOFT) 100 MG tablet, Take 200 mg by mouth daily 2 tabs (200mg) daily   spironolactone (ALDACTONE) 25 MG tablet, Take 1 tablet (25 mg) by mouth daily  tiZANidine (ZANAFLEX) 2 MG tablet, Take 1 tablet (2 mg) by mouth 3 times daily  triamcinolone (KENALOG) 0.1 % external cream, Apply  topically 1-3 times a day prn. Do not apply to face.    No current facility-administered medications on file prior to visit.      PAST SURGICAL HISTORY:  Past Surgical History:   Procedure Laterality Date     ABDOMEN SURGERY      in chart     ARTHROPLASTY KNEE Left 01/20/2022    Procedure: ARTHROPLASTY, LEFT KNEE, TOTAL;  Surgeon: Armand Martin MD;  Location: [facility] OR     BIOPSY      skin and uterine lining     CARDIOVERSION  06/07/2011     CHOLECYSTECTOMY       COLONOSCOPY  2013    in chart     DILATION AND CURETTAGE  04/26/2012    Procedure:DILATION AND CURETTAGE; DILATION AND CURETTAGE; Surgeon:JEAN-PAUL DEL CID; Location:[facility]     DILATION AND CURETTAGE, HYSTEROSCOPY DIAGNOSTIC, COMBINED  12/08/2011    Procedure:COMBINED DILATION AND CURETTAGE, HYSTEROSCOPY DIAGNOSTIC; DILATION AND CURETTAGE, DIAGNOSTIC HYSTEROSCOPY ; Surgeon:JEAN-PAUL DEL CID; Location:[facility]     KNEE SURGERY       REMOVE HARDWARE LOWER EXTREMITY Left 01/20/2022    Procedure: REMOVAL, HARDWARE, LEFT LOWER EXTREMITY;  Surgeon: Armand Martin MD;  Location: [facility]       ALLERGIES     Allergies   Allergen Reactions     Dyazide [Hydrochlorothiazide W/Triamterene] Unknown     Vistaril [Hydroxyzine] Visual Disturbance     Nickel Rash     Robaxin [Methocarbamol] Rash     Sulfa Drugs Rash       FAMILY HISTORY  Family History   Problem Relation Age of Onset     Hypertension Mother      Heart Disease Mother         A-fib     Arthritis Mother      Hyperlipidemia Mother      Obesity Mother      Heart Disease Father         triple bypass     Cancer Father 50        bladder     Hypertension Father      Respiratory Father         smoker     Coronary Artery Disease Father         chf, pulm htn, by pass     Hyperlipidemia Father      Other Cancer Father         bladder     Cancer Paternal Grandmother 90        rectal     Colon Cancer Paternal Grandmother      Obesity Paternal Grandmother      Unknown/Adopted Brother      Obesity Brother        VASCULAR FAMILY  HISTORY  1st order relative with atherosclerotic PAD: No  1st order relative with AAA: No  Family history of Familial Hyperlipidemia No  Family History of Hypercoagulable state:No    VASCULAR RISK FACTORS  1. Diabetes:Yes controlled  2. Smoking: has never smoked.  3. HTN: controlled  4.Hyperlipidemia: Yes -      SOCIAL HISTORY  Social History     Socioeconomic History     Marital status: Single     Spouse name: Not on file     Number of children: Not on file     Years of education: Not on file     Highest education level: Bachelor's degree (e.g., BA, AB, BS)   Occupational History     Not on file   Tobacco Use     Smoking status: Never Smoker     Smokeless tobacco: Never Used   Vaping Use     Vaping Use: Never used   Substance and Sexual Activity     Alcohol use: Yes     Comment: once in a while, few times a year     Drug use: No     Sexual activity: Not Currently     Partners: Male     Birth control/protection: None   Other Topics Concern     Parent/sibling w/ CABG, MI or angioplasty before 65F 55M? Yes     Comment: father triple bypass      Service Not Asked     Blood Transfusions Not Asked     Caffeine Concern Not Asked     Occupational Exposure Not Asked     Hobby Hazards Not Asked     Sleep Concern Not Asked     Stress Concern Not Asked     Weight Concern Not Asked     Special Diet No     Back Care Not Asked     Exercise Yes     Comment: walks 2 miles minimum 5 x weekly      Bike Helmet Not Asked     Seat Belt Not Asked     Self-Exams Not Asked   Social History Narrative     Not on file     Social Determinants of Health     Financial Resource Strain: Low Risk      Difficulty of Paying Living Expenses: Not hard at all   Food Insecurity: No Food Insecurity     Worried About Running Out of Food in the Last Year: Never true     Ran Out of Food in the Last Year: Never true   Transportation Needs: No Transportation Needs     Lack of Transportation (Medical): No     Lack of Transportation (Non-Medical): No    Physical Activity: Inactive     Days of Exercise per Week: 0 days     Minutes of Exercise per Session: 0 min   Stress: Stress Concern Present     Feeling of Stress : To some extent   Social Connections: Moderately Integrated     Frequency of Communication with Friends and Family: More than three times a week     Frequency of Social Gatherings with Friends and Family: Once a week     Attends Nondenominational Services: More than 4 times per year     Active Member of Clubs or Organizations: Yes     Attends Club or Organization Meetings: Not on file     Marital Status: Never    Intimate Partner Violence: Not on file   Housing Stability: Low Risk      Unable to Pay for Housing in the Last Year: No     Number of Places Lived in the Last Year: 1     Unstable Housing in the Last Year: No       ROS:   General: No change in weight, sleep or appetite.  Normal energy.  No fever or chills  Eyes: Negative for vision changes or eye problems  ENT: No problems with ears, nose or throat.  No difficulty swallowing.  Resp: No coughing, wheezing or shortness of breath  CV: No chest pains or palpitations  GI: No nausea, vomiting,  heartburn, abdominal pain, diarrhea, constipation or change in bowel habits  : No urinary frequency or dysuria, bladder or kidney problems  Musculoskeletal: No significant muscle or joint pains  Neurologic: No headaches, numbness, tingling, weakness, problems with balance or coordination  Psychiatric: No problems with anxiety, depression or mental health  Heme/immune/allergy: No history of bleeding or clotting problems or anemia.  No allergies or immune system problems  Endocrine: No history of thyroid disease, diabetes or other endocrine disorders  Skin: No rashes,worrisome lesions or skin problems  Vascular: Bilateral lower extremity swelling left more than right side history of knee replacement in January 2022  Thickening and discoloration of the skin both legs  No foot ulcers or leg ulcers  EXAM:  BP  128/81 (BP Location: Other (Comment), Patient Position: Chair, Cuff Size: Adult Regular)   Pulse 67   Wt (!) 341 lb 4.8 oz (154.8 kg)   SpO2 96%   Breastfeeding No   BMI 56.80 kg/m    In general, the patient is a pleasant female in no apparent distress.    HEENT: NC/AT.  PERRLA.  EOMI.  Sclerae white, not injected.  Nares clear.  Pharynx without erythema or exudate.  Dentition intact.    Neck: No adenopathy.  No thyromegaly. Carotids +2/2 bilaterally without bruits.  No jugular venous distension.   Heart: RRR. Normal S1, S2 splits physiologically. No murmur, rub, click, or gallop. The PMI is in the 5th ICS in the midclavicular line. There is no heave.    Lungs: CTA.  No ronchi, wheezes, rales.  No dullness to percussion.   Abdomen: Soft, nontender, nondistended. No organomegaly. No AAA.  No bruits.   Extremities: Vascular:  Bilateral lower extremity lymphedema left worse than right side with squaring of the toes, dorsal hump, loss of contour of leg, positive stammer sign.  Hyperkeratosis of the skin with stasis dermatitis left more than right side.  No cellulitis  Good palpable peripheral pulses  Well-healed scar in the left knee area      Labs:  LIPID RESULTS:  Lab Results   Component Value Date    CHOL 182 07/19/2022    CHOL 226 (H) 06/28/2021    HDL 48 (L) 07/19/2022    HDL 39 (L) 06/28/2021    LDL 97 07/19/2022     (H) 06/28/2021    TRIG 184 (H) 07/19/2022    TRIG 237 (H) 06/28/2021    CHOLHDLRATIO 3.4 11/02/2015       LIVER ENZYME RESULTS:  Lab Results   Component Value Date    AST 34 07/19/2022    AST 22 06/28/2021    ALT 42 07/19/2022    ALT 31 06/28/2021       CBC RESULTS:  Lab Results   Component Value Date    WBC 8.2 07/19/2022    WBC 8.6 05/10/2021    RBC 4.95 07/19/2022    RBC 5.29 (H) 05/10/2021    HGB 14.4 07/19/2022    HGB 15.2 05/10/2021    HCT 43.8 07/19/2022    HCT 46.6 05/10/2021    MCV 89 07/19/2022    MCV 88 05/10/2021    MCH 29.1 07/19/2022    MCH 28.7 05/10/2021    MCHC 32.9  07/19/2022    MCHC 32.6 05/10/2021    RDW 15.5 (H) 07/19/2022    RDW 13.1 05/10/2021     07/19/2022     05/10/2021       BMP RESULTS:  Lab Results   Component Value Date     07/19/2022     06/28/2021    POTASSIUM 4.7 07/19/2022    POTASSIUM 4.8 06/28/2021    CHLORIDE 109 07/19/2022    CHLORIDE 108 06/28/2021    CO2 21 07/19/2022    CO2 26 06/28/2021    ANIONGAP 10 07/19/2022    ANIONGAP 6 06/28/2021     (H) 07/19/2022     (H) 06/28/2021    BUN 16 07/19/2022    BUN 14 06/28/2021    CR 0.98 07/19/2022    CR 1.26 (H) 06/28/2021    GFRESTIMATED 67 07/19/2022    GFRESTIMATED 47 (L) 06/28/2021    GFRESTBLACK 55 (L) 06/28/2021    CHERYL 9.4 07/19/2022    CHERYL 10.0 06/28/2021        A1C RESULTS:  Lab Results   Component Value Date    A1C 6.4 (H) 07/19/2022    A1C 6.9 (H) 06/28/2021       THYROID RESULTS:  Lab Results   Component Value Date    TSH 1.91 05/10/2021       Procedures:       Assessment and Plan:    1. Lymphedema of both lower extremities left > Rt side   2. Stasis dermatitis of both legs  3. Morbid obesity (H)  4. BALTA (obstructive sleep apnea) uses CPAP   5. Permanent atrial fibrillation (H)  6. Hyperlipidemia LDL goal <70  7. Type 2 diabetes mellitus without complication, without long-term current use of insulin (H)    She has a classical features of bilateral lower extremity lymphedema left more than right side morbidly obese and obstructive sleep apnea wears a CPAP machine.  Stasis dermatitis in both legs and no signs of cellulitis.  She was taking diltiazem for heart rate control and this was held briefly no change in leg symptoms and restarted back.  She also takes gabapentin for sleep regulation which can also cause leg swelling and worsening lymphedema.  Last time she had a sleep evaluation was more than 10 years ago.  Previous echo normal LV function and she is scheduled to undergo repeat echo in October.  She also consulted weight loss clinic for possible future weight  loss surgery.  She wants to undergo right knee replacement in the near future.  No previous history of DVT or PE.  She is only taking Eliquis 5 mg once a day.  She is not on any statin her LDL is 97  Diabetes is well controlled  I had a lengthy discussion with the patient today    At present my recommendations,  Arrange a referral to see the lymphedema therapist for MLD, compression, wraps and skin care etc.  Discussed with prescribing provider for weaning of gabapentin and try alternate therapy  Discussed with primary and go for sleep clinic reevaluation for optimizing CPAP settings etc.  Elevate the legs  Discussed with cardiology for appropriate dose of Eliquis she currently takes 5 mg daily usual therapeutic dose is 5 mg twice a day for A. Fib.  Initiate Crestor 10 mg daily  Fasting lipids in 3 months order placed  Follow-up in 3 to 4 months  Continue rest of the medications same and tight control of the diabetes    - rosuvastatin (CRESTOR) 10 MG tablet; Take 1 tablet (10 mg) by mouth daily  Dispense: 90 tablet; Refill: 1  - Lipid panel reflex to direct LDL Fasting; Future  - Lymphedema Therapy Referral; Future    Thank you for the consultation !  This note was dictated by utilizing Dragon software  Copy of this note to primary care provider and referring provider     60 minutes spent on the date of the encounter doing chart review, history and exam, documentation, and further activities as noted above.  She is new to this clinic reviewed extensive records in epic and updated chart  AVS with written instructions given      Mekhi Quinn MD, [place]ULIANA, [facility], [facility]  Vascular Medicine  Clinical Lipidologist  Clinical Hypertension specialist

## 2022-08-09 NOTE — PATIENT INSTRUCTIONS
Please see Lymphedema therapist ,referral done    Go for ECHO as planned     Local Skin care     Discuss with prescribing provider for weaning off Gabapentin ( some patients leg swelling worsens with gabapentin)     Consider sleep clinic reevaluation     Clarify eliquis dose , should be taken 5 mg twice a day     RTC in 3-4 months

## 2022-08-25 ENCOUNTER — HOSPITAL ENCOUNTER (OUTPATIENT)
Dept: OCCUPATIONAL THERAPY | Facility: CLINIC | Age: 57
Discharge: HOME | End: 2022-08-25
Attending: INTERNAL MEDICINE
Payer: COMMERCIAL

## 2022-08-25 DIAGNOSIS — I89.0 LYMPHEDEMA OF BOTH LOWER EXTREMITIES: ICD-10-CM

## 2022-08-25 PROCEDURE — 97165 OT EVAL LOW COMPLEX 30 MIN: CPT | Mod: GO

## 2022-08-25 PROCEDURE — 97535 SELF CARE MNGMENT TRAINING: CPT | Mod: GO

## 2022-08-26 NOTE — PROGRESS NOTES
08/25/22 1030   Rehab Discipline   Discipline OT   Type of Visit   Type of visit Initial Edema Evaluation   General Information   Start of care 08/25/22   Referring physician Dr. Mekhi Quinn   Orders Evaluate and treat as indicated   Order date 08/09/22   Medical diagnosis lymphedema of both lower extremities, L>R   Edema onset 08/09/22   Affected body parts LLE;RLE   Edema etiology comments morbid obesity, s/p L TKA   Pertinent history of current problem (PT: include personal factors and/or comorbidities that impact the POC; OT: include additional occupational profile info) Patient with morbid obesity, OA, DM2, a-fib (anti-coagulated), COPD, asthma, depression, s/p L TKA (Jan 2022).  Patient reports no problem with swelling until s/p knee surgery, planning to have R TKA.  Patient has tried BL BK and thigh-high compression, elevation, is on diuretic.  Patient is very sedentary 2/2 pain, works desk job.   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 No   Have you fallen and had an injury in the past year? No   Abuse Screen (yes response referral indicated)   Feels Unsafe at Home or Work/School no   Feels Threatened by Someone no   Does Anyone Try to Keep You From Having Contact with Others or Doing Things Outside Your Home? no   Physical Signs of Abuse Present no   Patient needs abuse support services and resources No   System Outcome Measures   Outcome Measures Lymphedema   Lymphedema Life Impact Scale (score  "range 0-72). A higher score indicates greater impairment. 19   Subjective Report   Patient report of symptoms impaired body image, no understanding of how to manage, skin tightness   Patient / Family Goals   Patient / family goals statement \"how to manage\"   Pain   Patient currently in pain No   Pain comments 0/10 at rest, chronic R knee pain 2/2 OA   Cognitive Status   Orientation Orientation to person, place and time   Level of consciousness Alert   Follows commands and answers questions 100% of the time   Personal safety and judgement Intact   Memory Intact   Edema Exam / Assessment   Skin condition comments 3+ pitting BL pre-tibial with slow rebound, 2+ BL dorsum of feet, 2+ BL med thighs.  Loss of ankle contour, Stemmer's sign positive, BBK firm and fibrotic throughout.  Hemosiderin staining in gaiter pattern BBK, well-healed L knee scars s/p surgery.   Girth Measurements   Girth Measurements Other  (deferred for ed)   Range of Motion   ROM comments decreased ankle AROM   Activities of Daily Living   Activities of Daily Living mod I   Transfers   Transfers mod I   Planned Edema Interventions   Planned edema interventions Manual lymph drainage;Gradient compression bandaging;Fit for compression garment;Exercises;Precautions to prevent infection / exacerbation;Education;Skin care / precautions;Scar mobilization;Home management program development   Planned edema intervention comments recommend CDT   Clinical Impression   Criteria for skilled therapeutic intervention met Yes   Therapy diagnosis chronic lymphedema BLE, 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 morbid obesity   Influenced by the following impairments / conditions Stage 2;Stage 3   Assessment of Occupational Performance 1-3 Performance Deficits   Identified Performance Deficits impaired mobility, increased risk of infection and soft tissue fibrosis, limited understanding of long-term lymphedema symptom management   Clinical Decision Making " (Complexity) Low complexity   Treatment Frequency 5x/week   Treatment duration 2 weeks   Patient / family and/or staff in agreement with plan of care Yes   Risks and benefits of therapy have been explained Yes   Clinical impression comments Patient will benefit from complete decongestive therapy (CDT) to decrease risk of infection, progressive soft tissue fibrosis.  Ideally, patient should have full BLE gradient compression bandaging, however, this may pose falls risk; will plan for BBK GCB with cont assessment of BL thigh swelling.   Goals   Edema Eval Goals 1;2;3;4   Goal 1   Goal identifier volume   Goal description For decreased risk of infection, skin breakdown/wounds & progressive soft-tissue fibrosis and improved fit of footwear, volume will be reduced by at least 300 mL in each BK.   Target date 10/25/22   Goal 2   Goal identifier GCB   Goal description To reduce volume of lymphedema and risk of soft tissue fibrosis, pt will tolerate up to 23hr/day wear gradient compression bandaging (GCB) of BBK.   Target date 10/25/22   Goal 3   Goal identifier home program   Goal description For long-term home mgmt chronic lymphedema pt/caregiver independent in home program a. GCB/GCB alternative garment for night wear b. compression garment for day wear c. ex to incr lymph flow/self-MLD.   Target date 10/25/22   Goal 4   Goal identifier lymphedema precautions   Goal description Pt will independently verbalize the signs/symptoms of lymphedema, precautions and how to obtain a future lymphedema referral if edema persists to preserve skin integrity, prevent infection and preserve functional mobility.   Target date 10/25/22   Total Evaluation Time   OT Didi Low Complexity Minutes (40148) 15

## 2022-10-04 ENCOUNTER — TELEPHONE (OUTPATIENT)
Dept: CARDIOLOGY | Facility: CLINIC | Age: 57
End: 2022-10-04

## 2022-10-04 DIAGNOSIS — I48.0 PAROXYSMAL ATRIAL FIBRILLATION (H): Primary | ICD-10-CM

## 2022-10-04 RX ORDER — APIXABAN 5 MG/1
5 TABLET, FILM COATED ORAL 2 TIMES DAILY
Qty: 180 TABLET | Refills: 0 | Status: SHIPPED | OUTPATIENT
Start: 2022-10-04 | End: 2022-10-28

## 2022-10-04 NOTE — TELEPHONE ENCOUNTER
Message left on confidential voice mail for hold orders for patient.  Per periprocedural management of AC in AF patient.  Colonoscopy is low risk and patient is low risk CHADSVASc 2 (HTN, sex).   Okay to hold eliquis for 3 days, no bridging and resume AC day after procedure.  ELBA Harvey

## 2022-10-04 NOTE — TELEPHONE ENCOUNTER
[facility] Call Center    Phone Message    May a detailed message be left on voicemail: yes     Reason for Call: Other:     Ivy at [facility] is calling to request  3 day hold ELIQUIS ANTICOAGULANT 5 MG tablet.  Colonoscopy is scheduled for 10/17/2022.  Please call to give order as soon as possible.  [phone].  Action Taken: Other: cardio    Travel Screening: Not Applicable     Thank you!  [facility]

## 2022-10-17 ENCOUNTER — HOSPITAL ENCOUNTER (OUTPATIENT)
Dept: OCCUPATIONAL THERAPY | Facility: CLINIC | Age: 57
Discharge: HOME | End: 2022-10-17
Payer: COMMERCIAL

## 2022-10-17 ENCOUNTER — MYC MEDICAL ADVICE (OUTPATIENT)
Dept: CARDIOLOGY | Facility: CLINIC | Age: 57
End: 2022-10-17

## 2022-10-17 DIAGNOSIS — I89.0 LYMPHEDEMA OF BOTH LOWER EXTREMITIES: Primary | ICD-10-CM

## 2022-10-17 DIAGNOSIS — I48.0 PAROXYSMAL ATRIAL FIBRILLATION (H): Primary | ICD-10-CM

## 2022-10-17 DIAGNOSIS — I48.20 CHRONIC ATRIAL FIBRILLATION (H): ICD-10-CM

## 2022-10-17 PROCEDURE — 97140 MANUAL THERAPY 1/> REGIONS: CPT | Mod: GO

## 2022-10-17 RX ORDER — DILTIAZEM HYDROCHLORIDE 240 MG/1
240 CAPSULE, COATED, EXTENDED RELEASE ORAL DAILY
Qty: 90 CAPSULE | Refills: 0 | Status: SHIPPED | OUTPATIENT
Start: 2022-10-17 | End: 2022-10-28

## 2022-10-17 NOTE — TELEPHONE ENCOUNTER
Patient scheduled to see BEATRIZ Naranjo on 10/28.   Medication was removed off of medication list.  ELBA Harvey

## 2022-10-18 ENCOUNTER — HOSPITAL ENCOUNTER (OUTPATIENT)
Dept: OCCUPATIONAL THERAPY | Facility: CLINIC | Age: 57
Discharge: HOME | End: 2022-10-18
Payer: COMMERCIAL

## 2022-10-18 DIAGNOSIS — I89.0 LYMPHEDEMA OF BOTH LOWER EXTREMITIES: Primary | ICD-10-CM

## 2022-10-18 PROCEDURE — 97140 MANUAL THERAPY 1/> REGIONS: CPT | Mod: GO

## 2022-10-19 ENCOUNTER — HOSPITAL ENCOUNTER (OUTPATIENT)
Dept: OCCUPATIONAL THERAPY | Facility: CLINIC | Age: 57
Discharge: HOME | End: 2022-10-19
Payer: COMMERCIAL

## 2022-10-19 DIAGNOSIS — I89.0 LYMPHEDEMA OF BOTH LOWER EXTREMITIES: Primary | ICD-10-CM

## 2022-10-19 PROCEDURE — 97140 MANUAL THERAPY 1/> REGIONS: CPT | Mod: GO

## 2022-10-20 ENCOUNTER — HOSPITAL ENCOUNTER (OUTPATIENT)
Dept: OCCUPATIONAL THERAPY | Facility: CLINIC | Age: 57
Discharge: HOME | End: 2022-10-20
Payer: COMMERCIAL

## 2022-10-20 DIAGNOSIS — I89.0 LYMPHEDEMA OF BOTH LOWER EXTREMITIES: Primary | ICD-10-CM

## 2022-10-20 PROCEDURE — 97140 MANUAL THERAPY 1/> REGIONS: CPT | Mod: GO

## 2022-10-21 ENCOUNTER — HOSPITAL ENCOUNTER (OUTPATIENT)
Dept: OCCUPATIONAL THERAPY | Facility: CLINIC | Age: 57
Discharge: HOME | End: 2022-10-21
Payer: COMMERCIAL

## 2022-10-21 DIAGNOSIS — I89.0 LYMPHEDEMA OF BOTH LOWER EXTREMITIES: Primary | ICD-10-CM

## 2022-10-21 DIAGNOSIS — I89.0 LYMPHEDEMA: ICD-10-CM

## 2022-10-21 PROCEDURE — 97140 MANUAL THERAPY 1/> REGIONS: CPT | Mod: GO | Performed by: OCCUPATIONAL THERAPIST

## 2022-10-22 ENCOUNTER — HEALTH MAINTENANCE LETTER (OUTPATIENT)
Age: 57
End: 2022-10-22

## 2022-10-24 ENCOUNTER — HOSPITAL ENCOUNTER (OUTPATIENT)
Dept: OCCUPATIONAL THERAPY | Facility: CLINIC | Age: 57
Discharge: HOME | End: 2022-10-24
Payer: COMMERCIAL

## 2022-10-24 DIAGNOSIS — I89.0 LYMPHEDEMA OF BOTH LOWER EXTREMITIES: ICD-10-CM

## 2022-10-24 DIAGNOSIS — I89.0 LYMPHEDEMA: Primary | ICD-10-CM

## 2022-10-24 PROCEDURE — 97140 MANUAL THERAPY 1/> REGIONS: CPT | Mod: GO | Performed by: OCCUPATIONAL THERAPIST

## 2022-10-25 ENCOUNTER — HOSPITAL ENCOUNTER (OUTPATIENT)
Dept: OCCUPATIONAL THERAPY | Facility: CLINIC | Age: 57
Discharge: HOME | End: 2022-10-25
Payer: COMMERCIAL

## 2022-10-25 ENCOUNTER — HOSPITAL ENCOUNTER (OUTPATIENT)
Dept: CARDIOLOGY | Facility: CLINIC | Age: 57
Discharge: HOME | End: 2022-10-25
Attending: PHYSICIAN ASSISTANT | Admitting: PHYSICIAN ASSISTANT
Payer: COMMERCIAL

## 2022-10-25 DIAGNOSIS — I89.0 LYMPHEDEMA: Primary | ICD-10-CM

## 2022-10-25 DIAGNOSIS — I48.20 CHRONIC ATRIAL FIBRILLATION (H): ICD-10-CM

## 2022-10-25 LAB — LVEF ECHO: NORMAL

## 2022-10-25 PROCEDURE — 999N000208 ECHOCARDIOGRAM COMPLETE

## 2022-10-25 PROCEDURE — 93306 TTE W/DOPPLER COMPLETE: CPT | Mod: 26 | Performed by: INTERNAL MEDICINE

## 2022-10-25 PROCEDURE — 97140 MANUAL THERAPY 1/> REGIONS: CPT | Mod: GO | Performed by: OCCUPATIONAL THERAPIST

## 2022-10-25 PROCEDURE — 255N000002 HC RX 255 OP 636: Performed by: PHYSICIAN ASSISTANT

## 2022-10-25 RX ADMIN — HUMAN ALBUMIN MICROSPHERES AND PERFLUTREN 9 ML: 10; .22 INJECTION, SOLUTION INTRAVENOUS at 13:31

## 2022-10-27 NOTE — PROGRESS NOTES
"[facility]    I had the pleasure of seeing Mary when she came for follow up of recent echo results and AFib.  This 57 year old sees Dr. Greenfield for her history of:    1. Permanent atrial fibrillation on a rate control/AC strategy given her asymptomatic status.  2. Hypertension under good control  3. Obesity   4. Lower extremity edema with adjustments in Diltiazem dose and addition of spironolactone without improvement.  Sees lymphedema clinic  5. BALTA - on CPAP      I had a virtual visit with Mary 8/2021 at which time she was doing well from a cardiac perspective, but noted increasing fatigue after increasing metoprolol XL to 100/50.  She had stopped the Diltiazem 120 mg daily but she had not noticed any improvement in LE edema.  I therefore recommended she decrease metoprolol XL back to 50 mg BID and restart Diltiazem 240 mg daily (as no improvement off of Diltiazem) with EKG and follow-up in 6 months.      She underwent L TKA 1/2022.    Routine echo 10/2022 with normal LVEF.  ZioPatch to assess HR control on Diltiazem was not scheduled despite request to extend orders.    Interval History:  Mary is feeling \"fine\" from a heart perspective. No c/o palpitations, without awareness of AFib. No bleeding issues on Eliquis.  Hgb last checked 7/2022.     She's not terribly active given her significant lymphedema. No c/o CP, change in STYLES.    No dizziness, lightheadedness.     VITALS:  Vitals: /84 (Cuff Size: Adult Regular)   Pulse 107   Ht 1.651 m (5' 5\")   Wt (!) 153.5 kg (338 lb 4.8 oz)   SpO2 97%   BMI 56.30 kg/m      Diagnostic Testing:  EKG today, which I overread, showed AFib 106 bpm on Diltiazem 240 and metoprolol XL 50 mg BID  Echocardiogram 10/2022-LVEF 55-60%.  No RWMA. No thrombus. Nl RV. Mod dilated LA. mild aortic sclerosis.  No other significant valvular abnormalities. AFib on echo, no change c/w 6/2019  Holter 5/2020 AFib with avg HR 84 bpm on metoprolol XL 50 BID and Dilt " 240  Echocardiogram 6/10/2019 showed EF 60% with normal RV size and function. No significant valvular abnormalities.   24 hour Holter 6/7/2019 showed atrial fibrillation with average HR 81 bpm. Range was 54 bpm @ 1431 and max was 236 bpm @ 122. Minimal ectopy. Event     Plan:  1. Annual follow-up. Agrees to 24 hour Holter prior    Assessment/Plan:    1. Permanent AFib    Remains on Diltiazem 240 and metoprolol XL 50 mg BID    HR fast today but she notes it's much better at home and thinks it's high b/c she just walked here    Nl LVEF - no evidence of tachy-induced CM    Anticoagulation with Eliquis 5 mg BID.  Dose appropriate for age/age/Cr criteria.  [id number] 2 (HTN, sex). Hgb wnl    PLAN:    Continue current meds    Annual follow-up with 24 hour Holter prior      Joceline Gomez PA-C, MSPAS      Orders Placed This Encounter   Procedures     Holter Monitor 24 hour Adult Pediatric     Orders Placed This Encounter   Medications     diltiazem ER (DILT-XR) 240 MG 24 hr ER beaded capsule     Sig: Take 1 capsule (240 mg) by mouth daily     Dispense:  90 capsule     Refill:  3     ELIQUIS ANTICOAGULANT 5 MG tablet     Sig: Take 1 tablet (5 mg) by mouth 2 times daily     Dispense:  180 tablet     Refill:  3     Keep on file until pt is due for refill     Medications Discontinued During This Encounter   Medication Reason     diltiazem ER COATED BEADS (CARDIZEM CD/CARTIA XT) 240 MG 24 hr capsule Medication Reconciliation Clean Up     diltiazem ER (DILT-XR) 240 MG 24 hr ER beaded capsule Reorder     ELIQUIS ANTICOAGULANT 5 MG tablet Reorder     nystatin (MYCOSTATIN) 359782 UNIT/GM external cream Therapy completed     triamcinolone (KENALOG) 0.1 % external cream Therapy completed         Encounter Diagnoses   Name Primary?     Chronic atrial fibrillation (H)      Paroxysmal atrial fibrillation (H)        CURRENT MEDICATIONS:  Current Outpatient Medications   Medication Sig Dispense Refill     blood glucose (ACCU-CHEK GUIDE)  test strip Use to test blood sugar 1 times daily  (OK to substitute alternate brand per insurance formulary.  If One Touch only give Verio not Ultra) 100 strip 11     blood glucose monitoring (SOFTCLIX) lancets Use to test blood sugar 1 times daily (OK to substitute alternate brand per insurance formulary.  If One Touch only give Verio not Ultra) 100 each 11     Blood Glucose Monitoring Suppl (ACCU-CHEK GUIDE) w/Device KIT 1 Device daily (OK to substitute alternate brand per insurance formulary.  If One Touch only give Verio not Ultra) 1 kit 1     buPROPion (WELLBUTRIN XL) 150 MG 24 hr tablet Take 1 tablet (150 mg) by mouth every morning Take with 300 mg for total of 450 mg 90 tablet 1     buPROPion (WELLBUTRIN XL) 300 MG 24 hr tablet Take 1 tablet (300 mg) by mouth every morning Take with 150 mg for total 450 mg 90 tablet 1     cetirizine (ZYRTEC) 10 MG tablet Take 10 mg by mouth as needed for allergies       diltiazem ER (DILT-XR) 240 MG 24 hr ER beaded capsule Take 1 capsule (240 mg) by mouth daily 90 capsule 3     ELIQUIS ANTICOAGULANT 5 MG tablet Take 1 tablet (5 mg) by mouth 2 times daily 180 tablet 3     fosinopril (MONOPRIL) 10 MG tablet Take 1 tablet (10 mg) by mouth daily 90 tablet 3     gabapentin (NEURONTIN) 300 MG capsule Take 600 mg by mouth At Bedtime       lamoTRIgine (LAMICTAL) 100 MG tablet Take 200 mg by mouth daily        metoprolol succinate ER (TOPROL XL) 50 MG 24 hr tablet Take 1 tablet (50 mg) by mouth 2 times daily 180 tablet 3     rosuvastatin (CRESTOR) 10 MG tablet Take 1 tablet (10 mg) by mouth daily 90 tablet 1     sertraline (ZOLOFT) 100 MG tablet Take 200 mg by mouth daily 2 tabs (200mg) daily        spironolactone (ALDACTONE) 25 MG tablet Take 1 tablet (25 mg) by mouth daily 90 tablet 3     tiZANidine (ZANAFLEX) 2 MG tablet Take 1 tablet (2 mg) by mouth 3 times daily (Patient taking differently: Take 2 mg by mouth 3 times daily Pt takes once at bedtime) 30 tablet 1     metFORMIN  "(GLUCOPHAGE XR) 500 MG 24 hr tablet TAKE ONE TABLET BY MOUTH ONCE DAILY WITH DINNER 90 tablet 1       ALLERGIES     Allergies   Allergen Reactions     Dyazide [Hydrochlorothiazide W/Triamterene] Unknown     Vistaril [Hydroxyzine] Visual Disturbance     Nickel Rash     Robaxin [Methocarbamol] Rash     Sulfa Drugs Rash         Review of Systems:  Skin:  Negative     Eyes:  Positive for glasses  ENT:  Negative    Respiratory:  Positive for dyspnea on exertion  Cardiovascular:  Negative for;palpitations;chest pain Positive for;palpitations;dizziness;edema  Gastroenterology: Negative for melena;hematochezia  Genitourinary:  Negative    Musculoskeletal:  Positive for arthritis;joint pain  Neurologic:  Negative    Psychiatric:  Positive for excessive stress;depression;anxiety  Heme/Lymph/Imm:  Positive for allergies  Endocrine:  Negative      Physical Exam:  Vitals: /84 (Cuff Size: Adult Regular)   Pulse 107   Ht 1.651 m (5' 5\")   Wt (!) 153.5 kg (338 lb 4.8 oz)   SpO2 97%   BMI 56.30 kg/m      Constitutional:  cooperative, alert and oriented, well developed, well nourished, in no acute distress        Skin:  warm and dry to the touch   reaction from patches on anterior chest wall    Head:  normocephalic, no masses or lesions        Eyes:  pupils equal and round;conjunctivae and lids unremarkable;sclera white        ENT:  no pallor or cyanosis, dentition good        Neck:  JVP normal;no carotid bruit        Chest:  normal breath sounds, clear to auscultation, normal A-P diameter, normal symmetry, normal respiratory excursion, no use of accessory muscles        Cardiac:   irregularly irregular rhythm           HR 70s    Abdomen:  abdomen soft obese      Vascular: pulses full and equal                                      Extremities and Back:  no deformities, clubbing, cyanosis, erythema observed        Neurological:  no gross motor deficits            PAST MEDICAL HISTORY:  Past Medical History:   Diagnosis Date "     Arthritis Nov 2019     Atrial fibrillation (H)      COPD (chronic obstructive pulmonary disease) (H)     colds turn to bronchitis easily     Depression      Depressive disorder     in chart     Diabetes (H)      GERD (gastroesophageal reflux disease)      HTN (hypertension)      Hyperlipidemia      Obese      BALTA (obstructive sleep apnea)     uses CPAP     Permanent atrial fibrillation (H) 06/05/2011     Uncomplicated asthma     mentioned by urgent care md       PAST SURGICAL HISTORY:  Past Surgical History:   Procedure Laterality Date     ABDOMEN SURGERY      in chart     ARTHROPLASTY KNEE Left 01/20/2022    Procedure: ARTHROPLASTY, LEFT KNEE, TOTAL;  Surgeon: Armand Martin MD;  Location: [facility]     BIOPSY      skin and uterine lining     CARDIOVERSION  06/07/2011     CHOLECYSTECTOMY       COLONOSCOPY  2013    in chart     DILATION AND CURETTAGE  04/26/2012    Procedure:DILATION AND CURETTAGE; DILATION AND CURETTAGE; Surgeon:JEAN-PAUL DEL CID; Location:[facility]     DILATION AND CURETTAGE, HYSTEROSCOPY DIAGNOSTIC, COMBINED  12/08/2011    Procedure:COMBINED DILATION AND CURETTAGE, HYSTEROSCOPY DIAGNOSTIC; DILATION AND CURETTAGE, DIAGNOSTIC HYSTEROSCOPY ; Surgeon:JEAN-PAUL DEL CID; Location:[facility]     KNEE SURGERY       REMOVE HARDWARE LOWER EXTREMITY Left 01/20/2022    Procedure: REMOVAL, HARDWARE, LEFT LOWER EXTREMITY;  Surgeon: Armand Martin MD;  Location: [facility]       FAMILY HISTORY:  Family History   Problem Relation Age of Onset     Hypertension Mother      Heart Disease Mother         A-fib     Arthritis Mother      Hyperlipidemia Mother      Obesity Mother      Heart Disease Father         triple bypass     Cancer Father 50        bladder     Hypertension Father      Respiratory Father         smoker     Coronary Artery Disease Father         chf, pulm htn, by pass     Hyperlipidemia Father      Other Cancer Father         bladder     Cancer Paternal Grandmother 90        rectal     Colon Cancer Paternal Grandmother       Obesity Paternal Grandmother      Unknown/Adopted Brother      Obesity Brother        SOCIAL HISTORY:  Social History     Socioeconomic History     Marital status: Single     Spouse name: None     Number of children: None     Years of education: None     Highest education level: Bachelor's degree (e.g., BA, AB, BS)   Tobacco Use     Smoking status: Never     Smokeless tobacco: Never   Vaping Use     Vaping Use: Never used   Substance and Sexual Activity     Alcohol use: Yes     Comment: once in a while, few times a year     Drug use: No     Sexual activity: Not Currently     Partners: Male     Birth control/protection: None   Other Topics Concern     Parent/sibling w/ CABG, MI or angioplasty before 65F 55M? Yes     Comment: father triple bypass     Special Diet No     Exercise Yes     Comment: walks 2 miles minimum 5 x weekly      Social Determinants of Health     Financial Resource Strain: Low Risk      Difficulty of Paying Living Expenses: Not hard at all   Food Insecurity: No Food Insecurity     Worried About Running Out of Food in the Last Year: Never true     Ran Out of Food in the Last Year: Never true   Transportation Needs: No Transportation Needs     Lack of Transportation (Medical): No     Lack of Transportation (Non-Medical): No   Physical Activity: Inactive     Days of Exercise per Week: 0 days     Minutes of Exercise per Session: 0 min   Stress: Stress Concern Present     Feeling of Stress : To some extent   Social Connections: Moderately Integrated     Frequency of Communication with Friends and Family: More than three times a week     Frequency of Social Gatherings with Friends and Family: Once a week     Attends Anabaptist Services: More than 4 times per year     Active Member of Clubs or Organizations: Yes     Marital Status: Never    Housing Stability: Low Risk      Unable to Pay for Housing in the Last Year: No     Number of Places Lived in the Last Year: 1     Unstable Housing in the  Last Year: No

## 2022-10-28 ENCOUNTER — HOSPITAL ENCOUNTER (OUTPATIENT)
Dept: OCCUPATIONAL THERAPY | Facility: CLINIC | Age: 57
Discharge: HOME | End: 2022-10-28
Payer: COMMERCIAL

## 2022-10-28 ENCOUNTER — OFFICE VISIT (OUTPATIENT)
Dept: CARDIOLOGY | Facility: CLINIC | Age: 57
End: 2022-10-28
Payer: COMMERCIAL

## 2022-10-28 VITALS
BODY MASS INDEX: 48.82 KG/M2 | DIASTOLIC BLOOD PRESSURE: 84 MMHG | SYSTOLIC BLOOD PRESSURE: 124 MMHG | WEIGHT: 293 LBS | OXYGEN SATURATION: 97 % | HEIGHT: 65 IN | HEART RATE: 107 BPM

## 2022-10-28 DIAGNOSIS — I89.0 LYMPHEDEMA OF BOTH LOWER EXTREMITIES: Primary | ICD-10-CM

## 2022-10-28 DIAGNOSIS — I48.0 PAROXYSMAL ATRIAL FIBRILLATION (H): ICD-10-CM

## 2022-10-28 DIAGNOSIS — I48.20 CHRONIC ATRIAL FIBRILLATION (H): ICD-10-CM

## 2022-10-28 PROCEDURE — 97140 MANUAL THERAPY 1/> REGIONS: CPT | Mod: GO

## 2022-10-28 PROCEDURE — 99214 OFFICE O/P EST MOD 30 MIN: CPT | Performed by: PHYSICIAN ASSISTANT

## 2022-10-28 PROCEDURE — 93000 ELECTROCARDIOGRAM COMPLETE: CPT | Performed by: PHYSICIAN ASSISTANT

## 2022-10-28 RX ORDER — DILTIAZEM HYDROCHLORIDE 240 MG/1
240 CAPSULE, EXTENDED RELEASE ORAL DAILY
Qty: 90 CAPSULE | Refills: 3 | Status: SHIPPED | OUTPATIENT
Start: 2022-10-28 | End: 2023-09-01

## 2022-10-28 RX ORDER — APIXABAN 5 MG/1
5 TABLET, FILM COATED ORAL 2 TIMES DAILY
Qty: 180 TABLET | Refills: 3 | Status: SHIPPED | OUTPATIENT
Start: 2022-10-28 | End: 2023-09-01

## 2022-10-28 NOTE — LETTER
"10/28/2022    [facility] [place]  [address] [address]    RE: Mary KENNY Sherif       Dear Colleague,     I had the pleasure of seeing Mary Gorman in the [facility].  [facility]    I had the pleasure of seeing Mary when she came for follow up of recent echo results and AFib.  This 57 year old sees Dr. Greenfield for her history of:    1. Permanent atrial fibrillation on a rate control/AC strategy given her asymptomatic status.  2. Hypertension under good control  3. Obesity   4. Lower extremity edema with adjustments in Diltiazem dose and addition of spironolactone without improvement.  Sees lymphedema clinic  5. BALTA - on CPAP      I had a virtual visit with Mary 8/2021 at which time she was doing well from a cardiac perspective, but noted increasing fatigue after increasing metoprolol XL to 100/50.  She had stopped the Diltiazem 120 mg daily but she had not noticed any improvement in LE edema.  I therefore recommended she decrease metoprolol XL back to 50 mg BID and restart Diltiazem 240 mg daily (as no improvement off of Diltiazem) with EKG and follow-up in 6 months.      She underwent L TKA 1/2022.    Routine echo 10/2022 with normal LVEF.  ZioPatch to assess HR control on Diltiazem was not scheduled despite request to extend orders.    Interval History:  Mary is feeling \"fine\" from a heart perspective. No c/o palpitations, without awareness of AFib. No bleeding issues on Eliquis.  Hgb last checked 7/2022.     She's not terribly active given her significant lymphedema. No c/o CP, change in SYTLES.    No dizziness, lightheadedness.     VITALS:  Vitals: /84 (Cuff Size: Adult Regular)   Pulse 107   Ht 1.651 m (5' 5\")   Wt (!) 153.5 kg (338 lb 4.8 oz)   SpO2 97%   BMI 56.30 kg/m      Diagnostic Testing:  EKG today, which I overread, showed AFib 106 bpm on Diltiazem 240 and metoprolol XL 50 mg BID  Echocardiogram 10/2022-LVEF 55-60%.  No " RWMA. No thrombus. Nl RV. Mod dilated LA. mild aortic sclerosis.  No other significant valvular abnormalities. AFib on echo, no change c/w 6/2019  Holter 5/2020 AFib with avg HR 84 bpm on metoprolol XL 50 BID and Dilt 240  Echocardiogram 6/10/2019 showed EF 60% with normal RV size and function. No significant valvular abnormalities.   24 hour Holter 6/7/2019 showed atrial fibrillation with average HR 81 bpm. Range was 54 bpm @ 1431 and max was 236 bpm @ 122. Minimal ectopy. Event     Plan:  1. Annual follow-up. Agrees to 24 hour Holter prior    Assessment/Plan:    1. Permanent AFib    Remains on Diltiazem 240 and metoprolol XL 50 mg BID    HR fast today but she notes it's much better at home and thinks it's high b/c she just walked here    Nl LVEF - no evidence of tachy-induced CM    Anticoagulation with Eliquis 5 mg BID.  Dose appropriate for age/age/Cr criteria.  [id number] 2 (HTN, sex). Hgb wnl    PLAN:    Continue current meds    Annual follow-up with 24 hour Holter prior      Joceline Gomez PA-C, MSPAS      Orders Placed This Encounter   Procedures     Holter Monitor 24 hour Adult Pediatric     Orders Placed This Encounter   Medications     diltiazem ER (DILT-XR) 240 MG 24 hr ER beaded capsule     Sig: Take 1 capsule (240 mg) by mouth daily     Dispense:  90 capsule     Refill:  3     ELIQUIS ANTICOAGULANT 5 MG tablet     Sig: Take 1 tablet (5 mg) by mouth 2 times daily     Dispense:  180 tablet     Refill:  3     Keep on file until pt is due for refill     Medications Discontinued During This Encounter   Medication Reason     diltiazem ER COATED BEADS (CARDIZEM CD/CARTIA XT) 240 MG 24 hr capsule Medication Reconciliation Clean Up     diltiazem ER (DILT-XR) 240 MG 24 hr ER beaded capsule Reorder     ELIQUIS ANTICOAGULANT 5 MG tablet Reorder     nystatin (MYCOSTATIN) 899448 UNIT/GM external cream Therapy completed     triamcinolone (KENALOG) 0.1 % external cream Therapy completed         Encounter Diagnoses    Name Primary?     Chronic atrial fibrillation (H)      Paroxysmal atrial fibrillation (H)        CURRENT MEDICATIONS:  Current Outpatient Medications   Medication Sig Dispense Refill     blood glucose (ACCU-CHEK GUIDE) test strip Use to test blood sugar 1 times daily  (OK to substitute alternate brand per insurance formulary.  If One Touch only give Verio not Ultra) 100 strip 11     blood glucose monitoring (SOFTCLIX) lancets Use to test blood sugar 1 times daily (OK to substitute alternate brand per insurance formulary.  If One Touch only give Verio not Ultra) 100 each 11     Blood Glucose Monitoring Suppl (ACCU-CHEK GUIDE) w/Device KIT 1 Device daily (OK to substitute alternate brand per insurance formulary.  If One Touch only give Verio not Ultra) 1 kit 1     buPROPion (WELLBUTRIN XL) 150 MG 24 hr tablet Take 1 tablet (150 mg) by mouth every morning Take with 300 mg for total of 450 mg 90 tablet 1     buPROPion (WELLBUTRIN XL) 300 MG 24 hr tablet Take 1 tablet (300 mg) by mouth every morning Take with 150 mg for total 450 mg 90 tablet 1     cetirizine (ZYRTEC) 10 MG tablet Take 10 mg by mouth as needed for allergies       diltiazem ER (DILT-XR) 240 MG 24 hr ER beaded capsule Take 1 capsule (240 mg) by mouth daily 90 capsule 3     ELIQUIS ANTICOAGULANT 5 MG tablet Take 1 tablet (5 mg) by mouth 2 times daily 180 tablet 3     fosinopril (MONOPRIL) 10 MG tablet Take 1 tablet (10 mg) by mouth daily 90 tablet 3     gabapentin (NEURONTIN) 300 MG capsule Take 600 mg by mouth At Bedtime       lamoTRIgine (LAMICTAL) 100 MG tablet Take 200 mg by mouth daily        metoprolol succinate ER (TOPROL XL) 50 MG 24 hr tablet Take 1 tablet (50 mg) by mouth 2 times daily 180 tablet 3     rosuvastatin (CRESTOR) 10 MG tablet Take 1 tablet (10 mg) by mouth daily 90 tablet 1     sertraline (ZOLOFT) 100 MG tablet Take 200 mg by mouth daily 2 tabs (200mg) daily        spironolactone (ALDACTONE) 25 MG tablet Take 1 tablet (25 mg) by  "mouth daily 90 tablet 3     tiZANidine (ZANAFLEX) 2 MG tablet Take 1 tablet (2 mg) by mouth 3 times daily (Patient taking differently: Take 2 mg by mouth 3 times daily Pt takes once at bedtime) 30 tablet 1     metFORMIN (GLUCOPHAGE XR) 500 MG 24 hr tablet TAKE ONE TABLET BY MOUTH ONCE DAILY WITH DINNER 90 tablet 1       ALLERGIES     Allergies   Allergen Reactions     Dyazide [Hydrochlorothiazide W/Triamterene] Unknown     Vistaril [Hydroxyzine] Visual Disturbance     Nickel Rash     Robaxin [Methocarbamol] Rash     Sulfa Drugs Rash         Review of Systems:  Skin:  Negative     Eyes:  Positive for glasses  ENT:  Negative    Respiratory:  Positive for dyspnea on exertion  Cardiovascular:  Negative for;palpitations;chest pain Positive for;palpitations;dizziness;edema  Gastroenterology: Negative for melena;hematochezia  Genitourinary:  Negative    Musculoskeletal:  Positive for arthritis;joint pain  Neurologic:  Negative    Psychiatric:  Positive for excessive stress;depression;anxiety  Heme/Lymph/Imm:  Positive for allergies  Endocrine:  Negative      Physical Exam:  Vitals: /84 (Cuff Size: Adult Regular)   Pulse 107   Ht 1.651 m (5' 5\")   Wt (!) 153.5 kg (338 lb 4.8 oz)   SpO2 97%   BMI 56.30 kg/m      Constitutional:  cooperative, alert and oriented, well developed, well nourished, in no acute distress        Skin:  warm and dry to the touch   reaction from patches on anterior chest wall    Head:  normocephalic, no masses or lesions        Eyes:  pupils equal and round;conjunctivae and lids unremarkable;sclera white        ENT:  no pallor or cyanosis, dentition good        Neck:  JVP normal;no carotid bruit        Chest:  normal breath sounds, clear to auscultation, normal A-P diameter, normal symmetry, normal respiratory excursion, no use of accessory muscles        Cardiac:   irregularly irregular rhythm           HR 70s    Abdomen:  abdomen soft obese      Vascular: pulses full and equal              "                         Extremities and Back:  no deformities, clubbing, cyanosis, erythema observed        Neurological:  no gross motor deficits           PAST MEDICAL HISTORY:  Past Medical History:   Diagnosis Date     Arthritis Nov 2019     Atrial fibrillation (H)      COPD (chronic obstructive pulmonary disease) (H)     colds turn to bronchitis easily     Depression      Depressive disorder     in chart     Diabetes (H)      GERD (gastroesophageal reflux disease)      HTN (hypertension)      Hyperlipidemia      Obese      BALTA (obstructive sleep apnea)     uses CPAP     Permanent atrial fibrillation (H) 06/05/2011     Uncomplicated asthma     mentioned by urgent care md       PAST SURGICAL HISTORY:  Past Surgical History:   Procedure Laterality Date     ABDOMEN SURGERY      in chart     ARTHROPLASTY KNEE Left 01/20/2022    Procedure: ARTHROPLASTY, LEFT KNEE, TOTAL;  Surgeon: Armand Martin MD;  Location: [facility] OR     BIOPSY      skin and uterine lining     CARDIOVERSION  06/07/2011     CHOLECYSTECTOMY       COLONOSCOPY  2013    in chart     DILATION AND CURETTAGE  04/26/2012    Procedure:DILATION AND CURETTAGE; DILATION AND CURETTAGE; Surgeon:JEAN-PAUL DEL CID; Location:[facility]     DILATION AND CURETTAGE, HYSTEROSCOPY DIAGNOSTIC, COMBINED  12/08/2011    Procedure:COMBINED DILATION AND CURETTAGE, HYSTEROSCOPY DIAGNOSTIC; DILATION AND CURETTAGE, DIAGNOSTIC HYSTEROSCOPY ; Surgeon:JEAN-PAUL DEL CID; Location:[facility]     KNEE SURGERY       REMOVE HARDWARE LOWER EXTREMITY Left 01/20/2022    Procedure: REMOVAL, HARDWARE, LEFT LOWER EXTREMITY;  Surgeon: Armand Martin MD;  Location: [facility] OR       FAMILY HISTORY:  Family History   Problem Relation Age of Onset     Hypertension Mother      Heart Disease Mother         A-fib     Arthritis Mother      Hyperlipidemia Mother      Obesity Mother      Heart Disease Father         triple bypass     Cancer Father 50        bladder     Hypertension Father      Respiratory Father         smoker      Coronary Artery Disease Father         chf, pulm htn, by pass     Hyperlipidemia Father      Other Cancer Father         bladder     Cancer Paternal Grandmother 90        rectal     Colon Cancer Paternal Grandmother      Obesity Paternal Grandmother      Unknown/Adopted Brother      Obesity Brother        SOCIAL HISTORY:  Social History     Socioeconomic History     Marital status: Single     Spouse name: None     Number of children: None     Years of education: None     Highest education level: Bachelor's degree (e.g., BA, AB, BS)   Tobacco Use     Smoking status: Never     Smokeless tobacco: Never   Vaping Use     Vaping Use: Never used   Substance and Sexual Activity     Alcohol use: Yes     Comment: once in a while, few times a year     Drug use: No     Sexual activity: Not Currently     Partners: Male     Birth control/protection: None   Other Topics Concern     Parent/sibling w/ CABG, MI or angioplasty before 65F 55M? Yes     Comment: father triple bypass     Special Diet No     Exercise Yes     Comment: walks 2 miles minimum 5 x weekly      Social Determinants of Health     Financial Resource Strain: Low Risk      Difficulty of Paying Living Expenses: Not hard at all   Food Insecurity: No Food Insecurity     Worried About Running Out of Food in the Last Year: Never true     Ran Out of Food in the Last Year: Never true   Transportation Needs: No Transportation Needs     Lack of Transportation (Medical): No     Lack of Transportation (Non-Medical): No   Physical Activity: Inactive     Days of Exercise per Week: 0 days     Minutes of Exercise per Session: 0 min   Stress: Stress Concern Present     Feeling of Stress : To some extent   Social Connections: Moderately Integrated     Frequency of Communication with Friends and Family: More than three times a week     Frequency of Social Gatherings with Friends and Family: Once a week     Attends Confucianist Services: More than 4 times per year     Active Member of  Clubs or Organizations: Yes     Marital Status: Never    Housing Stability: Low Risk      Unable to Pay for Housing in the Last Year: No     Number of Places Lived in the Last Year: 1     Unstable Housing in the Last Year: No       Thank you for allowing me to participate in the care of your patient.      Sincerely,     Kayleen Gomez PA-C     [facility]  cc:   No referring provider defined for this encounter.

## 2022-11-03 ENCOUNTER — HOSPITAL ENCOUNTER (OUTPATIENT)
Dept: OCCUPATIONAL THERAPY | Facility: CLINIC | Age: 57
Discharge: HOME | End: 2022-11-03
Payer: COMMERCIAL

## 2022-11-03 DIAGNOSIS — I89.0 LYMPHEDEMA OF BOTH LOWER EXTREMITIES: Primary | ICD-10-CM

## 2022-11-03 PROCEDURE — 97140 MANUAL THERAPY 1/> REGIONS: CPT | Mod: GO | Performed by: OCCUPATIONAL THERAPIST

## 2022-11-09 ENCOUNTER — HOSPITAL ENCOUNTER (OUTPATIENT)
Dept: OCCUPATIONAL THERAPY | Facility: CLINIC | Age: 57
Discharge: HOME | End: 2022-11-09
Payer: COMMERCIAL

## 2022-11-09 DIAGNOSIS — I89.0 LYMPHEDEMA OF BOTH LOWER EXTREMITIES: Primary | ICD-10-CM

## 2022-11-09 DIAGNOSIS — I89.0 LYMPHEDEMA: ICD-10-CM

## 2022-11-09 PROCEDURE — 97140 MANUAL THERAPY 1/> REGIONS: CPT | Mod: GO | Performed by: OCCUPATIONAL THERAPIST

## 2022-12-02 ENCOUNTER — HOSPITAL ENCOUNTER (OUTPATIENT)
Dept: OCCUPATIONAL THERAPY | Facility: CLINIC | Age: 57
Discharge: HOME | End: 2022-12-02
Payer: COMMERCIAL

## 2022-12-02 DIAGNOSIS — I89.0 LYMPHEDEMA OF BOTH LOWER EXTREMITIES: Primary | ICD-10-CM

## 2022-12-02 PROCEDURE — 97140 MANUAL THERAPY 1/> REGIONS: CPT | Mod: GO

## 2022-12-02 NOTE — PROGRESS NOTES
"   12/02/22 1215   Signing Clinician's Name / Credentials   Signing clinician's name / credentials Aren Robison, OTR/L, CLT   Session Number   Session Number DISCHARGE NOTE 12 [employer] employee   Goal 1   Goal identifier volume   Goal description For decreased risk of infection, skin breakdown/wounds & progressive soft-tissue fibrosis and improved fit of footwear, volume will be reduced by at least 300 mL in each BK.   Goal Progress GOAL MET, EXCEEDED   Target date 12/16/22   Date met 10/28/22   Goal 2   Goal identifier GCB   Goal description To reduce volume of lymphedema and risk of soft tissue fibrosis, pt will tolerate up to 23hr/day wear gradient compression bandaging (GCB) of BBK.   Goal Progress GOAL MET   Target date 12/16/22   Date met 10/28/22   Goal 3   Goal identifier home program   Goal description For long-term home mgmt chronic lymphedema pt/caregiver independent in home program a. GCB/GCB alternative garment for night wear b. compression garment for day wear c. ex to incr lymph flow/self-MLD.   Target date 12/16/22   Goal Progress GOAL MET   Date met 12/02/22   Goal 4   Goal identifier lymphedema precautions   Goal description Pt will independently verbalize the signs/symptoms of lymphedema, precautions and how to obtain a future lymphedema referral if edema persists to preserve skin integrity, prevent infection and preserve functional mobility.   Goal Progress GOAL MET   Target date 10/25/22   Date met 08/25/22   Subjective Report   Subjective Report \"I haven't had my legs wrapped since Tuesday, but I'm still able to slip right into my shoes, I feel like I've kind of held steady\"   Objective Measures   Objective Measures Objective Measure 1   Objective Measure 1   Objective Measure BLE volume   Details see attached flowsheet:  RLE volume has reduced 1663 mL (15%); LLE volume has reduced 1511 mL (13%); addendum 12/2/2022:  patient demonstrates additional volume loss of of 258 mL LLE, RLE " relatively stable (slight increase of 37 mL)   System Outcome Measures   Lymphedema Life Impact Scale (score range 0-72). A higher score indicates greater impairment. 20   Therapeutic Interventions   Therapeutic Interventions Self Care/Home Management;Manual Therapy   Manual Therapy   Manual Therapy Minutes (02193) 45   Skilled Intervention BLE measurements, ed   Patient Response patient pleased with cont volume loss, presented wearing BBK velcro strap binders with transition socks, wounds healed. Verbalizes understanding of ed provided, in agreement with POC, d/c this visit.   Treatment Detail BLE measurements, garment fit assessed.  Ed re: tips for donning and doffing of garments to ease process; patient states she has not been using thigh/knee garments  they don t seem to fit right  offered additional session for ed, patient declines  actually I think I just have to get used to the idea, it took a little bit just to get used to the (BBK binders) .  Reinforced all aspects of HP, lymphedema precautions.   Progress ALL GOALS MET   Plan   Home program self-MLD, HEP, elevation, daytime BBK velcro-strap binders with transition socks, night time BBK velcro-strap binders with footpieces, thigh and knee pieces (may alternate thighs nightly)   Updates to plan of care DISCHARGE THIS VISIT   Comments   Comments Patient compliant with home program, however, symptoms of lymphedema BLE persist, including hyperplasia, hyperpigmentation BL feet and lower BBK.  Due to hip, thigh, and abdominal swelling, recommend lymphatic pump with complete decongestive cycle including BLE and torso garments.   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

## 2022-12-06 ENCOUNTER — VIRTUAL VISIT (OUTPATIENT)
Dept: FAMILY MEDICINE | Facility: CLINIC | Age: 57
End: 2022-12-06
Payer: COMMERCIAL

## 2022-12-06 ENCOUNTER — TELEPHONE (OUTPATIENT)
Dept: FAMILY MEDICINE | Facility: CLINIC | Age: 57
End: 2022-12-06

## 2022-12-06 DIAGNOSIS — Z12.31 ENCOUNTER FOR SCREENING MAMMOGRAM FOR BREAST CANCER: ICD-10-CM

## 2022-12-06 DIAGNOSIS — I10 HYPERTENSION GOAL BP (BLOOD PRESSURE) < 140/90: ICD-10-CM

## 2022-12-06 DIAGNOSIS — I89.0 LYMPHEDEMA: ICD-10-CM

## 2022-12-06 DIAGNOSIS — M62.838 MUSCLE SPASM: Primary | ICD-10-CM

## 2022-12-06 DIAGNOSIS — Z12.11 SPECIAL SCREENING FOR MALIGNANT NEOPLASMS, COLON: ICD-10-CM

## 2022-12-06 DIAGNOSIS — E11.9 CONTROLLED TYPE 2 DIABETES MELLITUS WITHOUT COMPLICATION, WITHOUT LONG-TERM CURRENT USE OF INSULIN (H): ICD-10-CM

## 2022-12-06 PROCEDURE — 99214 OFFICE O/P EST MOD 30 MIN: CPT | Mod: 95 | Performed by: NURSE PRACTITIONER

## 2022-12-06 RX ORDER — TIZANIDINE 2 MG/1
2 TABLET ORAL
Qty: 90 TABLET | Refills: 1 | Status: SHIPPED | OUTPATIENT
Start: 2022-12-06 | End: 2023-05-18

## 2022-12-06 RX ORDER — METFORMIN HCL 500 MG
TABLET, EXTENDED RELEASE 24 HR ORAL
Qty: 90 TABLET | Refills: 1 | Status: SHIPPED | OUTPATIENT
Start: 2022-12-06 | End: 2023-05-18

## 2022-12-06 RX ORDER — FOSINOPRIL SODIUM 10 MG/1
10 TABLET ORAL DAILY
Qty: 90 TABLET | Refills: 3 | Status: SHIPPED | OUTPATIENT
Start: 2022-12-06 | End: 2023-05-18

## 2022-12-06 ASSESSMENT — PATIENT HEALTH QUESTIONNAIRE - PHQ9
SUM OF ALL RESPONSES TO PHQ QUESTIONS 1-9: 11
SUM OF ALL RESPONSES TO PHQ QUESTIONS 1-9: 11
10. IF YOU CHECKED OFF ANY PROBLEMS, HOW DIFFICULT HAVE THESE PROBLEMS MADE IT FOR YOU TO DO YOUR WORK, TAKE CARE OF THINGS AT HOME, OR GET ALONG WITH OTHER PEOPLE: NOT DIFFICULT AT ALL

## 2022-12-06 NOTE — PATIENT INSTRUCTIONS
Get labs done end of December: I'll send a message once I get those on if we need to make medication adjustments or not. Next follow up with me is 4 months, virtual is okay      Preventative items To Do:  It appears your last colonoscopy was in 2014 and recommendations at that time was to repeat in 5 years. I ordered a colonoscopy referral, you will be called to set that up.    Your last mammogram appears to be in 2019, if so, you are also due for this. I will order this also, you can call [phone] to set up at a location of your choice    Your pap smear was done in July 2022, that is up to date

## 2022-12-06 NOTE — PROGRESS NOTES
Mary is a 57 year old who is being evaluated via a billable video visit.      How would you like to obtain your AVS? MyChart  If the video visit is dropped, the invitation should be resent by: Send to e-mail at: [email or web address]  Will anyone else be joining your video visit? No        Assessment & Plan     Muscle spasm  Refill sent. Using this more than gabapentin   - tiZANidine (ZANAFLEX) 2 MG tablet; Take 1 tablet (2 mg) by mouth nightly as needed for muscle spasms    Hypertension goal BP (blood pressure) < 140/90  Stable, last BP end of October with Cardiology was stable. Refill sent  - fosinopril (MONOPRIL) 10 MG tablet; Take 1 tablet (10 mg) by mouth daily    Controlled type 2 diabetes mellitus without complication, without long-term current use of insulin (H)  Last A1C was 6.4 in July 2022, due for labs, may need to increase to BID if A1C significantly higher  - metFORMIN (GLUCOPHAGE XR) 500 MG 24 hr tablet; TAKE ONE TABLET BY MOUTH ONCE DAILY WITH DINNER  - HEMOGLOBIN A1C; Future  - Comprehensive metabolic panel (BMP + Alb, Alk Phos, ALT, AST, Total. Bili, TP); Future  - Albumin Random Urine Quantitative with Creat Ratio; Future    Lymphedema  Following with lymphedema clinic and it is helping    Encounter for screening mammogram for breast cancer  Last was in 2019, due for mammogram  - *MA Screening Digital Bilateral; Future    Special screening for malignant neoplasms, colon  Last was in 2014, was supposed to repeat 5 years later. Is overdue  - Colonoscopy Screening  Referral; Future      Return in about 4 months (around 4/6/2023) for Phone or Video visit okay, Medication check, Lab Work.    YAMIL Cedeno, NP-C  [facility]   Mary is a 57 year old, presenting for the following health issues:  Hypertension, Diabetes, Lipids, and Sleep Problem    Sleep problem - patient uses tizanidine to sleep - prescribed by her previous PCP who left [place],  patient interested in a refill      History of Present Illness       Diabetes:   She presents for follow up of diabetes.  She is checking home blood glucose a few times a week. She checks blood glucose before meals and after meals.  Blood glucose is never over 200 and never under 70. When her blood glucose is low, the patient is asymptomatic for confusion, blurred vision, lethargy and reports not feeling dizzy, shaky, or weak.  She has no concerns regarding her diabetes at this time.  She is not experiencing numbness or burning in feet, excessive thirst, blurry vision, weight changes or redness, sores or blisters on feet. The patient has not had a diabetic eye exam in the last 12 months.         Hypertension: She presents for follow up of hypertension.  She does not check blood pressure  regularly outside of the clinic. Outpatient blood pressures have not been over 140/90. She does not follow a low salt diet.      Today's PHQ-9         PHQ-9 Total Score: 11    PHQ-9 Q9 Thoughts of better off dead/self-harm past 2 weeks :   Not at all    How difficult have these problems made it for you to do your work, take care of things at home, or get along with other people: Not difficult at all     Her PCP left [place] in July. She is making sure she has refills.     Is following with Lymphedema clinic: it is helpful. She went through 2 weeks of intense wrapping, but then she has garments for both her legs. Helping with the swelling.     Has a NP psychiatrist who orders psych meds: wellbutrin, zoloft, and lamitcal     Diabetes:     BP: doesn't check BP at home, only at clinics    Gabapentin: cardiac wondered if that was part of her swelling. Was taking 2, now down to 1. Was also trying the tizanidine-helps her relax at night to sleep. Take 1/2 to 1 at night.     Review of Systems   Constitutional, HEENT, cardiovascular, pulmonary, gi and gu systems are negative, except as otherwise noted.      Objective    Vitals - Patient  Reported  Pain Score: No Pain (0)      Vitals:  No vitals were obtained today due to virtual visit.    Physical Exam   GENERAL: Healthy, alert and no distress  EYES: Eyes grossly normal to inspection.  No discharge or erythema, or obvious scleral/conjunctival abnormalities.  RESP: No audible wheeze, cough, or visible cyanosis.  No visible retractions or increased work of breathing.    SKIN: Visible skin clear. No significant rash, abnormal pigmentation or lesions.  NEURO: Cranial nerves grossly intact.  Mentation and speech appropriate for age.  PSYCH: Mentation appears normal, affect normal/bright, judgement and insight intact, normal speech and appearance well-groomed.    Noted labs from July 2022            Video-Visit Details    Video Start Time: 2:53 PM    Type of service:  Video Visit    Video End Time:3:06 PM    Originating Location (pt. Location): Home    Distant Location (provider location):  Off-site    Platform used for Video Visit: Zumbox

## 2022-12-06 NOTE — TELEPHONE ENCOUNTER
Attempted to contact patient prior to virtual appointment today.    No answer. LVM @ 1:45    Kerrie Serrato RN  12/06/22

## 2022-12-15 ENCOUNTER — MEDICAL CORRESPONDENCE (OUTPATIENT)
Dept: HEALTH INFORMATION MANAGEMENT | Facility: CLINIC | Age: 57
End: 2022-12-15

## 2022-12-15 ENCOUNTER — TELEPHONE (OUTPATIENT)
Dept: OTHER | Facility: CLINIC | Age: 57
End: 2022-12-15

## 2022-12-15 NOTE — TELEPHONE ENCOUNTER
Prescription for Pneumatic Compression Device Flexitouch System signed by Dr. Quinn and faxed to [facility].    Rightfax: 12/15/22 1:57.    Josette Anguiano RN BSN  [facility]  [phone]

## 2022-12-20 ENCOUNTER — LAB (OUTPATIENT)
Dept: LAB | Facility: CLINIC | Age: 57
End: 2022-12-20
Payer: COMMERCIAL

## 2022-12-20 DIAGNOSIS — E78.5 HYPERLIPIDEMIA LDL GOAL <70: ICD-10-CM

## 2022-12-20 DIAGNOSIS — E11.9 CONTROLLED TYPE 2 DIABETES MELLITUS WITHOUT COMPLICATION, WITHOUT LONG-TERM CURRENT USE OF INSULIN (H): ICD-10-CM

## 2022-12-20 LAB
ALBUMIN SERPL BCG-MCNC: 4.1 G/DL (ref 3.5–5.2)
ALP SERPL-CCNC: 140 U/L (ref 35–104)
ALT SERPL W P-5'-P-CCNC: 19 U/L (ref 10–35)
ANION GAP SERPL CALCULATED.3IONS-SCNC: 12 MMOL/L (ref 7–15)
AST SERPL W P-5'-P-CCNC: 24 U/L (ref 10–35)
BILIRUB SERPL-MCNC: 0.4 MG/DL
BUN SERPL-MCNC: 17.9 MG/DL (ref 6–20)
CALCIUM SERPL-MCNC: 9.8 MG/DL (ref 8.6–10)
CHLORIDE SERPL-SCNC: 106 MMOL/L (ref 98–107)
CHOLEST SERPL-MCNC: 145 MG/DL
CREAT SERPL-MCNC: 1.16 MG/DL (ref 0.51–0.95)
DEPRECATED HCO3 PLAS-SCNC: 21 MMOL/L (ref 22–29)
GFR SERPL CREATININE-BSD FRML MDRD: 54 ML/MIN/1.73M2
GLUCOSE SERPL-MCNC: 148 MG/DL (ref 70–99)
HBA1C MFR BLD: 7.4 % (ref 0–5.6)
HDLC SERPL-MCNC: 56 MG/DL
LDLC SERPL CALC-MCNC: 56 MG/DL
NONHDLC SERPL-MCNC: 89 MG/DL
POTASSIUM SERPL-SCNC: 4.8 MMOL/L (ref 3.4–5.3)
PROT SERPL-MCNC: 7 G/DL (ref 6.4–8.3)
SODIUM SERPL-SCNC: 139 MMOL/L (ref 136–145)
TRIGL SERPL-MCNC: 165 MG/DL

## 2022-12-20 PROCEDURE — 36415 COLL VENOUS BLD VENIPUNCTURE: CPT

## 2022-12-20 PROCEDURE — 80061 LIPID PANEL: CPT

## 2022-12-20 PROCEDURE — 83036 HEMOGLOBIN GLYCOSYLATED A1C: CPT

## 2022-12-20 PROCEDURE — 80053 COMPREHEN METABOLIC PANEL: CPT

## 2022-12-21 NOTE — RESULT ENCOUNTER NOTE
Octavio Hernandez,   Your A1C is higher, increase metformin to 1 tab twice a day with breakfast and dinner. Kidney function is stable. Return for provider visit in 3-4 months and we can recheck labs at that time.  Please let me know if you have questions.  Thank you,  YAMIL Cedeno, NP-C  [facility]

## 2022-12-27 ENCOUNTER — OFFICE VISIT (OUTPATIENT)
Dept: OTHER | Facility: CLINIC | Age: 57
End: 2022-12-27
Attending: INTERNAL MEDICINE
Payer: COMMERCIAL

## 2022-12-27 VITALS
SYSTOLIC BLOOD PRESSURE: 147 MMHG | WEIGHT: 293 LBS | HEART RATE: 91 BPM | DIASTOLIC BLOOD PRESSURE: 97 MMHG | BODY MASS INDEX: 57.81 KG/M2 | OXYGEN SATURATION: 97 %

## 2022-12-27 DIAGNOSIS — I89.0 LYMPHEDEMA OF BOTH LOWER EXTREMITIES: ICD-10-CM

## 2022-12-27 DIAGNOSIS — G47.33 OSA (OBSTRUCTIVE SLEEP APNEA): ICD-10-CM

## 2022-12-27 DIAGNOSIS — I87.2 STASIS DERMATITIS OF BOTH LEGS: ICD-10-CM

## 2022-12-27 DIAGNOSIS — I48.21 PERMANENT ATRIAL FIBRILLATION (H): ICD-10-CM

## 2022-12-27 DIAGNOSIS — E11.9 TYPE 2 DIABETES MELLITUS WITHOUT COMPLICATION, WITHOUT LONG-TERM CURRENT USE OF INSULIN (H): ICD-10-CM

## 2022-12-27 DIAGNOSIS — E66.01 MORBID OBESITY (H): ICD-10-CM

## 2022-12-27 DIAGNOSIS — E78.5 HYPERLIPIDEMIA LDL GOAL <70: ICD-10-CM

## 2022-12-27 PROCEDURE — 99215 OFFICE O/P EST HI 40 MIN: CPT | Performed by: INTERNAL MEDICINE

## 2022-12-27 PROCEDURE — G0463 HOSPITAL OUTPT CLINIC VISIT: HCPCS

## 2022-12-27 RX ORDER — ROSUVASTATIN CALCIUM 10 MG/1
10 TABLET, COATED ORAL DAILY
Qty: 90 TABLET | Refills: 3 | Status: SHIPPED | OUTPATIENT
Start: 2022-12-27 | End: 2024-01-19

## 2022-12-27 NOTE — PROGRESS NOTES
[facility]  VASCULAR MEDICINE FOLlOW UP VISIT        PRIMARY HEALTH CARE PROVIDER:  Nicole Castellanos PA-C        REASON FOR VISIT    Follow-up visit  Seen and evaluated by edema therapist in the process of getting pump for phlebo lymphedema  Tolerating Crestor  Taking medications as suggested during the last visit  Leg symptoms are better      She was initially seen on August 9, 2022 for evaluation and management of vascular causes of bilateral lower extremity swelling left more than right side with dermatitis in a morbidly obese female with history of multiple comorbidities.      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  She is also planning for right knee replacement surgery.  She is unable to do any type of exercise due to her DJD of the knees and weight.  She takes gabapentin for sleep regulation 600 mg at bedtime.  She currently follows up with cardiologist and recently they decrease the dose of diltiazem and eventually stopped and that did not change her leg swelling then restarted back.  She is scheduled to undergo echocardiogram in October.  Previous echo normal LV function.  She denies any chest pain, shortness of breath or palpitations    During last visit optimized medications initiated Crestor and stopped gabapentin  Arranged referral to see edema therapist  Appropriate dose of Eliquis was suggested    She followed through the treatment plan seen and evaluated by edema therapist improved symptoms but given overwhelming secondary lymphedema and venous insufficiency she was suggested to get pump therapy in the  process of getting it  She stopped gabapentin  Taking Crestor  Lipids are significantly improved  Medication refills      PAST MEDICAL HISTORY  Past Medical History:   Diagnosis Date     Arthritis Nov 2019     Atrial fibrillation (H)      COPD (chronic obstructive pulmonary disease) (H)     colds turn to bronchitis easily     Depression      Depressive disorder     in chart     Diabetes (H)      GERD (gastroesophageal reflux disease)      HTN (hypertension)      Hyperlipidemia      Obese      BALTA (obstructive sleep apnea)     uses CPAP     Permanent atrial fibrillation (H) 06/05/2011     Uncomplicated asthma     mentioned by urgent care md       CURRENT MEDICATIONS  blood glucose (ACCU-CHEK GUIDE) test strip, Use to test blood sugar 1 times daily  (OK to substitute alternate brand per insurance formulary.  If One Touch only give Verio not Ultra)  blood glucose monitoring (SOFTCLIX) lancets, Use to test blood sugar 1 times daily (OK to substitute alternate brand per insurance formulary.  If One Touch only give Verio not Ultra)  Blood Glucose Monitoring Suppl (ACCU-CHEK GUIDE) w/Device KIT, 1 Device daily (OK to substitute alternate brand per insurance formulary.  If One Touch only give Verio not Ultra)  buPROPion (WELLBUTRIN XL) 150 MG 24 hr tablet, Take 1 tablet (150 mg) by mouth every morning Take with 300 mg for total of 450 mg  buPROPion (WELLBUTRIN XL) 300 MG 24 hr tablet, Take 1 tablet (300 mg) by mouth every morning Take with 150 mg for total 450 mg  cetirizine (ZYRTEC) 10 MG tablet, Take 10 mg by mouth as needed for allergies  diltiazem ER (DILT-XR) 240 MG 24 hr ER beaded capsule, Take 1 capsule (240 mg) by mouth daily  ELIQUIS ANTICOAGULANT 5 MG tablet, Take 1 tablet (5 mg) by mouth 2 times daily  fosinopril (MONOPRIL) 10 MG tablet, Take 1 tablet (10 mg) by mouth daily  lamoTRIgine (LAMICTAL) 100 MG tablet, Take 200 mg by mouth daily   metFORMIN (GLUCOPHAGE XR) 500 MG 24 hr tablet, TAKE ONE TABLET BY MOUTH  ONCE DAILY WITH DINNER (Patient taking differently: 2 times daily (with meals) TAKE ONE TABLET BY MOUTH ONCE DAILY WITH DINNER)  metoprolol succinate ER (TOPROL XL) 50 MG 24 hr tablet, Take 1 tablet (50 mg) by mouth 2 times daily  sertraline (ZOLOFT) 100 MG tablet, Take 200 mg by mouth daily 2 tabs (200mg) daily   spironolactone (ALDACTONE) 25 MG tablet, Take 1 tablet (25 mg) by mouth daily  tiZANidine (ZANAFLEX) 2 MG tablet, Take 1 tablet (2 mg) by mouth nightly as needed for muscle spasms    No current facility-administered medications on file prior to visit.      PAST SURGICAL HISTORY:  Past Surgical History:   Procedure Laterality Date     ABDOMEN SURGERY      in chart     ARTHROPLASTY KNEE Left 01/20/2022    Procedure: ARTHROPLASTY, LEFT KNEE, TOTAL;  Surgeon: Armand Martin MD;  Location: [facility] OR     BIOPSY      skin and uterine lining     CARDIOVERSION  06/07/2011     CHOLECYSTECTOMY       COLONOSCOPY  2013    in chart     DILATION AND CURETTAGE  04/26/2012    Procedure:DILATION AND CURETTAGE; DILATION AND CURETTAGE; Surgeon:JEAN-PAUL DEL CID; Location:[facility]     DILATION AND CURETTAGE, HYSTEROSCOPY DIAGNOSTIC, COMBINED  12/08/2011    Procedure:COMBINED DILATION AND CURETTAGE, HYSTEROSCOPY DIAGNOSTIC; DILATION AND CURETTAGE, DIAGNOSTIC HYSTEROSCOPY ; Surgeon:JEAN-PAUL DEL CID; Location:[facility]     KNEE SURGERY       REMOVE HARDWARE LOWER EXTREMITY Left 01/20/2022    Procedure: REMOVAL, HARDWARE, LEFT LOWER EXTREMITY;  Surgeon: Armand Martin MD;  Location: [facility]       ALLERGIES     Allergies   Allergen Reactions     Dyazide [Hydrochlorothiazide W/Triamterene] Unknown     Vistaril [Hydroxyzine] Visual Disturbance     Naproxen Rash     Nickel Rash     Robaxin [Methocarbamol] Rash     Sulfa Drugs Rash       FAMILY HISTORY  Family History   Problem Relation Age of Onset     Hypertension Mother      Heart Disease Mother         A-fib     Arthritis Mother      Hyperlipidemia Mother      Obesity Mother      Heart Disease  Father         triple bypass     Cancer Father 50        bladder     Hypertension Father      Respiratory Father         smoker     Coronary Artery Disease Father         chf, pulm htn, by pass     Hyperlipidemia Father      Other Cancer Father         bladder     Cancer Paternal Grandmother 90        rectal     Colon Cancer Paternal Grandmother      Obesity Paternal Grandmother      Unknown/Adopted Brother      Obesity Brother        VASCULAR FAMILY HISTORY  1st order relative with atherosclerotic PAD: No  1st order relative with AAA: No  Family history of Familial Hyperlipidemia No  Family History of Hypercoagulable state:No    VASCULAR RISK FACTORS  1. Diabetes:Yes controlled  2. Smoking: has never smoked.  3. HTN: controlled  4.Hyperlipidemia: Yes -      SOCIAL HISTORY  Social History     Socioeconomic History     Marital status: Single     Spouse name: Not on file     Number of children: Not on file     Years of education: Not on file     Highest education level: Bachelor's degree (e.g., BA, AB, BS)   Occupational History     Not on file   Tobacco Use     Smoking status: Never     Passive exposure: Never     Smokeless tobacco: Never   Vaping Use     Vaping Use: Never used   Substance and Sexual Activity     Alcohol use: Not Currently     Comment: once in a while, few times a year     Drug use: No     Sexual activity: Not Currently     Partners: Male     Birth control/protection: None   Other Topics Concern     Parent/sibling w/ CABG, MI or angioplasty before 65F 55M? Yes     Comment: father triple bypass      Service Not Asked     Blood Transfusions Not Asked     Caffeine Concern Not Asked     Occupational Exposure Not Asked     Hobby Hazards Not Asked     Sleep Concern Not Asked     Stress Concern Not Asked     Weight Concern Not Asked     Special Diet No     Back Care Not Asked     Exercise Yes     Comment: walks 2 miles minimum 5 x weekly      Bike Helmet Not Asked     Seat Belt Not Asked      Self-Exams Not Asked   Social History Narrative     Not on file     Social Determinants of Health     Financial Resource Strain: Low Risk      Difficulty of Paying Living Expenses: Not hard at all   Food Insecurity: No Food Insecurity     Worried About Running Out of Food in the Last Year: Never true     Ran Out of Food in the Last Year: Never true   Transportation Needs: No Transportation Needs     Lack of Transportation (Medical): No     Lack of Transportation (Non-Medical): No   Physical Activity: Inactive     Days of Exercise per Week: 0 days     Minutes of Exercise per Session: 0 min   Stress: Stress Concern Present     Feeling of Stress : To some extent   Social Connections: Moderately Integrated     Frequency of Communication with Friends and Family: More than three times a week     Frequency of Social Gatherings with Friends and Family: Once a week     Attends Jain Services: More than 4 times per year     Active Member of Clubs or Organizations: Yes     Attends Club or Organization Meetings: Not on file     Marital Status: Never    Intimate Partner Violence: Not on file   Housing Stability: Low Risk      Unable to Pay for Housing in the Last Year: No     Number of Places Lived in the Last Year: 1     Unstable Housing in the Last Year: No       ROS:   General: No change in weight, sleep or appetite.  Normal energy.  No fever or chills  Eyes: Negative for vision changes or eye problems  ENT: No problems with ears, nose or throat.  No difficulty swallowing.  Resp: No coughing, wheezing or shortness of breath  CV: No chest pains or palpitations  GI: No nausea, vomiting,  heartburn, abdominal pain, diarrhea, constipation or change in bowel habits  : No urinary frequency or dysuria, bladder or kidney problems  Musculoskeletal: No significant muscle or joint pains  Neurologic: No headaches, numbness, tingling, weakness, problems with balance or coordination  Psychiatric: No problems with anxiety,  depression or mental health  Heme/immune/allergy: No history of bleeding or clotting problems or anemia.  No allergies or immune system problems  Endocrine: No history of thyroid disease, diabetes or other endocrine disorders  Skin: No rashes,worrisome lesions or skin problems  Vascular: Bilateral lower extremity swelling left more than right side history of knee replacement in January 2022  Thickening and discoloration of the skin both legs  No foot ulcers or leg ulcers  EXAM:  BP (!) 147/97 (BP Location: Right arm, Patient Position: Chair, Cuff Size: Adult Regular)   Pulse 91   Wt (!) 347 lb 6.4 oz (157.6 kg)   LMP 06/25/2019 (Approximate)   SpO2 97%   BMI 57.81 kg/m    In general, the patient is a pleasant female in no apparent distress.    HEENT: NC/AT.  PERRLA.  EOMI.  Sclerae white, not injected.  Nares clear.  Pharynx without erythema or exudate.  Dentition intact.    Neck: No adenopathy.  No thyromegaly. Carotids +2/2 bilaterally without bruits.  No jugular venous distension.   Heart: RRR. Normal S1, S2 splits physiologically. No murmur, rub, click, or gallop. The PMI is in the 5th ICS in the midclavicular line. There is no heave.    Lungs: CTA.  No ronchi, wheezes, rales.  No dullness to percussion.   Abdomen: Soft, nontender, nondistended. No organomegaly. No AAA.  No bruits.   Extremities: Vascular:  Bilateral lower extremity lymphedema left worse than right side with squaring of the toes, dorsal hump, loss of contour of leg, positive stammer sign.  Hyperkeratosis of the skin with stasis dermatitis left more than right side.  No cellulitis  Good palpable peripheral pulses  Well-healed scar in the left knee area      Labs:  LIPID RESULTS:  Lab Results   Component Value Date    CHOL 145 12/20/2022    CHOL 226 (H) 06/28/2021    HDL 56 12/20/2022    HDL 39 (L) 06/28/2021    LDL 56 12/20/2022     (H) 06/28/2021    TRIG 165 (H) 12/20/2022    TRIG 237 (H) 06/28/2021    CHOLHDLRATIO 3.4 11/02/2015        LIVER ENZYME RESULTS:  Lab Results   Component Value Date    AST 24 12/20/2022    AST 22 06/28/2021    ALT 19 12/20/2022    ALT 31 06/28/2021       CBC RESULTS:  Lab Results   Component Value Date    WBC 8.2 07/19/2022    WBC 8.6 05/10/2021    RBC 4.95 07/19/2022    RBC 5.29 (H) 05/10/2021    HGB 14.4 07/19/2022    HGB 15.2 05/10/2021    HCT 43.8 07/19/2022    HCT 46.6 05/10/2021    MCV 89 07/19/2022    MCV 88 05/10/2021    MCH 29.1 07/19/2022    MCH 28.7 05/10/2021    MCHC 32.9 07/19/2022    MCHC 32.6 05/10/2021    RDW 15.5 (H) 07/19/2022    RDW 13.1 05/10/2021     07/19/2022     05/10/2021       BMP RESULTS:  Lab Results   Component Value Date     12/20/2022     06/28/2021    POTASSIUM 4.8 12/20/2022    POTASSIUM 4.7 07/19/2022    POTASSIUM 4.8 06/28/2021    CHLORIDE 106 12/20/2022    CHLORIDE 109 07/19/2022    CHLORIDE 108 06/28/2021    CO2 21 (L) 12/20/2022    CO2 21 07/19/2022    CO2 26 06/28/2021    ANIONGAP 12 12/20/2022    ANIONGAP 10 07/19/2022    ANIONGAP 6 06/28/2021     (H) 12/20/2022     (H) 07/19/2022     (H) 06/28/2021    BUN 17.9 12/20/2022    BUN 16 07/19/2022    BUN 14 06/28/2021    CR 1.16 (H) 12/20/2022    CR 1.26 (H) 06/28/2021    GFRESTIMATED 54 (L) 12/20/2022    GFRESTIMATED 47 (L) 06/28/2021    GFRESTBLACK 55 (L) 06/28/2021    CHERYL 9.8 12/20/2022    CHERYL 10.0 06/28/2021        A1C RESULTS:  Lab Results   Component Value Date    A1C 7.4 (H) 12/20/2022    A1C 6.9 (H) 06/28/2021       THYROID RESULTS:  Lab Results   Component Value Date    TSH 1.91 05/10/2021       Procedures:       Assessment and Plan:    1. Lymphedema of both lower extremities left > Rt side (improved compared to last visit)  2. Stasis dermatitis of both legs  3. Morbid obesity (H)  4. BALTA (obstructive sleep apnea) uses CPAP   5. Permanent atrial fibrillation (H) taking Eliquis 5 mg twice a day  6. Hyperlipidemia LDL goal <70, improved with initiation of Crestor  7. Type 2  diabetes mellitus without complication, without long-term current use of insulin (H)    She has a classical features of bilateral lower extremity lymphedema left more than right side morbidly obese and obstructive sleep apnea wears a CPAP machine.  Stasis dermatitis in both legs and no signs of cellulitis.  She was taking diltiazem for heart rate control and this was held briefly no change in leg symptoms and restarted back.  She also takes gabapentin for sleep regulation which can also cause leg swelling and worsening lymphedema.  Last time she had a sleep evaluation was more than 10 years ago.  Previous echo normal LV function and she is scheduled to undergo repeat echo in October.  She also consulted weight loss clinic for possible future weight loss surgery.  She wants to undergo right knee replacement in the near future.  No previous history of DVT or PE.  She is only taking Eliquis 5 mg once a day.  She is not on any statin her LDL is 97  Diabetes is well controlled  I had a lengthy discussion with the patient today    At present my recommendations,  Continue Crestor refill the medication  Repeat lipid panel in 6 months  Lose weight  Follow edema therapist recommendations  Discussed with sleep clinic for appropriate CPAP settings are new machine etc.  Continue to use compression, wraps and she will benefit with pump therapy will support paperwork if needed  Tight control of the diabetes  Monitor blood pressure at home with a large cuff or wrist cuff educated goal is less than 130/80  Follow-up in 6 months after fasting lipids, A1c and CMP labs order placed    This note was dictated by utilizing Dragon software  Copy of this note to primary care provider and referring provider     40 minutes spent on the date of the encounter doing chart review, history and exam, documentation, and further activities as noted above.reviewed previous work-up and extensive records in epic and updated chart  AVS with written  instructions given    Return to clinic in 6 months    Mekhi Quinn MD, YULIANA, FSVM, FNLA  Vascular Medicine  Clinical Lipidologist  Clinical Hypertension specialist

## 2022-12-27 NOTE — PATIENT INSTRUCTIONS
Follow edema therapist recommendations    Use compression, wraps, pump therapy     Lose weight     Tight control of diabetes, monitor BP at  home goal is less than 130/80     Continue crestor new Rx sent     Follow up in 6 months , fasting lipids, A1c, CMP before visit , lab order placed

## 2022-12-27 NOTE — PROGRESS NOTES
[facility]        Patient is here for a  follow up.      Pt is currently taking Statin and Eliquis.    BP (!) 147/97 (BP Location: Right arm, Patient Position: Chair, Cuff Size: Adult Regular)   Pulse 91   Wt (!) 347 lb 6.4 oz (157.6 kg)   LMP 06/25/2019 (Approximate)   SpO2 97%   BMI 57.81 kg/m      The provider has been notified that the patient has no concerns.     Questions patient would like addressed today are: N/A.    Refills are needed: N/A    Has homecare services and agency name:  Yovana Rolle MA

## 2023-02-13 ENCOUNTER — TELEPHONE (OUTPATIENT)
Dept: OTHER | Facility: CLINIC | Age: 58
End: 2023-02-13
Payer: COMMERCIAL

## 2023-02-13 NOTE — TELEPHONE ENCOUNTER
Attempted to call Ivy to let her know Dr. Quinn will be out of the clinic until 2/22/23.      Additionally, I do not have a request for an Entre device.  Last communication that I am aware of was a PCD Flexitouch System signed and Faxed to [employer] on 12/15/22.    Her mailbox was full and I was unable to leave a message.

## 2023-02-13 NOTE — TELEPHONE ENCOUNTER
[facility]    Who is the name of the provider?:  Dr. Quinn    What is the location you see this provider at/preferred location?: [place]  Person calling / Facility: Ivy from [facility]  Phone number:  [phone]  Nurse call back needed:  YES     Reason for call:   Ivy is calling to follow up on an order for Entre Compression Device.  A request was faxed over last week.  Patient was referred by Dr. Quinn and therapist is recommending next step as Entre Compression Device.  Ivy states if we agree, we can send order to her direct fax number of [phone] (which is a different fax number than the cover letter that was sent with the order last week.      Pharmacy location:  N/A  Outside Imaging: Not Applicable   Can we leave a detailed message on this number?  YES

## 2023-02-22 ENCOUNTER — MEDICAL CORRESPONDENCE (OUTPATIENT)
Dept: HEALTH INFORMATION MANAGEMENT | Facility: CLINIC | Age: 58
End: 2023-02-22

## 2023-02-22 NOTE — TELEPHONE ENCOUNTER
Letter of medical necessity for pneumatic compression device signed by Dr. Quinn, faxed to [facility] at [phone].    Rightfax confirmation 02/22/23 3:00.

## 2023-03-09 NOTE — TELEPHONE ENCOUNTER
Received notification via fax that patient is either not interested in Flexitouch pump or has received a different pump and that order has been inactivated.    Sent to HIM.    Josette Anguiano RN BSN  [facility]  [phone]

## 2023-03-24 ENCOUNTER — TELEPHONE (OUTPATIENT)
Dept: FAMILY MEDICINE | Facility: CLINIC | Age: 58
End: 2023-03-24
Payer: COMMERCIAL

## 2023-03-24 NOTE — TELEPHONE ENCOUNTER
Patient Quality Outreach    Patient is due for the following:   Diabetes -  A1C, Eye Exam and Foot Exam  Hypertension -  BP check  Colon Cancer Screening  Breast Cancer Screening - Mammogram      Topic Date Due     Hepatitis B Vaccine (1 of 3 - 3-dose series) Never done     Pneumococcal Vaccine (2 - PPSV23 if available, else PCV20) 12/12/2017     COVID-19 Vaccine (4 - Booster for Pfizer series) 02/28/2022       Next Steps:   Schedule a office visit for Diabetic check    Type of outreach:    Sent Sweeten message.    Next Steps:  Reach out within 90 days via Sweeten.    Max number of attempts reached: No. Will try again in 90 days if patient still on fail list.    Questions for provider review:    None     Jordin Avila  Chart routed to N/A.

## 2023-04-01 ENCOUNTER — HEALTH MAINTENANCE LETTER (OUTPATIENT)
Age: 58
End: 2023-04-01

## 2023-04-24 DIAGNOSIS — Z51.81 ENCOUNTER FOR THERAPEUTIC DRUG MONITORING: Primary | ICD-10-CM

## 2023-04-24 DIAGNOSIS — F33.9 RECURRENT MAJOR DEPRESSION (H): ICD-10-CM

## 2023-05-18 ENCOUNTER — OFFICE VISIT (OUTPATIENT)
Dept: FAMILY MEDICINE | Facility: CLINIC | Age: 58
End: 2023-05-18
Payer: COMMERCIAL

## 2023-05-18 ENCOUNTER — MEDICAL CORRESPONDENCE (OUTPATIENT)
Dept: HEALTH INFORMATION MANAGEMENT | Facility: CLINIC | Age: 58
End: 2023-05-18

## 2023-05-18 ENCOUNTER — TELEPHONE (OUTPATIENT)
Dept: OTHER | Facility: CLINIC | Age: 58
End: 2023-05-18

## 2023-05-18 VITALS
SYSTOLIC BLOOD PRESSURE: 146 MMHG | TEMPERATURE: 98.7 F | BODY MASS INDEX: 57.71 KG/M2 | WEIGHT: 293 LBS | RESPIRATION RATE: 20 BRPM | HEART RATE: 91 BPM | OXYGEN SATURATION: 97 % | DIASTOLIC BLOOD PRESSURE: 80 MMHG

## 2023-05-18 DIAGNOSIS — I10 HYPERTENSION GOAL BP (BLOOD PRESSURE) < 140/90: ICD-10-CM

## 2023-05-18 DIAGNOSIS — Z51.81 ENCOUNTER FOR THERAPEUTIC DRUG MONITORING: ICD-10-CM

## 2023-05-18 DIAGNOSIS — F33.9 RECURRENT MAJOR DEPRESSIVE DISORDER, REMISSION STATUS UNSPECIFIED (H): ICD-10-CM

## 2023-05-18 DIAGNOSIS — E11.9 TYPE 2 DIABETES MELLITUS WITHOUT COMPLICATION, WITHOUT LONG-TERM CURRENT USE OF INSULIN (H): Primary | ICD-10-CM

## 2023-05-18 DIAGNOSIS — M62.838 MUSCLE SPASM: ICD-10-CM

## 2023-05-18 DIAGNOSIS — E78.5 HYPERLIPIDEMIA LDL GOAL <70: ICD-10-CM

## 2023-05-18 DIAGNOSIS — G47.9 DIFFICULTY SLEEPING: ICD-10-CM

## 2023-05-18 DIAGNOSIS — E66.01 MORBID OBESITY (H): ICD-10-CM

## 2023-05-18 DIAGNOSIS — I48.21 PERMANENT ATRIAL FIBRILLATION (H): ICD-10-CM

## 2023-05-18 PROBLEM — N18.31 CHRONIC KIDNEY DISEASE, STAGE 3A (H): Status: ACTIVE | Noted: 2023-05-18

## 2023-05-18 LAB
ALBUMIN SERPL BCG-MCNC: 4.3 G/DL (ref 3.5–5.2)
ALP SERPL-CCNC: 140 U/L (ref 35–104)
ALT SERPL W P-5'-P-CCNC: 25 U/L (ref 10–35)
ANION GAP SERPL CALCULATED.3IONS-SCNC: 15 MMOL/L (ref 7–15)
AST SERPL W P-5'-P-CCNC: 28 U/L (ref 10–35)
BASOPHILS # BLD AUTO: 0.1 10E3/UL (ref 0–0.2)
BASOPHILS NFR BLD AUTO: 1 %
BILIRUB SERPL-MCNC: 0.4 MG/DL
BUN SERPL-MCNC: 28.9 MG/DL (ref 8–23)
CALCIUM SERPL-MCNC: 10.7 MG/DL (ref 8.6–10)
CHLORIDE SERPL-SCNC: 105 MMOL/L (ref 98–107)
CHOLEST SERPL-MCNC: 171 MG/DL
CREAT SERPL-MCNC: 1.52 MG/DL (ref 0.51–0.95)
DEPRECATED HCO3 PLAS-SCNC: 22 MMOL/L (ref 22–29)
EOSINOPHIL # BLD AUTO: 0.3 10E3/UL (ref 0–0.7)
EOSINOPHIL NFR BLD AUTO: 3 %
ERYTHROCYTE [DISTWIDTH] IN BLOOD BY AUTOMATED COUNT: 13.6 % (ref 10–15)
GFR SERPL CREATININE-BSD FRML MDRD: 39 ML/MIN/1.73M2
GLUCOSE SERPL-MCNC: 105 MG/DL (ref 70–99)
HBA1C MFR BLD: 7.7 % (ref 0–5.6)
HCT VFR BLD AUTO: 46.8 % (ref 35–47)
HDLC SERPL-MCNC: 53 MG/DL
HGB BLD-MCNC: 15.1 G/DL (ref 11.7–15.7)
IMM GRANULOCYTES # BLD: 0 10E3/UL
IMM GRANULOCYTES NFR BLD: 0 %
LDLC SERPL CALC-MCNC: 74 MG/DL
LYMPHOCYTES # BLD AUTO: 2.7 10E3/UL (ref 0.8–5.3)
LYMPHOCYTES NFR BLD AUTO: 27 %
MCH RBC QN AUTO: 28 PG (ref 26.5–33)
MCHC RBC AUTO-ENTMCNC: 32.3 G/DL (ref 31.5–36.5)
MCV RBC AUTO: 87 FL (ref 78–100)
MONOCYTES # BLD AUTO: 0.8 10E3/UL (ref 0–1.3)
MONOCYTES NFR BLD AUTO: 8 %
NEUTROPHILS # BLD AUTO: 5.9 10E3/UL (ref 1.6–8.3)
NEUTROPHILS NFR BLD AUTO: 60 %
NONHDLC SERPL-MCNC: 118 MG/DL
PLATELET # BLD AUTO: 241 10E3/UL (ref 150–450)
POTASSIUM SERPL-SCNC: 5 MMOL/L (ref 3.4–5.3)
PROT SERPL-MCNC: 7.4 G/DL (ref 6.4–8.3)
RBC # BLD AUTO: 5.39 10E6/UL (ref 3.8–5.2)
SODIUM SERPL-SCNC: 142 MMOL/L (ref 136–145)
TRIGL SERPL-MCNC: 220 MG/DL
WBC # BLD AUTO: 9.8 10E3/UL (ref 4–11)

## 2023-05-18 PROCEDURE — 80061 LIPID PANEL: CPT | Performed by: PHYSICIAN ASSISTANT

## 2023-05-18 PROCEDURE — 85025 COMPLETE CBC W/AUTO DIFF WBC: CPT | Performed by: PHYSICIAN ASSISTANT

## 2023-05-18 PROCEDURE — 36415 COLL VENOUS BLD VENIPUNCTURE: CPT | Performed by: PHYSICIAN ASSISTANT

## 2023-05-18 PROCEDURE — 99214 OFFICE O/P EST MOD 30 MIN: CPT | Performed by: PHYSICIAN ASSISTANT

## 2023-05-18 PROCEDURE — 96127 BRIEF EMOTIONAL/BEHAV ASSMT: CPT | Performed by: PHYSICIAN ASSISTANT

## 2023-05-18 PROCEDURE — 80053 COMPREHEN METABOLIC PANEL: CPT | Performed by: PHYSICIAN ASSISTANT

## 2023-05-18 PROCEDURE — 83036 HEMOGLOBIN GLYCOSYLATED A1C: CPT | Performed by: PHYSICIAN ASSISTANT

## 2023-05-18 RX ORDER — TRAZODONE HYDROCHLORIDE 50 MG/1
50 TABLET, FILM COATED ORAL AT BEDTIME
Qty: 30 TABLET | Refills: 3 | Status: SHIPPED | OUTPATIENT
Start: 2023-05-18 | End: 2023-06-16

## 2023-05-18 RX ORDER — FOSINOPRIL SODIUM 10 MG/1
10 TABLET ORAL DAILY
Qty: 90 TABLET | Refills: 3 | Status: SHIPPED | OUTPATIENT
Start: 2023-05-18 | End: 2024-02-21

## 2023-05-18 RX ORDER — METFORMIN HCL 500 MG
1000 TABLET, EXTENDED RELEASE 24 HR ORAL
Qty: 180 TABLET | Refills: 3 | Status: SHIPPED | OUTPATIENT
Start: 2023-05-18 | End: 2023-12-08

## 2023-05-18 RX ORDER — TIZANIDINE 2 MG/1
2-4 TABLET ORAL
Qty: 180 TABLET | Refills: 1 | Status: SHIPPED | OUTPATIENT
Start: 2023-05-18 | End: 2024-02-21

## 2023-05-18 ASSESSMENT — PATIENT HEALTH QUESTIONNAIRE - PHQ9
SUM OF ALL RESPONSES TO PHQ QUESTIONS 1-9: 14
SUM OF ALL RESPONSES TO PHQ QUESTIONS 1-9: 14
10. IF YOU CHECKED OFF ANY PROBLEMS, HOW DIFFICULT HAVE THESE PROBLEMS MADE IT FOR YOU TO DO YOUR WORK, TAKE CARE OF THINGS AT HOME, OR GET ALONG WITH OTHER PEOPLE: SOMEWHAT DIFFICULT

## 2023-05-18 ASSESSMENT — PAIN SCALES - GENERAL: PAINLEVEL: MILD PAIN (2)

## 2023-05-18 NOTE — TELEPHONE ENCOUNTER
Discussed with Ivy.  I do not have the fax at this time.  She will send it again.    Josette Anguiano RN BSN  [facility]  [phone]

## 2023-05-18 NOTE — PROGRESS NOTES
"  Assessment & Plan     Mary Gorman is a 58 year old female patient new to me who presents to establish care with new PCP. Today we reviewed the following:    Type 2 diabetes mellitus without complication, without long-term current use of insulin (H)  Recheck A1c today. Metformin was increased in 12/2022 but new prescription was never sent so patient was running out earlier, she self decreased to one tab daily simply so she wouldn't run out of medications. New prescription sent today to take 2 tablets (1,000 mg) daily. Reminded to schedule eye exam - she follows with Dr. Vieira at [facility].   - Adult Eye  Referral  - metFORMIN (GLUCOPHAGE XR) 500 MG 24 hr tablet  Dispense: 180 tablet; Refill: 3  - Hemoglobin A1c    Recurrent major depressive disorder, remission status unspecified (H)  Follows with psych. Labs today as per psych orders. Continues with therapist as well.   - CBC with Platelets & Differential    Difficulty sleeping  Worsening and affecting mental health. Add trazodone.   - add traZODone (DESYREL) 50 MG tablet  Dispense: 30 tablet; Refill: 3    Hypertension goal BP (blood pressure) < 140/90  Systolic slightly elevated today. Recheck next visit.   - fosinopril (MONOPRIL) 10 MG tablet  Dispense: 90 tablet; Refill: 3    Morbid obesity (H)  Discussed options for management, particularly focused on GLP-1 which she will consider and let me know.     Muscle spasm  - refill tiZANidine (ZANAFLEX) 2 MG tablet  Dispense: 180 tablet; Refill: 1    Hyperlipidemia LDL goal <70  Last ate about 6 hours prior to lab draw  - Lipid panel reflex to direct LDL Fasting  - Comprehensive metabolic panel    Encounter for therapeutic drug monitoring  - CBC with Platelets & Differential    Permanent atrial fibrillation (H)  Follows with cardiology. On eliquis, diltiazem.        BMI:   Estimated body mass index is 57.71 kg/m  as calculated from the following:    Height as of 10/28/22: 1.651 m (5' 5\").    Weight as " of this encounter: 157.3 kg (346 lb 12.8 oz).   Weight management plan: discussed considering medical management, she wants to think about this but may be interested in GLP-1    Follow up in clinic in 3 months for the following:  - BP recheck  - Diabetes recheck  - sleep/mood recheck   - Prevnar 20   - revisit colonoscopy     Nica Greco PA-C  [facility] BRITTANY Hernandez is a 58 year old, presenting for the following health issues:  Medication Refill, Sleep Problem, Establish Care, Labs Only (For diabetes. ), and Pain (Outer left thigh pain. )        5/18/2023     2:25 PM   Additional Questions   Roomed by Delia GUO     Medication Refill    Pain    History of Present Illness       Diabetes:   She presents for follow up of diabetes.  She is checking home blood glucose a few times a month. She checks blood glucose before and after meals.  Blood glucose is never over 200 and never under 70. When her blood glucose is low, the patient is asymptomatic for confusion, blurred vision, lethargy and reports not feeling dizzy, shaky, or weak.  She has no concerns regarding her diabetes at this time.  She is not experiencing numbness or burning in feet, excessive thirst, blurry vision, weight changes or redness, sores or blisters on feet. The patient has not had a diabetic eye exam in the last 12 months.         Reason for visit:  Reestablish with a provider and annual visit. New pain to leg and difficulty sleeping    She eats 2-3 servings of fruits and vegetables daily.She consumes 0 sweetened beverage(s) daily.She exercises with enough effort to increase her heart rate 9 or less minutes per day.  She exercises with enough effort to increase her heart rate 3 or less days per week.   She is taking medications regularly.    Today's PHQ-9         PHQ-9 Total Score: 14    PHQ-9 Q9 Thoughts of better off dead/self-harm past 2 weeks :   Not at all    How difficult have these problems made it for you  "to do your work, take care of things at home, or get along with other people: Somewhat difficult     Lab orders in from psychiatry (CBC, CMP, A1c, lipids)    [facility] review:    Diabetes - last A1c 7.4 in 12/2022, continues on metformin 500 mg BID, this was increased in 12/2022 (after last A1c) but she had to decrease back to once daily as prescription was not changed and was going to run out of pills     Depression - follows with psychiatry and therapy. continues on sertraline and wellbutrin    Hypertension - on spironolactone 25 mg daily, metoprolol 50 mg BID, fosinopril 10 mg daily, diltiazem 240 mg daily    Hyperlipidemia - on rosuvastatin 10 mg daily    Sleep/BALTA - has CPAP which fits well without issues. However she is not sleeping well since COVID started due to changes in schedule, got better now worse again, feels it's taking toll on mental health.  Once asleep can usually stay asleep but hard to fall asleep. Either wakes up with pain, or can't seem to \"shut things down\", taking advil PM and/or muscle relaxer at night to help     Lymphedema - follows with lymphedema clinic    Knee pain - is S/P left knee surgery and will need right knee surgery eventually. Wants to lose weight but hard to exercise due to pain     Atrial fibrillation - on eliquis 5 mg BID and diltiazem 240 mg daily    Review of Systems   Constitutional, HEENT, cardiovascular, pulmonary, gi and gu systems are negative, except as otherwise noted.      Objective    BP (!) 146/80   Pulse 91   Temp (!) 95.4  F (35.2  C) (Temporal)   Resp 20   Wt (!) 157.3 kg (346 lb 12.8 oz)   LMP 06/25/2019 (Approximate)   SpO2 97%   BMI 57.71 kg/m    Body mass index is 57.71 kg/m .  Physical Exam   GENERAL: healthy, alert and no distress  EYES: Eyes grossly normal to inspection  NEURO: Normal strength and tone, mentation intact and speech normal  PSYCH: mentation appears normal, affect normal/bright    Remainder of exam deferred due to time constraints.       "

## 2023-05-18 NOTE — TELEPHONE ENCOUNTER
[facility]    Who is the name of the provider?:  [place]    What is the location you see this provider at/preferred location?: [place]  Person calling / Facility: [facility]  Phone number:  [phone]  Nurse call back needed:  YES     Reason for call:  [facility] calling with regards to fax sent 5/17 with orders for compression device. Please call [facility] to confirm receipt    Pharmacy location:  n/a  Outside Imaging: n/a   Can we leave a detailed message on this number?  YES

## 2023-05-18 NOTE — PATIENT INSTRUCTIONS
Diabetes:  - metformin 2 pills daily, either 1 twice daily or 2 pills once daily  - check fasting sugars when you can  - schedule eye exam     Sleep:  - add trazodone 50 mg at bedtime    Other:  - refilled fosinopril, tizanidine     Send me a message if you haven't heard from me regarding lab results in 1 week

## 2023-05-18 NOTE — TELEPHONE ENCOUNTER
Order for Pneumatic Compression Device Flexitouch system signed by Dr. Quinn, faxed to [phone].    Rightfax 05/18/23 2:44.    Sent to [facility].

## 2023-05-19 NOTE — RESULT ENCOUNTER NOTE
Multiple lab abnormalities slightly worsened triglycerides, worsening renal function please follow-up with me in the office or virtually to optimize medications

## 2023-05-22 ENCOUNTER — TELEPHONE (OUTPATIENT)
Dept: OTHER | Facility: CLINIC | Age: 58
End: 2023-05-22
Payer: COMMERCIAL

## 2023-05-22 NOTE — TELEPHONE ENCOUNTER
----- Message from Mekhi Quinn MD sent at 5/19/2023  5:42 PM CDT -----  Multiple lab abnormalities slightly worsened triglycerides, worsening renal function please follow-up with me in the office or virtually to optimize medications

## 2023-05-22 NOTE — TELEPHONE ENCOUNTER
Please arrange for follow up visit (may be in clinic or virtual) to labs drawn 5/18/23.    Josette Anguiano RN BSN  [facility]  [phone]

## 2023-05-22 NOTE — TELEPHONE ENCOUNTER
Future Appointments   Date Time Provider Department Center   5/30/2023  4:30 PM Mekhi Quinn MD [facility]

## 2023-05-30 ENCOUNTER — VIRTUAL VISIT (OUTPATIENT)
Dept: OTHER | Facility: CLINIC | Age: 58
End: 2023-05-30
Attending: INTERNAL MEDICINE
Payer: COMMERCIAL

## 2023-05-30 DIAGNOSIS — I10 BENIGN ESSENTIAL HYPERTENSION: ICD-10-CM

## 2023-05-30 DIAGNOSIS — I89.0 LYMPHEDEMA OF BOTH LOWER EXTREMITIES: Primary | ICD-10-CM

## 2023-05-30 DIAGNOSIS — E78.5 HYPERLIPIDEMIA LDL GOAL <70: ICD-10-CM

## 2023-05-30 DIAGNOSIS — G47.33 OSA (OBSTRUCTIVE SLEEP APNEA): ICD-10-CM

## 2023-05-30 DIAGNOSIS — N18.31 STAGE 3A CHRONIC KIDNEY DISEASE (H): ICD-10-CM

## 2023-05-30 DIAGNOSIS — I48.21 PERMANENT ATRIAL FIBRILLATION (H): ICD-10-CM

## 2023-05-30 DIAGNOSIS — E83.52 HYPERCALCEMIA: ICD-10-CM

## 2023-05-30 DIAGNOSIS — E11.9 TYPE 2 DIABETES MELLITUS WITHOUT COMPLICATION, WITHOUT LONG-TERM CURRENT USE OF INSULIN (H): ICD-10-CM

## 2023-05-30 DIAGNOSIS — E66.01 MORBID OBESITY (H): ICD-10-CM

## 2023-05-30 PROCEDURE — 99214 OFFICE O/P EST MOD 30 MIN: CPT | Mod: 95 | Performed by: INTERNAL MEDICINE

## 2023-05-30 PROCEDURE — G0463 HOSPITAL OUTPT CLINIC VISIT: HCPCS | Mod: TEL

## 2023-05-30 NOTE — PATIENT INSTRUCTIONS
Drink plenty of water    Your kidney function serum creatinine, calcium level elevated    We will repeat CMP labs, PTH, ionized calcium and vitamin D level in 1 month lab order placed    Continue current medications    Monitor blood pressure at home    Use compression, pump therapy etc.

## 2023-05-30 NOTE — PROGRESS NOTES
Mary is a 58 year old who is being evaluated via a billable telephone visit.      What phone number would you like to be contacted at? [phone]  How would you like to obtain your AVS? Nenita    Distant Location (provider location):  On-site      Objective       Vitals:  No vitals were obtained today due to virtual visit.    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  She is also planning for right knee replacement surgery.  She is unable to do any type of exercise due to her DJD of the knees and weight.  She takes gabapentin for sleep regulation 600 mg at bedtime.  She currently follows up with cardiologist and recently they decrease the dose of diltiazem and eventually stopped and that did not change her leg swelling then restarted back.  She is scheduled to undergo echocardiogram in October.  Previous echo normal LV function.  She denies any chest pain, shortness of breath or palpitations     During last visit optimized medications initiated Crestor and stopped gabapentin  Arranged referral to see edema therapist  Appropriate dose of Eliquis was suggested     She followed through the treatment plan seen and evaluated by edema therapist improved symptoms but given overwhelming secondary lymphedema and venous insufficiency she was  suggested to get pump therapy in the process of getting it  She stopped gabapentin  Taking Crestor  Lipids are  improved      Review of systems: Reviewed all 12 point review of systems as per HPI otherwise unremarkable    Physical exam:( no physical exam done this is virtual visit)    Reviewed recent laboratory tests, imaging studies in the epic and updated chart    Assessment and plan:  1. Lymphedema of both lower extremities left > Rt side (improved compared to last visit)  2. Stasis dermatitis of both legs  3. Morbid obesity (H)  4. BALTA (obstructive sleep apnea) uses CPAP   5. Permanent atrial fibrillation (H) taking Eliquis 5 mg twice a day  6. Hyperlipidemia LDL goal <70, improved with initiation of Crestor  7. Type 2 diabetes mellitus without complication, without long-term current use of insulin (H)  8.Hypercalcemia   9.CKD     She has a classical features of bilateral lower extremity lymphedema left more than right side morbidly obese and obstructive sleep apnea wears a CPAP machine.  Stasis dermatitis in both legs and no signs of cellulitis.  She was taking diltiazem for heart rate control and this was held briefly no change in leg symptoms and restarted back.  She also takes gabapentin for sleep regulation which can also cause leg swelling and worsening lymphedema.  Last time she had a sleep evaluation was more than 10 years ago.  Previous echo normal LV function and she is scheduled to undergo repeat echo in October.  She also consulted weight loss clinic for possible future weight loss surgery.  She wants to undergo right knee replacement in the near future.  No previous history of DVT or PE.  She is only taking Eliquis 5 mg once a day.  She is not on any statin her LDL is 97  Diabetes is well controlled  I had a lengthy discussion with the patient today     At present my recommendations,  Continue Crestor refill the medication  Repeat labs in a month order placed   Lose weight  Follow edema therapist  recommendations  Discuss with sleep clinic for appropriate CPAP settings are new machine etc.  Continue to use compression, wraps and she will benefit with pump therapy will support paperwork if needed  Tight control of the diabetes  Monitor blood pressure at home with a large cuff or wrist cuff educated goal is less than 130/80       This note was dictated by utilizing Dragon software  Copy of this note to primary care provider and referring provider    Location of the patient: At her home    Location of the provider: [facility]    Phone visit start time: 4: 30     30 minutes spent on the date of the encounter doing chart review, review of previous evaluation, recent labs, history, documentation and addressed above-mentioned multiple issues  AVS with written instructions done      This visit is being conducted as a virtual visit due to the emphasis on mitigation of the COVID-19 virus pandemic. The clinician has decided that the risk of an in-office visit outweighs the benefit for this patient.     Mekhi Quinn MD, YULIANA, FSVM, FNLA  Vascular Medicine  Clinical Lipidologist  Clinical Hypertension specialist

## 2023-05-31 ENCOUNTER — TELEPHONE (OUTPATIENT)
Dept: OTHER | Facility: CLINIC | Age: 58
End: 2023-05-31
Payer: COMMERCIAL

## 2023-05-31 NOTE — TELEPHONE ENCOUNTER
One month follow-up to 5/30/23      Non-fasting labs around 6/30/23    Virtual visit about 3 days later.

## 2023-06-01 NOTE — TELEPHONE ENCOUNTER
Patient scheduled for labs on 06/30/23. Virtual telephone visit with Dr Quinn on 07/06/23 telephone:[phone]

## 2023-06-16 ENCOUNTER — MYC MEDICAL ADVICE (OUTPATIENT)
Dept: FAMILY MEDICINE | Facility: CLINIC | Age: 58
End: 2023-06-16
Payer: COMMERCIAL

## 2023-06-16 DIAGNOSIS — G47.9 DIFFICULTY SLEEPING: ICD-10-CM

## 2023-06-16 RX ORDER — TRAZODONE HYDROCHLORIDE 100 MG/1
100 TABLET ORAL AT BEDTIME
Qty: 90 TABLET | Refills: 1 | Status: SHIPPED | OUTPATIENT
Start: 2023-06-16 | End: 2024-02-21

## 2023-06-16 NOTE — TELEPHONE ENCOUNTER
Please see [facility] message and advise.    Maureen KENNY RN  [facility] Triage Team

## 2023-06-30 ENCOUNTER — PATIENT OUTREACH (OUTPATIENT)
Dept: CARE COORDINATION | Facility: CLINIC | Age: 58
End: 2023-06-30

## 2023-06-30 ENCOUNTER — LAB (OUTPATIENT)
Dept: LAB | Facility: CLINIC | Age: 58
End: 2023-06-30
Payer: COMMERCIAL

## 2023-06-30 DIAGNOSIS — N18.31 STAGE 3A CHRONIC KIDNEY DISEASE (H): ICD-10-CM

## 2023-06-30 DIAGNOSIS — E83.52 HYPERCALCEMIA: ICD-10-CM

## 2023-06-30 DIAGNOSIS — I10 BENIGN ESSENTIAL HYPERTENSION: ICD-10-CM

## 2023-06-30 LAB
CA-I BLD-MCNC: 5 MG/DL (ref 4.4–5.2)
PTH-INTACT SERPL-MCNC: 56 PG/ML (ref 15–65)

## 2023-06-30 PROCEDURE — 82306 VITAMIN D 25 HYDROXY: CPT

## 2023-06-30 PROCEDURE — 82330 ASSAY OF CALCIUM: CPT

## 2023-06-30 PROCEDURE — 83970 ASSAY OF PARATHORMONE: CPT

## 2023-06-30 PROCEDURE — 36415 COLL VENOUS BLD VENIPUNCTURE: CPT

## 2023-06-30 PROCEDURE — 80053 COMPREHEN METABOLIC PANEL: CPT

## 2023-07-01 LAB
ALBUMIN SERPL BCG-MCNC: 4.2 G/DL (ref 3.5–5.2)
ALP SERPL-CCNC: 135 U/L (ref 35–104)
ALT SERPL W P-5'-P-CCNC: 24 U/L (ref 0–50)
ANION GAP SERPL CALCULATED.3IONS-SCNC: 15 MMOL/L (ref 7–15)
AST SERPL W P-5'-P-CCNC: 26 U/L (ref 0–45)
BILIRUB SERPL-MCNC: 0.5 MG/DL
BUN SERPL-MCNC: 21.5 MG/DL (ref 8–23)
CALCIUM SERPL-MCNC: 9.9 MG/DL (ref 8.6–10)
CHLORIDE SERPL-SCNC: 99 MMOL/L (ref 98–107)
CREAT SERPL-MCNC: 1.16 MG/DL (ref 0.51–0.95)
DEPRECATED HCO3 PLAS-SCNC: 22 MMOL/L (ref 22–29)
GFR SERPL CREATININE-BSD FRML MDRD: 54 ML/MIN/1.73M2
GLUCOSE SERPL-MCNC: 125 MG/DL (ref 70–99)
POTASSIUM SERPL-SCNC: 4.9 MMOL/L (ref 3.4–5.3)
PROT SERPL-MCNC: 7.4 G/DL (ref 6.4–8.3)
SODIUM SERPL-SCNC: 136 MMOL/L (ref 136–145)

## 2023-07-03 LAB — DEPRECATED CALCIDIOL+CALCIFEROL SERPL-MC: 27 UG/L (ref 20–75)

## 2023-07-05 ENCOUNTER — THERAPY VISIT (OUTPATIENT)
Dept: OCCUPATIONAL THERAPY | Facility: CLINIC | Age: 58
End: 2023-07-05
Payer: COMMERCIAL

## 2023-07-05 DIAGNOSIS — I89.0 LYMPHEDEMA OF BOTH LOWER EXTREMITIES: Primary | ICD-10-CM

## 2023-07-05 DIAGNOSIS — I89.0 LYMPHEDEMA: ICD-10-CM

## 2023-07-05 PROCEDURE — 97140 MANUAL THERAPY 1/> REGIONS: CPT | Mod: GO

## 2023-07-05 NOTE — PROGRESS NOTES
DISCHARGE  Reason for Discharge: Patient has met all goals.    Equipment Issued: one set gradient compression supplies BLE    Discharge Plan: Patient to continue home program.    Referring Provider:  Mekhi Quinn    07/05/23 0500   Appointment Info   Treating Provider Aren Robison, OTR/LIAM, CLT   Visits Used 1- 2023 [facility]   Medical Diagnosis lymphedema   Other pertinent information patient completed an episode of Complete Decongestive Therapy at this clinic Dec. 2022, returns today for follow up (order from Dr. Mekhi Quinn 8/9/2022)   Objective Measures   Objective Measures Objective Measure 1;Objective Measure 2   Objective Measure 1   Objective Measure BLE volume   Details see attached flowsheet:  vs. 12/2/2022 measurements, patient demonstrates a volume increase of 460 mL RLE, 480 mL LLE, with volume decrease distally (BL ankle and lower 1/2 BBK) increase in BL upper thighs   Objective Measure 2   Objective Measure umbilicus circumference   Details 134.3 cm   Manual Therapy   Manual Therapy Minutes (44529) 45   Skilled Intervention BLE measurements, ed   Patient Response/Progress patient verb understanding of ed provided, in agreement with POC   Treatment Detail BLE and umbilicus measurements; discussed current pump which appears to be pushing fluid into upper thighs and trunk; soft tissue fibrosis/hyperplasia lower abdomen, also BBK with bright red hemosiderin staining.  Plan to refer for decongestive-type lymphatic pump.  Patient also experiencing more pain with decreased mobility, recommended pursuing PT, which patient would like to do  but I m just focusing on one thing at a time right now .  Patient reports diligent use of BBK compression, has not been able to manage thigh compression at this time.  Reinforced all aspects of HP, including HEP, elevation, daily self-MLD or lymphatic pump use; reviewed s/s of infection, lymphedema exacerbation, how to pursue new order  if needed.   Progress ALL GOALS MET   Plan   Home program self-MLD, HEP, elevation, daytime BBK velcro-strap binders with transition socks, night time BBK velcro-strap binders with footpieces, thigh and knee pieces (may alternate thighs nightly)   Updates to plan of care DISCHARGE THIS VISIT   Comments   Comments Patient compliant with home program, including use of BLE compression, elevation, HEP and use of basic lymphatic pump; however, symptoms of lymphedema BLE persist, including hyperplasia, hyperpigmentation BL feet and lower BBK, hyperplasia low abdomen.  Patient s upper thigh measurements have increased since last visit, and due to hip, thigh, and abdominal swelling, recommend lymphatic pump with complete decongestive cycle including BLE and torso garments.   Total Session Time   Timed Code Treatment Minutes 45   Total Treatment Time (sum of timed and untimed services) 45   Goal 1   Goal identifier volume   Goal description For decreased risk of infection, skin breakdown/wounds & progressive soft-tissue fibrosis and improved fit of footwear, volume will be reduced by at least 300 mL in each BK.   Goal Progress GOAL MET, EXCEEDED   Target date 12/16/22   Date met 10/28/22   Goal 2   Goal identifier GCB   Goal description To reduce volume of lymphedema and risk of soft tissue fibrosis, pt will tolerate up to 23hr/day wear gradient compression bandaging (GCB) of BBK.   Goal Progress GOAL MET   Target date 12/16/22   Date met 10/28/22   Goal 3   Goal identifier home program   Goal description For long-term home mgmt chronic lymphedema pt/caregiver independent in home program a. GCB/GCB alternative garment for night wear b. compression garment for day wear c. ex to incr lymph flow/self-MLD.   Target date 12/16/22   Goal Progress GOAL MET   Date met 12/02/22   Goal 4   Goal identifier lymphedema precautions   Goal description Pt will independently verbalize the signs/symptoms of lymphedema, precautions and how to  obtain a future lymphedema referral if edema persists to preserve skin integrity, prevent infection and preserve functional mobility.   Goal Progress GOAL MET   Target date 10/25/22   Date met 08/25/22   System Outcome Measures   Lymphedema Life Impact Scale (score range 0-72). A higher score indicates greater impairment. 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

## 2023-07-06 ENCOUNTER — VIRTUAL VISIT (OUTPATIENT)
Dept: OTHER | Facility: CLINIC | Age: 58
End: 2023-07-06
Attending: INTERNAL MEDICINE
Payer: COMMERCIAL

## 2023-07-06 DIAGNOSIS — I48.21 PERMANENT ATRIAL FIBRILLATION (H): ICD-10-CM

## 2023-07-06 DIAGNOSIS — E66.01 MORBID OBESITY (H): ICD-10-CM

## 2023-07-06 DIAGNOSIS — G47.33 OSA (OBSTRUCTIVE SLEEP APNEA): ICD-10-CM

## 2023-07-06 DIAGNOSIS — I10 BENIGN ESSENTIAL HYPERTENSION: ICD-10-CM

## 2023-07-06 DIAGNOSIS — E83.52 HYPERCALCEMIA: ICD-10-CM

## 2023-07-06 DIAGNOSIS — I89.0 LYMPHEDEMA OF BOTH LOWER EXTREMITIES: Primary | ICD-10-CM

## 2023-07-06 DIAGNOSIS — E11.9 TYPE 2 DIABETES MELLITUS WITHOUT COMPLICATION, WITHOUT LONG-TERM CURRENT USE OF INSULIN (H): ICD-10-CM

## 2023-07-06 DIAGNOSIS — E78.5 HYPERLIPIDEMIA LDL GOAL <70: ICD-10-CM

## 2023-07-06 DIAGNOSIS — N18.31 STAGE 3A CHRONIC KIDNEY DISEASE (H): ICD-10-CM

## 2023-07-06 PROCEDURE — 99214 OFFICE O/P EST MOD 30 MIN: CPT | Mod: 95 | Performed by: INTERNAL MEDICINE

## 2023-07-06 PROCEDURE — G0463 HOSPITAL OUTPT CLINIC VISIT: HCPCS | Mod: TEL

## 2023-07-06 NOTE — PROGRESS NOTES
Mary is a 58 year old who is being evaluated via a billable telephone visit.      What phone number would you like to be contacted at? [phone]  How would you like to obtain your AVS? Nenita  Distant Location (provider location):  On-site        Objective         Vitals:  No vitals were obtained today due to virtual visit.    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  She is also planning for right knee replacement surgery.  She is unable to do any type of exercise due to her DJD of the knees and weight.  She takes gabapentin for sleep regulation 600 mg at bedtime.  She currently follows up with cardiologist and recently they decrease the dose of diltiazem and eventually stopped and that did not change her leg swelling then restarted back.   Previous echo normal LV function.  She denies any chest pain, shortness of breath or palpitations     During last visit optimized medications initiated Crestor and stopped gabapentin  Arranged referral to see edema therapist  Appropriate dose of Eliquis was suggested     She followed through the treatment plan seen and evaluated by edema therapist improved symptoms but given overwhelming secondary lymphedema and venous insufficiency she was suggested to get pump therapy in the process of getting it  She stopped  gabapentin  Taking Crestor  Lipids are  improved  Kidney Fx improved     Review of systems: Reviewed all 12 point review of systems as per HPI otherwise unremarkable    Physical exam:( no physical exam done this is virtual visit)    Reviewed recent laboratory tests, imaging studies in the epic and updated chart    Assessment and plan:  1. Lymphedema of both lower extremities left > Rt side (improved  Per patient compared to last visit)  2. Stasis dermatitis of both legs  3. Morbid obesity (H)  4. BALTA (obstructive sleep apnea) uses CPAP   5. Permanent atrial fibrillation (H) taking Eliquis 5 mg twice a day  6. Hyperlipidemia LDL goal <70, improved with initiation of Crestor  7. Type 2 diabetes mellitus without complication, without long-term current use of insulin (H)  8.Hypercalcemia , resolved   9.CKD improved      She has a classical features of bilateral lower extremity lymphedema left more than right side morbidly obese and obstructive sleep apnea wears a CPAP machine.  Stasis dermatitis in both legs and no signs of cellulitis.  She was taking diltiazem for heart rate control and this was held briefly no change in leg symptoms and restarted back.   Off of gabapentin   She also consulted weight loss clinic for possible future weight loss surgery.  She wants to undergo right knee replacement in the near future.  No previous history of DVT or PE.  She is only taking Eliquis 5 mg twice a day.    Diabetes is reasonably  controlled       At present my recommendations,  Continue Crestor refill the medication  Lose weight  Follow edema therapist recommendations  Discuss with sleep clinic for appropriate CPAP settings for new machine etc.  Continue to use compression, wraps and she will benefit with pump therapy will support paperwork if needed  Tight control of the diabetes  Monitor blood pressure at home with a large cuff or wrist cuff educated goal is less than 130/80   follow up in 6 months after fasting labs       This note was dictated by utilizing Dragon software  Copy of this note to primary care provider and referring provider     Location of the patient: At her home     Location of the provider: [facility]     Phone visit start time: 1:35 PM      30 minutes spent on the date of the encounter doing chart review, review of previous evaluation, recent labs, history, documentation and addressed above-mentioned multiple issues  AVS with written instructions done        This visit is being conducted as a virtual visit due to the emphasis on mitigation of the COVID-19 virus pandemic. The clinician has decided that the risk of an in-office visit outweighs the benefit for this patient.      Mekhi Quinn MD, FAVERONICA, FSVM, FNLA  Vascular Medicine  Clinical Lipidologist  Clinical Hypertension specialist

## 2023-07-06 NOTE — PATIENT INSTRUCTIONS
All of your recent labs looks good calcium normalized PTH was normal kidney function improved    Continue current medications and plan    Continue compression and use pump therapy per edema therapist recommendations    Follow-up with me in December 2023 prior to visit fasting lipids, CBC, CMP, and A1c lab orders placed

## 2023-07-12 ENCOUNTER — MYC MEDICAL ADVICE (OUTPATIENT)
Dept: FAMILY MEDICINE | Facility: CLINIC | Age: 58
End: 2023-07-12
Payer: COMMERCIAL

## 2023-07-14 ENCOUNTER — PATIENT OUTREACH (OUTPATIENT)
Dept: CARE COORDINATION | Facility: CLINIC | Age: 58
End: 2023-07-14
Payer: COMMERCIAL

## 2023-07-19 NOTE — TELEPHONE ENCOUNTER
1st attempt. LM for callback.    Please see message from provider.    Pavithra PEREZ [facility]

## 2023-07-19 NOTE — TELEPHONE ENCOUNTER
Please have patient double booked in my last appointment slot today for video or phone visit - I will be out of town tomorrow until 7/26 and the form was just sent to me today. I will call her likely around 5:45-6 PM    Thanks!    Nica Greco PA-C on 7/19/2023 at 3:59 PM

## 2023-07-19 NOTE — TELEPHONE ENCOUNTER
Unable to get a hold of patient today, unfortunately I'm out of the office 7/20-7/25. If patient is needing this completed sooner than she will have to schedule an appointment with another provider to discuss. Otherwise, she can be added to one of my same day slots next week   Nica Greco PA-C on 7/19/2023 at 6:19 PM

## 2023-07-20 NOTE — TELEPHONE ENCOUNTER
1st attempt. LM for callback.    Please seen notes below.    [facility]avithra [facility]     I have personally seen and examined the patient. I have collaborated with and supervised the

## 2023-07-28 ENCOUNTER — VIRTUAL VISIT (OUTPATIENT)
Dept: FAMILY MEDICINE | Facility: CLINIC | Age: 58
End: 2023-07-28
Payer: COMMERCIAL

## 2023-07-28 DIAGNOSIS — M25.561 CHRONIC PAIN OF RIGHT KNEE: ICD-10-CM

## 2023-07-28 DIAGNOSIS — G89.29 CHRONIC HIP PAIN, UNSPECIFIED LATERALITY: ICD-10-CM

## 2023-07-28 DIAGNOSIS — Z02.89 ENCOUNTER FOR COMPLETION OF FORM WITH PATIENT: Primary | ICD-10-CM

## 2023-07-28 DIAGNOSIS — F41.9 ANXIETY: ICD-10-CM

## 2023-07-28 DIAGNOSIS — G89.29 CHRONIC PAIN OF RIGHT KNEE: ICD-10-CM

## 2023-07-28 DIAGNOSIS — M25.559 CHRONIC HIP PAIN, UNSPECIFIED LATERALITY: ICD-10-CM

## 2023-07-28 DIAGNOSIS — F33.9 RECURRENT MAJOR DEPRESSIVE DISORDER, REMISSION STATUS UNSPECIFIED (H): ICD-10-CM

## 2023-07-28 PROCEDURE — 99213 OFFICE O/P EST LOW 20 MIN: CPT | Mod: VID | Performed by: PHYSICIAN ASSISTANT

## 2023-07-28 ASSESSMENT — PATIENT HEALTH QUESTIONNAIRE - PHQ9
10. IF YOU CHECKED OFF ANY PROBLEMS, HOW DIFFICULT HAVE THESE PROBLEMS MADE IT FOR YOU TO DO YOUR WORK, TAKE CARE OF THINGS AT HOME, OR GET ALONG WITH OTHER PEOPLE: VERY DIFFICULT
SUM OF ALL RESPONSES TO PHQ QUESTIONS 1-9: 16
SUM OF ALL RESPONSES TO PHQ QUESTIONS 1-9: 16

## 2023-07-28 NOTE — PROGRESS NOTES
Mary is a 58 year old who is being evaluated via a billable video visit.      How would you like to obtain your AVS? MyChart  If the video visit is dropped, the invitation should be resent by: Text to cell phone: [phone]  Will anyone else be joining your video visit? No      Assessment & Plan     Encounter for completion of form with patient  ETHAN paperwork completed for intermittent leave, faxed as requested. Copy sent to HIM. Copy printed and will keep at my desk until upcoming appointment with patient 8/18/23    Recurrent major depressive disorder, remission status unspecified (H)  Anxiety  Continue care with therapist  Schedule follow up with psychiatry     Chronic pain of right knee  Chronic hip pain, unspecified laterality  Upcoming appointment with ortho - hoping for right knee surgery soon.     Follow up 8/18 as scheduled    Nica Greco PA-C  [facility]   Mary is a 58 year old, presenting for the following health issues:  Forms        5/18/2023     2:25 PM   Additional Questions   Roomed by Delia GUO       History of Present Illness       Reason for visit:  JARED paperwork    She eats 0-1 servings of fruits and vegetables daily.She consumes 1 sweetened beverage(s) daily.She exercises with enough effort to increase her heart rate 9 or less minutes per day.  She exercises with enough effort to increase her heart rate 3 or less days per week. She is missing 1 dose(s) of medications per week.  She is not taking prescribed medications regularly due to remembering to take.     Depression and anxiety flaring with all of her medical issues  Issues with concentration and easily distracted at work   Pain often interrupting work as well - right knee is very bothersome, left thigh, low back and hip pain   Has had a couple audits at work and noted a few things she didn't catch that she normally would  Management is concerned as it's not like her to miss things  Felt that  having an intermittent leave of absence available would be helpful to protect her and her job    Sees psychiatry and therapy, plans to follow up with psychiatry. 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      Objective       Vitals:  No vitals were obtained today due to virtual visit.    Physical Exam   GENERAL: Healthy, alert and no distress  EYES: Eyes grossly normal to inspection.  No discharge or erythema, or obvious scleral/conjunctival abnormalities.  RESP: No audible wheeze, cough, or visible cyanosis.  No visible retractions or increased work of breathing.    SKIN: Visible skin clear. No significant rash, abnormal pigmentation or lesions.  NEURO: Cranial nerves grossly intact.  Mentation and speech appropriate for age.  PSYCH: Mentation appears normal, affect normal/bright, judgement and insight intact, normal speech and appearance well-groomed.        Video-Visit Details    Type of service:  Video Visit   Video start time: 4:18 PM   Video end time: 4:44 PM     Originating Location (pt. Location): Home  Distant Location (provider location):  On-site  Platform used for Video Visit: Sarah

## 2023-08-18 ENCOUNTER — OFFICE VISIT (OUTPATIENT)
Dept: FAMILY MEDICINE | Facility: CLINIC | Age: 58
End: 2023-08-18
Attending: PHYSICIAN ASSISTANT
Payer: COMMERCIAL

## 2023-08-18 VITALS
OXYGEN SATURATION: 98 % | RESPIRATION RATE: 20 BRPM | SYSTOLIC BLOOD PRESSURE: 128 MMHG | DIASTOLIC BLOOD PRESSURE: 70 MMHG | BODY MASS INDEX: 57.74 KG/M2 | HEART RATE: 80 BPM | WEIGHT: 293 LBS

## 2023-08-18 DIAGNOSIS — N18.31 TYPE 2 DIABETES MELLITUS WITH STAGE 3A CHRONIC KIDNEY DISEASE, WITHOUT LONG-TERM CURRENT USE OF INSULIN (H): Primary | ICD-10-CM

## 2023-08-18 DIAGNOSIS — E78.5 HYPERLIPIDEMIA LDL GOAL <70: ICD-10-CM

## 2023-08-18 DIAGNOSIS — E11.22 TYPE 2 DIABETES MELLITUS WITH STAGE 3A CHRONIC KIDNEY DISEASE, WITHOUT LONG-TERM CURRENT USE OF INSULIN (H): Primary | ICD-10-CM

## 2023-08-18 DIAGNOSIS — M25.561 CHRONIC PAIN OF BOTH KNEES: ICD-10-CM

## 2023-08-18 DIAGNOSIS — N18.31 CHRONIC KIDNEY DISEASE, STAGE 3A (H): ICD-10-CM

## 2023-08-18 DIAGNOSIS — M54.16 LUMBAR RADICULITIS: ICD-10-CM

## 2023-08-18 DIAGNOSIS — G89.29 CHRONIC BILATERAL LOW BACK PAIN WITH SCIATICA, SCIATICA LATERALITY UNSPECIFIED: ICD-10-CM

## 2023-08-18 DIAGNOSIS — E11.9 TYPE 2 DIABETES MELLITUS WITHOUT COMPLICATION, WITHOUT LONG-TERM CURRENT USE OF INSULIN (H): ICD-10-CM

## 2023-08-18 DIAGNOSIS — M25.562 CHRONIC PAIN OF BOTH KNEES: ICD-10-CM

## 2023-08-18 DIAGNOSIS — M54.40 CHRONIC BILATERAL LOW BACK PAIN WITH SCIATICA, SCIATICA LATERALITY UNSPECIFIED: ICD-10-CM

## 2023-08-18 DIAGNOSIS — G89.29 CHRONIC PAIN OF BOTH KNEES: ICD-10-CM

## 2023-08-18 DIAGNOSIS — I10 BENIGN ESSENTIAL HYPERTENSION: ICD-10-CM

## 2023-08-18 LAB
ALBUMIN SERPL BCG-MCNC: 4.4 G/DL (ref 3.5–5.2)
ALP SERPL-CCNC: 137 U/L (ref 35–104)
ALT SERPL W P-5'-P-CCNC: 23 U/L (ref 0–50)
ANION GAP SERPL CALCULATED.3IONS-SCNC: 14 MMOL/L (ref 7–15)
AST SERPL W P-5'-P-CCNC: 27 U/L (ref 0–45)
BASOPHILS # BLD AUTO: 0 10E3/UL (ref 0–0.2)
BASOPHILS NFR BLD AUTO: 0 %
BILIRUB SERPL-MCNC: 0.4 MG/DL
BUN SERPL-MCNC: 21.2 MG/DL (ref 8–23)
CALCIUM SERPL-MCNC: 10.3 MG/DL (ref 8.6–10)
CHLORIDE SERPL-SCNC: 103 MMOL/L (ref 98–107)
CHOLEST SERPL-MCNC: 132 MG/DL
CREAT SERPL-MCNC: 1.18 MG/DL (ref 0.51–0.95)
DEPRECATED HCO3 PLAS-SCNC: 23 MMOL/L (ref 22–29)
EOSINOPHIL # BLD AUTO: 0.1 10E3/UL (ref 0–0.7)
EOSINOPHIL NFR BLD AUTO: 2 %
ERYTHROCYTE [DISTWIDTH] IN BLOOD BY AUTOMATED COUNT: 13.5 % (ref 10–15)
GFR SERPL CREATININE-BSD FRML MDRD: 53 ML/MIN/1.73M2
GLUCOSE SERPL-MCNC: 117 MG/DL (ref 70–99)
HBA1C MFR BLD: 6.9 % (ref 0–5.6)
HCT VFR BLD AUTO: 44.7 % (ref 35–47)
HDLC SERPL-MCNC: 50 MG/DL
HGB BLD-MCNC: 14.3 G/DL (ref 11.7–15.7)
IMM GRANULOCYTES # BLD: 0 10E3/UL
IMM GRANULOCYTES NFR BLD: 0 %
LDLC SERPL CALC-MCNC: 40 MG/DL
LYMPHOCYTES # BLD AUTO: 1.9 10E3/UL (ref 0.8–5.3)
LYMPHOCYTES NFR BLD AUTO: 29 %
MCH RBC QN AUTO: 27.8 PG (ref 26.5–33)
MCHC RBC AUTO-ENTMCNC: 32 G/DL (ref 31.5–36.5)
MCV RBC AUTO: 87 FL (ref 78–100)
MONOCYTES # BLD AUTO: 0.5 10E3/UL (ref 0–1.3)
MONOCYTES NFR BLD AUTO: 7 %
NEUTROPHILS # BLD AUTO: 4.2 10E3/UL (ref 1.6–8.3)
NEUTROPHILS NFR BLD AUTO: 62 %
NONHDLC SERPL-MCNC: 82 MG/DL
PLATELET # BLD AUTO: 234 10E3/UL (ref 150–450)
POTASSIUM SERPL-SCNC: 4.8 MMOL/L (ref 3.4–5.3)
PROT SERPL-MCNC: 7.6 G/DL (ref 6.4–8.3)
RBC # BLD AUTO: 5.14 10E6/UL (ref 3.8–5.2)
SODIUM SERPL-SCNC: 140 MMOL/L (ref 136–145)
TRIGL SERPL-MCNC: 210 MG/DL
WBC # BLD AUTO: 6.8 10E3/UL (ref 4–11)

## 2023-08-18 PROCEDURE — 80061 LIPID PANEL: CPT | Performed by: PHYSICIAN ASSISTANT

## 2023-08-18 PROCEDURE — 36415 COLL VENOUS BLD VENIPUNCTURE: CPT | Performed by: PHYSICIAN ASSISTANT

## 2023-08-18 PROCEDURE — 99214 OFFICE O/P EST MOD 30 MIN: CPT | Performed by: PHYSICIAN ASSISTANT

## 2023-08-18 PROCEDURE — 85025 COMPLETE CBC W/AUTO DIFF WBC: CPT | Performed by: PHYSICIAN ASSISTANT

## 2023-08-18 PROCEDURE — 80053 COMPREHEN METABOLIC PANEL: CPT | Performed by: PHYSICIAN ASSISTANT

## 2023-08-18 PROCEDURE — 83036 HEMOGLOBIN GLYCOSYLATED A1C: CPT | Performed by: PHYSICIAN ASSISTANT

## 2023-08-18 RX ORDER — TRAMADOL HYDROCHLORIDE 50 MG/1
50 TABLET ORAL 2 TIMES DAILY PRN
Qty: 30 TABLET | Refills: 0 | Status: SHIPPED | OUTPATIENT
Start: 2023-08-18 | End: 2023-11-15

## 2023-08-18 ASSESSMENT — PAIN SCALES - GENERAL: PAINLEVEL: MODERATE PAIN (5)

## 2023-08-18 ASSESSMENT — PATIENT HEALTH QUESTIONNAIRE - PHQ9
10. IF YOU CHECKED OFF ANY PROBLEMS, HOW DIFFICULT HAVE THESE PROBLEMS MADE IT FOR YOU TO DO YOUR WORK, TAKE CARE OF THINGS AT HOME, OR GET ALONG WITH OTHER PEOPLE: SOMEWHAT DIFFICULT
SUM OF ALL RESPONSES TO PHQ QUESTIONS 1-9: 9
SUM OF ALL RESPONSES TO PHQ QUESTIONS 1-9: 9

## 2023-08-18 NOTE — PATIENT INSTRUCTIONS
For pain:  - pain clinic referral  - stop advil  - tylenol 1000 mg up to 4 times daily  - tramadol 50 mg up to twice daily if needed for severe pain, use sparingly     For diabetes/weight:  - diabetes education referral  - trulicity once weekly injection  - check blood sugars twice daily ideally - fasting and 2 hours after main meal    Goal blood sugar readings:  Fasting in AM:   2 hours after main meal < 160  Bedtime 100-140

## 2023-08-18 NOTE — PROGRESS NOTES
Assessment & Plan     Type 2 diabetes mellitus with stage 3a chronic kidney disease, without long-term current use of insulin (H)  Recheck A1c today  Encouraged to work on checking blood sugars more regularly, may do better with CGM, referral placed to DM education.   Add trulicity for diabetes as well as weight loss which will also likely benefit her chronic back and knee pain.   Due for eye exam but overwhelmed with multiple upcoming appointments - will continue to remind at follow up visits.   - [facility] Adult Diabetes Educator Referral  - add dulaglutide (TRULICITY) 0.75 MG/0.5ML pen  Dispense: 2 mL; Refill: 3  - Hemoglobin A1c    Chronic kidney disease, stage 3a (H)  Discussed no NSAIDs (has been taking advil lately which I advised to stop).   - Albumin Random Urine Quantitative with Creat Ratio    Chronic pain of both knees  Lumbar radiculitis  Chronic bilateral low back pain with sciatica, sciatica laterality unspecified  Significant pain impacting daily life, sleep and ability to be mobile.  Has scheduled follow up with orthopedics in a few weeks. She is hoping for right knee surgery soon.   History of CKD and she on eliquis for history of afib so she cannot take NSAIDs (has been taking advil, discussed stopping this)  Considered gabapentin, had been on in this in the past but was recommended to discontinue per vascular recommendations due to possible worsening of her lymphedema.   She has tizanidine which is helpful but makes her very tired and she's unable to work during the day while taking this.   She has not had success with topical voltaren gel   I have referred her to pain management for further evaluation and treatment options  In the interim, I have recommended tylenol 1000 mg up to 4 times daily and add tramadol to use sparingly if needed for severe pain - we discussed the side effect profile and habit forming potential and to try to keep this at a minimum which she understands and agrees. She does  not want to be on a strong medication but wants to be able to function.   - Pain Management  Referral  - add (cautiously) traMADol (ULTRAM) 50 MG tablet  Dispense: 30 tablet; Refill: 0    Benign essential hypertension  Well controlled.   - CBC with platelets differential - vascular ordered, drawn today  - Comprehensive metabolic panel - vascular ordered, drawn today    Hyperlipidemia LDL goal <70  - Lipid panel reflex to direct LDL Fasting - vascular ordered, drawn today    Follow up 3 months, sooner prn     35 minutes spent on the date of the encounter doing chart review, history and exam, documentation and further activities as noted above     ISAAC Huitron [facility] BRITTANY Hernandez is a 58 year old, presenting for the following health issues:  Follow Up        8/18/2023     2:03 PM   Additional Questions   Roomed by Jose SCOTT       History of Present Illness       Diabetes:   She presents for follow up of diabetes.  She is checking home blood glucose a few times a month.   She checks blood glucose before meals.  Blood glucose is never over 200 and never under 70. She is aware of hypoglycemia symptoms including none.    She has no concerns regarding her diabetes at this time.   She is not experiencing numbness or burning in feet, excessive thirst, blurry vision, weight changes or redness, sores or blisters on feet. The patient has not had a diabetic eye exam in the last 12 months.          Reason for visit:  Discuss pain - would like to have something to take during the day      Diabetes  Last A1c 7.7 in May  On metformin 500 mg BID - history of CKD  Blood sugars - checks once per week fasting     Pain   Right knee, lower back, left thigh is greatest pain   Taking too much advil which is helpful but history of CKD and is on anticoagulant   Has tried gabapentin but vascular felt this could have contributed to edema     Mental health   Feels pretty good  today  Stress with above pain   FMLA forms filled out last month, received and approved  Has follow up with psychiatry soon     Review of Systems   Constitutional, HEENT, cardiovascular, pulmonary, gi and gu systems are negative, except as otherwise noted.      Objective    /70   Pulse 80   Resp 20   Wt (!) 157.4 kg (347 lb)   LMP 06/25/2019 (Approximate)   SpO2 98%   BMI 57.74 kg/m    Body mass index is 57.74 kg/m .  Physical Exam   GENERAL: healthy, alert and no distress  RESP: lungs clear to auscultation - no rales, rhonchi or wheezes  CV: RRR, no murmur heard   NEURO: Normal strength and tone, mentation intact and speech normal  PSYCH: mentation appears normal, affect normal/bright

## 2023-08-23 NOTE — RESULT ENCOUNTER NOTE
Kidney function about the same  Fluctuating and slightly elevated calcium  Triglycerides improved and LDL excellent range  Diabetes improved A1c 6.9 and previously it was 7.7  CBC is normal    Continue same medications and plan discussed in the clinic

## 2023-08-27 ENCOUNTER — HEALTH MAINTENANCE LETTER (OUTPATIENT)
Age: 58
End: 2023-08-27

## 2023-08-30 NOTE — PROGRESS NOTES
"[facility]    I had the pleasure of seeing Mary when she came for follow up of AFib.  This 58 year old saw Dr. Greenfield] for her history of:       1. Permanent atrial fibrillation on a rate control/AC strategy given her asymptomatic status.  2. Hypertension under good control  3. Obesity   4. Lymphedema with adjustments in Diltiazem dose and addition of spironolactone without improvement.  Sees Lymphedema clinic  5. BALTA - on CPAP  6. DM2      Last Visit & Interval History:  I saw Mary in 10/2022 at which time she was doing well from a cardiac perspective.  She felt she was tolerating the Diltiazem (which had been stopped due to LE edema) without trouble.  She had not thought this worsened her lymphedema at all.  I recommended annual follow-up with repeat 24-hour Holter monitor to assess HR control.    Holter again was not scheduled before our visit.    Today's Visit:  Overall, Mary is feeling okay from a cardiac perspective.  She denies palpitations.  No dizziness or lightheadedness.      Activity levels are significantly reduced due to her  R knee pain.  She denies chest pain, pressure, tightness.  Does note some mild STYLES.    Lymphedema is \"okay.\"  She wears Velcro wraps and has pumps that she uses, which help.  She continues to limit salt and put her feet up whenever possible.    She started Trulicity and feels more \"bloated.\"     HR 72-82 bpm on Pulse Ox at home.  She has declined wearing a Holter monitor due to the significant welts it leaves on her skin, even with 24 hours.    VITALS:  Vitals: /78   Pulse 80   Ht 1.651 m (5' 5\")   LMP 06/25/2019 (Approximate)   SpO2 98%   BMI 57.74 kg/m      Diagnostic Testing:  Echocardiogram 10/2022-LVEF 55-60%.  No RWMA. No thrombus. Nl RV. Mod dilated LA. mild aortic sclerosis.  No other significant valvular abnormalities. AFib on echo, no change c/w 6/2019  Holter 5/2020 AFib with avg HR 84 bpm on metoprolol XL 50 BID and Dilt " 240  Echocardiogram 6/10/2019 showed EF 60% with normal RV size and function. No significant valvular abnormalities.   24 hour Holter 6/7/2019 showed atrial fibrillation with average HR 81 bpm. Range was 54 bpm @ 1431 and max was 236 bpm @ 122. Minimal ectopy. Event       Plan:  Updated echo - we will call with results  2.  Annual follow-up     Assessment/Plan:    Permanent AFib  Currently on Diltiazem 240 mg daily, metoprolol XL 50 mg twice daily.  HR at home in the 70s typically    Echo 10/2022 with normal LVEF  Remains on AC with Eliquis.  [id number] 3 (HTN, DM, sex).  Hemoglobin 8/2023 was 14.3 g/dL    PLAN:  Continue current medications  Continue Eliquis  Update echo    2.  R Knee Arthritis  She is expecting to require R TKA in the coming months, and is hoping to get this done before the end of the year  We reviewed that from a cardiac standpoint, she has no issues with angina.  Echocardiogram done 10/2022 showed normal LVEF and no significant valvular abnormalities  Mobility has been significantly limited due to lymphedema and R knee pain    PLAN:  Updated echocardiogram to assess LVEF.  If this is normal, would not anticipate any additional cardiac testing be required prior to any surgery  [id number] (HTN, DM, sex) and remains on Eliquis.  Would be able to hold Eliquis x 2-3 days prior to procedure, restarting at the discretion of the surgeon, typically POD #1      Joceline Gomez PA-C, MSPAS      Orders Placed This Encounter   Procedures    Follow-Up with Cardiology JANNET    Echocardiogram Complete     Orders Placed This Encounter   Medications    metoprolol succinate ER (TOPROL XL) 50 MG 24 hr tablet     Sig: Take 1 tablet (50 mg) by mouth 2 times daily     Dispense:  180 tablet     Refill:  3    spironolactone (ALDACTONE) 25 MG tablet     Sig: Take 1 tablet (25 mg) by mouth daily     Dispense:  90 tablet     Refill:  3    diltiazem ER (DILT-XR) 240 MG 24 hr ER beaded capsule     Sig: Take 1 capsule (240  mg) by mouth daily     Dispense:  90 capsule     Refill:  3    ELIQUIS ANTICOAGULANT 5 MG tablet     Sig: Take 1 tablet (5 mg) by mouth 2 times daily     Dispense:  180 tablet     Refill:  3     Medications Discontinued During This Encounter   Medication Reason    metoprolol succinate ER (TOPROL XL) 50 MG 24 hr tablet Reorder (No AVS)    spironolactone (ALDACTONE) 25 MG tablet Reorder (No AVS)    diltiazem ER (DILT-XR) 240 MG 24 hr ER beaded capsule Reorder (No AVS)    ELIQUIS ANTICOAGULANT 5 MG tablet Reorder (No AVS)         Encounter Diagnoses   Name Primary?    Essential hypertension, benign     Essential hypertension     Chronic atrial fibrillation (H)     Paroxysmal atrial fibrillation (H)        CURRENT MEDICATIONS:  Current Outpatient Medications   Medication Sig Dispense Refill    blood glucose (ACCU-CHEK GUIDE) test strip Use to test blood sugar 1 times daily  (OK to substitute alternate brand per insurance formulary.  If One Touch only give Verio not Ultra) 100 strip 11    blood glucose monitoring (SOFTCLIX) lancets Use to test blood sugar 1 times daily (OK to substitute alternate brand per insurance formulary.  If One Touch only give Verio not Ultra) 100 each 11    Blood Glucose Monitoring Suppl (ACCU-CHEK GUIDE) w/Device KIT 1 Device daily (OK to substitute alternate brand per insurance formulary.  If One Touch only give Verio not Ultra) 1 kit 1    buPROPion (WELLBUTRIN XL) 150 MG 24 hr tablet Take 1 tablet (150 mg) by mouth every morning Take with 300 mg for total of 450 mg 90 tablet 1    buPROPion (WELLBUTRIN XL) 300 MG 24 hr tablet Take 1 tablet (300 mg) by mouth every morning Take with 150 mg for total 450 mg 90 tablet 1    cetirizine (ZYRTEC) 10 MG tablet Take 10 mg by mouth as needed for allergies      diltiazem ER (DILT-XR) 240 MG 24 hr ER beaded capsule Take 1 capsule (240 mg) by mouth daily 90 capsule 3    dulaglutide (TRULICITY) 0.75 MG/0.5ML pen Inject 0.75 mg Subcutaneous every 7 days 2 mL 3  "   ELIQUIS ANTICOAGULANT 5 MG tablet Take 1 tablet (5 mg) by mouth 2 times daily 180 tablet 3    fosinopril (MONOPRIL) 10 MG tablet Take 1 tablet (10 mg) by mouth daily 90 tablet 3    metFORMIN (GLUCOPHAGE XR) 500 MG 24 hr tablet Take 2 tablets (1,000 mg) by mouth daily (with dinner) 180 tablet 3    metoprolol succinate ER (TOPROL XL) 50 MG 24 hr tablet Take 1 tablet (50 mg) by mouth 2 times daily 180 tablet 3    rosuvastatin (CRESTOR) 10 MG tablet Take 1 tablet (10 mg) by mouth daily 90 tablet 3    sertraline (ZOLOFT) 100 MG tablet Take 200 mg by mouth daily 2 tabs (200mg) daily       spironolactone (ALDACTONE) 25 MG tablet Take 1 tablet (25 mg) by mouth daily 90 tablet 3    tiZANidine (ZANAFLEX) 2 MG tablet Take 1-2 tablets (2-4 mg) by mouth nightly as needed for muscle spasms 180 tablet 1    traMADol (ULTRAM) 50 MG tablet Take 1 tablet (50 mg) by mouth 2 times daily as needed for severe pain 30 tablet 0    traZODone (DESYREL) 100 MG tablet Take 1 tablet (100 mg) by mouth At Bedtime 90 tablet 1       ALLERGIES     Allergies   Allergen Reactions    Dyazide [Hydrochlorothiazide W-Triamterene] Unknown    Vistaril [Hydroxyzine] Visual Disturbance    Naproxen Rash    Nickel Rash    Robaxin [Methocarbamol] Rash    Sulfa Antibiotics Rash         Review of Systems:  Skin:  Negative     Eyes:  Positive for glasses  ENT:  Negative    Respiratory:  Positive for dyspnea on exertion  Cardiovascular:  Negative for;palpitations;chest pain Positive for;palpitations;dizziness;edema  Gastroenterology: Negative for melena;hematochezia  Genitourinary:  Negative    Musculoskeletal:  Positive for arthritis;joint pain  Neurologic:  Negative    Psychiatric:  Positive for excessive stress;depression;anxiety  Heme/Lymph/Imm:  Positive for allergies  Endocrine:  Negative      Physical Exam:  Vitals: /78   Pulse 80   Ht 1.651 m (5' 5\")   LMP 06/25/2019 (Approximate)   SpO2 98%   BMI 57.74 kg/m      Constitutional:  cooperative, " alert and oriented, well developed, well nourished, in no acute distress        Skin:  warm and dry to the touch   reaction from patches on anterior chest wall    Head:  normocephalic, no masses or lesions        Eyes:  pupils equal and round;conjunctivae and lids unremarkable;sclera white        ENT:  no pallor or cyanosis, dentition good        Neck:  JVP normal;no carotid bruit        Chest:  normal breath sounds, clear to auscultation, normal A-P diameter, normal symmetry, normal respiratory excursion, no use of accessory muscles        Cardiac:   irregularly irregular rhythm           HR 70s    Abdomen:  abdomen soft obese      Vascular: pulses full and equal                                      Extremities and Back:      Lymphedema wraps in place    Neurological:  no gross motor deficits            PAST MEDICAL HISTORY:  Past Medical History:   Diagnosis Date    Arthritis Nov 2019    Atrial fibrillation (H)     COPD (chronic obstructive pulmonary disease) (H)     colds turn to bronchitis easily    Depression     Depressive disorder     in chart    Diabetes (H)     GERD (gastroesophageal reflux disease)     HTN (hypertension)     Hyperlipidemia     Obese     BALTA (obstructive sleep apnea)     uses CPAP    Permanent atrial fibrillation (H) 06/05/2011    Uncomplicated asthma     mentioned by urgent care md       PAST SURGICAL HISTORY:  Past Surgical History:   Procedure Laterality Date    ABDOMEN SURGERY      in chart    ARTHROPLASTY KNEE Left 01/20/2022    Procedure: ARTHROPLASTY, LEFT KNEE, TOTAL;  Surgeon: Armand Martin MD;  Location: [facility] OR    BIOPSY      skin and uterine lining    CARDIOVERSION  06/07/2011    CHOLECYSTECTOMY      COLONOSCOPY  2013    in chart    DILATION AND CURETTAGE  04/26/2012    Procedure:DILATION AND CURETTAGE; DILATION AND CURETTAGE; Surgeon:JEAN-PAUL DEL CID; Location:[facility]    DILATION AND CURETTAGE, HYSTEROSCOPY DIAGNOSTIC, COMBINED  12/08/2011    Procedure:COMBINED DILATION AND  CURETTAGE, HYSTEROSCOPY DIAGNOSTIC; DILATION AND CURETTAGE, DIAGNOSTIC HYSTEROSCOPY ; Surgeon:JEAN-PAUL DEL CID; Location[facility]    KNEE SURGERY      REMOVE HARDWARE LOWER EXTREMITY Left 01/20/2022    Procedure: REMOVAL, HARDWARE, LEFT LOWER EXTREMITY;  Surgeon: Armand Martin MD;  Location:  OR       FAMILY HISTORY:  Family History   Problem Relation Age of Onset    Hypertension Mother     Heart Disease Mother         A-fib    Arthritis Mother     Hyperlipidemia Mother     Obesity Mother     Heart Disease Father         triple bypass    Cancer Father 50        bladder    Hypertension Father     Respiratory Father         smoker    Coronary Artery Disease Father         chf, pulm htn, by pass    Hyperlipidemia Father     Other Cancer Father         bladder    Cancer Paternal Grandmother 90        rectal    Colon Cancer Paternal Grandmother     Obesity Paternal Grandmother     Unknown/Adopted Brother     Obesity Brother        SOCIAL HISTORY:  Social History     Socioeconomic History    Marital status: Single     Spouse name: None    Number of children: None    Years of education: None    Highest education level: Bachelor's degree (e.g., BA, AB, BS)   Tobacco Use    Smoking status: Never     Passive exposure: Never    Smokeless tobacco: Never   Vaping Use    Vaping Use: Never used   Substance and Sexual Activity    Alcohol use: Not Currently     Comment: once in a while, few times a year    Drug use: No    Sexual activity: Not Currently     Partners: Male     Birth control/protection: None   Other Topics Concern    Parent/sibling w/ CABG, MI or angioplasty before 65F 55M? Yes     Comment: father triple bypass    Special Diet No    Exercise Yes     Comment: walks 2 miles minimum 5 x weekly      Social Determinants of Health     Financial Resource Strain: Low Risk  (1/4/2022)    Overall Financial Resource Strain (CARDIA)     Difficulty of Paying Living Expenses: Not hard at all   Food Insecurity: No Food Insecurity  (1/4/2022)    Hunger Vital Sign     Worried About Running Out of Food in the Last Year: Never true     Ran Out of Food in the Last Year: Never true   Transportation Needs: No Transportation Needs (1/4/2022)    PRAPARE - Transportation     Lack of Transportation (Medical): No     Lack of Transportation (Non-Medical): No   Physical Activity: Inactive (1/4/2022)    Exercise Vital Sign     Days of Exercise per Week: 0 days     Minutes of Exercise per Session: 0 min   Stress: Stress Concern Present (1/4/2022)    [place] [employer] - Occupational Stress Questionnaire     Feeling of Stress : To some extent   Social Connections: Moderately Integrated (1/4/2022)    Social Connection and Isolation Panel [NHANES]     Frequency of Communication with Friends and Family: More than three times a week     Frequency of Social Gatherings with Friends and Family: Once a week     Attends Gnosticism Services: More than 4 times per year     Active Member of Clubs or Organizations: Yes     Marital Status: Never    Housing Stability: Low Risk  (1/4/2022)    Housing Stability Vital Sign     Unable to Pay for Housing in the Last Year: No     Number of Places Lived in the Last Year: 1     Unstable Housing in the Last Year: No

## 2023-09-01 ENCOUNTER — OFFICE VISIT (OUTPATIENT)
Dept: CARDIOLOGY | Facility: CLINIC | Age: 58
End: 2023-09-01
Payer: COMMERCIAL

## 2023-09-01 VITALS
DIASTOLIC BLOOD PRESSURE: 78 MMHG | HEIGHT: 65 IN | SYSTOLIC BLOOD PRESSURE: 136 MMHG | BODY MASS INDEX: 57.74 KG/M2 | OXYGEN SATURATION: 98 % | HEART RATE: 80 BPM

## 2023-09-01 DIAGNOSIS — I10 ESSENTIAL HYPERTENSION: ICD-10-CM

## 2023-09-01 DIAGNOSIS — I10 ESSENTIAL HYPERTENSION, BENIGN: ICD-10-CM

## 2023-09-01 DIAGNOSIS — I48.0 PAROXYSMAL ATRIAL FIBRILLATION (H): ICD-10-CM

## 2023-09-01 DIAGNOSIS — I48.20 CHRONIC ATRIAL FIBRILLATION (H): ICD-10-CM

## 2023-09-01 PROCEDURE — 99214 OFFICE O/P EST MOD 30 MIN: CPT | Performed by: PHYSICIAN ASSISTANT

## 2023-09-01 RX ORDER — APIXABAN 5 MG/1
5 TABLET, FILM COATED ORAL 2 TIMES DAILY
Qty: 180 TABLET | Refills: 3 | Status: SHIPPED | OUTPATIENT
Start: 2023-09-01

## 2023-09-01 RX ORDER — DILTIAZEM HYDROCHLORIDE 240 MG/1
240 CAPSULE, EXTENDED RELEASE ORAL DAILY
Qty: 90 CAPSULE | Refills: 3 | Status: SHIPPED | OUTPATIENT
Start: 2023-09-01

## 2023-09-01 RX ORDER — SPIRONOLACTONE 25 MG/1
25 TABLET ORAL DAILY
Qty: 90 TABLET | Refills: 3 | Status: SHIPPED | OUTPATIENT
Start: 2023-09-01

## 2023-09-01 RX ORDER — METOPROLOL SUCCINATE 50 MG/1
50 TABLET, EXTENDED RELEASE ORAL 2 TIMES DAILY
Qty: 180 TABLET | Refills: 3 | Status: SHIPPED | OUTPATIENT
Start: 2023-09-01

## 2023-09-01 NOTE — PATIENT INSTRUCTIONS
Mary - it was good to see you today!    Reviewed that overall you're feeling OK heart-wise  Still haven't done the monitor to check HR on the current medications    PLAN:  Get updated echo for upcoming surgery   When have surgery, OK to hold Elquis up to 3 days, restart day after procedure unless surgeon says otherwise!       CALL if issues! [phone]

## 2023-09-01 NOTE — LETTER
"9/1/2023    Nica Greco PA-C  [address]  [place] [address]    RE: Mary Gorman       Dear Colleague,     I had the pleasure of seeing Mary Gorman in the [facility].  [facility] HEART [facility]    I had the pleasure of seeing Mary when she came for follow up of AFib.  This 58 year old saw Dr. Greenfield] for her history of:       1. Permanent atrial fibrillation on a rate control/AC strategy given her asymptomatic status.  2. Hypertension under good control  3. Obesity   4. Lymphedema with adjustments in Diltiazem dose and addition of spironolactone without improvement.  Sees Lymphedema clinic  5. BALTA - on CPAP  6. DM2      Last Visit & Interval History:  I saw Mary in 10/2022 at which time she was doing well from a cardiac perspective.  She felt she was tolerating the Diltiazem (which had been stopped due to LE edema) without trouble.  She had not thought this worsened her lymphedema at all.  I recommended annual follow-up with repeat 24-hour Holter monitor to assess HR control.    Holter again was not scheduled before our visit.    Today's Visit:  Overall, Mary is feeling okay from a cardiac perspective.  She denies palpitations.  No dizziness or lightheadedness.      Activity levels are significantly reduced due to her  R knee pain.  She denies chest pain, pressure, tightness.  Does note some mild STYLES.    Lymphedema is \"okay.\"  She wears Velcro wraps and has pumps that she uses, which help.  She continues to limit salt and put her feet up whenever possible.    She started Trulicity and feels more \"bloated.\"     HR 72-82 bpm on Pulse Ox at home.  She has declined wearing a Holter monitor due to the significant welts it leaves on her skin, even with 24 hours.    VITALS:  Vitals: /78   Pulse 80   Ht 1.651 m (5' 5\")   LMP 06/25/2019 (Approximate)   SpO2 98%   BMI 57.74 kg/m      Diagnostic Testing:  Echocardiogram 10/2022-LVEF 55-60%.  No RWMA. No thrombus. Nl RV. " Mod dilated LA. mild aortic sclerosis.  No other significant valvular abnormalities. AFib on echo, no change c/w 6/2019  Holter 5/2020 AFib with avg HR 84 bpm on metoprolol XL 50 BID and Dilt 240  Echocardiogram 6/10/2019 showed EF 60% with normal RV size and function. No significant valvular abnormalities.   24 hour Holter 6/7/2019 showed atrial fibrillation with average HR 81 bpm. Range was 54 bpm @ 1431 and max was 236 bpm @ 122. Minimal ectopy. Event       Plan:  Updated echo - we will call with results  2.  Annual follow-up     Assessment/Plan:    Permanent AFib  Currently on Diltiazem 240 mg daily, metoprolol XL 50 mg twice daily.  HR at home in the 70s typically    Echo 10/2022 with normal LVEF  Remains on AC with Eliquis.  [id number] 3 (HTN, DM, sex).  Hemoglobin 8/2023 was 14.3 g/dL    PLAN:  Continue current medications  Continue Eliquis  Update echo    2.  R Knee Arthritis  She is expecting to require R TKA in the coming months, and is hoping to get this done before the end of the year  We reviewed that from a cardiac standpoint, she has no issues with angina.  Echocardiogram done 10/2022 showed normal LVEF and no significant valvular abnormalities  Mobility has been significantly limited due to lymphedema and R knee pain    PLAN:  Updated echocardiogram to assess LVEF.  If this is normal, would not anticipate any additional cardiac testing be required prior to any surgery  [id number] 3 (HTN, DM, sex) and remains on Eliquis.  Would be able to hold Eliquis x 2-3 days prior to procedure, restarting at the discretion of the surgeon, typically POD #1      Joceline Gomez PA-C, MSPAS      Orders Placed This Encounter   Procedures    Follow-Up with Cardiology JANNET    Echocardiogram Complete     Orders Placed This Encounter   Medications    metoprolol succinate ER (TOPROL XL) 50 MG 24 hr tablet     Sig: Take 1 tablet (50 mg) by mouth 2 times daily     Dispense:  180 tablet     Refill:  3    spironolactone  (ALDACTONE) 25 MG tablet     Sig: Take 1 tablet (25 mg) by mouth daily     Dispense:  90 tablet     Refill:  3    diltiazem ER (DILT-XR) 240 MG 24 hr ER beaded capsule     Sig: Take 1 capsule (240 mg) by mouth daily     Dispense:  90 capsule     Refill:  3    ELIQUIS ANTICOAGULANT 5 MG tablet     Sig: Take 1 tablet (5 mg) by mouth 2 times daily     Dispense:  180 tablet     Refill:  3     Medications Discontinued During This Encounter   Medication Reason    metoprolol succinate ER (TOPROL XL) 50 MG 24 hr tablet Reorder (No AVS)    spironolactone (ALDACTONE) 25 MG tablet Reorder (No AVS)    diltiazem ER (DILT-XR) 240 MG 24 hr ER beaded capsule Reorder (No AVS)    ELIQUIS ANTICOAGULANT 5 MG tablet Reorder (No AVS)         Encounter Diagnoses   Name Primary?    Essential hypertension, benign     Essential hypertension     Chronic atrial fibrillation (H)     Paroxysmal atrial fibrillation (H)        CURRENT MEDICATIONS:  Current Outpatient Medications   Medication Sig Dispense Refill    blood glucose (ACCU-CHEK GUIDE) test strip Use to test blood sugar 1 times daily  (OK to substitute alternate brand per insurance formulary.  If One Touch only give Verio not Ultra) 100 strip 11    blood glucose monitoring (SOFTCLIX) lancets Use to test blood sugar 1 times daily (OK to substitute alternate brand per insurance formulary.  If One Touch only give Verio not Ultra) 100 each 11    Blood Glucose Monitoring Suppl (ACCU-CHEK GUIDE) w/Device KIT 1 Device daily (OK to substitute alternate brand per insurance formulary.  If One Touch only give Verio not Ultra) 1 kit 1    buPROPion (WELLBUTRIN XL) 150 MG 24 hr tablet Take 1 tablet (150 mg) by mouth every morning Take with 300 mg for total of 450 mg 90 tablet 1    buPROPion (WELLBUTRIN XL) 300 MG 24 hr tablet Take 1 tablet (300 mg) by mouth every morning Take with 150 mg for total 450 mg 90 tablet 1    cetirizine (ZYRTEC) 10 MG tablet Take 10 mg by mouth as needed for allergies       diltiazem ER (DILT-XR) 240 MG 24 hr ER beaded capsule Take 1 capsule (240 mg) by mouth daily 90 capsule 3    dulaglutide (TRULICITY) 0.75 MG/0.5ML pen Inject 0.75 mg Subcutaneous every 7 days 2 mL 3    ELIQUIS ANTICOAGULANT 5 MG tablet Take 1 tablet (5 mg) by mouth 2 times daily 180 tablet 3    fosinopril (MONOPRIL) 10 MG tablet Take 1 tablet (10 mg) by mouth daily 90 tablet 3    metFORMIN (GLUCOPHAGE XR) 500 MG 24 hr tablet Take 2 tablets (1,000 mg) by mouth daily (with dinner) 180 tablet 3    metoprolol succinate ER (TOPROL XL) 50 MG 24 hr tablet Take 1 tablet (50 mg) by mouth 2 times daily 180 tablet 3    rosuvastatin (CRESTOR) 10 MG tablet Take 1 tablet (10 mg) by mouth daily 90 tablet 3    sertraline (ZOLOFT) 100 MG tablet Take 200 mg by mouth daily 2 tabs (200mg) daily       spironolactone (ALDACTONE) 25 MG tablet Take 1 tablet (25 mg) by mouth daily 90 tablet 3    tiZANidine (ZANAFLEX) 2 MG tablet Take 1-2 tablets (2-4 mg) by mouth nightly as needed for muscle spasms 180 tablet 1    traMADol (ULTRAM) 50 MG tablet Take 1 tablet (50 mg) by mouth 2 times daily as needed for severe pain 30 tablet 0    traZODone (DESYREL) 100 MG tablet Take 1 tablet (100 mg) by mouth At Bedtime 90 tablet 1       ALLERGIES     Allergies   Allergen Reactions    Dyazide [Hydrochlorothiazide W-Triamterene] Unknown    Vistaril [Hydroxyzine] Visual Disturbance    Naproxen Rash    Nickel Rash    Robaxin [Methocarbamol] Rash    Sulfa Antibiotics Rash         Review of Systems:  Skin:  Negative     Eyes:  Positive for glasses  ENT:  Negative    Respiratory:  Positive for dyspnea on exertion  Cardiovascular:  Negative for;palpitations;chest pain Positive for;palpitations;dizziness;edema  Gastroenterology: Negative for melena;hematochezia  Genitourinary:  Negative    Musculoskeletal:  Positive for arthritis;joint pain  Neurologic:  Negative    Psychiatric:  Positive for excessive stress;depression;anxiety  Heme/Lymph/Imm:  Positive for  "allergies  Endocrine:  Negative      Physical Exam:  Vitals: /78   Pulse 80   Ht 1.651 m (5' 5\")   LMP 06/25/2019 (Approximate)   SpO2 98%   BMI 57.74 kg/m      Constitutional:  cooperative, alert and oriented, well developed, well nourished, in no acute distress        Skin:  warm and dry to the touch   reaction from patches on anterior chest wall    Head:  normocephalic, no masses or lesions        Eyes:  pupils equal and round;conjunctivae and lids unremarkable;sclera white        ENT:  no pallor or cyanosis, dentition good        Neck:  JVP normal;no carotid bruit        Chest:  normal breath sounds, clear to auscultation, normal A-P diameter, normal symmetry, normal respiratory excursion, no use of accessory muscles        Cardiac:   irregularly irregular rhythm           HR 70s    Abdomen:  abdomen soft obese      Vascular: pulses full and equal                                      Extremities and Back:      Lymphedema wraps in place    Neurological:  no gross motor deficits            PAST MEDICAL HISTORY:  Past Medical History:   Diagnosis Date    Arthritis Nov 2019    Atrial fibrillation (H)     COPD (chronic obstructive pulmonary disease) (H)     colds turn to bronchitis easily    Depression     Depressive disorder     in chart    Diabetes (H)     GERD (gastroesophageal reflux disease)     HTN (hypertension)     Hyperlipidemia     Obese     BALTA (obstructive sleep apnea)     uses CPAP    Permanent atrial fibrillation (H) 06/05/2011    Uncomplicated asthma     mentioned by urgent care md       PAST SURGICAL HISTORY:  Past Surgical History:   Procedure Laterality Date    ABDOMEN SURGERY      in chart    ARTHROPLASTY KNEE Left 01/20/2022    Procedure: ARTHROPLASTY, LEFT KNEE, TOTAL;  Surgeon: Armand Martin MD;  Location: [facility] OR    BIOPSY      skin and uterine lining    CARDIOVERSION  06/07/2011    CHOLECYSTECTOMY      COLONOSCOPY  2013    in chart    DILATION AND CURETTAGE  04/26/2012    " Procedure:DILATION AND CURETTAGE; DILATION AND CURETTAGE; Surgeon:JEAN-PAUL DEL CID; Location:[facility]    DILATION AND CURETTAGE, HYSTEROSCOPY DIAGNOSTIC, COMBINED  12/08/2011    Procedure:COMBINED DILATION AND CURETTAGE, HYSTEROSCOPY DIAGNOSTIC; DILATION AND CURETTAGE, DIAGNOSTIC HYSTEROSCOPY ; Surgeon:JEAN-PAUL DEL CID; Location:[facility]    KNEE SURGERY      REMOVE HARDWARE LOWER EXTREMITY Left 01/20/2022    Procedure: REMOVAL, HARDWARE, LEFT LOWER EXTREMITY;  Surgeon: Armand Martin MD;  Location:  OR       FAMILY HISTORY:  Family History   Problem Relation Age of Onset    Hypertension Mother     Heart Disease Mother         A-fib    Arthritis Mother     Hyperlipidemia Mother     Obesity Mother     Heart Disease Father         triple bypass    Cancer Father 50        bladder    Hypertension Father     Respiratory Father         smoker    Coronary Artery Disease Father         chf, pulm htn, by pass    Hyperlipidemia Father     Other Cancer Father         bladder    Cancer Paternal Grandmother 90        rectal    Colon Cancer Paternal Grandmother     Obesity Paternal Grandmother     Unknown/Adopted Brother     Obesity Brother        SOCIAL HISTORY:  Social History     Socioeconomic History    Marital status: Single     Spouse name: None    Number of children: None    Years of education: None    Highest education level: Bachelor's degree (e.g., BA, AB, BS)   Tobacco Use    Smoking status: Never     Passive exposure: Never    Smokeless tobacco: Never   Vaping Use    Vaping Use: Never used   Substance and Sexual Activity    Alcohol use: Not Currently     Comment: once in a while, few times a year    Drug use: No    Sexual activity: Not Currently     Partners: Male     Birth control/protection: None   Other Topics Concern    Parent/sibling w/ CABG, MI or angioplasty before 65F 55M? Yes     Comment: father triple bypass    Special Diet No    Exercise Yes     Comment: walks 2 miles minimum 5 x weekly      Social Determinants of  Health     Financial Resource Strain: Low Risk  (1/4/2022)    Overall Financial Resource Strain (CARDIA)     Difficulty of Paying Living Expenses: Not hard at all   Food Insecurity: No Food Insecurity (1/4/2022)    Hunger Vital Sign     Worried About Running Out of Food in the Last Year: Never true     Ran Out of Food in the Last Year: Never true   Transportation Needs: No Transportation Needs (1/4/2022)    PRAPARE - Transportation     Lack of Transportation (Medical): No     Lack of Transportation (Non-Medical): No   Physical Activity: Inactive (1/4/2022)    Exercise Vital Sign     Days of Exercise per Week: 0 days     Minutes of Exercise per Session: 0 min   Stress: Stress Concern Present (1/4/2022)    [employer] - Occupational Stress Questionnaire     Feeling of Stress : To some extent   Social Connections: Moderately Integrated (1/4/2022)    Social Connection and Isolation Panel [NHANES]     Frequency of Communication with Friends and Family: More than three times a week     Frequency of Social Gatherings with Friends and Family: Once a week     Attends Confucianist Services: More than 4 times per year     Active Member of Clubs or Organizations: Yes     Marital Status: Never    Housing Stability: Low Risk  (1/4/2022)    Housing Stability Vital Sign     Unable to Pay for Housing in the Last Year: No     Number of Places Lived in the Last Year: 1     Unstable Housing in the Last Year: No         Thank you for allowing me to participate in the care of your patient.      Sincerely,     Kayleen Gomez PA-C     [facility]  cc:   No referring provider defined for this encounter.

## 2023-09-06 ENCOUNTER — ANCILLARY PROCEDURE (OUTPATIENT)
Dept: GENERAL RADIOLOGY | Facility: CLINIC | Age: 58
End: 2023-09-06
Attending: STUDENT IN AN ORGANIZED HEALTH CARE EDUCATION/TRAINING PROGRAM
Payer: COMMERCIAL

## 2023-09-06 ENCOUNTER — OFFICE VISIT (OUTPATIENT)
Dept: ORTHOPEDICS | Facility: CLINIC | Age: 58
End: 2023-09-06
Payer: COMMERCIAL

## 2023-09-06 VITALS
SYSTOLIC BLOOD PRESSURE: 120 MMHG | BODY MASS INDEX: 48.82 KG/M2 | WEIGHT: 293 LBS | HEIGHT: 65 IN | DIASTOLIC BLOOD PRESSURE: 88 MMHG

## 2023-09-06 DIAGNOSIS — G89.29 CHRONIC PAIN OF RIGHT KNEE: ICD-10-CM

## 2023-09-06 DIAGNOSIS — M25.561 CHRONIC PAIN OF RIGHT KNEE: ICD-10-CM

## 2023-09-06 DIAGNOSIS — M17.11 OSTEOARTHROSIS, LOCALIZED, PRIMARY, KNEE, RIGHT: Primary | ICD-10-CM

## 2023-09-06 PROCEDURE — 99214 OFFICE O/P EST MOD 30 MIN: CPT | Performed by: STUDENT IN AN ORGANIZED HEALTH CARE EDUCATION/TRAINING PROGRAM

## 2023-09-06 PROCEDURE — 73562 X-RAY EXAM OF KNEE 3: CPT | Mod: TC | Performed by: RADIOLOGY

## 2023-09-06 RX ORDER — TRANEXAMIC ACID 650 MG/1
1950 TABLET ORAL ONCE
Status: CANCELLED | OUTPATIENT
Start: 2023-09-06 | End: 2023-09-06

## 2023-09-06 NOTE — PATIENT INSTRUCTIONS
1. Chronic pain of right knee        Schedule Right Total Knee Arthroplasty surgery.   Surgery Scheduler will contact you to assist with scheduling surgery.   You can contact her directly at [phone].   Prior to your scheduled surgery we advise scheduling with your dentist to obtain clearance for surgery, and to complete any recommended dental work prior.     Pre-operative Physical needed within 30 days of scheduled proceedure  Physical Therapy will be scheduled.    For mor information regarding total joint surgery please visit our website:   [email or web address]    FORMS:   If you are needing any forms completed relating to your upcoming procedure, please send them to our office with a completed Release of Information.   Forms will be completed AFTER your procedure. A letter can be sent to your employer prior to surgery to inform them of your anticipated time off.    Please notify our staff if you would like a letter to do so.   Forms can be faxed directly to our clinic at [phone].     DO NOT BRING FORMS ON THE DATE OF SURGERY.     MEDICATION REFILL:   Please allow 3 business days for completion of medication refills for any surgery related prescription.   Medication refills submitted on Friday, may not be addressed until the following Monday.  You may request a refill via [employer], or by calling our Nurse Triage at [phone].      Call my office with any questions or concerns, [phone].

## 2023-09-06 NOTE — PROGRESS NOTES
"    [facility]  Orthopaedic Surgery Consultation by Armand Martin M.D.    Mary Gorman MRN# [id number]   Age: 56 year old YOB: 1965     Requesting physician: No ref. provider found  Nicole Castellanos     Background history:  DX:  Lumbago  OSAS  GERD  Morbid obesity  Hyperlipidemia  Prediabetes  Hypertension  Chronic kidney disease stage III  Atrial fibrillation on Pradaxa  Depressive disorder    TREATMENTS:  11/5/2007, laparoscopic cholecystectomy, Dr. Buckley  1/20/2022, left total knee arthroplasty with removal of hardware, Dr. Martin           History of Present Illness:   58 year old female who is status post left total knee arthroplasty with removal of hardware by Dr. Martin 2022.  She is doing well in regards to the left knee and is happy with the results.  Today she presents because of chronic right knee pain similar to the pain she was experiencing before her left knee replacement surgery.    Her pain has been present for multiple years and has become progressive over time.  The discomfort does not radiate.  It greatly limits her in her daily activities specially now she is doing well on her left side.  She is no longer able to walk her dog.  It significantly reduces her quality of life.  Patient endorses some night pain, initiation stiffness and soreness.  No significant hip or back pain.  No motor or sensory deficits.    To get the pain patient has trialed home exercise regimen, over-the-counter analgesics and intra-articular injection without any significant or long-term success.    Social:   Occupation: nurse  Living situation: alone   Hobbies / Sports: everything    Smoking: No  Alcohol: 1 x a year  Illicit drug use: No         Physical Exam:     EXAMINATION pertinent findings:   PSYCH: Pleasant, healthy-appearing, alert, oriented x3, cooperative. Normal mood and affect.  VITAL SIGNS: Blood pressure 120/88, height 1.651 m (5' 5\"), weight (!) 157.9 kg " (348 lb), last menstrual period 06/25/2019, not currently breastfeeding.  Reviewed nursing intake notes.   Body mass index is 57.91 kg/m .  RESP: non labored breathing   ABD: benign, soft, non-tender, no acute peritoneal findings  SKIN: grossly normal   LYMPHATIC: grossly normal, no adenopathy, no extremity edema  NEURO: grossly normal , no motor deficits  VASCULAR: satisfactory perfusion of all extremities   MUSCULOSKELETAL:   Alignment: Neutral      R knee: ROM 90-0-0  . Straight leg raise +. No redness, warmth or skin changes present. Effusion minimal. Ligamentously stable in both ML and AP direction.  No gross laxity in ML direction.  Normal PF tracking with crepitus.  There is some tenderness to palpation over the joint line.     L knee: Incision is well-healed.  Flexion/extension 100-0-0.  Ligamentously stable in both AP and ML direction.  Normal patellofemoral tracking without crepitus.  Calf soft and non-tender with no signs of DVT.    Bilateral LE:   Thigh and leg compartments soft and compressible   +Quad/TA/GSC/FHL/EHL   SILT DP/SP/Ziyad/Saph/Tib nerve distributions   Palpable dorsalis pedis pulse          Data:   All laboratory data reviewed  All imaging studies reviewed by me personally.    XR knee right 6/20/2023:  My interpretation: End-stage osteoarthritic changes in all 3 compartments of right knee with complete obliteration of joint space, presence of marginal osteophytes, sclerosis and subchondral cysts.         Assessment and Plan:   Assessment:  58-year-old female with chronic right knee pain and instability due to end-stage osteoarthritic changes in all 3 compartments.  Insufficiently responding to nonoperative treatment measures.     Plan:  I had a long discussion with the patient regarding etiology and ongoing management options.  Reviewed surgical and nonsurgical treatments.  The non-surgical options include activity modification, pain medication, PT, bracing and injection therapy.  At this  point patient has exhausted all nonsurgical treatment options.  As for surgery we discussed the option of total knee replacement surgery similar to the contralateral side. We reviewed total knee replacement in detail including the procedure, the implants, the recovery process, and long-term outcomes.  We reviewed that the risks of the surgery include but are not limited to infection, wound problems, stiffness, persistent pain, swelling, clicking, loosening, revision surgery.  We also reviewed less common risks such as neurovascular injury fracture, and other implant-related issues.  We reviewed other medical complications such as a blood clot.  We discussed that the vast majority of cases have a highly successful outcome.  However there is a small subset of patients that do experience complications or problems following the knee replacement and these problems can be very debilitating and painful and sometimes do not improve.  Patient is aware that her BMI puts her at increased risk profile for complications.  Based on a discussion of the risks and benefits, we believe that the benefits far outweigh the risks at this point and the patient would like to proceed with right total knee replacement surgery.  We will work on scheduling surgery at a time that works well for patient in the next few months.  Patient will contact us if there are any questions or concerns leading up to surgery. Before surgery the patient will attend a joint replacement class and will be seen by the PCP/anesthesiologist and dentist.     More information on joint replacements can be found on : [email or web address]    Thank you for your referral.      Armand Martin MD, PhD     Adult Reconstruction  [facility] Department of Orthopaedic Surgery

## 2023-09-06 NOTE — LETTER
9/6/2023         RE: Mary Gorman  [address]  [facility] [address]        Dear Colleague,    Thank you for referring your patient, Mary Gorman, to the [facility] [place]. Please see a copy of my visit note below.        [facility]  Orthopaedic Surgery Consultation by Armand Martin M.D.    Mary Gorman MRN# [id number]   Age: 56 year old YOB: 1965     Requesting physician: No ref. provider found  Nicole Castellanos     Background history:  DX:  Lumbago  OSAS  GERD  Morbid obesity  Hyperlipidemia  Prediabetes  Hypertension  Chronic kidney disease stage III  Atrial fibrillation on Pradaxa  Depressive disorder    TREATMENTS:  11/5/2007, laparoscopic cholecystectomy, Dr. Buckley  1/20/2022, left total knee arthroplasty with removal of hardware, Dr. Martin           History of Present Illness:   58 year old female who is status post left total knee arthroplasty with removal of hardware by Dr. Martin 2022.  She is doing well in regards to the left knee and is happy with the results.  Today she presents because of chronic right knee pain similar to the pain she was experiencing before her left knee replacement surgery.    Her pain has been present for multiple years and has become progressive over time.  The discomfort does not radiate.  It greatly limits her in her daily activities specially now she is doing well on her left side.  She is no longer able to walk her dog.  It significantly reduces her quality of life.  Patient endorses some night pain, initiation stiffness and soreness.  No significant hip or back pain.  No motor or sensory deficits.    To get the pain patient has trialed home exercise regimen, over-the-counter analgesics and intra-articular injection without any significant or long-term success.    Social:   Occupation: nurse  Living situation: alone   Hobbies / Sports: everything    Smoking: No  Alcohol: 1 x a  "year  Illicit drug use: No         Physical Exam:     EXAMINATION pertinent findings:   PSYCH: Pleasant, healthy-appearing, alert, oriented x3, cooperative. Normal mood and affect.  VITAL SIGNS: Blood pressure 120/88, height 1.651 m (5' 5\"), weight (!) 157.9 kg (348 lb), last menstrual period 06/25/2019, not currently breastfeeding.  Reviewed nursing intake notes.   Body mass index is 57.91 kg/m .  RESP: non labored breathing   ABD: benign, soft, non-tender, no acute peritoneal findings  SKIN: grossly normal   LYMPHATIC: grossly normal, no adenopathy, no extremity edema  NEURO: grossly normal , no motor deficits  VASCULAR: satisfactory perfusion of all extremities   MUSCULOSKELETAL:   Alignment: Neutral      R knee: ROM 90-0-0  . Straight leg raise +. No redness, warmth or skin changes present. Effusion minimal. Ligamentously stable in both ML and AP direction.  No gross laxity in ML direction.  Normal PF tracking with crepitus.  There is some tenderness to palpation over the joint line.     L knee: Incision is well-healed.  Flexion/extension 100-0-0.  Ligamentously stable in both AP and ML direction.  Normal patellofemoral tracking without crepitus.  Calf soft and non-tender with no signs of DVT.    Bilateral LE:   Thigh and leg compartments soft and compressible   +Quad/TA/GSC/FHL/EHL   SILT DP/SP/Ziyad/Saph/Tib nerve distributions   Palpable dorsalis pedis pulse          Data:   All laboratory data reviewed  All imaging studies reviewed by me personally.    XR knee right 6/20/2023:  My interpretation: End-stage osteoarthritic changes in all 3 compartments of right knee with complete obliteration of joint space, presence of marginal osteophytes, sclerosis and subchondral cysts.         Assessment and Plan:   Assessment:  58-year-old female with chronic right knee pain and instability due to end-stage osteoarthritic changes in all 3 compartments.  Insufficiently responding to nonoperative treatment measures.   "   Plan:  I had a long discussion with the patient regarding etiology and ongoing management options.  Reviewed surgical and nonsurgical treatments.  The non-surgical options include activity modification, pain medication, PT, bracing and injection therapy.  At this point patient has exhausted all nonsurgical treatment options.  As for surgery we discussed the option of total knee replacement surgery similar to the contralateral side. We reviewed total knee replacement in detail including the procedure, the implants, the recovery process, and long-term outcomes.  We reviewed that the risks of the surgery include but are not limited to infection, wound problems, stiffness, persistent pain, swelling, clicking, loosening, revision surgery.  We also reviewed less common risks such as neurovascular injury fracture, and other implant-related issues.  We reviewed other medical complications such as a blood clot.  We discussed that the vast majority of cases have a highly successful outcome.  However there is a small subset of patients that do experience complications or problems following the knee replacement and these problems can be very debilitating and painful and sometimes do not improve.  Patient is aware that her BMI puts her at increased risk profile for complications.  Based on a discussion of the risks and benefits, we believe that the benefits far outweigh the risks at this point and the patient would like to proceed with right total knee replacement surgery.  We will work on scheduling surgery at a time that works well for patient in the next few months.  Patient will contact us if there are any questions or concerns leading up to surgery. Before surgery the patient will attend a joint replacement class and will be seen by the PCP/anesthesiologist and dentist.     More information on joint replacements can be found on : [email or web address]    Thank you for your  referral.      Armand Martin MD, PhD     Adult Reconstruction  [facility] Department of Orthopaedic Surgery        Again, thank you for allowing me to participate in the care of your patient.        Sincerely,        Armand Martin MD

## 2023-09-07 ENCOUNTER — TELEPHONE (OUTPATIENT)
Dept: ORTHOPEDICS | Facility: CLINIC | Age: 58
End: 2023-09-07

## 2023-09-07 NOTE — TELEPHONE ENCOUNTER
Patient has been scheduled for surgery. Details are below.    Date of Surgery: 12/01/23    Approximate Arrival Time: 930am    Surgeon: Dr. Armand Martin    Procedure: arthroplasty knee total, right  Location: [facility].  [address] [place] [id number]  Surgery Consult: na  PreOp Physical: 11/15/23  PostOp: 12/22/23 & 01/12/24  Packet Mailed/MyChart Sent: yes  Added to [place]: yes

## 2023-10-05 ENCOUNTER — TELEPHONE (OUTPATIENT)
Dept: FAMILY MEDICINE | Facility: CLINIC | Age: 58
End: 2023-10-05
Payer: COMMERCIAL

## 2023-10-05 NOTE — TELEPHONE ENCOUNTER
Patient Quality Outreach    Patient is due for the following:   Diabetes -  A1C, Eye Exam, and Foot Exam  Colon Cancer Screening  Physical Preventive Adult Physical      Topic Date Due    Hepatitis B Vaccine (1 of 3 - 3-dose series) Never done    Pneumococcal Vaccine (2 - PPSV23 or PCV20) 12/12/2017    Flu Vaccine (1) 09/01/2023    COVID-19 Vaccine (4 - 2023-24 season) 09/01/2023       Next Steps:   Schedule a Adult Preventative    Type of outreach:    Sent [facility] message. and Sent letter.      Questions for provider review:    None           Delia Gentile MA

## 2023-10-05 NOTE — LETTER
October 5, 2023      Mary Gorman  [address] [facility] [facility] [address]        Dear Mary,    I care about your health and have reviewed your health plan. I have reviewed your medical conditions, medication list, and lab results and am making recommendations based on this review, to better manage your health.    You are in particular need of attention regarding:  -Diabetes  -Colon Cancer Screening  -Wellness (Physical) Visit     I am recommending that you:  -schedule a LAB ONLY APPOINTMENT to recheck your: A1c test within the next 1-4 weeks.  -schedule a WELLNESS (Physical) APPOINTMENT with me.   I will check fasting labs the same day - nothing to eat except water and meds for 8-10 hours prior.  -schedule a COLONOSCOPY to look for colon cancer (due every 10 years or 5 years in higher risk situations.)   Colon cancer is now the second leading cause of death in the [place] for both men and women and there are over 130,000 new cases and 50,000 deaths per year from colon cancer.  Colonoscopies can prevent 90-95% of these deaths.  Problem lesions can be removed before they ever become cancer.  This test is not only looking for cancer, but also getting rid of precancerious lesions.  If you do not wish to do a colonoscopy or cannot afford to do one, at this time, there is another option. It is called a FIT test or Fecal Immunochemical Occult Blood Test (take home stool sample kit).  It does not replace the colonoscopy for colorectal cancer screening, but it can detect hidden bleeding in the lower colon.  It does need to be repeated every year and if a positive result is obtained, you would be referred for a colonoscopy.  If you have completed either one of these tests at another facility, please have the records sent to our clinic so that we can best coordinate your care.    Here is a list of Health Maintenance topics that are due now or due soon:  Health Maintenance Due   Topic Date Due    Eye Exam  Never done     Hepatitis B Vaccine (1 of 3 - 3-dose series) Never done    Urine Test  Never done    Pneumococcal Vaccine (2 - PPSV23 or PCV20) 12/12/2017    Diabetic Foot Exam  10/17/2018    Colorectal Cancer Screening  08/18/2019    Kidney Microalbumin Urine Test  11/25/2020    Mammogram  11/29/2021    Yearly Preventive Visit  07/19/2023    Flu Vaccine (1) 09/01/2023    COVID-19 Vaccine (4 - 2023-24 season) 09/01/2023       Please call us at [phone] (or use [employer]) to address the above recommendations.     Thank you for trusting [facility] and we appreciate the opportunity to serve you.  We look forward to supporting your healthcare needs in the future.    Healthy Regards,    Nica Greco PA-C

## 2023-10-09 ASSESSMENT — PAIN SCALES - PAIN ENJOYMENT GENERAL ACTIVITY SCALE (PEG)
INTERFERED_ENJOYMENT_LIFE: 6
INTERFERED_GENERAL_ACTIVITY: 7
AVG_PAIN_PASTWEEK: 6
PEG_TOTALSCORE: 6.33

## 2023-10-09 ASSESSMENT — ANXIETY QUESTIONNAIRES
4. TROUBLE RELAXING: MORE THAN HALF THE DAYS
GAD7 TOTAL SCORE: 6
7. FEELING AFRAID AS IF SOMETHING AWFUL MIGHT HAPPEN: NOT AT ALL
IF YOU CHECKED OFF ANY PROBLEMS ON THIS QUESTIONNAIRE, HOW DIFFICULT HAVE THESE PROBLEMS MADE IT FOR YOU TO DO YOUR WORK, TAKE CARE OF THINGS AT HOME, OR GET ALONG WITH OTHER PEOPLE: SOMEWHAT DIFFICULT
GAD7 TOTAL SCORE: 6
2. NOT BEING ABLE TO STOP OR CONTROL WORRYING: SEVERAL DAYS
6. BECOMING EASILY ANNOYED OR IRRITABLE: SEVERAL DAYS
1. FEELING NERVOUS, ANXIOUS, OR ON EDGE: SEVERAL DAYS
5. BEING SO RESTLESS THAT IT IS HARD TO SIT STILL: NOT AT ALL
3. WORRYING TOO MUCH ABOUT DIFFERENT THINGS: SEVERAL DAYS

## 2023-10-10 ENCOUNTER — TELEPHONE (OUTPATIENT)
Dept: ORTHOPEDICS | Facility: CLINIC | Age: 58
End: 2023-10-10
Payer: COMMERCIAL

## 2023-10-10 DIAGNOSIS — M17.11 OSTEOARTHRITIS OF RIGHT KNEE: Primary | ICD-10-CM

## 2023-10-10 NOTE — TELEPHONE ENCOUNTER
Teaching Flowsheet   Relevant Diagnosis:   Teaching Topic: total joint arthroplasty    Phoned patient to review surgery instructions for upcoming right total knee.  She recently had her left knee done with Dr. Martin, she reports she understands the process and has no questions.  Post op PT order placed, pt will call to schedule.     Person(s) involved in teaching:   Patient     Motivation Level:  Asks Questions: Yes  Eager to Learn: Yes  Cooperative: Yes  Receptive (willing/able to accept information): Yes  Any cultural factors/Mormonism beliefs that may influence understanding or compliance? No  Comments: none     Patient demonstrates understanding of the following:  Reason for the appointment, diagnosis and treatment plan: Yes  Knowledge of proper use of medications and conditions for which they are ordered (with special attention to potential side effects or drug interactions): Yes  Which situations necessitate calling provider and whom to contact: Yes       Teaching Concerns Addressed:   Comments: none     Proper use and care of  (medical equip, care aids, etc.): Yes  Nutritional needs and diet plan: Yes  Pain management techniques: Yes  Wound Care: Yes  How and/when to access community resources: Yes     Instructional Materials Used/Given: Preoperative teaching packet, surgical soap x2, dental card, total joint booklet, welcome letter, stoplight tool.       Time spent with patient: 30 minutes.

## 2023-10-11 ENCOUNTER — TELEPHONE (OUTPATIENT)
Dept: PALLIATIVE MEDICINE | Facility: OTHER | Age: 58
End: 2023-10-11
Payer: COMMERCIAL

## 2023-10-11 NOTE — PROGRESS NOTES
Date:10/11/2023      COMPREHENSIVE PAIN CLINIC INITIAL EVALUATION    I had the pleasure of meeting Ms. Mary Gorman on 10/12/2023 in the [facility] in consult for Nica Greco PA-C with regards to her pain. The patient is a 58 year old female with past medical history of DMII, lymphadema, CKD stage 3a depression, muscle spasms, insomnia, obesity, BALTA on CPAP, HLD, HTN, A-fib, on chronic anticoagulation, who presents for evaluation of chronic pain.      Subjective:  Patient endorses chronic axial low back pain R=L which started in Spring 2023 without a precipitating event.  She does not have any numbness or tingling in her back or legs.  She has not been to PT for this pain.  Patient has pain in her right knee and saw Dr. Armand Martin and plan for R) TKA before the end of the year.  She had a L) TKA by Dr. Armand Martin last year without complication.  The patient describes the pain as intermittent electric when she gets out of bed in the morning and prolong sitting in a chair.  She reports that the pain is made worse by position change.  Her pain is improved with position changes slowly.   She rates her current pain score at 0/10,  or as severe as 6/10. Follows with mental health provider on regular basis. She uses a cane regularly. She follows with Vascular due to lymphedema. She follows with Cardiology    Progress Notes Reviewed:  09/06/2023 Dr. Armand Martin, Ortho Surgeon  08/18/2023 Nica Greco PA-C   07/28/2023 Nica Greco PA-C     She denies any new problems with falls or balance, any new numbness or weakness of the arms or legs, any new bowel or bladder incontinence, any night sweats or unexplained fevers, or any sudden or unexpected weight loss.    Mary Gorman has not been seen at a pain clinic in the past.      Patient reported symptoms:  Patient Supplied Answers To the  Pain Questionnaire      10/9/2023     6:25 PM   [facility] -  Patient Entered Questionnaire/Answers   What  number best describes your pain right now:  0 = No pain  to  10 = Worst pain imaginable 4   How would you describe the pain dull, aching    throbbing   Which of the following worsen your pain sitting    walking   Which of the following improve or reduce your pain sitting   What number best describes your average pain for the past week:  0 = No pain  to  10 = Worst pain imaginable 4   What number best describes your LOWEST pain in past 24 hours:  0 = No pain  to  10 = Worst pain imaginable 2   What number best describes your WORST pain in past 24 hours:  0 = No pain  to  10 = Worst pain imaginable 6   When is your pain worst AM   What non-medicine treatments have you already had for your pain other             Current treatments:  Acetaminophen 1,000mg TID  Tizanidine 2mg 1-2 tabs q hs not every night  Tramadol 50mg prn not daily for back pain  Trazodone 100mg q hs every night  Wellbutrin XL 150mg and 300mg = 450mg daily  Sertraline 100mg 2 tabs daily  Eliquis 5mg daily for A-fib    Previous medication treatments included:  Anti-convulsants: gabapentin is not an option due to potential worsening of lymphadema  Muscle relaxors:   Anti-depressants:   Acetaminophen/NSAIDs:  She can not take NSAID on Eliquis   Topicals: Voltaren gel 1%   Opioids: none    Other treatments have included:  Physical therapy: not for back pain  Pain Psychology: no  Chiropractic: no  Acupuncture: no  TENs Unit: no  Injections: no  Surgeries: no spine surgery    Past Medical History:  Medical history reviewed.   Past Medical History:   Diagnosis Date    Arthritis Nov 2019    Atrial fibrillation (H)     COPD (chronic obstructive pulmonary disease) (H)     colds turn to bronchitis easily    Depression     Depressive disorder     in chart    Diabetes (H)     GERD (gastroesophageal reflux disease)     HTN (hypertension)     Hyperlipidemia     Obese     BALTA (obstructive sleep apnea)     uses CPAP    Permanent atrial fibrillation (H) 06/05/2011     Uncomplicated asthma     mentioned by urgent care md      Patient Active Problem List   Diagnosis    BALTA (obstructive sleep apnea)    Low back pain    Lumbar radiculitis    Hyperlipidemia LDL goal <100    Mild recurrent major depression (H24)    Hypertension goal BP (blood pressure) < 140/90    ACP (advance care planning)    Permanent atrial fibrillation (H)    Lipoma of skin and subcutaneous tissue    Morbid obesity due to excess calories (H)    Prediabetes    Hip pain, right    Bilateral knee pain    Osteoarthrosis, localized, primary, knee, left    S/P total knee arthroplasty, left    Aftercare following left knee joint replacement surgery    Diverticular disease of colon    Diabetes mellitus, type 2 (H)    Chronic kidney disease, stage 3a (H)       Past Surgical History:  Pertinent surgical history reviewed.   Past Surgical History:   Procedure Laterality Date    ABDOMEN SURGERY      in chart    ARTHROPLASTY KNEE Left 01/20/2022    Procedure: ARTHROPLASTY, LEFT KNEE, TOTAL;  Surgeon: Armand Martin MD;  Location: [facility]    BIOPSY      skin and uterine lining    CARDIOVERSION  06/07/2011    CHOLECYSTECTOMY      COLONOSCOPY  2013    in chart    DILATION AND CURETTAGE  04/26/2012    Procedure:DILATION AND CURETTAGE; DILATION AND CURETTAGE; Surgeon:JEAN-PAUL DEL CID; Location:[facility]    DILATION AND CURETTAGE, HYSTEROSCOPY DIAGNOSTIC, COMBINED  12/08/2011    Procedure:COMBINED DILATION AND CURETTAGE, HYSTEROSCOPY DIAGNOSTIC; DILATION AND CURETTAGE, DIAGNOSTIC HYSTEROSCOPY ; Surgeon:JEAN-PAUL DEL CID; Location:[facility]    KNEE SURGERY      REMOVE HARDWARE LOWER EXTREMITY Left 01/20/2022    Procedure: REMOVAL, HARDWARE, LEFT LOWER EXTREMITY;  Surgeon: Armand Martin MD;  Location: [facility] OR        Medications: Pertinent medications reviewed.  Current Outpatient Medications   Medication Sig Dispense Refill    blood glucose (ACCU-CHEK GUIDE) test strip Use to test blood sugar 1 times daily  (OK to substitute alternate brand per  insurance formulary.  If One Touch only give Verio not Ultra) 100 strip 11    blood glucose monitoring (SOFTCLIX) lancets Use to test blood sugar 1 times daily (OK to substitute alternate brand per insurance formulary.  If One Touch only give Verio not Ultra) 100 each 11    Blood Glucose Monitoring Suppl (ACCU-CHEK GUIDE) w/Device KIT 1 Device daily (OK to substitute alternate brand per insurance formulary.  If One Touch only give Verio not Ultra) 1 kit 1    buPROPion (WELLBUTRIN XL) 150 MG 24 hr tablet Take 1 tablet (150 mg) by mouth every morning Take with 300 mg for total of 450 mg 90 tablet 1    buPROPion (WELLBUTRIN XL) 300 MG 24 hr tablet Take 1 tablet (300 mg) by mouth every morning Take with 150 mg for total 450 mg 90 tablet 1    cetirizine (ZYRTEC) 10 MG tablet Take 10 mg by mouth as needed for allergies      diltiazem ER (DILT-XR) 240 MG 24 hr ER beaded capsule Take 1 capsule (240 mg) by mouth daily 90 capsule 3    dulaglutide (TRULICITY) 0.75 MG/0.5ML pen Inject 0.75 mg Subcutaneous every 7 days 2 mL 3    ELIQUIS ANTICOAGULANT 5 MG tablet Take 1 tablet (5 mg) by mouth 2 times daily 180 tablet 3    fosinopril (MONOPRIL) 10 MG tablet Take 1 tablet (10 mg) by mouth daily 90 tablet 3    metFORMIN (GLUCOPHAGE XR) 500 MG 24 hr tablet Take 2 tablets (1,000 mg) by mouth daily (with dinner) 180 tablet 3    metoprolol succinate ER (TOPROL XL) 50 MG 24 hr tablet Take 1 tablet (50 mg) by mouth 2 times daily 180 tablet 3    rosuvastatin (CRESTOR) 10 MG tablet Take 1 tablet (10 mg) by mouth daily 90 tablet 3    sertraline (ZOLOFT) 100 MG tablet Take 200 mg by mouth daily 2 tabs (200mg) daily       spironolactone (ALDACTONE) 25 MG tablet Take 1 tablet (25 mg) by mouth daily 90 tablet 3    tiZANidine (ZANAFLEX) 2 MG tablet Take 1-2 tablets (2-4 mg) by mouth nightly as needed for muscle spasms 180 tablet 1    traMADol (ULTRAM) 50 MG tablet Take 1 tablet (50 mg) by mouth 2 times daily as needed for severe pain 30 tablet 0     traZODone (DESYREL) 100 MG tablet Take 1 tablet (100 mg) by mouth At Bedtime 90 tablet 1       [place] Prescription Monitoring Program reviewed 10/11/2023.  No concern for abuse or misuse of controlled medications based on this report.  08/18/2023 Tramadol 50mg 30 tabs for 15 days  11/17/2022 Gabapentin 100mg 42 tabs for 28 days.    Allergies: Pertinent allergies reviewed.     Allergies   Allergen Reactions    Dyazide [Hydrochlorothiazide W-Triamterene] Unknown    Vistaril [Hydroxyzine] Visual Disturbance    Naproxen Rash    Nickel Rash    Robaxin [Methocarbamol] Rash    Sulfa Antibiotics Rash       Family History:   family history includes Arthritis in her mother; Cancer (age of onset: 50) in her father; Cancer (age of onset: 90) in her paternal grandmother; Colon Cancer in her paternal grandmother; Coronary Artery Disease in her father; Heart Disease in her father and mother; Hyperlipidemia in her father and mother; Hypertension in her father and mother; Obesity in her brother, mother, and paternal grandmother; Other Cancer in her father; Respiratory in her father; Unknown/Adopted in her brother.    Social History:   She lives in a townhouse in [place]. She has a dog. She is independent in [employer].  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  She  reports that she has never smoked. She has never been exposed to tobacco smoke. She has never used smokeless tobacco. She reports that she does not currently use alcohol. She reports that she does not use drugs.  Social History     Social History Narrative    Not on file         Review of Systems:      (Positive responses bolded)  GENERAL: fever/chills, fatigue, general unwell feeling, weight gain/loss.  HEAD/EYES:  headache, dizziness, or vision changes.    EARS/NOSE/THROAT: nosebleeds, hearing loss, sinus infection, earache, tinnitus.  IMMUNE:  allergies, cancer, immune deficiency, or infections.  SKIN:  itching, rash, hives  HEME/Lymphatic: anemia, easy bruising, easy  bleeding.  RESPIRATORY: cough, wheezing, or shortness of breath  CARDIOVASCULAR/Circulation: extremity edema, syncope, hypertension, tachycardia, or angina.  GASTROINTESTINAL: abdominal pain, nausea/emesis, diarrhea, constipation, hematochezia, or melena.  ENDOCRINE:  diabetes, steroid use, thyroid disease or osteoporosis.  MUSCULOSKELETAL: joint pain, stiffness, neck pain, back pain, arthritis, or gout.  GENITOURINARY: frequency, urgency, dysuria, difficulty voiding, hematuria or incontinence.  NEUROLOGIC: weakness, numbness, paresthesias, seizure, tremor, stroke or memory loss.  PSYCHIATRIC: depression, anxiety, stress, suicidal thoughts or mood swings.     Physical Exam:  [facility] 06/25/2019 (Approximate)     Constitutional: She is oriented to person, place, and time.  She appears well-developed and well-nourished. She is not in acute distress.   HENT:     Head: Normocephalic and atraumatic.     Eyes: Pupils are equal, round, and reactive to light. EOM are normal. No scleral icterus.   Pulmonary/Chest:  NWOB. No respiratory distress.   Neurological: She is alert and oriented to person, place, and time. Coordination grossly normal.  Romberg test negative.  Skin: Skin is warm and dry. She is not diaphoretic.   Psychiatric: She has a normal mood and affect. Her behavior is normal. Judgment and thought content normal.  MSK: Gait is normal. Patient cannot walk on toes, heals, heal toe walk and perform heal to shin testing without difficulty due to knee pain.        Lumbar/Thoracic spine:   ROM: flexion 60 degrees, extension 10 degrees, left lateral 20 degrees, and right lateral 20 degrees  Rotation/ext to right: pain free  Rotation/ext to left: pain free  Myofascial tenderness:left para lumbar muscles, right para lumbar muscles  Focal tenderness: No SI joint, gluteal, piriformis, or GT tenderness  Normal 5/5 LE strength bilaterally   Normal sensation to light touch in the lower extremities bilaterally   Reflexes: Lower  extremity reflexes within normal limits bilaterally  Straight leg raise: Negative on the left,  Negative on the right            Imaging: no lumbar imaging to review.    XR knee right 6/20/2023:  My interpretation: End-stage osteoarthritic changes in all 3 compartments of right knee with complete obliteration of joint space, presence of marginal osteophytes, sclerosis and subchondral cysts.    EMG:  na      Diagnosis:  (M54.40,  G89.29) Chronic bilateral low back pain with sciatica, sciatica laterality unspecified  (primary encounter diagnosis)  Comment:   Plan:     (M25.561,  M25.562,  G89.29) Chronic pain of both knees  Comment:   Plan:     (G89.29) Other chronic pain  Comment:   Plan:         Plan:  Patient has bilateral lumbar pain.  There is no imaging to review.  She has not had PT for low back pain.  She is going to be having R) knee TKA December 1st on [facility].  Will fax for TENS unit and refer for lumbar MRI.  She can follow up at her convenience to review imaging and for further recommendations.  Patient acknowledged understanding there is no worrisome etiology for the lumbar pain.  It may be myofascial due to compensating for ambulation issue due to knee pain.    A multimodal plan was developed today to treat your pain.  Multimodal analgesia is a strategy that reduces reliance on opioids through the use of non-opioid analgesics and therapies that have different mechanisms of action.        Diagnostics: ordered MRI lumbar spine        Medications:    The following OTC pain medications may be helpful, use as directed: Voltaren Gel 1%, CBD products, Capsaicin products, Australian Dream Cream, Epson It, Arnica, products, Lidocaine Patch, Solanpas, Biofreeze, Aspercream, Tiger Balm and Kvng Emu cream.  Apply heat or cold PRN.      Therapies:  PHYSICAL THERAPY:  Discussed the importance of core strengthening, ROM, stretching exercises with the patient and how each of these entities is important in  decreasing pain.  Explained to the patient that the purpose of physical therapy is to teach the patient a home exercise program.  These exercises need to be performed every day in order to decrease pain and prevent future occurrences of pain.        Discussed Grounding Mat - handout provided  [email or web address]?[email or web address]    Discussed Frequency Specific Microcurrent - handout provided  Treatment for Neuropathic Pain.   [facility] [phone]  [facility] [phone]  May be able to be billed as a chiropractic service depending on your insurance coverage.     Discussed Acupuncture.    Fax for [employer] TENs unit.      Interventions:      none    Follow up:   As needed to review lumbar MRI.      YAMIL Louis, RN, CNP, FNP  [facility]        BILLING TIME DOCUMENTATION:   The total TIME spent on this patient on the date of the encounter/appointment was 46 minutes.

## 2023-10-12 ENCOUNTER — HOSPITAL ENCOUNTER (OUTPATIENT)
Dept: CARDIOLOGY | Facility: CLINIC | Age: 58
Discharge: HOME | End: 2023-10-12
Attending: PHYSICIAN ASSISTANT | Admitting: PHYSICIAN ASSISTANT
Payer: COMMERCIAL

## 2023-10-12 ENCOUNTER — OFFICE VISIT (OUTPATIENT)
Dept: PALLIATIVE MEDICINE | Facility: CLINIC | Age: 58
End: 2023-10-12
Attending: PHYSICIAN ASSISTANT
Payer: COMMERCIAL

## 2023-10-12 VITALS — DIASTOLIC BLOOD PRESSURE: 78 MMHG | SYSTOLIC BLOOD PRESSURE: 116 MMHG | OXYGEN SATURATION: 98 % | HEART RATE: 74 BPM

## 2023-10-12 DIAGNOSIS — I48.20 CHRONIC ATRIAL FIBRILLATION (H): ICD-10-CM

## 2023-10-12 DIAGNOSIS — G89.29 CHRONIC BILATERAL LOW BACK PAIN WITH SCIATICA, SCIATICA LATERALITY UNSPECIFIED: Primary | ICD-10-CM

## 2023-10-12 DIAGNOSIS — M25.561 CHRONIC PAIN OF BOTH KNEES: ICD-10-CM

## 2023-10-12 DIAGNOSIS — G89.29 OTHER CHRONIC PAIN: ICD-10-CM

## 2023-10-12 DIAGNOSIS — G89.29 CHRONIC PAIN OF BOTH KNEES: ICD-10-CM

## 2023-10-12 DIAGNOSIS — M25.562 CHRONIC PAIN OF BOTH KNEES: ICD-10-CM

## 2023-10-12 DIAGNOSIS — M54.40 CHRONIC BILATERAL LOW BACK PAIN WITH SCIATICA, SCIATICA LATERALITY UNSPECIFIED: Primary | ICD-10-CM

## 2023-10-12 LAB — LVEF ECHO: NORMAL

## 2023-10-12 PROCEDURE — 99204 OFFICE O/P NEW MOD 45 MIN: CPT | Performed by: NURSE PRACTITIONER

## 2023-10-12 PROCEDURE — 93306 TTE W/DOPPLER COMPLETE: CPT | Mod: 26 | Performed by: INTERNAL MEDICINE

## 2023-10-12 PROCEDURE — 255N000002 HC RX 255 OP 636: Performed by: PHYSICIAN ASSISTANT

## 2023-10-12 PROCEDURE — 999N000208 ECHOCARDIOGRAM COMPLETE

## 2023-10-12 RX ADMIN — HUMAN ALBUMIN MICROSPHERES AND PERFLUTREN 9 ML: 10; .22 INJECTION, SOLUTION INTRAVENOUS at 11:47

## 2023-10-12 ASSESSMENT — PAIN SCALES - GENERAL: PAINLEVEL: SEVERE PAIN (6)

## 2023-10-12 NOTE — PATIENT INSTRUCTIONS
----------------------------------------------------------------  [facility] Number:  [phone]   Call with any questions about your care and for scheduling assistance.   Calls are returned Monday through Friday between 8 AM and 4:30 PM. We usually get back to you within 2 business days depending on the issue/request.    If we are prescribing your medications:  For opioid medication refills, call the clinic or send a [employer] message 7 days in advance.  Please include:  Name of requested medication  Name of the pharmacy.  For non-opioid medications, call your pharmacy directly to request a refill. Please allow 3-4 days to be processed.   Per [place] State Law:  All controlled substance prescriptions must be filled within 30 days of being written.    For those controlled substances allowing refills, pickup must occur within 30 days of last fill.      We believe regular attendance is key to your success in our program!    Any time you are unable to keep your appointment we ask that you call us at least 24 hours in advance to cancel.This will allow us to offer the appointment time to another patient.   Multiple missed appointments may lead to dismissal from the clinic.

## 2023-11-14 ASSESSMENT — PATIENT HEALTH QUESTIONNAIRE - PHQ9
SUM OF ALL RESPONSES TO PHQ QUESTIONS 1-9: 12
SUM OF ALL RESPONSES TO PHQ QUESTIONS 1-9: 12
10. IF YOU CHECKED OFF ANY PROBLEMS, HOW DIFFICULT HAVE THESE PROBLEMS MADE IT FOR YOU TO DO YOUR WORK, TAKE CARE OF THINGS AT HOME, OR GET ALONG WITH OTHER PEOPLE: SOMEWHAT DIFFICULT

## 2023-11-15 ENCOUNTER — OFFICE VISIT (OUTPATIENT)
Dept: FAMILY MEDICINE | Facility: CLINIC | Age: 58
End: 2023-11-15
Payer: COMMERCIAL

## 2023-11-15 VITALS
TEMPERATURE: 98 F | DIASTOLIC BLOOD PRESSURE: 70 MMHG | OXYGEN SATURATION: 97 % | SYSTOLIC BLOOD PRESSURE: 110 MMHG | WEIGHT: 293 LBS | HEART RATE: 75 BPM | BODY MASS INDEX: 56.08 KG/M2

## 2023-11-15 DIAGNOSIS — N18.31 TYPE 2 DIABETES MELLITUS WITH STAGE 3A CHRONIC KIDNEY DISEASE, WITHOUT LONG-TERM CURRENT USE OF INSULIN (H): ICD-10-CM

## 2023-11-15 DIAGNOSIS — E11.22 TYPE 2 DIABETES MELLITUS WITH STAGE 3A CHRONIC KIDNEY DISEASE, WITHOUT LONG-TERM CURRENT USE OF INSULIN (H): ICD-10-CM

## 2023-11-15 DIAGNOSIS — G47.33 OSA (OBSTRUCTIVE SLEEP APNEA): ICD-10-CM

## 2023-11-15 DIAGNOSIS — M25.561 CHRONIC PAIN OF BOTH KNEES: ICD-10-CM

## 2023-11-15 DIAGNOSIS — E66.01 MORBID OBESITY WITH BMI OF 50.0-59.9, ADULT (H): ICD-10-CM

## 2023-11-15 DIAGNOSIS — I48.21 PERMANENT ATRIAL FIBRILLATION (H): ICD-10-CM

## 2023-11-15 DIAGNOSIS — Z01.818 PREOP GENERAL PHYSICAL EXAM: Primary | ICD-10-CM

## 2023-11-15 DIAGNOSIS — M54.16 LUMBAR RADICULITIS: ICD-10-CM

## 2023-11-15 DIAGNOSIS — M17.11 PRIMARY OSTEOARTHRITIS OF RIGHT KNEE: ICD-10-CM

## 2023-11-15 DIAGNOSIS — G89.29 CHRONIC PAIN OF BOTH KNEES: ICD-10-CM

## 2023-11-15 DIAGNOSIS — M25.562 CHRONIC PAIN OF BOTH KNEES: ICD-10-CM

## 2023-11-15 DIAGNOSIS — G89.29 CHRONIC BILATERAL LOW BACK PAIN WITH SCIATICA, SCIATICA LATERALITY UNSPECIFIED: ICD-10-CM

## 2023-11-15 DIAGNOSIS — I10 HYPERTENSION GOAL BP (BLOOD PRESSURE) < 140/90: ICD-10-CM

## 2023-11-15 DIAGNOSIS — Z96.652 S/P TOTAL KNEE ARTHROPLASTY, LEFT: ICD-10-CM

## 2023-11-15 DIAGNOSIS — N18.31 CHRONIC KIDNEY DISEASE, STAGE 3A (H): ICD-10-CM

## 2023-11-15 DIAGNOSIS — M54.40 CHRONIC BILATERAL LOW BACK PAIN WITH SCIATICA, SCIATICA LATERALITY UNSPECIFIED: ICD-10-CM

## 2023-11-15 LAB
ERYTHROCYTE [DISTWIDTH] IN BLOOD BY AUTOMATED COUNT: 14.5 % (ref 10–15)
HBA1C MFR BLD: 5.9 % (ref 0–5.6)
HCT VFR BLD AUTO: 43.8 % (ref 35–47)
HGB BLD-MCNC: 14.2 G/DL (ref 11.7–15.7)
MCH RBC QN AUTO: 28.5 PG (ref 26.5–33)
MCHC RBC AUTO-ENTMCNC: 32.4 G/DL (ref 31.5–36.5)
MCV RBC AUTO: 88 FL (ref 78–100)
PLATELET # BLD AUTO: 230 10E3/UL (ref 150–450)
RBC # BLD AUTO: 4.99 10E6/UL (ref 3.8–5.2)
WBC # BLD AUTO: 7.9 10E3/UL (ref 4–11)

## 2023-11-15 PROCEDURE — 99214 OFFICE O/P EST MOD 30 MIN: CPT | Mod: 25 | Performed by: PHYSICIAN ASSISTANT

## 2023-11-15 PROCEDURE — 80048 BASIC METABOLIC PNL TOTAL CA: CPT | Performed by: PHYSICIAN ASSISTANT

## 2023-11-15 PROCEDURE — 93000 ELECTROCARDIOGRAM COMPLETE: CPT | Performed by: PHYSICIAN ASSISTANT

## 2023-11-15 PROCEDURE — 85027 COMPLETE CBC AUTOMATED: CPT | Performed by: PHYSICIAN ASSISTANT

## 2023-11-15 PROCEDURE — 36415 COLL VENOUS BLD VENIPUNCTURE: CPT | Performed by: PHYSICIAN ASSISTANT

## 2023-11-15 PROCEDURE — 83036 HEMOGLOBIN GLYCOSYLATED A1C: CPT | Performed by: PHYSICIAN ASSISTANT

## 2023-11-15 RX ORDER — TRAMADOL HYDROCHLORIDE 50 MG/1
50 TABLET ORAL 2 TIMES DAILY PRN
Qty: 30 TABLET | Refills: 0 | Status: ON HOLD | OUTPATIENT
Start: 2023-11-15 | End: 2023-12-04

## 2023-11-15 ASSESSMENT — PAIN SCALES - GENERAL: PAINLEVEL: SEVERE PAIN (6)

## 2023-11-15 NOTE — PROGRESS NOTES
[address] [address]  Phone: [phone]  Primary Provider: Nica Christianson  Pre-op Performing Provider: NICA CHRISTIANSON    PREOPERATIVE EVALUATION:  Today's date: 11/15/2023    Mary is a 58 year old, presenting for the following:  Pre-Op Exam        11/15/2023     2:18 PM   Additional Questions   Roomed by amaal       Surgical Information:  Surgery/Procedure: ARTHROPLASTY, RIGHT KNEE TOTAL  Surgery Location: [facility]  Surgeon: Armand Martin MD  Surgery Date: 12/01/23  Time of Surgery: 7.30 am  Where patient plans to recover: At a TCU (Transitional Care Unit)  Fax number for surgical facility: Note does not need to be faxed, will be available electronically in Epic.    Assessment & Plan     The proposed surgical procedure is considered INTERMEDIATE risk.    Preop general physical exam  - EKG 12-lead complete w/read - Clinics  - HEMOGLOBIN A1C  - CBC with platelets  - Basic metabolic panel  (Ca, Cl, CO2, Creat, Gluc, K, Na, BUN)    Primary osteoarthritis of right knee  Reason for surgery    S/P total knee arthroplasty, left  January 2022    Permanent atrial fibrillation (H)  Rate controlled. On eliquis, will hold 3 days prior per cardiology recommendations.     Type 2 diabetes mellitus with stage 3a chronic kidney disease, without long-term current use of insulin (H)  Well controlled.   - HEMOGLOBIN A1C  - Basic metabolic panel  (Ca, Cl, CO2, Creat, Gluc, K, Na, BUN)  - blood glucose monitoring (NO BRAND SPECIFIED) meter device kit  Dispense: 1 kit; Refill: 0  - blood glucose (NO BRAND SPECIFIED) test strip  Dispense: 200 strip; Refill: 3  - blood glucose (NO BRAND SPECIFIED) lancets standard  Dispense: 200 each; Refill: 3    Chronic kidney disease, stage 3a (H)  Stable   - CBC with platelets  - Basic metabolic panel  (Ca, Cl, CO2, Creat, Gluc, K, Na, BUN)    BALTA (obstructive sleep  apnea)  On CPAP    Morbid obesity with BMI of 50.0-59.9, adult (H)  Noted - on trulicity and is down 11# since initiating.     Hypertension goal BP (blood pressure) < 140/90  Controlled.   - CBC with platelets  - Basic metabolic panel  (Ca, Cl, CO2, Creat, Gluc, K, Na, BUN)    Chronic pain of both knees  Lumbar radiculitis  Chronic bilateral low back pain with sciatica, sciatica laterality unspecified  Uses tramadol sparingly for severe pain.   - refill traMADol (ULTRAM) 50 MG tablet  Dispense: 30 tablet; Refill: 0       Risks and Recommendations:  The patient has the following additional risks and recommendations for perioperative complications:   - Consult Hospitalist / IM to assist with post-op medical management   - Morbid obesity (BMI >40)  Diabetes:  - Patient is not on insulin therapy: regular NPO guidelines can be followed.   Obstructive Sleep Apnea:     Antiplatelet or Anticoagulation Medication Instructions:   - apixaban (Eliquis), edoxaban (Savaysa), rivaroxaban (Xarelto): Neuraxial or regional block anticipated AND CrCL (>=) 50mL/min. HOLD 3 days before surgery.   Per cardiology - Would be able to hold Eliquis x 2-3 days prior to procedure, restarting at the discretion of the surgeon, typically POD #1    Additional Medication Instructions:  Patient is to take all scheduled medications on the day of surgery EXCEPT for modifications listed below:   - ACE/ARB: HOLD on day of surgery (minimum 11 hours for general anesthesia).   - Beta Blockers: Continue taking on the day of surgery.   - Calcium Channel Blockers: May be continued on the day of surgery.   - Diuretics: HOLD on the day of surgery.   - Statins: Continue taking on the day of surgery.    - metformin: HOLD day of surgery.   - GLP-1 Injectable (exenitide, liraglutide, semaglutide, dulaglutide, etc.): HOLD 7 days before surgery    - SSRIs, SNRIs, TCAs, Antipsychotics: Continue without modification.     RECOMMENDATION:  APPROVAL GIVEN to proceed with  proposed procedure, without further diagnostic evaluation.    Nica Workman PA-C on 11/15/2023 at 2:40 PM      Subjective       HPI related to upcoming procedure:   Mary Gorman is a 58 year old female who presents for preop exam undergoing right total knee replacement. She has been feeling well overall in her usual state of health without recent illness.     Per cardiology RE: surgery plan     Updated echocardiogram to assess LVEF.  If this is normal, would not anticipate any additional cardiac testing be required prior to any surgery -- NORMAL with LVEF 60%, LV and RV within normal limits, no valvular disease noted    [id number] 3 (HTN, DM, sex) and remains on Eliquis.  Would be able to hold Eliquis x 2-3 days prior to procedure, restarting at the discretion of the surgeon, typically POD #1    Wt Readings from Last 5 Encounters:   11/15/23 (!) 152.9 kg (337 lb)   09/06/23 (!) 157.9 kg (348 lb)   08/18/23 (!) 157.4 kg (347 lb)   05/18/23 (!) 157.3 kg (346 lb 12.8 oz)   12/27/22 (!) 157.6 kg (347 lb 6.4 oz)            11/14/2023     6:20 PM   Preop Questions   1. Have you ever had a heart attack or stroke? No   2. Have you ever had surgery on your heart or blood vessels, such as a stent placement, a coronary artery bypass, or surgery on an artery in your head, neck, heart, or legs? No   3. Do you have chest pain with activity? No   4. Do you have a history of  heart failure? No   5. Do you currently have a cold, bronchitis or symptoms of other infection? No   6. Do you have a cough, shortness of breath, or wheezing? No   7. Do you or anyone in your family have previous history of blood clots? No   8. Do you or does anyone in your family have a serious bleeding problem such as prolonged bleeding following surgeries or cuts? No   9. Have you ever had problems with anemia or been told to take iron pills? No   10. Have you had any abnormal blood loss such as black, tarry or bloody stools, or abnormal  vaginal bleeding? No   11. Have you ever had a blood transfusion? No   12. Are you willing to have a blood transfusion if it is medically needed before, during, or after your surgery? Yes   13. Have you or any of your relatives ever had problems with anesthesia? No   14. Do you have sleep apnea, excessive snoring or daytime drowsiness? YES - BALTA   14a. Do you have a CPAP machine? Yes   15. Do you have any artifical heart valves or other implanted medical devices like a pacemaker, defibrillator, or continuous glucose monitor? No   16. Do you have artificial joints? YES - left knee   17. Are you allergic to latex? No   18. Is there any chance that you may be pregnant? No       Health Care Directive:  Patient has a Health Care Directive on file      Preoperative Review of :   reviewed - controlled substances reflected in medication list.      Status of Chronic Conditions:  See problem list for active medical problems.  Problems all longstanding and stable, except as noted/documented.  See ROS for pertinent symptoms related to these conditions.    Review of Systems  CONSTITUTIONAL: NEGATIVE for fever, chills, change in weight  INTEGUMENTARY/SKIN: NEGATIVE for worrisome rashes, moles or lesions  EYES: NEGATIVE for vision changes or irritation  ENT/MOUTH: POSITIVE for postnasal drainage  RESP: NEGATIVE for significant cough or SOB  CV: NEGATIVE for chest pain, palpitations or peripheral edema  GI: NEGATIVE for nausea, abdominal pain, heartburn, or change in bowel habits  : NEGATIVE for frequency, dysuria, or hematuria  MUSCULOSKELETAL:POSITIVE  for knee, back, hip pain - baseline  NEURO: POSITIVE for baseline intermittent paresthesias toes and hands. No weakness or dizziness   ENDOCRINE: NEGATIVE for temperature intolerance, skin/hair changes  HEME: NEGATIVE for bleeding problems  PSYCHIATRIC: NEGATIVE for changes in mood or affect    Patient Active Problem List    Diagnosis Date Noted    Diabetes mellitus, type 2  (H) 05/18/2023     Priority: Medium    Chronic kidney disease, stage 3a (H) 05/18/2023     Priority: Medium    Aftercare following left knee joint replacement surgery 01/24/2022     Priority: Medium    S/P total knee arthroplasty, left 01/20/2022     Priority: Medium    Osteoarthrosis, localized, primary, knee, left 12/16/2021     Priority: Medium    Hip pain, right 06/09/2017     Priority: Medium    Bilateral knee pain 06/09/2017     Priority: Medium    Prediabetes 12/08/2016     Priority: Medium     Diabetic diagnosis occurred during use of antipsychotic medications with improvement in A1C since d/c antipsychotics x 2 years.      Morbid obesity due to excess calories (H) 12/06/2016     Priority: Medium    Lipoma of skin and subcutaneous tissue 11/10/2016     Priority: Medium     11/10/2016: Present for about a year, located on LUQ of abdomen, about 19 cm in width, 12 cm in length.      ACP (advance care planning) 11/18/2014     Priority: Medium     Advance Care Planning: Receipt of ACP document:  Received: Health Care Directive which was witnessed or notarized on 7-15-11.  Document previously scanned on 7-18-11 in [facility]-moved to epic 12-29-14.  Validation form completed and sent to be scanned.  Code Status needs to be updated to reflect choices in most recent ACP document.  Confirmed/documented designated decision maker(s). See permanent comments section of demographics in clinical tab. View document(s) and details by clicking on code status. Added by Tricia Hubbard RN, System ACP Coordinator [facility] on 12/29/2014.            Diverticular disease of colon 08/18/2014     Priority: Medium    Hypertension goal BP (blood pressure) < 140/90 05/15/2014     Priority: Medium    Mild recurrent major depression (H24) 06/23/2013     Priority: Medium     Dr Varela's office asking results be faxed to their office- see contact info  wellbutrin, lamictal, gabapentin, zoloft      Hyperlipidemia LDL goal <100 05/03/2013      Priority: Medium    Low back pain 03/26/2013     Priority: Medium    Lumbar radiculitis 03/26/2013     Priority: Medium    BALTA (obstructive sleep apnea) 12/02/2011     Priority: Medium     AHI 39 and RDI of 60, desaturations to as 89%      Permanent atrial fibrillation (H) 06/05/2011     Priority: Medium      Past Medical History:   Diagnosis Date    Arthritis Nov 2019    Atrial fibrillation (H)     COPD (chronic obstructive pulmonary disease) (H)     colds turn to bronchitis easily    Depression     Depressive disorder     in chart    Diabetes (H)     GERD (gastroesophageal reflux disease)     HTN (hypertension)     Hyperlipidemia     Obese     BALTA (obstructive sleep apnea)     uses CPAP    Permanent atrial fibrillation (H) 06/05/2011    Uncomplicated asthma     mentioned by urgent care md     Past Surgical History:   Procedure Laterality Date    ABDOMEN SURGERY      in chart    ARTHROPLASTY KNEE Left 01/20/2022    Procedure: ARTHROPLASTY, LEFT KNEE, TOTAL;  Surgeon: Armand Martin MD;  Location: [facility] OR    BIOPSY      skin and uterine lining    CARDIOVERSION  06/07/2011    CHOLECYSTECTOMY      COLONOSCOPY  2013    in chart    DILATION AND CURETTAGE  04/26/2012    Procedure:DILATION AND CURETTAGE; DILATION AND CURETTAGE; Surgeon:JEAN-PAUL DEL CID; Location:[facility]    DILATION AND CURETTAGE, HYSTEROSCOPY DIAGNOSTIC, COMBINED  12/08/2011    Procedure:COMBINED DILATION AND CURETTAGE, HYSTEROSCOPY DIAGNOSTIC; DILATION AND CURETTAGE, DIAGNOSTIC HYSTEROSCOPY ; Surgeon:JEAN-PAUL DEL CID; Location:[facility]    KNEE SURGERY      REMOVE HARDWARE LOWER EXTREMITY Left 01/20/2022    Procedure: REMOVAL, HARDWARE, LEFT LOWER EXTREMITY;  Surgeon: Armand Martin MD;  Location: [facility] OR     Current Outpatient Medications   Medication Sig Dispense Refill    blood glucose (NO BRAND SPECIFIED) lancets standard Use to test blood sugar 2 times daily or as directed. 200 each 3    blood glucose (NO BRAND SPECIFIED) test strip Use to test blood sugar 2  times daily or as directed. 200 strip 3    blood glucose monitoring (NO BRAND SPECIFIED) meter device kit Use to test blood sugar 2 times daily or as directed. 1 kit 0    buPROPion (WELLBUTRIN XL) 150 MG 24 hr tablet Take 1 tablet (150 mg) by mouth every morning Take with 300 mg for total of 450 mg 90 tablet 1    buPROPion (WELLBUTRIN XL) 300 MG 24 hr tablet Take 1 tablet (300 mg) by mouth every morning Take with 150 mg for total 450 mg 90 tablet 1    cetirizine (ZYRTEC) 10 MG tablet Take 10 mg by mouth as needed for allergies      diltiazem ER (DILT-XR) 240 MG 24 hr ER beaded capsule Take 1 capsule (240 mg) by mouth daily 90 capsule 3    dulaglutide (TRULICITY) 0.75 MG/0.5ML pen Inject 0.75 mg Subcutaneous every 7 days 2 mL 3    ELIQUIS ANTICOAGULANT 5 MG tablet Take 1 tablet (5 mg) by mouth 2 times daily 180 tablet 3    fosinopril (MONOPRIL) 10 MG tablet Take 1 tablet (10 mg) by mouth daily 90 tablet 3    metFORMIN (GLUCOPHAGE XR) 500 MG 24 hr tablet Take 2 tablets (1,000 mg) by mouth daily (with dinner) 180 tablet 3    metoprolol succinate ER (TOPROL XL) 50 MG 24 hr tablet Take 1 tablet (50 mg) by mouth 2 times daily 180 tablet 3    rosuvastatin (CRESTOR) 10 MG tablet Take 1 tablet (10 mg) by mouth daily 90 tablet 3    sertraline (ZOLOFT) 100 MG tablet Take 200 mg by mouth daily 2 tabs (200mg) daily       spironolactone (ALDACTONE) 25 MG tablet Take 1 tablet (25 mg) by mouth daily 90 tablet 3    tiZANidine (ZANAFLEX) 2 MG tablet Take 1-2 tablets (2-4 mg) by mouth nightly as needed for muscle spasms 180 tablet 1    traMADol (ULTRAM) 50 MG tablet Take 1 tablet (50 mg) by mouth 2 times daily as needed for severe pain 30 tablet 0    traZODone (DESYREL) 100 MG tablet Take 1 tablet (100 mg) by mouth At Bedtime 90 tablet 1       Allergies   Allergen Reactions    Dyazide [Hydrochlorothiazide W-Triamterene] Unknown    Vistaril [Hydroxyzine] Visual Disturbance    Naproxen Rash    Nickel Rash    Robaxin [Methocarbamol] Rash     Sulfa Antibiotics Rash        Social History     Tobacco Use    Smoking status: Never     Passive exposure: Never    Smokeless tobacco: Never   Substance Use Topics    Alcohol use: Not Currently     Comment: once in a while     Family History   Problem Relation Age of Onset    Hypertension Mother     Heart Disease Mother         A-fib    Arthritis Mother     Hyperlipidemia Mother     Obesity Mother     Heart Disease Father         triple bypass    Cancer Father 50        bladder    Hypertension Father     Respiratory Father         smoker    Coronary Artery Disease Father         chf, pulm htn, by pass    Hyperlipidemia Father     Other Cancer Father         bladder    Cancer Paternal Grandmother 90        rectal    Colon Cancer Paternal Grandmother     Obesity Paternal Grandmother     Unknown/Adopted Brother     Obesity Brother      History   Drug Use No         Objective     /70   Pulse 75   Temp 98  F (36.7  C) (Tympanic)   Wt (!) 152.9 kg (337 lb)   LMP 06/25/2019 (Approximate)   SpO2 97%   BMI 56.08 kg/m      Physical Exam    GENERAL APPEARANCE: healthy, alert and no distress     EYES: EOMI, PERRL     HENT: ear canals and TM's normal and nose and mouth without ulcers or lesions     NECK: no adenopathy, no asymmetry, masses, or scars and thyroid normal to palpation     RESP: lungs clear to auscultation - no rales, rhonchi or wheezes     CV: no murmur, click or rub and irregular rhythm     ABDOMEN:  soft, nontender, no HSM or masses and bowel sounds normal     MS: extremities normal- no gross deformities noted, no evidence of inflammation in joints, FROM in all extremities.     SKIN: no suspicious lesions or rashes     NEURO: Normal strength and tone, sensory exam grossly normal, mentation intact and speech normal     PSYCH: mentation appears normal. and affect normal/bright     LYMPHATICS: No cervical adenopathy    Recent Labs   Lab Test 08/18/23  1508 06/30/23  1315 05/18/23  1533   HGB 14.3  --   15.1     --  241    136 142   POTASSIUM 4.8 4.9 5.0   CR 1.18* 1.16* 1.52*   A1C 6.9*  --  7.7*        Diagnostics:  Recent Results (from the past 24 hour(s))   HEMOGLOBIN A1C    Collection Time: 11/15/23  3:21 PM   Result Value Ref Range    Hemoglobin A1C 5.9 (H) 0.0 - 5.6 %   CBC with platelets    Collection Time: 11/15/23  3:21 PM   Result Value Ref Range    WBC Count 7.9 4.0 - 11.0 10e3/uL    RBC Count 4.99 3.80 - 5.20 10e6/uL    Hemoglobin 14.2 11.7 - 15.7 g/dL    Hematocrit 43.8 35.0 - 47.0 %    MCV 88 78 - 100 fL    MCH 28.5 26.5 - 33.0 pg    MCHC 32.4 31.5 - 36.5 g/dL    RDW 14.5 10.0 - 15.0 %    Platelet Count 230 150 - 450 10e3/uL   Basic metabolic panel  (Ca, Cl, CO2, Creat, Gluc, K, Na, BUN)    Collection Time: 11/15/23  3:21 PM   Result Value Ref Range    Sodium 136 135 - 145 mmol/L    Potassium 4.8 3.4 - 5.3 mmol/L    Chloride 101 98 - 107 mmol/L    Carbon Dioxide (CO2) 23 22 - 29 mmol/L    Anion Gap 12 7 - 15 mmol/L    Urea Nitrogen 18.8 8.0 - 23.0 mg/dL    Creatinine 1.23 (H) 0.51 - 0.95 mg/dL    GFR Estimate 50 (L) >60 mL/min/1.73m2    Calcium 10.2 (H) 8.6 - 10.0 mg/dL    Glucose 104 (H) 70 - 99 mg/dL      EKG: atrial fibrillation, rate controlled @ 75, no acute ST-T changes    Revised Cardiac Risk Index (RCRI):  The patient has the following serious cardiovascular risks for perioperative complications:   - No serious cardiac risks = 0 points     RCRI Interpretation: 0 points: Class I (very low risk - 0.4% complication rate)         Signed Electronically by: Nica Greco PA-C  Copy of this evaluation report is provided to requesting physician.

## 2023-11-15 NOTE — PATIENT INSTRUCTIONS
Antiplatelet or Anticoagulation Medication Instructions:   - Eliquis - HOLD 3 days before surgery.     Additional Medication Instructions:  Patient is to take all scheduled medications on the day of surgery EXCEPT for modifications listed below:   - fosinopril: HOLD on day of surgery (minimum 11 hours for general anesthesia).   - metoprolol: Continue taking on the day of surgery.   - Diltiazem: May be continued on the day of surgery.   - spironolactone: HOLD on the day of surgery.   - Statins: Continue taking on the day of surgery.    - metformin: HOLD day of surgery.   - trulicity: HOLD 7 days before surgery (OK to give  injection, then OK to hold until after surgery)   - sertraline ad wellbutrin: Continue without modification.       Preparing for Your Surgery  Getting started  A nurse will call you to review your health history and instructions. They will give you an arrival time based on your scheduled surgery time. Please be ready to share:  Your doctor's clinic name and phone number  Your medical, surgical, and anesthesia history  A list of allergies and sensitivities  A list of medicines, including herbal treatments and over-the-counter drugs  Whether the patient has a legal guardian (ask how to send us the papers in advance)  Please tell us if you're pregnant--or if there's any chance you might be pregnant. Some surgeries may injure a fetus (unborn baby), so they require a pregnancy test. Surgeries that are safe for a fetus don't always need a test, and you can choose whether to have one.   If you have a child who's having surgery, please ask for a copy of Preparing for Your Child's Surgery.    Preparing for surgery  Within 10 to 30 days of surgery: Have a pre-op exam (sometimes called an H&P, or History and Physical). This can be done at a clinic or pre-operative center.  If you're having a , you may not need this exam. Talk to your care team.  At your pre-op exam, talk to your care team about  all medicines you take. If you need to stop any medicines before surgery, ask when to start taking them again.  We do this for your safety. Many medicines can make you bleed too much during surgery. Some change how well surgery (anesthesia) drugs work.  Call your insurance company to let them know you're having surgery. (If you don't have insurance, call [phone].)  Call your clinic if there's any change in your health. This includes signs of a cold or flu (sore throat, runny nose, cough, rash, fever). It also includes a scrape or scratch near the surgery site.  If you have questions on the day of surgery, call your hospital or surgery center.  Eating and drinking guidelines  For your safety: Unless your surgeon tells you otherwise, follow the guidelines below.  Eat and drink as usual until 8 hours before you arrive for surgery. After that, no food or milk.  Drink clear liquids until 2 hours before you arrive. These are liquids you can see through, like water, Gatorade, and Propel Water. They also include plain black coffee and tea (no cream or milk), candy, and breath mints. You can spit out gum when you arrive.  If you drink alcohol: Stop drinking it the night before surgery.  If your care team tells you to take medicine on the morning of surgery, it's okay to take it with a sip of water.  Preventing infection  Shower or bathe the night before and morning of your surgery. Follow the instructions your clinic gave you. (If no instructions, use regular soap.)  Don't shave or clip hair near your surgery site. We'll remove the hair if needed.  Don't smoke or vape the morning of surgery. You may chew nicotine gum up to 2 hours before surgery. A nicotine patch is okay.  Note: Some surgeries require you to completely quit smoking and nicotine. Check with your surgeon.  Your care team will make every effort to keep you safe from infection. We will:  Clean our hands often with soap and water (or an alcohol-based hand  rub).  Clean the skin at your surgery site with a special soap that kills germs.  Give you a special gown to keep you warm. (Cold raises the risk of infection.)  Wear special hair covers, masks, gowns and gloves during surgery.  Give antibiotic medicine, if prescribed. Not all surgeries need antibiotics.  What to bring on the day of surgery  Photo ID and insurance card  Copy of your health care directive, if you have one  Glasses and hearing aids (bring cases)  You can't wear contacts during surgery  Inhaler and eye drops, if you use them (tell us about these when you arrive)  CPAP machine or breathing device, if you use them  A few personal items, if spending the night  If you have . . .  A pacemaker, ICD (cardiac defibrillator) or other implant: Bring the ID card.  An implanted stimulator: Bring the remote control.  A legal guardian: Bring a copy of the certified (court-stamped) guardianship papers.  Please remove any jewelry, including body piercings. Leave jewelry and other valuables at home.  If you're going home the day of surgery  You must have a responsible adult drive you home. They should stay with you overnight as well.  If you don't have someone to stay with you, and you aren't safe to go home alone, we may keep you overnight. Insurance often won't pay for this.  After surgery  If it's hard to control your pain or you need more pain medicine, please call your surgeon's office.  Questions?   If you have any questions for your care team, list them here: _________________________________________________________________________________________________________________________________________________________________________ ____________________________________ ____________________________________ ____________________________________  For informational purposes only. Not to replace the advice of your health care provider. Copyright   2003, 2019 [facility]. All rights reserved. Clinically reviewed  by Adrienne Olivares MD. Youjia [id number] - REV 12/22.

## 2023-11-15 NOTE — H&P (VIEW-ONLY)
[address] [address]  Phone: [phone]  Primary Provider: Nica Christianson  Pre-op Performing Provider: NICA CHRISTIANSON    PREOPERATIVE EVALUATION:  Today's date: 11/15/2023    Mary is a 58 year old, presenting for the following:  Pre-Op Exam        11/15/2023     2:18 PM   Additional Questions   Roomed by amaal       Surgical Information:  Surgery/Procedure: ARTHROPLASTY, RIGHT KNEE TOTAL  Surgery Location: [facility]  Surgeon: Armand Martin MD  Surgery Date: 12/01/23  Time of Surgery: 7.30 am  Where patient plans to recover: At a TCU (Transitional Care Unit)  Fax number for surgical facility: Note does not need to be faxed, will be available electronically in Epic.    Assessment & Plan     The proposed surgical procedure is considered INTERMEDIATE risk.    Preop general physical exam  - EKG 12-lead complete w/read - Clinics  - HEMOGLOBIN A1C  - CBC with platelets  - Basic metabolic panel  (Ca, Cl, CO2, Creat, Gluc, K, Na, BUN)    Primary osteoarthritis of right knee  Reason for surgery    S/P total knee arthroplasty, left  January 2022    Permanent atrial fibrillation (H)  Rate controlled. On eliquis, will hold 3 days prior per cardiology recommendations.     Type 2 diabetes mellitus with stage 3a chronic kidney disease, without long-term current use of insulin (H)  Well controlled.   - HEMOGLOBIN A1C  - Basic metabolic panel  (Ca, Cl, CO2, Creat, Gluc, K, Na, BUN)  - blood glucose monitoring (NO BRAND SPECIFIED) meter device kit  Dispense: 1 kit; Refill: 0  - blood glucose (NO BRAND SPECIFIED) test strip  Dispense: 200 strip; Refill: 3  - blood glucose (NO BRAND SPECIFIED) lancets standard  Dispense: 200 each; Refill: 3    Chronic kidney disease, stage 3a (H)  Stable   - CBC with platelets  - Basic metabolic panel  (Ca, Cl, CO2, Creat, Gluc, K, Na, BUN)    BALTA (obstructive sleep  apnea)  On CPAP    Morbid obesity with BMI of 50.0-59.9, adult (H)  Noted - on trulicity and is down 11# since initiating.     Hypertension goal BP (blood pressure) < 140/90  Controlled.   - CBC with platelets  - Basic metabolic panel  (Ca, Cl, CO2, Creat, Gluc, K, Na, BUN)    Chronic pain of both knees  Lumbar radiculitis  Chronic bilateral low back pain with sciatica, sciatica laterality unspecified  Uses tramadol sparingly for severe pain.   - refill traMADol (ULTRAM) 50 MG tablet  Dispense: 30 tablet; Refill: 0       Risks and Recommendations:  The patient has the following additional risks and recommendations for perioperative complications:   - Consult Hospitalist / IM to assist with post-op medical management   - Morbid obesity (BMI >40)  Diabetes:  - Patient is not on insulin therapy: regular NPO guidelines can be followed.   Obstructive Sleep Apnea:     Antiplatelet or Anticoagulation Medication Instructions:   - apixaban (Eliquis), edoxaban (Savaysa), rivaroxaban (Xarelto): Neuraxial or regional block anticipated AND CrCL (>=) 50mL/min. HOLD 3 days before surgery.   Per cardiology - Would be able to hold Eliquis x 2-3 days prior to procedure, restarting at the discretion of the surgeon, typically POD #1    Additional Medication Instructions:  Patient is to take all scheduled medications on the day of surgery EXCEPT for modifications listed below:   - ACE/ARB: HOLD on day of surgery (minimum 11 hours for general anesthesia).   - Beta Blockers: Continue taking on the day of surgery.   - Calcium Channel Blockers: May be continued on the day of surgery.   - Diuretics: HOLD on the day of surgery.   - Statins: Continue taking on the day of surgery.    - metformin: HOLD day of surgery.   - GLP-1 Injectable (exenitide, liraglutide, semaglutide, dulaglutide, etc.): HOLD 7 days before surgery    - SSRIs, SNRIs, TCAs, Antipsychotics: Continue without modification.     RECOMMENDATION:  APPROVAL GIVEN to proceed with  proposed procedure, without further diagnostic evaluation.    Nica Workman PA-C on 11/15/2023 at 2:40 PM      Subjective       HPI related to upcoming procedure:   Mary Gorman is a 58 year old female who presents for preop exam undergoing right total knee replacement. She has been feeling well overall in her usual state of health without recent illness.     Per cardiology RE: surgery plan     Updated echocardiogram to assess LVEF.  If this is normal, would not anticipate any additional cardiac testing be required prior to any surgery -- NORMAL with LVEF 60%, LV and RV within normal limits, no valvular disease noted    [id number] 3 (HTN, DM, sex) and remains on Eliquis.  Would be able to hold Eliquis x 2-3 days prior to procedure, restarting at the discretion of the surgeon, typically POD #1    Wt Readings from Last 5 Encounters:   11/15/23 (!) 152.9 kg (337 lb)   09/06/23 (!) 157.9 kg (348 lb)   08/18/23 (!) 157.4 kg (347 lb)   05/18/23 (!) 157.3 kg (346 lb 12.8 oz)   12/27/22 (!) 157.6 kg (347 lb 6.4 oz)            11/14/2023     6:20 PM   Preop Questions   1. Have you ever had a heart attack or stroke? No   2. Have you ever had surgery on your heart or blood vessels, such as a stent placement, a coronary artery bypass, or surgery on an artery in your head, neck, heart, or legs? No   3. Do you have chest pain with activity? No   4. Do you have a history of  heart failure? No   5. Do you currently have a cold, bronchitis or symptoms of other infection? No   6. Do you have a cough, shortness of breath, or wheezing? No   7. Do you or anyone in your family have previous history of blood clots? No   8. Do you or does anyone in your family have a serious bleeding problem such as prolonged bleeding following surgeries or cuts? No   9. Have you ever had problems with anemia or been told to take iron pills? No   10. Have you had any abnormal blood loss such as black, tarry or bloody stools, or abnormal  vaginal bleeding? No   11. Have you ever had a blood transfusion? No   12. Are you willing to have a blood transfusion if it is medically needed before, during, or after your surgery? Yes   13. Have you or any of your relatives ever had problems with anesthesia? No   14. Do you have sleep apnea, excessive snoring or daytime drowsiness? YES - BALTA   14a. Do you have a CPAP machine? Yes   15. Do you have any artifical heart valves or other implanted medical devices like a pacemaker, defibrillator, or continuous glucose monitor? No   16. Do you have artificial joints? YES - left knee   17. Are you allergic to latex? No   18. Is there any chance that you may be pregnant? No       Health Care Directive:  Patient has a Health Care Directive on file      Preoperative Review of :   reviewed - controlled substances reflected in medication list.      Status of Chronic Conditions:  See problem list for active medical problems.  Problems all longstanding and stable, except as noted/documented.  See ROS for pertinent symptoms related to these conditions.    Review of Systems  CONSTITUTIONAL: NEGATIVE for fever, chills, change in weight  INTEGUMENTARY/SKIN: NEGATIVE for worrisome rashes, moles or lesions  EYES: NEGATIVE for vision changes or irritation  ENT/MOUTH: POSITIVE for postnasal drainage  RESP: NEGATIVE for significant cough or SOB  CV: NEGATIVE for chest pain, palpitations or peripheral edema  GI: NEGATIVE for nausea, abdominal pain, heartburn, or change in bowel habits  : NEGATIVE for frequency, dysuria, or hematuria  MUSCULOSKELETAL:POSITIVE  for knee, back, hip pain - baseline  NEURO: POSITIVE for baseline intermittent paresthesias toes and hands. No weakness or dizziness   ENDOCRINE: NEGATIVE for temperature intolerance, skin/hair changes  HEME: NEGATIVE for bleeding problems  PSYCHIATRIC: NEGATIVE for changes in mood or affect    Patient Active Problem List    Diagnosis Date Noted    Diabetes mellitus, type 2  (H) 05/18/2023     Priority: Medium    Chronic kidney disease, stage 3a (H) 05/18/2023     Priority: Medium    Aftercare following left knee joint replacement surgery 01/24/2022     Priority: Medium    S/P total knee arthroplasty, left 01/20/2022     Priority: Medium    Osteoarthrosis, localized, primary, knee, left 12/16/2021     Priority: Medium    Hip pain, right 06/09/2017     Priority: Medium    Bilateral knee pain 06/09/2017     Priority: Medium    Prediabetes 12/08/2016     Priority: Medium     Diabetic diagnosis occurred during use of antipsychotic medications with improvement in A1C since d/c antipsychotics x 2 years.      Morbid obesity due to excess calories (H) 12/06/2016     Priority: Medium    Lipoma of skin and subcutaneous tissue 11/10/2016     Priority: Medium     11/10/2016: Present for about a year, located on LUQ of abdomen, about 19 cm in width, 12 cm in length.      ACP (advance care planning) 11/18/2014     Priority: Medium     Advance Care Planning: Receipt of ACP document:  Received: Health Care Directive which was witnessed or notarized on 7-15-11.  Document previously scanned on 7-18-11 in [facility]-moved to epic 12-29-14.  Validation form completed and sent to be scanned.  Code Status needs to be updated to reflect choices in most recent ACP document.  Confirmed/documented designated decision maker(s). See permanent comments section of demographics in clinical tab. View document(s) and details by clicking on code status. Added by Tricia Hubbard RN, System ACP Coordinator [facility] on 12/29/2014.            Diverticular disease of colon 08/18/2014     Priority: Medium    Hypertension goal BP (blood pressure) < 140/90 05/15/2014     Priority: Medium    Mild recurrent major depression (H24) 06/23/2013     Priority: Medium     Dr Varela's office asking results be faxed to their office- see contact info  wellbutrin, lamictal, gabapentin, zoloft      Hyperlipidemia LDL goal <100 05/03/2013      Priority: Medium    Low back pain 03/26/2013     Priority: Medium    Lumbar radiculitis 03/26/2013     Priority: Medium    BALTA (obstructive sleep apnea) 12/02/2011     Priority: Medium     AHI 39 and RDI of 60, desaturations to as 89%      Permanent atrial fibrillation (H) 06/05/2011     Priority: Medium      Past Medical History:   Diagnosis Date    Arthritis Nov 2019    Atrial fibrillation (H)     COPD (chronic obstructive pulmonary disease) (H)     colds turn to bronchitis easily    Depression     Depressive disorder     in chart    Diabetes (H)     GERD (gastroesophageal reflux disease)     HTN (hypertension)     Hyperlipidemia     Obese     BALTA (obstructive sleep apnea)     uses CPAP    Permanent atrial fibrillation (H) 06/05/2011    Uncomplicated asthma     mentioned by urgent care md     Past Surgical History:   Procedure Laterality Date    ABDOMEN SURGERY      in chart    ARTHROPLASTY KNEE Left 01/20/2022    Procedure: ARTHROPLASTY, LEFT KNEE, TOTAL;  Surgeon: Armand Martin MD;  Location: [facility] OR    BIOPSY      skin and uterine lining    CARDIOVERSION  06/07/2011    CHOLECYSTECTOMY      COLONOSCOPY  2013    in chart    DILATION AND CURETTAGE  04/26/2012    Procedure:DILATION AND CURETTAGE; DILATION AND CURETTAGE; Surgeon:JEAN-PAUL DEL CID; Location:[facility]    DILATION AND CURETTAGE, HYSTEROSCOPY DIAGNOSTIC, COMBINED  12/08/2011    Procedure:COMBINED DILATION AND CURETTAGE, HYSTEROSCOPY DIAGNOSTIC; DILATION AND CURETTAGE, DIAGNOSTIC HYSTEROSCOPY ; Surgeon:JEAN-PAUL DEL CID; Location:[facility]    KNEE SURGERY      REMOVE HARDWARE LOWER EXTREMITY Left 01/20/2022    Procedure: REMOVAL, HARDWARE, LEFT LOWER EXTREMITY;  Surgeon: Armand Martin MD;  Location: [facility] OR     Current Outpatient Medications   Medication Sig Dispense Refill    blood glucose (NO BRAND SPECIFIED) lancets standard Use to test blood sugar 2 times daily or as directed. 200 each 3    blood glucose (NO BRAND SPECIFIED) test strip Use to test blood sugar 2  times daily or as directed. 200 strip 3    blood glucose monitoring (NO BRAND SPECIFIED) meter device kit Use to test blood sugar 2 times daily or as directed. 1 kit 0    buPROPion (WELLBUTRIN XL) 150 MG 24 hr tablet Take 1 tablet (150 mg) by mouth every morning Take with 300 mg for total of 450 mg 90 tablet 1    buPROPion (WELLBUTRIN XL) 300 MG 24 hr tablet Take 1 tablet (300 mg) by mouth every morning Take with 150 mg for total 450 mg 90 tablet 1    cetirizine (ZYRTEC) 10 MG tablet Take 10 mg by mouth as needed for allergies      diltiazem ER (DILT-XR) 240 MG 24 hr ER beaded capsule Take 1 capsule (240 mg) by mouth daily 90 capsule 3    dulaglutide (TRULICITY) 0.75 MG/0.5ML pen Inject 0.75 mg Subcutaneous every 7 days 2 mL 3    ELIQUIS ANTICOAGULANT 5 MG tablet Take 1 tablet (5 mg) by mouth 2 times daily 180 tablet 3    fosinopril (MONOPRIL) 10 MG tablet Take 1 tablet (10 mg) by mouth daily 90 tablet 3    metFORMIN (GLUCOPHAGE XR) 500 MG 24 hr tablet Take 2 tablets (1,000 mg) by mouth daily (with dinner) 180 tablet 3    metoprolol succinate ER (TOPROL XL) 50 MG 24 hr tablet Take 1 tablet (50 mg) by mouth 2 times daily 180 tablet 3    rosuvastatin (CRESTOR) 10 MG tablet Take 1 tablet (10 mg) by mouth daily 90 tablet 3    sertraline (ZOLOFT) 100 MG tablet Take 200 mg by mouth daily 2 tabs (200mg) daily       spironolactone (ALDACTONE) 25 MG tablet Take 1 tablet (25 mg) by mouth daily 90 tablet 3    tiZANidine (ZANAFLEX) 2 MG tablet Take 1-2 tablets (2-4 mg) by mouth nightly as needed for muscle spasms 180 tablet 1    traMADol (ULTRAM) 50 MG tablet Take 1 tablet (50 mg) by mouth 2 times daily as needed for severe pain 30 tablet 0    traZODone (DESYREL) 100 MG tablet Take 1 tablet (100 mg) by mouth At Bedtime 90 tablet 1       Allergies   Allergen Reactions    Dyazide [Hydrochlorothiazide W-Triamterene] Unknown    Vistaril [Hydroxyzine] Visual Disturbance    Naproxen Rash    Nickel Rash    Robaxin [Methocarbamol] Rash     Sulfa Antibiotics Rash        Social History     Tobacco Use    Smoking status: Never     Passive exposure: Never    Smokeless tobacco: Never   Substance Use Topics    Alcohol use: Not Currently     Comment: once in a while     Family History   Problem Relation Age of Onset    Hypertension Mother     Heart Disease Mother         A-fib    Arthritis Mother     Hyperlipidemia Mother     Obesity Mother     Heart Disease Father         triple bypass    Cancer Father 50        bladder    Hypertension Father     Respiratory Father         smoker    Coronary Artery Disease Father         chf, pulm htn, by pass    Hyperlipidemia Father     Other Cancer Father         bladder    Cancer Paternal Grandmother 90        rectal    Colon Cancer Paternal Grandmother     Obesity Paternal Grandmother     Unknown/Adopted Brother     Obesity Brother      History   Drug Use No         Objective     /70   Pulse 75   Temp 98  F (36.7  C) (Tympanic)   Wt (!) 152.9 kg (337 lb)   LMP 06/25/2019 (Approximate)   SpO2 97%   BMI 56.08 kg/m      Physical Exam    GENERAL APPEARANCE: healthy, alert and no distress     EYES: EOMI, PERRL     HENT: ear canals and TM's normal and nose and mouth without ulcers or lesions     NECK: no adenopathy, no asymmetry, masses, or scars and thyroid normal to palpation     RESP: lungs clear to auscultation - no rales, rhonchi or wheezes     CV: no murmur, click or rub and irregular rhythm     ABDOMEN:  soft, nontender, no HSM or masses and bowel sounds normal     MS: extremities normal- no gross deformities noted, no evidence of inflammation in joints, FROM in all extremities.     SKIN: no suspicious lesions or rashes     NEURO: Normal strength and tone, sensory exam grossly normal, mentation intact and speech normal     PSYCH: mentation appears normal. and affect normal/bright     LYMPHATICS: No cervical adenopathy    Recent Labs   Lab Test 08/18/23  1508 06/30/23  1315 05/18/23  1533   HGB 14.3  --   15.1     --  241    136 142   POTASSIUM 4.8 4.9 5.0   CR 1.18* 1.16* 1.52*   A1C 6.9*  --  7.7*        Diagnostics:  Recent Results (from the past 24 hour(s))   HEMOGLOBIN A1C    Collection Time: 11/15/23  3:21 PM   Result Value Ref Range    Hemoglobin A1C 5.9 (H) 0.0 - 5.6 %   CBC with platelets    Collection Time: 11/15/23  3:21 PM   Result Value Ref Range    WBC Count 7.9 4.0 - 11.0 10e3/uL    RBC Count 4.99 3.80 - 5.20 10e6/uL    Hemoglobin 14.2 11.7 - 15.7 g/dL    Hematocrit 43.8 35.0 - 47.0 %    MCV 88 78 - 100 fL    MCH 28.5 26.5 - 33.0 pg    MCHC 32.4 31.5 - 36.5 g/dL    RDW 14.5 10.0 - 15.0 %    Platelet Count 230 150 - 450 10e3/uL   Basic metabolic panel  (Ca, Cl, CO2, Creat, Gluc, K, Na, BUN)    Collection Time: 11/15/23  3:21 PM   Result Value Ref Range    Sodium 136 135 - 145 mmol/L    Potassium 4.8 3.4 - 5.3 mmol/L    Chloride 101 98 - 107 mmol/L    Carbon Dioxide (CO2) 23 22 - 29 mmol/L    Anion Gap 12 7 - 15 mmol/L    Urea Nitrogen 18.8 8.0 - 23.0 mg/dL    Creatinine 1.23 (H) 0.51 - 0.95 mg/dL    GFR Estimate 50 (L) >60 mL/min/1.73m2    Calcium 10.2 (H) 8.6 - 10.0 mg/dL    Glucose 104 (H) 70 - 99 mg/dL      EKG: atrial fibrillation, rate controlled @ 75, no acute ST-T changes    Revised Cardiac Risk Index (RCRI):  The patient has the following serious cardiovascular risks for perioperative complications:   - No serious cardiac risks = 0 points     RCRI Interpretation: 0 points: Class I (very low risk - 0.4% complication rate)         Signed Electronically by: Nica Greco PA-C  Copy of this evaluation report is provided to requesting physician.

## 2023-11-16 LAB
ANION GAP SERPL CALCULATED.3IONS-SCNC: 12 MMOL/L (ref 7–15)
BUN SERPL-MCNC: 18.8 MG/DL (ref 8–23)
CALCIUM SERPL-MCNC: 10.2 MG/DL (ref 8.6–10)
CHLORIDE SERPL-SCNC: 101 MMOL/L (ref 98–107)
CREAT SERPL-MCNC: 1.23 MG/DL (ref 0.51–0.95)
DEPRECATED HCO3 PLAS-SCNC: 23 MMOL/L (ref 22–29)
EGFRCR SERPLBLD CKD-EPI 2021: 50 ML/MIN/1.73M2
GLUCOSE SERPL-MCNC: 104 MG/DL (ref 70–99)
POTASSIUM SERPL-SCNC: 4.8 MMOL/L (ref 3.4–5.3)
SODIUM SERPL-SCNC: 136 MMOL/L (ref 135–145)

## 2023-11-16 NOTE — RESULT ENCOUNTER NOTE
Mary,    I have reviewed your lab results below:    - electrolytes look good  - kidney function is stable  - A1c has improved greatly now at 5.9%  - blood counts are normal - normal hemoglobin/red blood cells (no anemia), normal white blood cells (infection fighting cells), normal platelets (affect ability to clot normally)      Please let me know if you have any further questions.    Take care,  Nica Workman PA-C on 11/16/2023 at 7:02 AM

## 2023-11-27 NOTE — PROGRESS NOTES
Ortho Navigator Note      Pre-op Date 11/15/23     Medical Clearance  Cleared     Labs Stable      COVID Test Date No longer indicated      Skin  Intact      Activity: Ambulates independently with straight cane for short distances and walker for long distances.      Equipment Need Patient has a walker for discharge. Defer to PT/OT for recs.       Meds to Hold Held all supplements 14 days prior to surgery  Antiplatelet or Anticoagulation Medication Instructions:   - Eliquis - HOLD 3 days before surgery.      Additional Medication Instructions:  Patient is to take all scheduled medications on the day of surgery EXCEPT for modifications listed below:   - fosinopril: HOLD on day of surgery (minimum 11 hours for general anesthesia).   - metoprolol: Continue taking on the day of surgery.   - Diltiazem: May be continued on the day of surgery.   - spironolactone: HOLD on the day of surgery.   - Statins: Continue taking on the day of surgery.    - metformin: HOLD day of surgery.   - trulicity: HOLD 7 days before surgery (OK to give 11/18 injection, then OK to hold until after surgery)   - sertraline ad wellbutrin: Continue without modification.   * Medication recommendations are not intended to be exhaustive; they are limited to common medications that are potentially dangerous if incorrectly managed   NPO Instructions  Instructed to stop solids 8 hr prior to arrival and clears 2 hr prior to arrival.       Pre-op Joint Education Complete? Complete   Discharge Plan Patient has plan to discharge to TCU when stable. She stated that her mother is elderly and quite overwhelmed about caring for Mary post-op. Mary stated that she has spoken with [facility] in [place] and they stated that they would be able to admit on a Saturday because they know her as they are both employed by [employer] and work closely.  She stated that if insurance will not cover a TCU stay, she will private pay.  Upon discharge from TCU she  will continue her recovery at her mothers condo as it is all on one level and is more conducive to safe recovery. Email sent to inpatient care coordination/SW team to update on plan for discharge.     /Transportation  (mother) is unable to physically care for patient.      Barriers to Discharge No barriers to discharge.      Additional Info/   Special Needs : Patient had no unanswered questions or concerns.         11/27/23 1017   Discharge Planning   Patient/Family Anticipates Transition to home with family   Concerns to be Addressed no discharge needs identified   Living Arrangements   People in Home alone   Type of Residence Assisted Living   Is your private residence a single family home or apartment? Single family home   Number of Stairs, Within Home, Primary greater than 10 stairs   Stair Railings, Within Home, Primary railings safe and in good condition   Once home, are you able to live on one level? No   Bathroom Shower/Tub Walk-in shower   Equipment Currently Used at Home cane, straight   Support System   Support Systems Parent  (Elderly mother unable to care for patient immediately post-op.)   Do you have someone available to stay with you one or two nights once you are home? No   Medical Clearance   Date of Physical 11/15/23   Clinic Name [facility]   It is recommended that you call and check with any specialty providers before surgery to see if you need surgical clearance.  Do you see any specialty providers outside of your primary care provider? No   Blood   Known Bleeding Disorder or Coagulopathy No   Does the patient have any Hindu/cultural preferences related to blood products? No   Education   Has the patient scheduled or completed pre-op total joint education, either in class or online, in the last 12 months? Yes   Patient attended total joint pre-op class/received pre-op teaching  online   [R] Discharge Planning   Care Facility Name Pt would like to go to TCU at [facility]  [place]. [place] to private pay as needed.

## 2023-11-30 ENCOUNTER — ANESTHESIA EVENT (OUTPATIENT)
Dept: SURGERY | Facility: CLINIC | Age: 58
End: 2023-11-30
Payer: COMMERCIAL

## 2023-12-01 ENCOUNTER — APPOINTMENT (OUTPATIENT)
Dept: GENERAL RADIOLOGY | Facility: CLINIC | Age: 58
End: 2023-12-01
Attending: STUDENT IN AN ORGANIZED HEALTH CARE EDUCATION/TRAINING PROGRAM
Payer: COMMERCIAL

## 2023-12-01 ENCOUNTER — ANESTHESIA (OUTPATIENT)
Dept: SURGERY | Facility: CLINIC | Age: 58
End: 2023-12-01
Payer: COMMERCIAL

## 2023-12-01 ENCOUNTER — HOSPITAL ENCOUNTER (OUTPATIENT)
Facility: CLINIC | Age: 58
Discharge: NURSING, REHAB OR LONG-TERM CARE | End: 2023-12-05
Attending: STUDENT IN AN ORGANIZED HEALTH CARE EDUCATION/TRAINING PROGRAM | Admitting: STUDENT IN AN ORGANIZED HEALTH CARE EDUCATION/TRAINING PROGRAM
Payer: COMMERCIAL

## 2023-12-01 ENCOUNTER — APPOINTMENT (OUTPATIENT)
Dept: PHYSICAL THERAPY | Facility: CLINIC | Age: 58
End: 2023-12-01
Attending: STUDENT IN AN ORGANIZED HEALTH CARE EDUCATION/TRAINING PROGRAM
Payer: COMMERCIAL

## 2023-12-01 DIAGNOSIS — M17.11 OSTEOARTHROSIS, LOCALIZED, PRIMARY, KNEE, RIGHT: Primary | ICD-10-CM

## 2023-12-01 PROBLEM — Z96.651 S/P TOTAL KNEE ARTHROPLASTY, RIGHT: Status: ACTIVE | Noted: 2023-12-01

## 2023-12-01 LAB
GLUCOSE BLDC GLUCOMTR-MCNC: 129 MG/DL (ref 70–99)
GLUCOSE BLDC GLUCOMTR-MCNC: 177 MG/DL (ref 70–99)
GLUCOSE BLDC GLUCOMTR-MCNC: 183 MG/DL (ref 70–99)
GLUCOSE BLDC GLUCOMTR-MCNC: 186 MG/DL (ref 70–99)

## 2023-12-01 PROCEDURE — 250N000013 HC RX MED GY IP 250 OP 250 PS 637

## 2023-12-01 PROCEDURE — 250N000011 HC RX IP 250 OP 636: Performed by: NURSE ANESTHETIST, CERTIFIED REGISTERED

## 2023-12-01 PROCEDURE — 250N000011 HC RX IP 250 OP 636: Performed by: STUDENT IN AN ORGANIZED HEALTH CARE EDUCATION/TRAINING PROGRAM

## 2023-12-01 PROCEDURE — 999N000065 XR KNEE PORT RIGHT 1/2 VIEWS: Mod: RT

## 2023-12-01 PROCEDURE — 97530 THERAPEUTIC ACTIVITIES: CPT | Mod: GP

## 2023-12-01 PROCEDURE — 258N000003 HC RX IP 258 OP 636: Performed by: NURSE ANESTHETIST, CERTIFIED REGISTERED

## 2023-12-01 PROCEDURE — 82962 GLUCOSE BLOOD TEST: CPT

## 2023-12-01 PROCEDURE — 250N000012 HC RX MED GY IP 250 OP 636 PS 637

## 2023-12-01 PROCEDURE — 250N000013 HC RX MED GY IP 250 OP 250 PS 637: Performed by: STUDENT IN AN ORGANIZED HEALTH CARE EDUCATION/TRAINING PROGRAM

## 2023-12-01 PROCEDURE — 250N000025 HC SEVOFLURANE, PER MIN: Performed by: STUDENT IN AN ORGANIZED HEALTH CARE EDUCATION/TRAINING PROGRAM

## 2023-12-01 PROCEDURE — 258N000003 HC RX IP 258 OP 636: Performed by: STUDENT IN AN ORGANIZED HEALTH CARE EDUCATION/TRAINING PROGRAM

## 2023-12-01 PROCEDURE — C1776 JOINT DEVICE (IMPLANTABLE): HCPCS | Performed by: STUDENT IN AN ORGANIZED HEALTH CARE EDUCATION/TRAINING PROGRAM

## 2023-12-01 PROCEDURE — C1713 ANCHOR/SCREW BN/BN,TIS/BN: HCPCS | Performed by: STUDENT IN AN ORGANIZED HEALTH CARE EDUCATION/TRAINING PROGRAM

## 2023-12-01 PROCEDURE — 27447 TOTAL KNEE ARTHROPLASTY: CPT | Mod: RT | Performed by: STUDENT IN AN ORGANIZED HEALTH CARE EDUCATION/TRAINING PROGRAM

## 2023-12-01 PROCEDURE — 272N000001 HC OR GENERAL SUPPLY STERILE: Performed by: STUDENT IN AN ORGANIZED HEALTH CARE EDUCATION/TRAINING PROGRAM

## 2023-12-01 PROCEDURE — 360N000077 HC SURGERY LEVEL 4, PER MIN: Performed by: STUDENT IN AN ORGANIZED HEALTH CARE EDUCATION/TRAINING PROGRAM

## 2023-12-01 PROCEDURE — 97161 PT EVAL LOW COMPLEX 20 MIN: CPT | Mod: GP

## 2023-12-01 PROCEDURE — 250N000011 HC RX IP 250 OP 636: Performed by: ANESTHESIOLOGY

## 2023-12-01 PROCEDURE — 99417 PROLNG OP E/M EACH 15 MIN: CPT

## 2023-12-01 PROCEDURE — 370N000017 HC ANESTHESIA TECHNICAL FEE, PER MIN: Performed by: STUDENT IN AN ORGANIZED HEALTH CARE EDUCATION/TRAINING PROGRAM

## 2023-12-01 PROCEDURE — 710N000009 HC RECOVERY PHASE 1, LEVEL 1, PER MIN: Performed by: STUDENT IN AN ORGANIZED HEALTH CARE EDUCATION/TRAINING PROGRAM

## 2023-12-01 PROCEDURE — 250N000009 HC RX 250: Performed by: STUDENT IN AN ORGANIZED HEALTH CARE EDUCATION/TRAINING PROGRAM

## 2023-12-01 PROCEDURE — 258N000001 HC RX 258: Performed by: STUDENT IN AN ORGANIZED HEALTH CARE EDUCATION/TRAINING PROGRAM

## 2023-12-01 PROCEDURE — 250N000009 HC RX 250: Performed by: NURSE ANESTHETIST, CERTIFIED REGISTERED

## 2023-12-01 PROCEDURE — 999N000141 HC STATISTIC PRE-PROCEDURE NURSING ASSESSMENT: Performed by: STUDENT IN AN ORGANIZED HEALTH CARE EDUCATION/TRAINING PROGRAM

## 2023-12-01 PROCEDURE — 99215 OFFICE O/P EST HI 40 MIN: CPT

## 2023-12-01 PROCEDURE — 97110 THERAPEUTIC EXERCISES: CPT | Mod: GP

## 2023-12-01 DEVICE — IMPLANTABLE DEVICE
Type: IMPLANTABLE DEVICE | Site: KNEE | Status: FUNCTIONAL
Brand: PERSONA®

## 2023-12-01 DEVICE — BONE CEMENT SIMPLEX FULL DOSE 6191-1-001: Type: IMPLANTABLE DEVICE | Site: KNEE | Status: FUNCTIONAL

## 2023-12-01 DEVICE — IMPLANTABLE DEVICE
Type: IMPLANTABLE DEVICE | Site: KNEE | Status: FUNCTIONAL
Brand: PERSONA® NATURAL TIBIA®

## 2023-12-01 DEVICE — IMPLANTABLE DEVICE
Type: IMPLANTABLE DEVICE | Site: KNEE | Status: FUNCTIONAL
Brand: PERSONA® VIVACIT-E®

## 2023-12-01 RX ORDER — FOSINOPRIL SODIUM 10 MG/1
10 TABLET ORAL DAILY
Status: DISCONTINUED | OUTPATIENT
Start: 2023-12-02 | End: 2023-12-05 | Stop reason: HOSPADM

## 2023-12-01 RX ORDER — OXYCODONE HYDROCHLORIDE 5 MG/1
5-10 TABLET ORAL EVERY 4 HOURS PRN
Qty: 26 TABLET | Refills: 0 | Status: SHIPPED | OUTPATIENT
Start: 2023-12-01 | End: 2023-12-04

## 2023-12-01 RX ORDER — LIDOCAINE 40 MG/G
CREAM TOPICAL
Status: DISCONTINUED | OUTPATIENT
Start: 2023-12-01 | End: 2023-12-05 | Stop reason: HOSPADM

## 2023-12-01 RX ORDER — SODIUM CHLORIDE, SODIUM LACTATE, POTASSIUM CHLORIDE, CALCIUM CHLORIDE 600; 310; 30; 20 MG/100ML; MG/100ML; MG/100ML; MG/100ML
INJECTION, SOLUTION INTRAVENOUS CONTINUOUS
Status: DISCONTINUED | OUTPATIENT
Start: 2023-12-01 | End: 2023-12-01 | Stop reason: HOSPADM

## 2023-12-01 RX ORDER — FENTANYL CITRATE 50 UG/ML
25 INJECTION, SOLUTION INTRAMUSCULAR; INTRAVENOUS EVERY 5 MIN PRN
Status: DISCONTINUED | OUTPATIENT
Start: 2023-12-01 | End: 2023-12-01 | Stop reason: HOSPADM

## 2023-12-01 RX ORDER — DEXAMETHASONE SODIUM PHOSPHATE 4 MG/ML
INJECTION, SOLUTION INTRA-ARTICULAR; INTRALESIONAL; INTRAMUSCULAR; INTRAVENOUS; SOFT TISSUE PRN
Status: DISCONTINUED | OUTPATIENT
Start: 2023-12-01 | End: 2023-12-01

## 2023-12-01 RX ORDER — NALOXONE HYDROCHLORIDE 0.4 MG/ML
0.2 INJECTION, SOLUTION INTRAMUSCULAR; INTRAVENOUS; SUBCUTANEOUS
Status: DISCONTINUED | OUTPATIENT
Start: 2023-12-01 | End: 2023-12-05 | Stop reason: HOSPADM

## 2023-12-01 RX ORDER — SODIUM CHLORIDE, SODIUM LACTATE, POTASSIUM CHLORIDE, CALCIUM CHLORIDE 600; 310; 30; 20 MG/100ML; MG/100ML; MG/100ML; MG/100ML
INJECTION, SOLUTION INTRAVENOUS
Status: DISPENSED
Start: 2023-12-01 | End: 2023-12-02

## 2023-12-01 RX ORDER — OXYCODONE HYDROCHLORIDE 10 MG/1
10 TABLET ORAL
Status: DISCONTINUED | OUTPATIENT
Start: 2023-12-01 | End: 2023-12-01 | Stop reason: HOSPADM

## 2023-12-01 RX ORDER — ONDANSETRON 2 MG/ML
4 INJECTION INTRAMUSCULAR; INTRAVENOUS EVERY 30 MIN PRN
Status: DISCONTINUED | OUTPATIENT
Start: 2023-12-01 | End: 2023-12-01 | Stop reason: HOSPADM

## 2023-12-01 RX ORDER — ACETAMINOPHEN 325 MG/1
975 TABLET ORAL EVERY 8 HOURS
Status: DISPENSED | OUTPATIENT
Start: 2023-12-01 | End: 2023-12-04

## 2023-12-01 RX ORDER — HYDROMORPHONE HYDROCHLORIDE 1 MG/ML
0.2 INJECTION, SOLUTION INTRAMUSCULAR; INTRAVENOUS; SUBCUTANEOUS EVERY 5 MIN PRN
Status: DISCONTINUED | OUTPATIENT
Start: 2023-12-01 | End: 2023-12-01 | Stop reason: HOSPADM

## 2023-12-01 RX ORDER — CEFAZOLIN SODIUM 2 G/100ML
2 INJECTION, SOLUTION INTRAVENOUS EVERY 8 HOURS
Qty: 200 ML | Refills: 0 | Status: COMPLETED | OUTPATIENT
Start: 2023-12-01 | End: 2023-12-01

## 2023-12-01 RX ORDER — TRAZODONE HYDROCHLORIDE 100 MG/1
100 TABLET ORAL AT BEDTIME
Status: DISCONTINUED | OUTPATIENT
Start: 2023-12-01 | End: 2023-12-05 | Stop reason: HOSPADM

## 2023-12-01 RX ORDER — POLYETHYLENE GLYCOL 3350 17 G/17G
1 POWDER, FOR SOLUTION ORAL DAILY
Qty: 7 PACKET | Refills: 0 | Status: SHIPPED | OUTPATIENT
Start: 2023-12-01 | End: 2023-12-04

## 2023-12-01 RX ORDER — ACETAMINOPHEN 325 MG/1
650 TABLET ORAL EVERY 4 HOURS PRN
Qty: 100 TABLET | Refills: 0 | Status: SHIPPED | OUTPATIENT
Start: 2023-12-01 | End: 2023-12-04

## 2023-12-01 RX ORDER — ONDANSETRON 2 MG/ML
4 INJECTION INTRAMUSCULAR; INTRAVENOUS EVERY 6 HOURS PRN
Status: DISCONTINUED | OUTPATIENT
Start: 2023-12-01 | End: 2023-12-05 | Stop reason: HOSPADM

## 2023-12-01 RX ORDER — SODIUM CHLORIDE, SODIUM LACTATE, POTASSIUM CHLORIDE, CALCIUM CHLORIDE 600; 310; 30; 20 MG/100ML; MG/100ML; MG/100ML; MG/100ML
INJECTION, SOLUTION INTRAVENOUS CONTINUOUS
Status: DISCONTINUED | OUTPATIENT
Start: 2023-12-01 | End: 2023-12-05 | Stop reason: HOSPADM

## 2023-12-01 RX ORDER — ONDANSETRON 4 MG/1
4 TABLET, ORALLY DISINTEGRATING ORAL EVERY 30 MIN PRN
Status: DISCONTINUED | OUTPATIENT
Start: 2023-12-01 | End: 2023-12-01 | Stop reason: HOSPADM

## 2023-12-01 RX ORDER — AMOXICILLIN 250 MG
1-2 CAPSULE ORAL 2 TIMES DAILY
Qty: 30 TABLET | Refills: 0 | Status: SHIPPED | OUTPATIENT
Start: 2023-12-01 | End: 2023-12-04

## 2023-12-01 RX ORDER — SERTRALINE HYDROCHLORIDE 100 MG/1
200 TABLET, FILM COATED ORAL DAILY
Status: DISCONTINUED | OUTPATIENT
Start: 2023-12-02 | End: 2023-12-05 | Stop reason: HOSPADM

## 2023-12-01 RX ORDER — PROPOFOL 10 MG/ML
INJECTION, EMULSION INTRAVENOUS PRN
Status: DISCONTINUED | OUTPATIENT
Start: 2023-12-01 | End: 2023-12-01

## 2023-12-01 RX ORDER — DILTIAZEM HYDROCHLORIDE 240 MG/1
240 CAPSULE, EXTENDED RELEASE ORAL DAILY
Status: DISCONTINUED | OUTPATIENT
Start: 2023-12-02 | End: 2023-12-05 | Stop reason: HOSPADM

## 2023-12-01 RX ORDER — HYDROMORPHONE HCL IN WATER/PF 6 MG/30 ML
0.2 PATIENT CONTROLLED ANALGESIA SYRINGE INTRAVENOUS
Status: DISCONTINUED | OUTPATIENT
Start: 2023-12-01 | End: 2023-12-05 | Stop reason: HOSPADM

## 2023-12-01 RX ORDER — FENTANYL CITRATE 50 UG/ML
50 INJECTION, SOLUTION INTRAMUSCULAR; INTRAVENOUS EVERY 5 MIN PRN
Status: DISCONTINUED | OUTPATIENT
Start: 2023-12-01 | End: 2023-12-01 | Stop reason: HOSPADM

## 2023-12-01 RX ORDER — NALOXONE HYDROCHLORIDE 0.4 MG/ML
0.4 INJECTION, SOLUTION INTRAMUSCULAR; INTRAVENOUS; SUBCUTANEOUS
Status: DISCONTINUED | OUTPATIENT
Start: 2023-12-01 | End: 2023-12-05 | Stop reason: HOSPADM

## 2023-12-01 RX ORDER — FENTANYL CITRATE 50 UG/ML
INJECTION, SOLUTION INTRAMUSCULAR; INTRAVENOUS PRN
Status: DISCONTINUED | OUTPATIENT
Start: 2023-12-01 | End: 2023-12-01

## 2023-12-01 RX ORDER — CEFAZOLIN SODIUM/WATER 3 G/30 ML
3 SYRINGE (ML) INTRAVENOUS SEE ADMIN INSTRUCTIONS
Status: DISCONTINUED | OUTPATIENT
Start: 2023-12-01 | End: 2023-12-01 | Stop reason: HOSPADM

## 2023-12-01 RX ORDER — ACETAMINOPHEN 500 MG
1000 TABLET ORAL EVERY 8 HOURS PRN
COMMUNITY
End: 2024-09-17

## 2023-12-01 RX ORDER — ONDANSETRON 2 MG/ML
INJECTION INTRAMUSCULAR; INTRAVENOUS PRN
Status: DISCONTINUED | OUTPATIENT
Start: 2023-12-01 | End: 2023-12-01

## 2023-12-01 RX ORDER — PROCHLORPERAZINE MALEATE 10 MG
10 TABLET ORAL EVERY 6 HOURS PRN
Status: DISCONTINUED | OUTPATIENT
Start: 2023-12-01 | End: 2023-12-05 | Stop reason: HOSPADM

## 2023-12-01 RX ORDER — OXYCODONE HYDROCHLORIDE 5 MG/1
5 TABLET ORAL
Status: DISCONTINUED | OUTPATIENT
Start: 2023-12-01 | End: 2023-12-01 | Stop reason: HOSPADM

## 2023-12-01 RX ORDER — AMOXICILLIN 250 MG
1 CAPSULE ORAL 2 TIMES DAILY
Status: DISCONTINUED | OUTPATIENT
Start: 2023-12-01 | End: 2023-12-05 | Stop reason: HOSPADM

## 2023-12-01 RX ORDER — LIDOCAINE HYDROCHLORIDE 20 MG/ML
INJECTION, SOLUTION INFILTRATION; PERINEURAL PRN
Status: DISCONTINUED | OUTPATIENT
Start: 2023-12-01 | End: 2023-12-01

## 2023-12-01 RX ORDER — DEXTROSE MONOHYDRATE 25 G/50ML
25-50 INJECTION, SOLUTION INTRAVENOUS
Status: DISCONTINUED | OUTPATIENT
Start: 2023-12-01 | End: 2023-12-05 | Stop reason: HOSPADM

## 2023-12-01 RX ORDER — ONDANSETRON 4 MG/1
4 TABLET, ORALLY DISINTEGRATING ORAL EVERY 6 HOURS PRN
Status: DISCONTINUED | OUTPATIENT
Start: 2023-12-01 | End: 2023-12-05 | Stop reason: HOSPADM

## 2023-12-01 RX ORDER — ACETAMINOPHEN 325 MG/1
650 TABLET ORAL EVERY 4 HOURS PRN
Status: DISCONTINUED | OUTPATIENT
Start: 2023-12-04 | End: 2023-12-05 | Stop reason: HOSPADM

## 2023-12-01 RX ORDER — OXYCODONE HYDROCHLORIDE 10 MG/1
10 TABLET ORAL EVERY 4 HOURS PRN
Status: DISCONTINUED | OUTPATIENT
Start: 2023-12-01 | End: 2023-12-05 | Stop reason: HOSPADM

## 2023-12-01 RX ORDER — HYDROMORPHONE HYDROCHLORIDE 1 MG/ML
0.4 INJECTION, SOLUTION INTRAMUSCULAR; INTRAVENOUS; SUBCUTANEOUS EVERY 5 MIN PRN
Status: DISCONTINUED | OUTPATIENT
Start: 2023-12-01 | End: 2023-12-01 | Stop reason: HOSPADM

## 2023-12-01 RX ORDER — NICOTINE POLACRILEX 4 MG
15-30 LOZENGE BUCCAL
Status: DISCONTINUED | OUTPATIENT
Start: 2023-12-01 | End: 2023-12-05 | Stop reason: HOSPADM

## 2023-12-01 RX ORDER — SERTRALINE HYDROCHLORIDE 100 MG/1
200 TABLET, FILM COATED ORAL DAILY
Status: DISCONTINUED | OUTPATIENT
Start: 2023-12-01 | End: 2023-12-01

## 2023-12-01 RX ORDER — BISACODYL 10 MG
10 SUPPOSITORY, RECTAL RECTAL DAILY PRN
Status: DISCONTINUED | OUTPATIENT
Start: 2023-12-01 | End: 2023-12-05 | Stop reason: HOSPADM

## 2023-12-01 RX ORDER — SPIRONOLACTONE 25 MG/1
25 TABLET ORAL DAILY
Status: DISCONTINUED | OUTPATIENT
Start: 2023-12-01 | End: 2023-12-05 | Stop reason: HOSPADM

## 2023-12-01 RX ORDER — KETOROLAC TROMETHAMINE 30 MG/ML
15 INJECTION, SOLUTION INTRAMUSCULAR; INTRAVENOUS EVERY 6 HOURS
Status: COMPLETED | OUTPATIENT
Start: 2023-12-01 | End: 2023-12-02

## 2023-12-01 RX ORDER — TRANEXAMIC ACID 650 MG/1
1950 TABLET ORAL ONCE
Status: COMPLETED | OUTPATIENT
Start: 2023-12-01 | End: 2023-12-01

## 2023-12-01 RX ORDER — OXYCODONE HYDROCHLORIDE 5 MG/1
5 TABLET ORAL EVERY 4 HOURS PRN
Status: DISCONTINUED | OUTPATIENT
Start: 2023-12-01 | End: 2023-12-05 | Stop reason: HOSPADM

## 2023-12-01 RX ORDER — ROSUVASTATIN CALCIUM 5 MG/1
10 TABLET, COATED ORAL EVERY EVENING
Status: DISCONTINUED | OUTPATIENT
Start: 2023-12-01 | End: 2023-12-05 | Stop reason: HOSPADM

## 2023-12-01 RX ORDER — METOPROLOL TARTRATE 1 MG/ML
INJECTION, SOLUTION INTRAVENOUS PRN
Status: DISCONTINUED | OUTPATIENT
Start: 2023-12-01 | End: 2023-12-01

## 2023-12-01 RX ORDER — HYDROMORPHONE HCL IN WATER/PF 6 MG/30 ML
0.4 PATIENT CONTROLLED ANALGESIA SYRINGE INTRAVENOUS
Status: DISCONTINUED | OUTPATIENT
Start: 2023-12-01 | End: 2023-12-05 | Stop reason: HOSPADM

## 2023-12-01 RX ORDER — SODIUM CHLORIDE, SODIUM LACTATE, POTASSIUM CHLORIDE, CALCIUM CHLORIDE 600; 310; 30; 20 MG/100ML; MG/100ML; MG/100ML; MG/100ML
INJECTION, SOLUTION INTRAVENOUS CONTINUOUS PRN
Status: DISCONTINUED | OUTPATIENT
Start: 2023-12-01 | End: 2023-12-01

## 2023-12-01 RX ORDER — METOPROLOL SUCCINATE 25 MG/1
50 TABLET, EXTENDED RELEASE ORAL 2 TIMES DAILY
Status: DISCONTINUED | OUTPATIENT
Start: 2023-12-01 | End: 2023-12-05 | Stop reason: HOSPADM

## 2023-12-01 RX ORDER — CEFAZOLIN SODIUM/WATER 3 G/30 ML
3 SYRINGE (ML) INTRAVENOUS
Status: COMPLETED | OUTPATIENT
Start: 2023-12-01 | End: 2023-12-01

## 2023-12-01 RX ORDER — IBUPROFEN 600 MG/1
600 TABLET, FILM COATED ORAL EVERY 6 HOURS PRN
Qty: 30 TABLET | Refills: 0 | Status: SHIPPED | OUTPATIENT
Start: 2023-12-01 | End: 2023-12-04

## 2023-12-01 RX ORDER — POLYETHYLENE GLYCOL 3350 17 G/17G
17 POWDER, FOR SOLUTION ORAL DAILY
Status: DISCONTINUED | OUTPATIENT
Start: 2023-12-02 | End: 2023-12-05 | Stop reason: HOSPADM

## 2023-12-01 RX ADMIN — Medication 70 MG: at 07:37

## 2023-12-01 RX ADMIN — TRAZODONE HYDROCHLORIDE 100 MG: 100 TABLET ORAL at 22:56

## 2023-12-01 RX ADMIN — DEXAMETHASONE SODIUM PHOSPHATE 4 MG: 4 INJECTION, SOLUTION INTRA-ARTICULAR; INTRALESIONAL; INTRAMUSCULAR; INTRAVENOUS; SOFT TISSUE at 08:00

## 2023-12-01 RX ADMIN — METOPROLOL TARTRATE 1 MG: 5 INJECTION INTRAVENOUS at 08:44

## 2023-12-01 RX ADMIN — HYDROMORPHONE HYDROCHLORIDE 0.5 MG: 1 INJECTION, SOLUTION INTRAMUSCULAR; INTRAVENOUS; SUBCUTANEOUS at 09:35

## 2023-12-01 RX ADMIN — HYDROMORPHONE HYDROCHLORIDE 0.4 MG: 1 INJECTION, SOLUTION INTRAMUSCULAR; INTRAVENOUS; SUBCUTANEOUS at 10:59

## 2023-12-01 RX ADMIN — KETOROLAC TROMETHAMINE 15 MG: 30 INJECTION, SOLUTION INTRAMUSCULAR; INTRAVENOUS at 17:50

## 2023-12-01 RX ADMIN — METOPROLOL TARTRATE 1 MG: 5 INJECTION INTRAVENOUS at 08:48

## 2023-12-01 RX ADMIN — LIDOCAINE HYDROCHLORIDE 100 MG: 20 INJECTION, SOLUTION INFILTRATION; PERINEURAL at 07:37

## 2023-12-01 RX ADMIN — MIDAZOLAM 2 MG: 1 INJECTION INTRAMUSCULAR; INTRAVENOUS at 07:29

## 2023-12-01 RX ADMIN — OXYCODONE HYDROCHLORIDE 5 MG: 5 TABLET ORAL at 15:11

## 2023-12-01 RX ADMIN — KETOROLAC TROMETHAMINE 15 MG: 30 INJECTION, SOLUTION INTRAMUSCULAR; INTRAVENOUS at 11:32

## 2023-12-01 RX ADMIN — ONDANSETRON 4 MG: 2 INJECTION INTRAMUSCULAR; INTRAVENOUS at 09:52

## 2023-12-01 RX ADMIN — PHENYLEPHRINE HYDROCHLORIDE 0.1 MCG/KG/MIN: 10 INJECTION INTRAVENOUS at 08:00

## 2023-12-01 RX ADMIN — ACETAMINOPHEN 975 MG: 325 TABLET, FILM COATED ORAL at 11:37

## 2023-12-01 RX ADMIN — KETOROLAC TROMETHAMINE 15 MG: 30 INJECTION, SOLUTION INTRAMUSCULAR; INTRAVENOUS at 23:32

## 2023-12-01 RX ADMIN — HYDROMORPHONE HYDROCHLORIDE 0.5 MG: 1 INJECTION, SOLUTION INTRAMUSCULAR; INTRAVENOUS; SUBCUTANEOUS at 08:27

## 2023-12-01 RX ADMIN — METOPROLOL TARTRATE 1 MG: 5 INJECTION INTRAVENOUS at 09:04

## 2023-12-01 RX ADMIN — FENTANYL CITRATE 50 MCG: 50 INJECTION INTRAMUSCULAR; INTRAVENOUS at 08:15

## 2023-12-01 RX ADMIN — DOCUSATE SODIUM AND SENNOSIDES 1 TABLET: 8.6; 5 TABLET, FILM COATED ORAL at 20:12

## 2023-12-01 RX ADMIN — FENTANYL CITRATE 50 MCG: 50 INJECTION INTRAMUSCULAR; INTRAVENOUS at 10:21

## 2023-12-01 RX ADMIN — ROSUVASTATIN CALCIUM 10 MG: 5 TABLET, FILM COATED ORAL at 20:13

## 2023-12-01 RX ADMIN — PROPOFOL 50 MG: 10 INJECTION, EMULSION INTRAVENOUS at 08:18

## 2023-12-01 RX ADMIN — FENTANYL CITRATE 100 MCG: 50 INJECTION INTRAMUSCULAR; INTRAVENOUS at 07:37

## 2023-12-01 RX ADMIN — CEFAZOLIN SODIUM 2 G: 2 INJECTION, SOLUTION INTRAVENOUS at 15:04

## 2023-12-01 RX ADMIN — Medication 3 G: at 07:37

## 2023-12-01 RX ADMIN — METOPROLOL SUCCINATE 50 MG: 25 TABLET, FILM COATED, EXTENDED RELEASE ORAL at 20:12

## 2023-12-01 RX ADMIN — SODIUM CHLORIDE, POTASSIUM CHLORIDE, SODIUM LACTATE AND CALCIUM CHLORIDE: 600; 310; 30; 20 INJECTION, SOLUTION INTRAVENOUS at 07:25

## 2023-12-01 RX ADMIN — INSULIN ASPART 1 UNITS: 100 INJECTION, SOLUTION INTRAVENOUS; SUBCUTANEOUS at 19:23

## 2023-12-01 RX ADMIN — FENTANYL CITRATE 50 MCG: 50 INJECTION INTRAMUSCULAR; INTRAVENOUS at 10:36

## 2023-12-01 RX ADMIN — PROPOFOL 250 MG: 10 INJECTION, EMULSION INTRAVENOUS at 07:37

## 2023-12-01 RX ADMIN — METOPROLOL TARTRATE 1 MG: 5 INJECTION INTRAVENOUS at 08:38

## 2023-12-01 RX ADMIN — FENTANYL CITRATE 50 MCG: 50 INJECTION INTRAMUSCULAR; INTRAVENOUS at 08:20

## 2023-12-01 RX ADMIN — SUGAMMADEX 200 MG: 100 INJECTION, SOLUTION INTRAVENOUS at 09:55

## 2023-12-01 RX ADMIN — METOPROLOL TARTRATE 1 MG: 5 INJECTION INTRAVENOUS at 09:17

## 2023-12-01 RX ADMIN — SODIUM CHLORIDE, POTASSIUM CHLORIDE, SODIUM LACTATE AND CALCIUM CHLORIDE: 600; 310; 30; 20 INJECTION, SOLUTION INTRAVENOUS at 14:08

## 2023-12-01 RX ADMIN — TRANEXAMIC ACID 1950 MG: 650 TABLET ORAL at 06:42

## 2023-12-01 RX ADMIN — OXYCODONE HYDROCHLORIDE 5 MG: 5 TABLET ORAL at 20:13

## 2023-12-01 RX ADMIN — CEFAZOLIN SODIUM 2 G: 2 INJECTION, SOLUTION INTRAVENOUS at 22:56

## 2023-12-01 ASSESSMENT — ACTIVITIES OF DAILY LIVING (ADL)
ADLS_ACUITY_SCORE: 25
ADLS_ACUITY_SCORE: 22
ADLS_ACUITY_SCORE: 24
ADLS_ACUITY_SCORE: 25
ADLS_ACUITY_SCORE: 24
ADLS_ACUITY_SCORE: 25
ADLS_ACUITY_SCORE: 25
ADLS_ACUITY_SCORE: 24
ADLS_ACUITY_SCORE: 24

## 2023-12-01 ASSESSMENT — COPD QUESTIONNAIRES: COPD: 1

## 2023-12-01 NOTE — ANESTHESIA PROCEDURE NOTES
Airway       Patient location during procedure: OR       Procedure Start/Stop Times: 12/1/2023 7:40 AM  Staff -        CRNA: Fred Morse APRN CRNA       Performed By: CRNA  Consent for Airway        Urgency: elective  Indications and Patient Condition       Indications for airway management: rafa-procedural       Induction type:intravenous       Mask difficulty assessment: 2 - vent by mask + OA or adjuvant +/- NMBA    Final Airway Details       Final airway type: endotracheal airway       Successful airway: ETT - single  Endotracheal Airway Details        ETT size (mm): 7.0       Successful intubation technique: video laryngoscopy       VL Blade Size: MAC 4       Grade View of Cords: 2       Adjucts: stylet       Position: Right       Measured from: lips       Secured at (cm): 22       Bite block used: None    Post intubation assessment        Placement verified by: capnometry, equal breath sounds and chest rise        Number of attempts at approach: 1       Number of other approaches attempted: 0       Secured with: tape       Ease of procedure: easy       Dentition: Intact and Unchanged    Medication(s) Administered   Medication Administration Time: 12/1/2023 7:40 AM

## 2023-12-01 NOTE — ANESTHESIA POSTPROCEDURE EVALUATION
Patient: Mary Gorman    Procedure: Procedure(s):  ARTHROPLASTY, RIGHT KNEE, TOTAL       Anesthesia Type:  General    Note:  Disposition: Inpatient; Admission   Postop Pain Control: Uneventful            Sign Out: Well controlled pain   PONV: No   Neuro/Psych: Uneventful            Sign Out: Acceptable/Baseline neuro status   Airway/Respiratory: Uneventful            Sign Out: Acceptable/Baseline resp. status   CV/Hemodynamics: Uneventful            Sign Out: Acceptable CV status; No obvious hypovolemia; No obvious fluid overload   Other NRE: NONE   DID A NON-ROUTINE EVENT OCCUR? No       Last vitals:  Vitals Value Taken Time   /91 12/01/23 1130   Temp 36.5  C (97.7  F) 12/01/23 1045   Pulse 76 12/01/23 1133   Resp 11 12/01/23 1133   SpO2 97 % 12/01/23 1133   Vitals shown include unfiled device data.    Electronically Signed By: Ibrahiam Enrique MD  December 1, 2023  11:34 AM

## 2023-12-01 NOTE — PHARMACY-ADMISSION MEDICATION HISTORY
Pharmacist Admission Medication History    Admission medication history is complete. The information provided in this note is only as accurate as the sources available at the time of the update.    Information Source(s): Patient and CareEverywhere/SureScripts via in-person    Pertinent Information: None    Changes made to PTA medication list:  Added: acetaminophen (per patient, takes at least twice daily)  Deleted: None  Changed: Metformin ER 1000 mg with supper --> 500 mg twice daily with meals    Medication Affordability:  Not including over the counter (OTC) medications, was there a time in the past 3 months when you did not take your medications as prescribed because of cost?: No      Allergies reviewed with patient and updates made in EHR: yes    Medication History Completed By: Kim Roldan [facility] 12/1/2023 2:34 PM    PTA Med List   Medication Sig Note Last Dose    acetaminophen (TYLENOL) 325 MG tablet Take 2 tablets (650 mg) by mouth every 4 hours as needed for other (mild pain)      acetaminophen (TYLENOL) 500 MG tablet Take 1,000 mg by mouth 3 times daily as needed for pain  11/30/2023 at am    buPROPion (WELLBUTRIN XL) 150 MG 24 hr tablet Take 1 tablet (150 mg) by mouth every morning Take with 300 mg for total of 450 mg  12/1/2023 at 0445    buPROPion (WELLBUTRIN XL) 300 MG 24 hr tablet Take 1 tablet (300 mg) by mouth every morning Take with 150 mg for total 450 mg  12/1/2023 at 0445    cetirizine (ZYRTEC) 10 MG tablet Take 10 mg by mouth as needed for allergies  11/30/2023 at am    diltiazem ER (DILT-XR) 240 MG 24 hr ER beaded capsule Take 1 capsule (240 mg) by mouth daily  12/1/2023 at 0445    dulaglutide (TRULICITY) 0.75 MG/0.5ML pen Inject 0.75 mg Subcutaneous every 7 days 12/1/2023: Takes on Saturdays 11/18/2023    ELIQUIS ANTICOAGULANT 5 MG tablet Take 1 tablet (5 mg) by mouth 2 times daily  11/28/2023 at pm    fosinopril (MONOPRIL) 10 MG tablet Take 1 tablet (10 mg) by mouth daily  11/30/2023 at  1000    ibuprofen (ADVIL/MOTRIN) 600 MG tablet Take 1 tablet (600 mg) by mouth every 6 hours as needed for mild pain      metFORMIN (GLUCOPHAGE XR) 500 MG 24 hr tablet Take 2 tablets (1,000 mg) by mouth daily (with dinner) (Patient taking differently: Take 500 mg by mouth 2 times daily (with meals))  Past Week    metoprolol succinate ER (TOPROL XL) 50 MG 24 hr tablet Take 1 tablet (50 mg) by mouth 2 times daily  11/30/2023 at 2200    oxyCODONE (ROXICODONE) 5 MG tablet Take 1-2 tablets (5-10 mg) by mouth every 4 hours as needed for moderate to severe pain      polyethylene glycol (MIRALAX) 17 g packet Take 17 g by mouth daily      rosuvastatin (CRESTOR) 10 MG tablet Take 1 tablet (10 mg) by mouth daily  11/30/2023 at 2200    senna-docusate (SENOKOT-S/PERICOLACE) 8.6-50 MG tablet Take 1-2 tablets by mouth 2 times daily Take while on oral narcotics to prevent or treat constipation.      sertraline (ZOLOFT) 100 MG tablet Take 200 mg by mouth daily 2 tabs (200mg) daily   12/1/2023 at 0445    spironolactone (ALDACTONE) 25 MG tablet Take 1 tablet (25 mg) by mouth daily  11/30/2023 at 1000    tiZANidine (ZANAFLEX) 2 MG tablet Take 1-2 tablets (2-4 mg) by mouth nightly as needed for muscle spasms  Past Week at prn    traMADol (ULTRAM) 50 MG tablet Take 1 tablet (50 mg) by mouth 2 times daily as needed for severe pain  Past Week at prn    traZODone (DESYREL) 100 MG tablet Take 1 tablet (100 mg) by mouth At Bedtime  11/30/2023 at 2200

## 2023-12-01 NOTE — PROGRESS NOTES
[facility]      OUTPATIENT PHYSICAL THERAPY EVALUATION  PLAN OF TREATMENT FOR OUTPATIENT REHABILITATION  (COMPLETE FOR INITIAL CLAIMS ONLY)  Patient's Last Name, First Name, M.I.  YOB: 1965  Mary Gorman                        Provider's Name  [facility] Medical Record No.  [id number]                               Onset Date:  12/01/23   Start of Care Date:  12/01/23      Type:     _X_PT   ___OT   ___SLP Medical Diagnosis:  R TKA                        PT Diagnosis:  impaired functional mobility   Visits from SOC:  1   _________________________________________________________________________________  Plan of Treatment/Functional Goals    Planned Interventions: balance training, bed mobility training, gait training, home exercise program, neuromuscular re-education, patient/family education, ROM (range of motion), stair training, strengthening, stretching, transfer training, progressive activity/exercise, risk factor education, home program guidelines     Goals: See Physical Therapy Goals on Care Plan in [facility] electronic health record.    Therapy Frequency: 2x/day  Predicted Duration of Therapy Intervention: 12/15/23  _________________________________________________________________________________    I CERTIFY THE NEED FOR THESE SERVICES FURNISHED UNDER        THIS PLAN OF TREATMENT AND WHILE UNDER MY CARE     (Physician attestation of this document indicates review and certification of the therapy plan).              Certification date from: 12/01/23, Certification date to: 12/30/23    Referring Physician: Armand Martin MD            Initial Assessment        See Physical Therapy evaluation dated 12/01/23 in Epic electronic health record.     12/01/23 3736   Appointment Info   Signing Clinician's Name / Credentials (PT) Paulo Faust DPT   Rehab  Comments (PT) R TKA, WBAT   Quick Adds   Quick Adds Certification   Living Environment   People in Home parent(s)  (mother)   Current Living Arrangements condominium   Home Accessibility no concerns   Transportation Anticipated family or friend will provide   Living Environment Comments Pt planning to stay at mother's condo upon d/c where she will have elevator access and 24/7 care. Pt lives in [facility] that [facility] a [facility], will plan to return there once able to perform stairs well with OP PT.   Self-Care   Usual Activity Tolerance moderate   Current Activity Tolerance fair   Equipment Currently Used at Home cane, straight;walker, rolling;raised toilet seat   Fall history within last six months yes   Number of times patient has fallen within last six months 1   Activity/Exercise/Self-Care Comment Pt noting one fall of sliding out of bed in last 6 months. Pt was using mix of cane vs 4ww for mobility PTA. Pt's mother is caregiver for pt, was assisting with lymphedema, driving, cooking, etc. 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   General Information   Onset of Illness/Injury or Date of Surgery 12/01/23   Referring Physician Armand Martin MD   Patient/Family Therapy Goals Statement (PT) To decrease pain and improve mobility   Pertinent History of Current Problem (include personal factors and/or comorbidities that impact the POC) Per EMR: Mary Gorman is a 58 year old female admitted on 12/1/2023. She has a past medical history of primary OA of R knee (S/P R TKA 12/1/23), OA of L knee (S/P L TKA 01/2022), permanent Afib (on AC), T2DM c/b Diabetic Nephropathy, Stage IIIa CKD, BALTA, class III obesity, Lymphedema, HTN, HLD, chronic lower back pain, MDD, SHAQUILLE, who was admitted following elective R TKA.   Existing Precautions/Restrictions fall   Weight-Bearing Status - LUE full weight-bearing   Weight-Bearing Status - RUE full weight-bearing   Weight-Bearing Status - LLE full weight-bearing    Weight-Bearing Status - RLE weight-bearing as tolerated   General Observations Activity Ambulate with assist   Cognition   Affect/Mental Status (Cognition) WNL   Follows Commands (Cognition) WNL   Pain Assessment   Patient Currently in Pain Yes, see Vital Sign flowsheet   Integumentary/Edema   Integumentary/Edema Comments ACE wraps on R LE; wears compression stockings at baseline with additional velcro wrap for compression, will plan to assess lymphedema further tomorrow.   Posture    Posture Protracted shoulders;Forward head position;Kyphosis   Range of Motion (ROM)   Range of Motion ROM deficits secondary to surgical procedure   Strength (Manual Muscle Testing)   Strength (Manual Muscle Testing) Deficits observed during functional mobility   Strength Comments Difficult R SLR through partial ROM   Bed Mobility   Comment, (Bed Mobility) SBA with use of rails for supine <> sit   Transfers   Comment, (Transfers) Jackie for STS from elevated HOB   Gait/Stairs (Locomotion)   Comment, (Gait/Stairs) Ambulates with FWW and CGA, demos outstretched FWW, increased UE reliance, forward trunk lean, step to gait pattern   Balance   Balance Comments able to stand unsupported, needing FWW for dynamic balance   Sensory Examination   Sensory Perception patient reports no sensory changes   Clinical Impression   Criteria for Skilled Therapeutic Intervention Yes, treatment indicated   PT Diagnosis (PT) impaired functional mobility   Influenced by the following impairments post op pain and weakness, lymphedema, deconditioning   Functional limitations due to impairments bed mobility, STS, transfers, gait, stairs, activity tolerance   Clinical Presentation (PT Evaluation Complexity) stable   Clinical Presentation Rationale stable clinical presentation   Clinical Decision Making (Complexity) low complexity   Planned Therapy Interventions (PT) balance training;bed mobility training;gait training;home exercise program;neuromuscular  "re-education;patient/family education;ROM (range of motion);stair training;strengthening;stretching;transfer training;progressive activity/exercise;risk factor education;home program guidelines   Risk & Benefits of therapy have been explained evaluation/treatment results reviewed;care plan/treatment goals reviewed;risks/benefits reviewed;current/potential barriers reviewed;participants voiced agreement with care plan;participants included;patient   Clinical Impression Comments Pt presents to PT with post op pain and weakness s/p R TKA procedure that is impacting pt's activity tolerance and mobility. Skilled PT services necessary to improve pt's level of IND with functional mobility.   PT Total Evaluation Time   PT Eval, Low Complexity Minutes (10402) 10   Therapy Certification   Start of care date 12/01/23   Certification date from 12/01/23   Certification date to 12/30/23   Medical Diagnosis R TKA   Physical Therapy Goals   PT Frequency 2x/day   PT Predicted Duration/Target Date for Goal Attainment 12/15/23   PT Goals Bed Mobility;Transfers;Gait;PT Goal 1   PT: Bed Mobility Independent;Supine to/from sit;Rolling;Bridging   PT: Transfers Modified independent;Sit to/from stand;Bed to/from chair;Assistive device   PT: Gait Modified independent;Rolling walker;150 feet   PT: Goal 1 Pt will be IND with HEP in order to progress strength and mobility for improved overall function   Interventions   Interventions Quick Adds Therapeutic Activity;Therapeutic Procedure   Therapeutic Procedure/Exercise   Ther. Procedure: strength, endurance, ROM, flexibillity Minutes (24758) 15   Symptoms Noted During/After Treatment fatigue   Treatment Detail/Skilled Intervention Post eval, instructed pt on LE exercises to improve LE strength. Performs x3 LAQ through partial ROM, limited by pain, discontinued. Performs x10 ankle pumps, 5x5\" hold of quad sets, glute sets, hamstring sets, x5 of heel slides, hip abd/add, and SLR. Provided HEP " handout and rec performing 2-3x/day as tolerated.   Therapeutic Activity   Therapeutic Activities: dynamic activities to improve functional performance Minutes (61657) 25   Symptoms Noted During/After Treatment Fatigue;Increased pain   Treatment Detail/Skilled Intervention post eval, continued with functional mobility training to improve level of IND. Skilled cuing for supine <> sit for sequencing of movement and positioning in bed. Cued for STS with R foot placement to decrease pain with R knee flexion. Ambulates 25' total in room, demos outstretched FWW, increased UE reliance, forward trunk lean, step to gait pattern, cued for upright position as tolerated with offweighting through UEs. Edu provided to pt on PT POC, lymphedema management with plan to see lymph trained therapist tomorrow, d/c recs as pt planning to d/c to TCU prior to returning home. Pt ends supine in bed, all needs met within reach.   PT Discharge Planning   PT Plan progress gait, trial stiven walker (pt originally not wanting), initiate lymph, STS from lower surfaces   PT Discharge Recommendation (DC Rec) other (see comments)  (defer to ortho)   PT Rationale for DC Rec Pt requiring Ax1 for all mobility at this time, below baseline level of functioning, unable to ambulate functional household distances at this time. Pt is hoping to d/c to TCU prior to returning to [facility], has reached out to [facility]. At this time, would recommend TCU to progress pt's level of IND prior to d/c.   PT Brief overview of current status Jackie bed mobility and STS with FWW, CGA for gait to bathroom with FWW   Total Session Time   Timed Code Treatment Minutes 40   Total Session Time (sum of timed and untimed services) 50

## 2023-12-01 NOTE — ANESTHESIA CARE TRANSFER NOTE
Patient: Mary Gorman    Procedure: Procedure(s):  ARTHROPLASTY, RIGHT KNEE, TOTAL       Diagnosis: Osteoarthrosis, localized, primary, knee, right [M17.11]  Diagnosis Additional Information: No value filed.    Anesthesia Type:   General     Note:    Oropharynx: oropharynx clear of all foreign objects  Level of Consciousness: awake    Level of Supplemental Oxygen (L/min / FiO2): 6  Independent Airway: airway patency satisfactory and stable  Dentition: dentition unchanged  Vital Signs Stable: post-procedure vital signs reviewed and stable  Report to RN Given: handoff report given  Patient transferred to: PACU    Handoff Report: Identifed the Patient, Identified the Reponsible Provider, Reviewed the pertinent medical history, Discussed the surgical course, Reviewed Intra-OP anesthesia mangement and issues during anesthesia, Set expectations for post-procedure period and Allowed opportunity for questions and acknowledgement of understanding      Vitals:  Vitals Value Taken Time   /81 12/01/23 1002   Temp 37.5    Pulse 95 12/01/23 1003   Resp 11 12/01/23 1003   SpO2 97 % 12/01/23 1003   Vitals shown include unfiled device data.    Electronically Signed By: YAMIL Rene CRNA  December 1, 2023  10:04 AM

## 2023-12-01 NOTE — OP NOTE
[facility]    Operative Note    Pre-operative diagnosis: 58-year-old female with chronic right knee pain and instability due to end-stage osteoarthritic changes in all 3 compartments.  Insufficiently responding to nonoperative treatment measures.   Post-operative diagnosis Same as pre-operative diagnosis    Procedure: ARTHROPLASTY, RIGHT KNEE, TOTAL, Right - Knee    Surgeon: Surgeon(s) and Role:     * Armand Martin MD - Primary     * Hector Novak MD - Resident - Assisting  Anesthesia: General   Estimated Blood Loss: 300 ml    Drains: None  Specimens: * No specimens in log *    Complications: None.  Implants:   Implant Name Type Inv. Item Serial No.  Lot No. LRB No. Used Action   BONE CEMENT SIMPLEX FULL DOSE 6191-1-001 - [id number] Cement, Bone BONE CEMENT SIMPLEX FULL DOSE 6191-1-001  [employer] [id number] Right 2 Implanted   IMP STEM EXT ZIM PERSONA KNEE TPR 14MM + 30MM -862-14 - [id number] Total Joint Component/Insert IMP STEM EXT ZIM PERSONA KNEE TPR 14MM + 30MM -225-14  [employer] [id number] Right 1 Implanted   KNEE FEMUR CR CEMENT CCR STD SZ 10 R - [id number] Total Joint Component/Insert KNEE FEMUR CR CEMENT CCR STD SZ 10 R  [employer] [id number] Right 1 Implanted   IMP PATELLA ZIM KNEE ALL PRASHANT 32MM -290-32 - [id number] Total Joint Component/Insert IMP PATELLA ZIM KNEE ALL PRASHANT 32MM -038-32  [employer] [id number] Right 1 Implanted   IMP TIBIAL ZIM PSN NP STM 5DEG SZ ER -577-02 - [id number] Total Joint Component/Insert IMP TIBIAL ZIM PSN NP STM 5DEG SZ ER -669-02  [employer] [id number] Right 1 Implanted   INSERT TIBIAL CR 11MM RIGHT - [id number] Total Joint Component/Insert INSERT TIBIAL CR 11MM RIGHT  [employer] [id number] Right 1 Implanted       Components used:  Blanka Persona Size 10 Femoral Component  Blanka Persona Size E Tibial Component with 30 mm stem  Blanka Size 32  Patellar Button    [facility]iner, Size 11     Description of procedure:      Patient was seen in the preoperative area where procedure, risks and complications, expectations and rehabilitation were discussed once more.  All questions were answered.  Patient understands and agrees to the treatment plan as set forth.  Informed consent was obtained and the right lower extremity was marked.  Patient was then taken to the operating room where general anesthesia was induced.     Patient was positioned supine with a bump under the ipsilateral buttock. Bony prominences were well padded and the patient was secured to the table in the standard fashion.  An SCD was placed on the nonoperative leg.  Preoperative range of motion showed a flexion/extension of 90/15/0.      A tourniquet was placed on the operative thigh and the patient was prepped and draped in the normal sterile fashion.        After the completion of a timeout procedure a standard midline incision was made. Dissection was carried down to the knee retinaculum and the quadriceps tendon. A standard medial parapatellar arthrotomy was performed. Subperiosteal dissection was carried out along the medial proximal tibia. The fat pad was removed.  Care was taken to protect the patellar tendon and extensor mechanism during fat pad removal. The tissue along the anterior aspect of the distal femur was also removed.  The patella was subluxed and the knee was flexed.       The ACL was released.  A drill was advanced down the femoral canal in a retrograde direction. The sword was set at 5 degrees of valgus in accordance with the preoperative plan and was advanced into the canal.  The distal femoral cutting block was then placed at +0 mm resection and pinned into place. The luis was removed and the distal femur cuts were made medially and laterally.  The cutting block was removed and the luis with the alignment piece was inserted into the canal to ensure  proper angle of the cut and a flat cut.       Attention was then directed towards the proximal tibia.  Part of the medial and lateral meniscus and soft tissue was removed to expose the medial and lateral tibial plateau. Retractors were placed around the knee to obtain good visualization, then the extramedullary tibial alignment guide was placed in the appropriate position. The 2-10 guide was used to select the resection level of the tibia and the cutting guide was pinned into position.  Great care was taken to place the proximal cutting guide in the appropriate varus/valgus and posterior slope orientation.  With care directed towards preserving the posterior nerves and blood vessels and the medial collateral ligament, the saw was used to cut the proximal tibia.  Next the 10 mm sizing block was put in place to ensure proper alignment.  It showed adequate alignment using the drop luis as well as good balancing of soft tissues.     We turned our attention to the back to the distal femur.  The posterior referencing femoral sizing block was used and the femur was measured to be a size 10.  Two holes were drilled to obtain 3 degrees of external rotation with respect to the posterior condylar axis of the femur.  The femoral cutting block was then slid over the pins and fixed onto the bone.  The anterior cut, posterior cut, anterior and posterior chamfer cuts were made.  The block was then removed and excess bone fragments were removed.   A lamina  was used to properly visualize the posterior knee and meniscal remnants.  Using electrocautery the residual medial and lateral menisci were removed.  Care was taken to coagulate the lateral genicular vessels.  A curved osteotome was used to remove posterior osteophytes.  Great care was taken to protect the popliteus tendon laterally.  Our anesthetic injection was then introduced through the posterior capsule with extreme care directed towards not injuring the posterior  neurovascular structures. The trial components (femur size 10) were placed and confirmed appropriate sizing of the flexion and extension gaps.  The drop luis was used to confirm proper alignment of the cut.  A range of motion of 0 to 130 degrees with a well balanced knee in both flexion and extension was obtained.  Appropriate external rotation of the tibial trial was marked. The excess bone was removed.       The patella was then exposed.  Denervation and soft tissue release around the patella for visualization utilizing electrocautery. The patella thickness was measured with a caliper to be 22, and a measured resection of 7 mm was made.  A caliper was then used to measure the residual thickness of the patella to be 15.  The patella was measured with the lollipops and found to be a size 32. The drill guide was placed and the three lug holes were drilled. The patella trial was then placed and the knee was ranged. The patella was found to track well.  Now the tourniquet was inflated to 250 mmHg.    Femoral lug holes were drilled.  All trial components were removed and the proximal tibia was again exposed and sized. The tibia was measured to be a size E.   The tibial trial was placed into proper rotation and pinned into place.  The stove pipe was used to guide the drill, and then the wing punch was used. The tibial trial was then removed.      Pulsitile lavage was used to clean the bone in preparation for cementing. The bone was dried. Cement was mixed, and then all the components were cemented into place. While the cement was hardening, the remainder of the anesthetic injection was spread around the deep retinacular layer and the subcutaneous layer with a spinal needle.  The knee was irrigated with betadine lavage for approximately 3 minutes.  Once the cement had hardened, the excess cement was removed.    The tourniquet was released.  Hemostasis was obtained.       The MC 11 mm liner fit best and the knee had full  extension to 130 degrees and was stable to anterior and posterior stress in flexion and extension as well as varus/valgus stress in 0, 30, and 90 degrees of flexion.  The trial liner was removed and the real liner was placed.       The knee was again copiously irrigated.   Closure was performed with a #1  Vicryl interrupted sutures in the retinacular layer, 0 Vicryl and 2-0 Vicryl interrupted suture in the  subcutaneous tissues, and 3-0 monocryl in the skin.  A sterile dressing was applied.  The patient was then awakened, transferred to the [facility], and brought to the recovery room in stable condition. There were no complications.     POSTOPERATIVE PLAN:  Weight bearing: Weightbearing as tolerated  Anticoagulation: Restart home dose Pradaxa POD 1  Precautions: No precautions  Pain control and monitoring  PT/OT  Antibiotics per protocol  X-ray in PACU  Discharge today or tomorrow when pain well controlled and ambulating safely.        Armand Martin MD      Adult Reconstruction  [facility] Department of Orthopaedic Surgery  Pager [phone]

## 2023-12-01 NOTE — PROVIDER NOTIFICATION
Redness/swelling bilateral lower extremities - patient states she has lymphedema.  Redness under abdominal folds.  Mepilex applied to sacrum.    Dr Martin  here to see patient and looked at her legs at 0710.  OK to go ahead with surgery.

## 2023-12-01 NOTE — OR NURSING
PACU to Inpatient Nursing Handoff    Patient Mary Gorman is a 58 year old female who speaks English.   Procedure Procedure(s):  ARTHROPLASTY, RIGHT KNEE, TOTAL   Surgeon(s) Primary: Armand Martin MD  Resident - Assisting: Hectro Novak MD     Allergies   Allergen Reactions    Dyazide [Hydrochlorothiazide W-Triamterene] Unknown    Vistaril [Hydroxyzine] Visual Disturbance    Naproxen Rash    Nickel Rash    Robaxin [Methocarbamol] Rash    Sulfa Antibiotics Rash       Isolation  [unfilled]     Past Medical History   has a past medical history of Arthritis (Nov 2019), Atrial fibrillation (H), COPD (chronic obstructive pulmonary disease) (H), Depression, Depressive disorder, Diabetes (H), GERD (gastroesophageal reflux disease), HTN (hypertension), Hyperlipidemia, Obese, BALTA (obstructive sleep apnea), Permanent atrial fibrillation (H) (06/05/2011), and Uncomplicated asthma.    Anesthesia General   Dermatome Level     Preop Meds TXA 1950mg - time given: 0642   Nerve block Not applicable   Intraop Meds dexamethasone (Decadron)  fentanyl (Sublimaze): 200 mcg total  ondansetron (Zofran): last given at 0952  Metoprolol 5mg @ 0917   Local Meds Yes - Local Cocktail (morphine, ropivacaine, epinephrine, Toradol)   Antibiotics cefazolin (Ancef) - last given at 0737     Pain Patient Currently in Pain: yes   PACU meds  Fentanyl 100mg IV @ 1036  Dilaudid 0.4mg IV @ 1059  Toradol 15mg IV @ 1132  Acetaminophen 975mg @ 1137   PCA / epidural No   Capnography     Telemetry ECG Rhythm: Atrial fibrillation   Inpatient Telemetry Monitor Ordered? No        Labs Glucose Lab Results   Component Value Date     12/01/2023     07/19/2022     06/28/2021       Hgb Lab Results   Component Value Date    HGB 14.2 11/15/2023    HGB 15.2 05/10/2021       INR Lab Results   Component Value Date    INR 1.19 08/03/2011      PACU Imaging Completed     Wound/Incision Incision/Surgical Site 12/01/23 Anterior;Right Knee (Active)    Incision Assessment WDL 12/01/23 1130   Dressing Transparent film (Opsite, Tegaderm);Alginate 12/01/23 1001   Vickie-Incision Assessment UTV 12/01/23 1130   Closure JM 12/01/23 1130   Incision Drainage Amount None 12/01/23 1130   Dressing Intervention Clean, dry, intact 12/01/23 1130   Number of days: 0      CMS        Equipment ice pack   Other LDA       IV Access Peripheral IV 12/01/23 Right Hand (Active)   Site Assessment WDL 12/01/23 1130   Line Status Infusing 12/01/23 1130   Dressing Transparent 12/01/23 1130   Dressing Status clean;dry;intact 12/01/23 1130   Dressing Intervention New dressing  12/01/23 0703   Phlebitis Scale 0-->no symptoms 12/01/23 1130   Infiltration? no 12/01/23 0703   Number of days: 0      Blood Products Not applicable  mL   Intake/Output Date 12/01/23 0700 - 12/02/23 0659   Shift 7556-1315 1981-4013 1930-0382 24 Hour Total   INTAKE   I.V. 800   800   Shift Total(mL/kg) 800(5.29)   800(5.29)   OUTPUT   Blood 300   300   Shift Total(mL/kg) 300(1.98)   300(1.98)   Weight (kg) 151.2 151.2 151.2 151.2      Drains / [place]alen     Time of void PreOp Time of Void Prior to Procedure: 0430 (12/01/23 0635)    PostOp      Diapered? No   Bladder Scan Bladder Scan Volume (mL): 180 ml (12/01/23 1135)   PO    tolerating sips, crackers, and water     Vitals    B/P: (!) 135/93  T: 98.1  F (36.7  C)    Temp src: Oral  P:  Pulse: 94 (12/01/23 1145)          R: 17  O2:  SpO2: 98 %    O2 Device: Nasal cannula (12/01/23 1115)    Oxygen Delivery: 3 LPM (12/01/23 1115)         Family/support present mother and brother   Patient belongings     Patient transported on bed   DC meds/scripts (obs/outpt) Not applicable   Inpatient Pain Meds Released? Yes       Special needs/considerations None   Tasks needing completion None       Apolonia See RN  ASCOM [id number]

## 2023-12-01 NOTE — ANESTHESIA PREPROCEDURE EVALUATION
Anesthesia Pre-Procedure Evaluation    Patient: Mary Gorman   MRN: [id number] : 1965        Procedure : Procedure(s):  ARTHROPLASTY, RIGHT KNEE, TOTAL          Past Medical History:   Diagnosis Date     Arthritis 2019     Atrial fibrillation (H)      COPD (chronic obstructive pulmonary disease) (H)     colds turn to bronchitis easily     Depression      Depressive disorder     in chart     Diabetes (H)      GERD (gastroesophageal reflux disease)      HTN (hypertension)      Hyperlipidemia      Obese      BALTA (obstructive sleep apnea)     uses CPAP     Permanent atrial fibrillation (H) 2011     Uncomplicated asthma     mentioned by urgent care md      Past Surgical History:   Procedure Laterality Date     ABDOMEN SURGERY      in chart     ARTHROPLASTY KNEE Left 2022    Procedure: ARTHROPLASTY, LEFT KNEE, TOTAL;  Surgeon: Armand Martin MD;  [facility]     BIOPSY      skin and uterine lining     CARDIOVERSION  2011     CHOLECYSTECTOMY       COLONOSCOPY  2013    in chart     DILATION AND CURETTAGE  2012    Procedure:DILATION AND CURETTAGE; DILATION AND CURETTAGE; Surgeon:JEAN-PAUL DEL CID; Location:[facility]     DILATION AND CURETTAGE, HYSTEROSCOPY DIAGNOSTIC, COMBINED  2011    Procedure:COMBINED DILATION AND CURETTAGE, HYSTEROSCOPY DIAGNOSTIC; DILATION AND CURETTAGE, DIAGNOSTIC HYSTEROSCOPY ; Surgeon:JEAN-PAUL DEL CID; Location:[facility]     KNEE SURGERY       REMOVE HARDWARE LOWER EXTREMITY Left 2022    Procedure: REMOVAL, HARDWARE, LEFT LOWER EXTREMITY;  Surgeon: Armand Martin MD;  [facility]      Allergies   Allergen Reactions     Dyazide [Hydrochlorothiazide W-Triamterene] Unknown     Vistaril [Hydroxyzine] Visual Disturbance     Naproxen Rash     Nickel Rash     Robaxin [Methocarbamol] Rash     Sulfa Antibiotics Rash      Social History     Tobacco Use     Smoking status: Never     Passive exposure: Never     Smokeless tobacco: Never   Substance Use Topics      Alcohol use: Not Currently     Comment: once in a while      Wt Readings from Last 1 Encounters:   12/01/23 (!) 151.2 kg (333 lb 5.4 oz)        Anesthesia Evaluation   Pt has had prior anesthetic. Type: General.        ROS/MED HX  ENT/Pulmonary:     (+) sleep apnea, uses CPAP,                   asthma    COPD,              Neurologic:       Cardiovascular: Comment: 10/12/23 Echo  1. The left ventricle is normal in structure, function and size. The visual  ejection fraction is estimated at 60%.  2. The right ventricle is normal in structure, function and size.  3. No valve disease.        (+) Dyslipidemia hypertension- -   -  - -                                      METS/Exercise Tolerance:     Hematologic:       Musculoskeletal:       GI/Hepatic:     (+) GERD,                   Renal/Genitourinary:     (+) renal disease, type: CRI,            Endo:     (+)  type II DM,             Obesity,       Psychiatric/Substance Use:       Infectious Disease:       Malignancy:       Other:          Physical Exam    Airway        Mallampati: II   TM distance: > 3 FB   Neck ROM: full   Mouth opening: > 3 cm    Respiratory Devices and Support         Dental       (+) Minor Abnormalities - some fillings, tiny chips      Cardiovascular          Rhythm and rate: irregular and normal     Pulmonary   pulmonary exam normal        breath sounds clear to auscultation       OUTSIDE LABS:  CBC:   Lab Results   Component Value Date    WBC 7.9 11/15/2023    WBC 6.8 08/18/2023    HGB 14.2 11/15/2023    HGB 14.3 08/18/2023    HCT 43.8 11/15/2023    HCT 44.7 08/18/2023     11/15/2023     08/18/2023     BMP:   Lab Results   Component Value Date     11/15/2023     08/18/2023    POTASSIUM 4.8 11/15/2023    POTASSIUM 4.8 08/18/2023    CHLORIDE 101 11/15/2023    CHLORIDE 103 08/18/2023    CO2 23 11/15/2023    CO2 23 08/18/2023    BUN 18.8 11/15/2023    BUN 21.2 08/18/2023    CR 1.23 (H) 11/15/2023    CR 1.18 (H) 08/18/2023      (H) 12/01/2023     (H) 11/15/2023     COAGS:   Lab Results   Component Value Date    INR 1.19 (H) 08/03/2011     POC:   Lab Results   Component Value Date     (H) 12/08/2011    HCGS Negative 12/08/2011     HEPATIC:   Lab Results   Component Value Date    ALBUMIN 4.4 08/18/2023    PROTTOTAL 7.6 08/18/2023    ALT 23 08/18/2023    AST 27 08/18/2023    ALKPHOS 137 (H) 08/18/2023    BILITOTAL 0.4 08/18/2023     OTHER:   Lab Results   Component Value Date    PH 7.42 07/11/2011    A1C 5.9 (H) 11/15/2023    CHERYL 10.2 (H) 11/15/2023    MAG 1.8 08/03/2011    TSH 1.91 05/10/2021       Anesthesia Plan    ASA Status:  3    NPO Status:  NPO Appropriate    Anesthesia Type: General.     - Airway: ETT   Induction: Propofol.   Maintenance: Balanced.   Techniques and Equipment:     - Lines/Monitors: 2nd IV     Consents    Anesthesia Plan(s) and associated risks, benefits, and realistic alternatives discussed. Questions answered and patient/representative(s) expressed understanding.     - Discussed: Risks, Benefits and Alternatives for BOTH SEDATION and the PROCEDURE were discussed     - Discussed with:  Patient      - Extended Intubation/Ventilatory Support Discussed: No.      - Patient is DNR/DNI Status: No     Use of blood products discussed: No .     Postoperative Care    Pain management: IV analgesics, Oral pain medications, Multi-modal analgesia.   PONV prophylaxis: Ondansetron (or other 5HT-3), Dexamethasone or Solumedrol     Comments:             Ibrahima Enrique MD    I have reviewed the pertinent notes and labs in the chart from the past 30 days and (re)examined the patient.  Any updates or changes from those notes are reflected in this note.      # Hypercalcemia: Highest Ca = 10.2 mg/dL in last 30 days, will monitor as appropriate       # Drug Induced Coagulation Defect: home medication list includes an anticoagulant medication   # Severe Obesity: Estimated body mass index is 55.47 kg/m  as calculated  "from the following:    Height as of this encounter: 1.651 m (5' 5\").    Weight as of this encounter: 151.2 kg (333 lb 5.4 oz).      "

## 2023-12-01 NOTE — CONSULTS
[facility]  Consult Note - Hospitalist Service  Date of Admission:  12/1/2023  Consult Requested by: Armand Martin MD  Reason for Consult: Medical Comanagement    Assessment & Plan   Mary Gorman is a 58 year old female admitted on 12/1/2023. She has a past medical history of primary OA of R knee (S/P R TKA 12/1/23), OA of L knee (S/P L TKA 01/2022), permanent Afib (on AC), T2DM c/b Diabetic Nephropathy, Stage IIIa CKD, BALTA, class III obesity, Lymphedema, HTN, HLD, chronic lower back pain, MDD, SHAQUILLE, who was admitted following elective R TKA. She was admitted for further monitoring and management, and Medicine was consulted for medical comanagement.     Primary Osteoarthritis of R Knee (S/P R TKA 12/1/23)  Hx of OA of L Knee (S/P L TKA 01/2022)  Prior hx of L TKA in January 2022, and had progressive OA in R knee, prompting the above procedure earlier today. The procedure was performed by Dr. Armand Martin, and EBL ~300mL. There were no intraoperative complications. Remains HDS on the floor.  - Post-op cares per Primary Team   - WBAT w/ no precautions   - Can resume AC (see below) tomorrow   - PT/OT   - Pain mgmt per Primary Team  - CBC in AM to ensure no acute anemia    Permanent Atrial Fibrillation   Follows w/ Kayleen Gomez PA-C from Cardiology, last saw 9/1/23. Rate control w/ Diltiazem 240mg daily and Metoprolol XL 50mg BID, and is anticoagulated w/ Eliquis 5mg BID. Had updated ECHO 10/12/23 as part of pre-op cardiac workup, and this showed LVEF 60%, no valvular abnormalities, and normal RV size and function. Remains HDS on floor, exam w/ RRR.  - Per Primary Team, ok to resume Eliquis tomorrow   - Can continue PTA Metoprolol and Diltiazem this evening w/ hold parameters  - Follow-up w/ Cardiology as directed    HTN  On Metoprolol as above for Afib, Spironolactone as below (for lymphedema), and additionally on Fosinopril for HTN mgmt.   - Continue  Metoprolol as above  - Holding Spironolactone and Fosinopril until recheck BMP in AM    Type 2 Diabetes Mellitus c/b Diabetic Nephropathy, non-insulin dependent, improving  Last A1c 5.9% on 11/15/23. PTA regimen includes Metformin 1000mg with dinner, and Trulicity 0.75mg weekly (last injection 11/18). She checks BG at home usually twice daily.   - Hold PTA Metformin and Trulicity   - Can resume these at discharge  - While here, will place on medium sliding scale  - BG checks TID AC + at bedtime + 0200  - Hypoglycemia protocol  - Follow-up w/ PCP     Stage IIIa CKD   CKD suspected to be d/t diabetic nephropathy. Baseline Cr ~1.1-1.3. Last check on 11/15/23 was 1.23. No established care with a Nephrologist.   - BMP in AM to assess renal function   - Avoid nephrotoxic agents  - Holding Fosinopril until renal function can be assessed in AM    Class III Obesity  Lymphedema, suspect 2/2 Obesity-related Chronic Venous Insufficiency  Pt w/ hx of lymphedema in the setting of class III obesity (BMI 55 on admission) and limited mobility recently d/t end-stage OA in R knee (see above), all of which are likely contributing to chronic venous insufficiency. She follows w/ lymphedema therapy PTA, last saw 7/5/23. Was supposed to be using decongestive pumps daily on the BLEs for therapy, in a supine position, but d/t her back pain in supine position, has only been able to complete ~once per month. Also on Spironolactone as a form of adjuvant therapy w/ her HTN.  - She is scheduled for an appt w/ OT tomorrow at 9am, will need to reschedule  - Can use SCD's for now on LLE  - Reconsult OT to help formulate a plan for her lymphedema therapy to bridge as an OP (ordered for you)    BALTA  PTA CPAP.  - Continue PTA CPAP    HLD  PTA Statin.  - Can resume PTA Statin this evening    Chronic Lower Back Pain  Began in Spring of this year without a precipitating event. Pain is present bilaterally. She saw [facility] on 10/12/23 at which time  "she was recommended to initiate the use of a TENS unit and get a lumbar spine MRI following her TKA. Sparingly uses Tramadol for acute flares of pain, last refill was by her PCP on 11/20/23 for #30 tabs of Tramadol 50mg.  - Follow-up w Pain clinic as directed  - Get outpatient lumbar MRI  - Follow-up w/ PCP     MDD  SHAQUILLE  Insomnia  PTA Wellbutrin 450mg daily (max dosing), Zoloft 200mg daily, Trazodone 100mg nightly.  - Continue PTA Wellbutrin, Zoloft daily and Trazodone nightly     The patient's care was discussed with the Patient.    Clinically Significant Risk Factors Present on Admission               # Drug Induced Coagulation Defect: home medication list includes an anticoagulant medication    # Hypertension: Noted on problem list      # Severe Obesity: Estimated body mass index is 55.47 kg/m  as calculated from the following:    Height as of this encounter: 1.651 m (5' 5\").    Weight as of this encounter: 151.2 kg (333 lb 5.4 oz).              Joao Zhao PA-C  Hospitalist Service  Securely message with [employer] (more info)  Text page via [facility] Paging/Directory   ______________________________________________________________________    Chief Complaint   \"I feel ok\"    History is obtained from the patient    History of Present Illness   Mary Gorman is a 58 year old female who is seen in her room this afternoon following surgery.  She reports very minimal pain at the R knee, and notes that the most bothersome area is actually just superior to the R knee (but the same pain occurred following her L TKA last year).  At the moment, she denies headaches, vision changes, hallucinations, chest pain, shortness of breath, abdominal pain, nausea, vomiting, urinary or bowel changes.    Reports a longstanding history of lymphedema in both extremities.  She has followed with vascular surgery and lymphedema therapy, and this was to be using decongestive pumps to the bilateral lower extremities daily, however, she was " only able to do this approximately once a month for the past several months.  Elaborates that this was due to lower back pain when lying in a supine position (is supposed to be in this position during decongestive therapy).  Outside of this, she uses compression stockings throughout the day.    Past Medical History    Past Medical History:   Diagnosis Date    Arthritis Nov 2019    Atrial fibrillation (H)     COPD (chronic obstructive pulmonary disease) (H)     colds turn to bronchitis easily    Depression     Depressive disorder     in chart    Diabetes (H)     GERD (gastroesophageal reflux disease)     HTN (hypertension)     Hyperlipidemia     Obese     BALTA (obstructive sleep apnea)     uses CPAP    Permanent atrial fibrillation (H) 06/05/2011    Uncomplicated asthma     mentioned by urgent care md       Past Surgical History   Past Surgical History:   Procedure Laterality Date    ABDOMEN SURGERY      in chart    ARTHROPLASTY KNEE Left 01/20/2022    Procedure: ARTHROPLASTY, LEFT KNEE, TOTAL;  Surgeon: Armand Martin MD;  [facility]    BIOPSY      skin and uterine lining    CARDIOVERSION  06/07/2011    CHOLECYSTECTOMY      COLONOSCOPY  2013    in chart    DILATION AND CURETTAGE  04/26/2012    Procedure:DILATION AND CURETTAGE; DILATION AND CURETTAGE; Surgeon:JEAN-PAUL DEL CID; Location:[facility]    DILATION AND CURETTAGE, HYSTEROSCOPY DIAGNOSTIC, COMBINED  12/08/2011    Procedure:COMBINED DILATION AND CURETTAGE, HYSTEROSCOPY DIAGNOSTIC; DILATION AND CURETTAGE, DIAGNOSTIC HYSTEROSCOPY ; Surgeon:JEAN-PAUL DEL CID; Location:[facility]    KNEE SURGERY      REMOVE HARDWARE LOWER EXTREMITY Left 01/20/2022    Procedure: REMOVAL, HARDWARE, LEFT LOWER EXTREMITY;  Surgeon: Armand Martin MD;  [facility]       Medications   I have reviewed this patient's current medications       Review of Systems    The 10 point Review of Systems is negative other than noted in the HPI or here.     Social History   I have reviewed this patient's  social history and updated it with pertinent information if needed.  Social History     Tobacco Use    Smoking status: Never     Passive exposure: Never    Smokeless tobacco: Never   Vaping Use    Vaping Use: Never used   Substance Use Topics    Alcohol use: Not Currently     Comment: once in a while    Drug use: No        Physical Exam   Vital Signs: Temp: 98.1  F (36.7  C) Temp src: Oral BP: 122/76 Pulse: 80   Resp: 10 SpO2: 97 % O2 Device: Nasal cannula Oxygen Delivery: 2 LPM  Weight: 333 lbs 5.37 oz    Constitutional: awake, alert, cooperative, no apparent distress, and appears stated age  Eyes: lids and lashes normal, sclera clear, and conjunctiva normal  ENT: normocephalic, without obvious abnormality  Respiratory: No increased work of breathing, good air exchange, clear to auscultation bilaterally, no crackles or wheezing. On 2L of O2 via NC.  Cardiovascular: regular rate and rhythm, normal S1 and S2, no S3, no S4, and no murmur noted  GI: normal bowel sounds, soft, non-distended, and non-tender  Skin: no bruising or bleeding, no redness, warmth, or swelling, no rashes, and no lesions on visualized skin. Chronic-appearing fibrotic changes to epidermis of LLE.   Musculoskeletal: BLE lymphedema present, trace pitting edema in LLE, not assessed in RLE as wrapped in post-op bandage. No deformities. SCD applied to LLE.  Neurologic: Moving all extremities equally and spontaneously. No obvious focal neuro deficits.  Neuropsychiatric: General: normal, calm, and normal eye contact  Level of consciousness: alert / normal  Affect: normal and pleasant  Memory and insight: normal, memory for past and recent events intact, and thought process normal    Medical Decision Making       95 MINUTES SPENT BY ME on the date of service doing chart review, history, exam, documentation & further activities per the note.      Data         Imaging results reviewed over the past 24 hrs:   Recent Results (from the past 24 hour(s))   XR  Knee Port Right 1/2 Views    Narrative    EXAM: XR KNEE PORT RIGHT 1/2 VIEWS  LOCATION: [facility]  DATE: 12/1/2023    INDICATION: Post Op Total Knee  COMPARISON: 09/06/2023      Impression    IMPRESSION: TKA with patellar resurfacing. Postoperative air is present. Components are well seated. No fractures are evident.     Recent Labs   Lab 12/01/23  1008 12/01/23  0623   * 129*

## 2023-12-01 NOTE — PROGRESS NOTES
"  VS: /54 (BP Location: Right arm)   Pulse 89   Temp 97.6  F (36.4  C) (Oral)   Resp 16   Ht 1.651 m (5' 5\")   Wt (!) 151.2 kg (333 lb 5.4 oz)   LMP 06/25/2019 (Approximate)   SpO2 96%   BMI 55.47 kg/m      O2: Currently on 2 liters of Oxygen. Taught patient about use of IS and weaning off O2   Output: Still awaiting for first void.    Last BM:    Activity: Pt is requesting to go to rehab at time of discharge. She would like to go to [facility] in [place], [place] She has already been in contact with Eve at [facility]. Spoke with  and gave information to social workers.    Skin: Dry flaky skin on arms and legs. Arrived to unit with skin breakdown in abdominal folds.    Pain: Pt currently denies pain. Had Tylenol and Toradol prior to coming to unit    CMS: Baseline numbness. Pt normally uses Lymphedema for lower legs.    Dressing: Right knee ace wrap is CDI   Diet: Regular. Has not ordered yet.    LDA: IV is infusing   Equipment: IS, PCD's, capnography   Plan: Pt is hoping to discharge to rehab when medically stable.    Additional Info: Pt's mom and brother are at bedside. 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 Pt resting comfortably between cares.       "

## 2023-12-02 ENCOUNTER — APPOINTMENT (OUTPATIENT)
Dept: PHYSICAL THERAPY | Facility: CLINIC | Age: 58
End: 2023-12-02
Attending: STUDENT IN AN ORGANIZED HEALTH CARE EDUCATION/TRAINING PROGRAM
Payer: COMMERCIAL

## 2023-12-02 LAB
ANION GAP SERPL CALCULATED.3IONS-SCNC: 6 MMOL/L (ref 7–15)
BASOPHILS # BLD AUTO: 0 10E3/UL (ref 0–0.2)
BASOPHILS NFR BLD AUTO: 0 %
BUN SERPL-MCNC: 25.2 MG/DL (ref 8–23)
CALCIUM SERPL-MCNC: 9.1 MG/DL (ref 8.6–10)
CHLORIDE SERPL-SCNC: 102 MMOL/L (ref 98–107)
CREAT SERPL-MCNC: 1.25 MG/DL (ref 0.51–0.95)
DEPRECATED HCO3 PLAS-SCNC: 27 MMOL/L (ref 22–29)
EGFRCR SERPLBLD CKD-EPI 2021: 49 ML/MIN/1.73M2
EOSINOPHIL # BLD AUTO: 0 10E3/UL (ref 0–0.7)
EOSINOPHIL NFR BLD AUTO: 0 %
ERYTHROCYTE [DISTWIDTH] IN BLOOD BY AUTOMATED COUNT: 14.6 % (ref 10–15)
GLUCOSE BLDC GLUCOMTR-MCNC: 153 MG/DL (ref 70–99)
GLUCOSE BLDC GLUCOMTR-MCNC: 160 MG/DL (ref 70–99)
GLUCOSE BLDC GLUCOMTR-MCNC: 168 MG/DL (ref 70–99)
GLUCOSE BLDC GLUCOMTR-MCNC: 170 MG/DL (ref 70–99)
GLUCOSE BLDC GLUCOMTR-MCNC: 176 MG/DL (ref 70–99)
GLUCOSE SERPL-MCNC: 181 MG/DL (ref 70–99)
HCT VFR BLD AUTO: 34.9 % (ref 35–47)
HGB BLD-MCNC: 11.2 G/DL (ref 11.7–15.7)
IMM GRANULOCYTES # BLD: 0 10E3/UL
IMM GRANULOCYTES NFR BLD: 0 %
LYMPHOCYTES # BLD AUTO: 1.4 10E3/UL (ref 0.8–5.3)
LYMPHOCYTES NFR BLD AUTO: 16 %
MCH RBC QN AUTO: 28.9 PG (ref 26.5–33)
MCHC RBC AUTO-ENTMCNC: 32.1 G/DL (ref 31.5–36.5)
MCV RBC AUTO: 90 FL (ref 78–100)
MONOCYTES # BLD AUTO: 1.2 10E3/UL (ref 0–1.3)
MONOCYTES NFR BLD AUTO: 13 %
NEUTROPHILS # BLD AUTO: 6.6 10E3/UL (ref 1.6–8.3)
NEUTROPHILS NFR BLD AUTO: 71 %
NRBC # BLD AUTO: 0 10E3/UL
NRBC BLD AUTO-RTO: 0 /100
PLATELET # BLD AUTO: 167 10E3/UL (ref 150–450)
POTASSIUM SERPL-SCNC: 5 MMOL/L (ref 3.4–5.3)
RBC # BLD AUTO: 3.88 10E6/UL (ref 3.8–5.2)
SODIUM SERPL-SCNC: 135 MMOL/L (ref 135–145)
WBC # BLD AUTO: 9.2 10E3/UL (ref 4–11)

## 2023-12-02 PROCEDURE — 999N000111 HC STATISTIC OT IP EVAL DEFER

## 2023-12-02 PROCEDURE — 250N000011 HC RX IP 250 OP 636: Mod: JZ | Performed by: STUDENT IN AN ORGANIZED HEALTH CARE EDUCATION/TRAINING PROGRAM

## 2023-12-02 PROCEDURE — 250N000013 HC RX MED GY IP 250 OP 250 PS 637

## 2023-12-02 PROCEDURE — 82310 ASSAY OF CALCIUM: CPT

## 2023-12-02 PROCEDURE — 97110 THERAPEUTIC EXERCISES: CPT | Mod: GP

## 2023-12-02 PROCEDURE — 36415 COLL VENOUS BLD VENIPUNCTURE: CPT

## 2023-12-02 PROCEDURE — 99214 OFFICE O/P EST MOD 30 MIN: CPT | Performed by: INTERNAL MEDICINE

## 2023-12-02 PROCEDURE — 82962 GLUCOSE BLOOD TEST: CPT

## 2023-12-02 PROCEDURE — 85025 COMPLETE CBC W/AUTO DIFF WBC: CPT

## 2023-12-02 PROCEDURE — 250N000013 HC RX MED GY IP 250 OP 250 PS 637: Performed by: STUDENT IN AN ORGANIZED HEALTH CARE EDUCATION/TRAINING PROGRAM

## 2023-12-02 PROCEDURE — 97530 THERAPEUTIC ACTIVITIES: CPT | Mod: GP

## 2023-12-02 RX ADMIN — ACETAMINOPHEN 975 MG: 325 TABLET, FILM COATED ORAL at 10:03

## 2023-12-02 RX ADMIN — ROSUVASTATIN CALCIUM 10 MG: 5 TABLET, FILM COATED ORAL at 22:08

## 2023-12-02 RX ADMIN — BUPROPION HYDROCHLORIDE 450 MG: 150 TABLET, FILM COATED, EXTENDED RELEASE ORAL at 08:03

## 2023-12-02 RX ADMIN — METOPROLOL SUCCINATE 50 MG: 25 TABLET, FILM COATED, EXTENDED RELEASE ORAL at 08:03

## 2023-12-02 RX ADMIN — DOCUSATE SODIUM AND SENNOSIDES 1 TABLET: 8.6; 5 TABLET, FILM COATED ORAL at 08:03

## 2023-12-02 RX ADMIN — OXYCODONE HYDROCHLORIDE 5 MG: 5 TABLET ORAL at 22:07

## 2023-12-02 RX ADMIN — ACETAMINOPHEN 975 MG: 325 TABLET, FILM COATED ORAL at 03:15

## 2023-12-02 RX ADMIN — TRAZODONE HYDROCHLORIDE 100 MG: 100 TABLET ORAL at 22:07

## 2023-12-02 RX ADMIN — SERTRALINE HYDROCHLORIDE 200 MG: 100 TABLET ORAL at 08:03

## 2023-12-02 RX ADMIN — INSULIN ASPART 1 UNITS: 100 INJECTION, SOLUTION INTRAVENOUS; SUBCUTANEOUS at 12:22

## 2023-12-02 RX ADMIN — OXYCODONE HYDROCHLORIDE 5 MG: 5 TABLET ORAL at 16:37

## 2023-12-02 RX ADMIN — APIXABAN 5 MG: 5 TABLET, FILM COATED ORAL at 08:03

## 2023-12-02 RX ADMIN — DILTIAZEM HYDROCHLORIDE 240 MG: 240 CAPSULE, EXTENDED RELEASE ORAL at 08:03

## 2023-12-02 RX ADMIN — APIXABAN 5 MG: 5 TABLET, FILM COATED ORAL at 22:08

## 2023-12-02 RX ADMIN — INSULIN ASPART 1 UNITS: 100 INJECTION, SOLUTION INTRAVENOUS; SUBCUTANEOUS at 08:11

## 2023-12-02 RX ADMIN — OXYCODONE HYDROCHLORIDE 5 MG: 5 TABLET ORAL at 03:15

## 2023-12-02 RX ADMIN — DOCUSATE SODIUM AND SENNOSIDES 1 TABLET: 8.6; 5 TABLET, FILM COATED ORAL at 22:08

## 2023-12-02 RX ADMIN — OXYCODONE HYDROCHLORIDE 5 MG: 5 TABLET ORAL at 08:02

## 2023-12-02 RX ADMIN — INSULIN ASPART 1 UNITS: 100 INJECTION, SOLUTION INTRAVENOUS; SUBCUTANEOUS at 18:26

## 2023-12-02 RX ADMIN — ACETAMINOPHEN 975 MG: 325 TABLET, FILM COATED ORAL at 18:25

## 2023-12-02 ASSESSMENT — ACTIVITIES OF DAILY LIVING (ADL)
ADLS_ACUITY_SCORE: 26
ADLS_ACUITY_SCORE: 25
ADLS_ACUITY_SCORE: 26
ADLS_ACUITY_SCORE: 25
ADLS_ACUITY_SCORE: 25
ADLS_ACUITY_SCORE: 26
ADLS_ACUITY_SCORE: 25
ADLS_ACUITY_SCORE: 26
ADLS_ACUITY_SCORE: 25

## 2023-12-02 NOTE — PROGRESS NOTES
Orthopaedic Surgery Progress Note 12/02/2023    S: No acute events overnight.  Pain controlled. Out of bed some, expecting TCU discharge.    O:  Temp: 97.7  F (36.5  C) Temp src: Oral BP: 122/72 Pulse: 76   Resp: 16 SpO2: 95 % O2 Device: BiPAP/CPAP Oxygen Delivery: 2 LPM    Exam:  Gen: No acute distress, resting comfortably in bed.  Resp: Non-labored breathing  MSK:  RLE:  - Dressings c/d/i  - SILT femoral/tibial/sural/saphenous/DP/SP nerves  - Fires Quad, TA, EHL, FHL, GaSC  - PT/DP pulses 2+, foot wwp    Recent Labs   Lab 12/02/23  0659   WBC 9.2   HGB 11.2*            Assessment: Mary Gorman is a 58 year old female s/p R TKA on 12/1 with Dr. Martin. Doing well.    Plan:  Weight bearing: Weightbearing as tolerated  Anticoagulation: Restart PTA anticoag POD 1  Precautions: No precautions  Pain control and monitoring  PT/OT  Antibiotics per protocol  X-ray in PACU  Discharge today or tomorrow when pain well controlled and ambulating safely.        Future Appointments   Date Time Provider Department Center   12/2/2023  1:00 PM Delilah Booth, PT [facility] [place]   12/11/2023  1:50 PM Dinorah Ruiz, PT [facility]   12/14/2023  1:50 PM Dinorah Ruiz, PT [facility]   12/19/2023  1:50 PM Dinorah Ruiz, PT [facility]   12/21/2023  1:50 PM Dinorah Ruiz, PT [facility]   12/22/2023  1:30 PM Ta Iyer, [facility] [place]   1/17/2024  1:20 PM Armand Martin MD [facility] [place]         Torrey Ji MD  Orthopaedic Surgery PGY-4

## 2023-12-02 NOTE — PROGRESS NOTES
[facility]    Medicine Progress Note - Hospitalist Service, GOLD TEAM 22    Date of Admission:  12/1/2023    Assessment & Plan    58 year old female admitted on 12/1/2023. She has a past medical history of primary OA of R knee (S/P R TKA 12/1/23), OA of L knee (S/P L TKA 01/2022), permanent Afib (on AC), T2DM c/b Diabetic Nephropathy, Stage IIIa CKD, BALTA, class III obesity, Lymphedema, HTN, HLD, chronic lower back pain, MDD, SHAQUILLE, who was admitted following elective R TKA. She was admitted for further monitoring and management, and Medicine was consulted for medical comanagement. Plan is to discharge to TCU    Primary Osteoarthritis of R Knee (S/P R TKA 12/1/23)  Hx of OA of L Knee (S/P L TKA 01/2022)  Prior hx of L TKA in January 2022, and had progressive OA in R knee, prompting the above procedure earlier today. The procedure was performed by Dr. Armand Martin, and EBL ~300mL. There were no intraoperative complications. Remains HDS on the floor.  - Post-op cares per Primary Team              - WBAT w/ no precautions              - Can resume AC (see below) tomorrow              - PT/OT              - Pain mgmt per Primary Team    Permanent Atrial Fibrillation   Follows w/ Kayleen Gomez PA-C from Cardiology, last saw 9/1/23. Rate control w/ Diltiazem 240mg daily and Metoprolol XL 50mg BID, and is anticoagulated w/ Eliquis 5mg BID. Had updated ECHO 10/12/23 as part of pre-op cardiac workup, and this showed LVEF 60%, no valvular abnormalities, and normal RV size and function. Remains HDS on floor, exam w/ RRR.  - Per Primary Team, ok to resume Eliquis tomorrow   - Can continue PTA Metoprolol and Diltiazem this evening w/ hold parameters  - Follow-up w/ Cardiology as directed    HTN  On Metoprolol as above for Afib, Spironolactone as below (for lymphedema), and additionally on Fosinopril for HTN mgmt.   - Continue Metoprolol as above  - Holding Spironolactone and  Fosinopril    Type 2 Diabetes Mellitus c/b Diabetic Nephropathy, non-insulin dependent, improving  Last A1c 5.9% on 11/15/23. PTA regimen includes Metformin 1000mg with dinner, and Trulicity 0.75mg weekly (last injection 11/18). She checks BG at home usually twice daily.   - Holding PTA Metformin and Trulicity              - Can resume these at discharge  - While here, will place on medium sliding scale  - BG checks TID AC + at bedtime + 0200  - Hypoglycemia protocol  - Follow-up w/ PCP    Stage IIIa CKD   CKD suspected to be d/t diabetic nephropathy. Baseline Cr ~1.1-1.3. Last check on 11/15/23 was 1.23. No established care with a Nephrologist.   - BMP in AM to assess renal function   - Avoid nephrotoxic agents  - Holding Fosinopril until renal function can be assessed in AM     Class III Obesity  Lymphedema, suspect 2/2 Obesity-related Chronic Venous Insufficiency  Pt w/ hx of lymphedema in the setting of class III obesity (BMI 55 on admission) and limited mobility recently d/t end-stage OA in R knee (see above), all of which are likely contributing to chronic venous insufficiency. She follows w/ lymphedema therapy PTA, last saw 7/5/23. Was supposed to be using decongestive pumps daily on the BLEs for therapy, in a supine position, but d/t her back pain in supine position, has only been able to complete ~once per month. Also on Spironolactone as a form of adjuvant therapy w/ her HTN.  - She is scheduled for an appt w/ OT tomorrow at 9am, will need to reschedule  - Can use SCD's for now on LLE  - Reconsult OT to help formulate a plan for her lymphedema therapy to bridge as an OP (ordered for you)     BALTA  PTA CPAP.  - Continue PTA CPAP     HLD  PTA Statin.  - Can resume PTA Statin this evening     Chronic Lower Back Pain  Began in Spring of this year without a precipitating event. Pain is present bilaterally. She saw [facility] on 10/12/23 at which time she was recommended to initiate the use of a TENS unit and  "get a lumbar spine MRI following her TKA. Sparingly uses Tramadol for acute flares of pain, last refill was by her PCP on 11/20/23 for #30 tabs of Tramadol 50mg.  - Follow-up w Pain clinic as directed  - Get outpatient lumbar MRI  - Follow-up w/ PCP      MDD  SHAQUILLE  Insomnia  PTA Wellbutrin 450mg daily (max dosing), Zoloft 200mg daily, Trazodone 100mg nightly.  - Continue PTA Wellbutrin, Zoloft daily and Trazodone nightly          Diet: Advance Diet as Tolerated: Regular Diet Adult  Discharge Instruction - Regular Diet Adult    DVT Prophylaxis: DOAC  Whalen Catheter: Not present  Lines: None     Cardiac Monitoring: None  Code Status: Full Code      Clinically Significant Risk Factors Present on Admission               # Drug Induced Coagulation Defect: home medication list includes an anticoagulant medication    # Hypertension: Noted on problem list   # Non-Invasive mechanical ventilation: current O2 Device: BiPAP/CPAP  # Acute hypoxic respiratory failure: continue supplemental O2 as needed     # Severe Obesity: Estimated body mass index is 55.47 kg/m  as calculated from the following:    Height as of this encounter: 1.651 m (5' 5\").    Weight as of this encounter: 151.2 kg (333 lb 5.4 oz).              Disposition Plan     Expected Discharge Date: 12/02/2023                    Porsha Calloway MD  Hospitalist Service, [address]  Securely message with [employer] (more info)  Text page via [facility] Paging/Directory   See signed in provider for up to date coverage information  ______________________________________________________________________    Interval History   Patient resting comfortably. ROS neg    Physical Exam   Vital Signs: Temp: 97.7  F (36.5  C) Temp src: Oral BP: 122/72 Pulse: 76   Resp: 16 SpO2: 95 % O2 Device: BiPAP/CPAP    Weight: 333 lbs 5.37 oz    General Appearance: Well built and nourished, comfortable at rest, ROS neg  Respiratory: CTA " bl  Cardiovascular: S1S2 normal RRR  GI: soft NT  Skin: NAD  Other: Aaox3 moving all 4 ext spont     Medical Decision Making             Data     I have personally reviewed the following data over the past 24 hrs:    9.2  \   11.2 (L)   / 167     135 102 25.2 (H) /  176 (H)   5.0 27 1.25 (H) \       Imaging results reviewed over the past 24 hrs:   No results found for this or any previous visit (from the past 24 hour(s)).

## 2023-12-02 NOTE — PLAN OF CARE
Pt is A&Ox4. VSS. LS clear, on RA. BS active, LBM on 11/30/2023. Voiding well. Pain managed with oxycodone. L knee surgical dressing is c/d/I. Pt up with 1 assist. R PIV is patent and infusing TKo. Continue to monitor.

## 2023-12-02 NOTE — PLAN OF CARE
"  VS: /72 (BP Location: Right arm)   Pulse 76   Temp 97.7  F (36.5  C) (Oral)   Resp 16   Ht 1.651 m (5' 5\")   Wt (!) 151.2 kg (333 lb 5.4 oz)   LMP 06/25/2019 (Approximate)   SpO2 95%   BMI 55.47 kg/m      O2: To room air, denies SOB   Output: Continent, uses toilet as needed   Last BM: 11-, passing flatus   Activity: Assist of 1 with gaitbelt and FWW   Skin: Skin redness with moisture on abdominal folds applied interdry sheet and redness and edema on (B) lower leg and feet   Pain: Mild pain when at rest, moderate to severe pain when moving, managed with PRN oxycodone   CMS: Aox4, with baseline numbness on bilateral toes   Dressing: (R) knee, CDI   Diet: Regular diet, with good appetite, (-) nausea and vomiting   LDA: PIV on (R) hand saline locked   Equipment: Personal items, call light IV pole   Plan: Possible discharge to TCU as per pt's request, awaiting placement   Additional Info:      "

## 2023-12-02 NOTE — PLAN OF CARE
Occupational Therapy: Orders received. Chart reviewed and discussed with care team.? Occupational Therapy not indicated in acute setting as patient had L TKA 2 yrs ago and understands basic strategies/AE for ADLs.?Pt will have assist from mother at discharge, but does want to be as IND as possible prior to returning to mother's home, so pt prefers discharge to TCU. Pt would benefit from OT at TCU to maximize IND with higher level IADLs. Defer discharge recommendations to ortho.? Pt working with PT and mobilizing with nursing while in hospital. Pt in agreement that working with single discipline in hospital, PT, will meet her needs. If discharge plan changes, or acute OT needs arise, open to re-consult.

## 2023-12-02 NOTE — PLAN OF CARE
Goal Outcome Evaluation:      Plan of Care Reviewed With: patient    Overall Patient Progress: improvingOverall Patient Progress: improving    Outcome Evaluation: continues to rate pain 3/10 when in bed and increases to 10/10 with activity. given prn's with relief. pt able to walk to the bathroom and voided adequate amount. Pt tolerated regular diet. pt uses CPAP at night. pt able to make needs known. call lgt in reach. will continue to monitor.

## 2023-12-03 ENCOUNTER — APPOINTMENT (OUTPATIENT)
Dept: PHYSICAL THERAPY | Facility: CLINIC | Age: 58
End: 2023-12-03
Attending: STUDENT IN AN ORGANIZED HEALTH CARE EDUCATION/TRAINING PROGRAM
Payer: COMMERCIAL

## 2023-12-03 LAB
ANION GAP SERPL CALCULATED.3IONS-SCNC: 7 MMOL/L (ref 7–15)
BUN SERPL-MCNC: 22.4 MG/DL (ref 8–23)
CALCIUM SERPL-MCNC: 9.3 MG/DL (ref 8.6–10)
CHLORIDE SERPL-SCNC: 104 MMOL/L (ref 98–107)
CREAT SERPL-MCNC: 1.06 MG/DL (ref 0.51–0.95)
DEPRECATED HCO3 PLAS-SCNC: 27 MMOL/L (ref 22–29)
EGFRCR SERPLBLD CKD-EPI 2021: 60 ML/MIN/1.73M2
GLUCOSE BLDC GLUCOMTR-MCNC: 120 MG/DL (ref 70–99)
GLUCOSE BLDC GLUCOMTR-MCNC: 149 MG/DL (ref 70–99)
GLUCOSE BLDC GLUCOMTR-MCNC: 155 MG/DL (ref 70–99)
GLUCOSE BLDC GLUCOMTR-MCNC: 179 MG/DL (ref 70–99)
GLUCOSE BLDC GLUCOMTR-MCNC: 181 MG/DL (ref 70–99)
GLUCOSE SERPL-MCNC: 125 MG/DL (ref 70–99)
HGB BLD-MCNC: 10.4 G/DL (ref 11.7–15.7)
POTASSIUM SERPL-SCNC: 4.6 MMOL/L (ref 3.4–5.3)
SODIUM SERPL-SCNC: 138 MMOL/L (ref 135–145)

## 2023-12-03 PROCEDURE — 250N000013 HC RX MED GY IP 250 OP 250 PS 637

## 2023-12-03 PROCEDURE — 250N000013 HC RX MED GY IP 250 OP 250 PS 637: Performed by: STUDENT IN AN ORGANIZED HEALTH CARE EDUCATION/TRAINING PROGRAM

## 2023-12-03 PROCEDURE — 97110 THERAPEUTIC EXERCISES: CPT | Mod: GP

## 2023-12-03 PROCEDURE — 97530 THERAPEUTIC ACTIVITIES: CPT | Mod: GP

## 2023-12-03 PROCEDURE — 80048 BASIC METABOLIC PNL TOTAL CA: CPT | Performed by: INTERNAL MEDICINE

## 2023-12-03 PROCEDURE — 85018 HEMOGLOBIN: CPT | Performed by: STUDENT IN AN ORGANIZED HEALTH CARE EDUCATION/TRAINING PROGRAM

## 2023-12-03 PROCEDURE — 99214 OFFICE O/P EST MOD 30 MIN: CPT | Performed by: INTERNAL MEDICINE

## 2023-12-03 PROCEDURE — 82962 GLUCOSE BLOOD TEST: CPT

## 2023-12-03 PROCEDURE — 36415 COLL VENOUS BLD VENIPUNCTURE: CPT | Performed by: STUDENT IN AN ORGANIZED HEALTH CARE EDUCATION/TRAINING PROGRAM

## 2023-12-03 PROCEDURE — 250N000013 HC RX MED GY IP 250 OP 250 PS 637: Performed by: INTERNAL MEDICINE

## 2023-12-03 RX ORDER — TIZANIDINE 2 MG/1
2-4 TABLET ORAL EVERY 8 HOURS PRN
Status: DISCONTINUED | OUTPATIENT
Start: 2023-12-03 | End: 2023-12-05 | Stop reason: HOSPADM

## 2023-12-03 RX ORDER — CALCIUM CARBONATE 500 MG/1
500 TABLET, CHEWABLE ORAL DAILY PRN
Status: DISCONTINUED | OUTPATIENT
Start: 2023-12-03 | End: 2023-12-05 | Stop reason: HOSPADM

## 2023-12-03 RX ADMIN — ACETAMINOPHEN 975 MG: 325 TABLET, FILM COATED ORAL at 10:09

## 2023-12-03 RX ADMIN — OXYCODONE HYDROCHLORIDE 10 MG: 10 TABLET ORAL at 21:04

## 2023-12-03 RX ADMIN — APIXABAN 5 MG: 5 TABLET, FILM COATED ORAL at 21:04

## 2023-12-03 RX ADMIN — TRAZODONE HYDROCHLORIDE 100 MG: 100 TABLET ORAL at 22:57

## 2023-12-03 RX ADMIN — OXYCODONE HYDROCHLORIDE 5 MG: 5 TABLET ORAL at 10:09

## 2023-12-03 RX ADMIN — DOCUSATE SODIUM AND SENNOSIDES 1 TABLET: 8.6; 5 TABLET, FILM COATED ORAL at 08:48

## 2023-12-03 RX ADMIN — CALCIUM CARBONATE (ANTACID) CHEW TAB 500 MG 500 MG: 500 CHEW TAB at 10:39

## 2023-12-03 RX ADMIN — METOPROLOL SUCCINATE 50 MG: 25 TABLET, FILM COATED, EXTENDED RELEASE ORAL at 08:47

## 2023-12-03 RX ADMIN — DILTIAZEM HYDROCHLORIDE 240 MG: 240 CAPSULE, EXTENDED RELEASE ORAL at 08:47

## 2023-12-03 RX ADMIN — OXYCODONE HYDROCHLORIDE 5 MG: 5 TABLET ORAL at 16:35

## 2023-12-03 RX ADMIN — ACETAMINOPHEN 975 MG: 325 TABLET, FILM COATED ORAL at 18:41

## 2023-12-03 RX ADMIN — ROSUVASTATIN CALCIUM 10 MG: 5 TABLET, FILM COATED ORAL at 21:04

## 2023-12-03 RX ADMIN — INSULIN ASPART 1 UNITS: 100 INJECTION, SOLUTION INTRAVENOUS; SUBCUTANEOUS at 18:41

## 2023-12-03 RX ADMIN — INSULIN ASPART 1 UNITS: 100 INJECTION, SOLUTION INTRAVENOUS; SUBCUTANEOUS at 12:10

## 2023-12-03 RX ADMIN — DOCUSATE SODIUM AND SENNOSIDES 1 TABLET: 8.6; 5 TABLET, FILM COATED ORAL at 21:04

## 2023-12-03 RX ADMIN — OXYCODONE HYDROCHLORIDE 10 MG: 10 TABLET ORAL at 05:08

## 2023-12-03 RX ADMIN — SERTRALINE HYDROCHLORIDE 200 MG: 100 TABLET ORAL at 08:47

## 2023-12-03 RX ADMIN — ACETAMINOPHEN 975 MG: 325 TABLET, FILM COATED ORAL at 05:08

## 2023-12-03 RX ADMIN — BUPROPION HYDROCHLORIDE 450 MG: 150 TABLET, FILM COATED, EXTENDED RELEASE ORAL at 08:48

## 2023-12-03 RX ADMIN — METOPROLOL SUCCINATE 50 MG: 25 TABLET, FILM COATED, EXTENDED RELEASE ORAL at 21:04

## 2023-12-03 RX ADMIN — APIXABAN 5 MG: 5 TABLET, FILM COATED ORAL at 08:48

## 2023-12-03 ASSESSMENT — ACTIVITIES OF DAILY LIVING (ADL)
ADLS_ACUITY_SCORE: 26
ADLS_ACUITY_SCORE: 26
ADLS_ACUITY_SCORE: 25
ADLS_ACUITY_SCORE: 26
ADLS_ACUITY_SCORE: 25
ADLS_ACUITY_SCORE: 26
DEPENDENT_IADLS:: INDEPENDENT
ADLS_ACUITY_SCORE: 26
ADLS_ACUITY_SCORE: 25

## 2023-12-03 NOTE — PROGRESS NOTES
[facility]    Medicine Progress Note - Hospitalist Service, GOLD TEAM 22    Date of Admission:  12/1/2023    Assessment & Plan   58 year old female admitted on 12/1/2023. She has a past medical history of primary OA of R knee (S/P R TKA 12/1/23), OA of L knee (S/P L TKA 01/2022), permanent Afib (on AC), T2DM c/b Diabetic Nephropathy, Stage IIIa CKD, BALTA, class III obesity, Lymphedema, HTN, HLD, chronic lower back pain, MDD, SHAQUILLE, who was admitted following elective R TKA. She was admitted for further monitoring and management, and Medicine was consulted for medical comanagement. Plan is to discharge to TCU    Primary Osteoarthritis of R Knee (S/P R TKA 12/1/23)  Hx of OA of L Knee (S/P L TKA 01/2022)  Prior hx of L TKA in January 2022, and had progressive OA in R knee, prompting the above procedure earlier today. The procedure was performed by Dr. Armand Martin, and EBL ~300mL. There were no intraoperative complications. Remains HDS on the floor.  - Post-op cares per Primary Team              - WBAT w/ no precautions              - Can resume AC (see below) tomorrow              - PT/OT              - Pain mgmt per Primary Team    Permanent Atrial Fibrillation   Follows w/ Kayleen Gomez PA-C from Cardiology, last saw 9/1/23. Rate control w/ Diltiazem 240mg daily and Metoprolol XL 50mg BID, and is anticoagulated w/ Eliquis 5mg BID. Had updated ECHO 10/12/23 as part of pre-op cardiac workup, and this showed LVEF 60%, no valvular abnormalities, and normal RV size and function. Remains HDS on floor, exam w/ RRR.  - Per Primary Team, ok to resume Eliquis tomorrow   - Can continue PTA Metoprolol and Diltiazem this evening w/ hold parameters  - Follow-up w/ Cardiology as directed    HTN  On Metoprolol as above for Afib, Spironolactone as below (for lymphedema), and additionally on Fosinopril for HTN mgmt.   - Continue Metoprolol as above  - Holding Spironolactone and  Fosinopril    Type 2 Diabetes Mellitus c/b Diabetic Nephropathy, non-insulin dependent, improving  Last A1c 5.9% on 11/15/23. PTA regimen includes Metformin 1000mg with dinner, and Trulicity 0.75mg weekly (last injection 11/18). She checks BG at home usually twice daily.   - Holding PTA Metformin and Trulicity              - Can resume these at discharge  - While here, will place on medium sliding scale  - BG checks TID AC + at bedtime + 0200  - Hypoglycemia protocol  - Follow-up w/ PCP    Stage IIIa CKD   CKD suspected to be d/t diabetic nephropathy. Baseline Cr ~1.1-1.3. Last check on 11/15/23 was 1.23. No established care with a Nephrologist.   - BMP in AM to assess renal function   - Avoid nephrotoxic agents  - Holding Fosinopril until renal function can be assessed in AM     Class III Obesity  Lymphedema, suspect 2/2 Obesity-related Chronic Venous Insufficiency  Pt w/ hx of lymphedema in the setting of class III obesity (BMI 55 on admission) and limited mobility recently d/t end-stage OA in R knee (see above), all of which are likely contributing to chronic venous insufficiency. She follows w/ lymphedema therapy PTA, last saw 7/5/23. Was supposed to be using decongestive pumps daily on the BLEs for therapy, in a supine position, but d/t her back pain in supine position, has only been able to complete ~once per month. Also on Spironolactone as a form of adjuvant therapy w/ her HTN.  - She is scheduled for an appt w/ OT tomorrow at 9am, will need to reschedule  - Can use SCD's for now on LLE  - Reconsult OT to help formulate a plan for her lymphedema therapy to bridge as an OP (ordered for you)     BALTA  PTA CPAP.  - Continue PTA CPAP     HLD  PTA Statin.  - Can resume PTA Statin this evening     Chronic Lower Back Pain  Began in Spring of this year without a precipitating event. Pain is present bilaterally. She saw [facility] on 10/12/23 at which time she was recommended to initiate the use of a TENS unit and  "get a lumbar spine MRI following her TKA. Sparingly uses Tramadol for acute flares of pain, last refill was by her PCP on 11/20/23 for #30 tabs of Tramadol 50mg.  - Follow-up w Pain clinic as directed  - Get outpatient lumbar MRI  - Follow-up w/ PCP      MDD  SHAQUILLE  Insomnia  PTA Wellbutrin 450mg daily (max dosing), Zoloft 200mg daily, Trazodone 100mg nightly.  - Continue PTA Wellbutrin, Zoloft daily and Trazodone nightly          Diet: Advance Diet as Tolerated: Regular Diet Adult  Discharge Instruction - Regular Diet Adult    DVT Prophylaxis: DOAC  Whalen Catheter: Not present  Lines: None     Cardiac Monitoring: None  Code Status: Full Code      Clinically Significant Risk Factors Present on Admission               # Drug Induced Coagulation Defect: home medication list includes an anticoagulant medication    # Hypertension: Noted on problem list      # Severe Obesity: Estimated body mass index is 55.47 kg/m  as calculated from the following:    Height as of this encounter: 1.651 m (5' 5\").    Weight as of this encounter: 151.2 kg (333 lb 5.4 oz).       # Financial/Environmental Concerns: none         Disposition Plan    TCU         Porsha Calloway MD  Hospitalist Service, GOLD TEAM 22  [facility]  Securely message with [employer] (more info)  Text page via [employer] Paging/Directory   See signed in provider for up to date coverage information  ______________________________________________________________________    Interval History   Patient resting comfortably. ROS neg    Physical Exam   Vital Signs: Temp: 98  F (36.7  C) Temp src: Oral BP: 126/65 Pulse: 85   Resp: 16 SpO2: 96 % O2 Device: None (Room air)    Weight: 333 lbs 5.37 oz    General Appearance: Well built and nourished, comfortably resting in bed, no acute distress  Respiratory: CTA bl  Cardiovascular: S1S2 normal RRR  GI: soft NT  Skin: NAD  Other: aaox3     Medical Decision Making             Data     I have " personally reviewed the following data over the past 24 hrs:    N/A  \   10.4 (L)   / N/A     138 104 22.4 /  155 (H)   4.6 27 1.06 (H) \       Imaging results reviewed over the past 24 hrs:   No results found for this or any previous visit (from the past 24 hour(s)).

## 2023-12-03 NOTE — PLAN OF CARE
Goal Outcome Evaluation:      Plan of Care Reviewed With: patient, parent, sibling    Overall Patient Progress: improvingOverall Patient Progress: improving    Outcome Evaluation: Plan for TCU at discharge.  Family will provide transportation.

## 2023-12-03 NOTE — CONSULTS
Care Management Initial Consult    General Information  Assessment completed with: Patient, Family,    Type of CM/SW Visit: Initial Assessment    Primary Care Provider verified and updated as needed: Yes   Readmission within the last 30 days: no previous admission in last 30 days      Reason for Consult: discharge planning  Advance Care Planning: Advance Care Planning Reviewed: present on chart          Communication Assessment  Patient's communication style: spoken language (English or Bilingual)    Hearing Difficulty or Deaf: no   Wear Glasses or Blind: yes    Cognitive  Cognitive/Neuro/Behavioral: WDL  Level of Consciousness: alert  Arousal Level: opens eyes spontaneously  Orientation: oriented x 4  Mood/Behavior: calm, cooperative  Best Language: 0 - No aphasia  Speech: logical, spontaneous, clear    Living Environment:   People in home: alone     Current living Arrangements: other (see comments) ([facility])      Able to return to prior arrangements: yes       Family/Social Support:  Care provided by: self  Provides care for: no one  Marital Status: Single  Parent(s), Sibling(s)          Description of Support System: Supportive    Support Assessment: Adequate family and caregiver support    Current Resources:   Patient receiving home care services: No     Community Resources: None  Equipment currently used at home: cane, straight, walker, rolling, raised toilet seat  Supplies currently used at home: None    Employment/Financial:  Employment Status: employed full-time        Financial Concerns: none           Does the patient's insurance plan have a 3 day qualifying hospital stay waiver?  No    Lifestyle & Psychosocial Needs:  Social Determinants of Health     Food Insecurity: Low Risk  (11/14/2023)    Food Insecurity     Within the past 12 months, did you worry that your food would run out before you got money to buy more?: No     Within the past 12 months, did the food you bought just not last and you didn t  have money to get more?: No   Depression: At risk (11/15/2023)    PHQ-2     PHQ-2 Score: 4   Housing Stability: Low Risk  (11/14/2023)    Housing Stability     Do you have housing? : Yes     Are you worried about losing your housing?: No   Tobacco Use: Low Risk  (12/1/2023)    Patient History     Smoking Tobacco Use: Never     Smokeless Tobacco Use: Never     Passive Exposure: Never   Financial Resource Strain: Low Risk  (11/14/2023)    Financial Resource Strain     Within the past 12 months, have you or your family members you live with been unable to get utilities (heat, electricity) when it was really needed?: No   Alcohol Use: Not At Risk (1/4/2022)    AUDIT-C     Frequency of Alcohol Consumption: Monthly or less     Average Number of Drinks: 1 or 2     Frequency of Binge Drinking: Never   Transportation Needs: Low Risk  (11/14/2023)    Transportation Needs     Within the past 12 months, has lack of transportation kept you from medical appointments, getting your medicines, non-medical meetings or appointments, work, or from getting things that you need?: No   Physical Activity: Inactive (1/4/2022)    Exercise Vital Sign     Days of Exercise per Week: 0 days     Minutes of Exercise per Session: 0 min   Interpersonal Safety: Not on file   Stress: Stress Concern Present (1/4/2022)    [employer] - Occupational Stress Questionnaire     Feeling of Stress : To some extent   Social Connections: Moderately Integrated (1/4/2022)    Social Connection and Isolation Panel [NHANES]     Frequency of Communication with Friends and Family: More than three times a week     Frequency of Social Gatherings with Friends and Family: Once a week     Attends Buddhist Services: More than 4 times per year     Active Member of Clubs or Organizations: Yes     Attends Club or Organization Meetings: Not on file     Marital Status: Never        Functional Status:  Prior to admission patient needed  assistance:   Dependent ADLs:: Independent  Dependent IADLs:: Independent  Assesssment of Functional Status: Needs placement in a SNF/TCF for rehabilitation    Mental Health Status:  Mental Health Status: No Current Concerns       Chemical Dependency Status:  Chemical Dependency Status: No Current Concerns             Values/Beliefs:  Spiritual, Cultural Beliefs, Yarsanism Practices, Values that affect care: yes       Cultural/Yarsanism Practices Patient Routinely Participates In: ceremony, prayer       Additional Information:  Mary Gorman is a 58 year old female admitted on 12/1/2023. She has a past medical history of primary OA of R knee (S/P R TKA 12/1/23), OA of L knee (S/P L TKA 01/2022), permanent Afib (on AC), T2DM c/b Diabetic Nephropathy, Stage IIIa CKD, BALTA, class III obesity, Lymphedema, HTN, HLD, chronic lower back pain, MDD, SHAQUILLE, who was admitted following elective R TKA. She was admitted for further monitoring and management, and Medicine was consulted for medical comanagement.     Writer met with patient at bedside.  PT recommends TCU at discharge.  Patient in agreement and asked for referral to be sent to [facility] in [place], [place].  Patient had spoken to admission prior to hospitalization.  Referral sent.  Patient's brother was also in the room and indicated he would provide transportation to facility at discharge.    Pavithra [facility]  Text paging available through [employer] on [employer] - search [employer] ([phone]) Saturday and Sunday      Units: 5 Ortho, 5 Med/Surg & WB ED  Pager:[phone]     Units: 6 Med/Surg, 8A, & 10A ICU  Pager: [phone]     After hours (1630 - 0000) Saturday & Sunday; ([id number]) Mon-Fri; ([phone]) [facility] Recognized Holidays     Units: ALL  Pager: [phone]

## 2023-12-03 NOTE — PLAN OF CARE
Goal Outcome Evaluation:      Plan of Care Reviewed With: patient    Overall Patient Progress: improving    Outcome Evaluation: Pt overall improving, pain well managed on orals. Obtained order for PRN zanaflex today to alternate with oxycodone for pain management. Assist x1 w/ gait belt and walker, WBAT RLE. Bilateral pedal pulses 2+, R) knee dressing CDI and is ACE wrapped. PIV removed this shift d/t no longer indicated and per pt request. Pt resting comfortably between cares and call light within reach. Anticipate discharge to TCU.

## 2023-12-03 NOTE — PLAN OF CARE
"Goal Outcome Evaluation:      Plan of Care Reviewed With: patient    VS: /62 (BP Location: Right arm)   Pulse 97   Temp 98.8  F (37.1  C) (Oral)   Resp 16   Ht 1.651 m (5' 5\")   Wt (!) 151.2 kg (333 lb 5.4 oz)   LMP 06/25/2019 (Approximate)   SpO2 97%   BMI 55.47 kg/m      O2: To room air, denies SOB   Output: Continent, uses toilet as needed   Last BM: 11-, on scheduled Senna, refused to take Miralax, encouraged increase fluid intake   Activity: Assist of 1 with gaitbelt and FWW   Skin: Incision on (R) knee, redness bilateral lower leg, lymphedema BLE   Pain: Mild to moderate pain, managed by PRN oxycodone   CMS: A&Ox4, with baseline numbness on (B) toes, (-) tingling   Dressing: (R) knee CDI   Diet: Regular diet, (-) nausea and vomiting   LDA: PIV saline locked on (R) hand   Equipment: Personal items, CPAP, IV pole   Plan: Awaiting placement to TCU   Additional Info:          "

## 2023-12-03 NOTE — PROVIDER NOTIFICATION
Paged to ortho resident on call: Pt in [address] on 5 ortho POD 1 R) TKA w/ [place], can I pls get an order for 2-4mg zanaflex PRN? Pt states this is the only muscle relaxer that has worked in past/isnt allergic to. Thanks! Gilda ARREOLA [id number]

## 2023-12-03 NOTE — PROGRESS NOTES
Orthopaedic Surgery Progress Note 12/03/2023    S: No acute events overnight.  OOB yesterday to bathroom and attempted joe. Feels she will need TCU.    O:  Temp: 98.8  F (37.1  C) Temp src: Oral BP: 135/62 Pulse: 97   Resp: 16 SpO2: 97 % O2 Device: None (Room air)      Exam:  Gen: No acute distress, resting comfortably in bed.  Resp: Non-labored breathing  MSK:  RLE:  - Dressings c/d/i  - SILT femoral/tibial/sural/saphenous/DP/SP nerves  - Fires Quad, TA, EHL, FHL, GaSC  - PT/DP pulses 2+, foot wwp    Recent Labs   Lab 12/03/23  0722 12/02/23  0659   WBC  --  9.2   HGB 10.4* 11.2*   PLT  --  167         Assessment: Mary Gorman is a 58 year old female s/p R TKA on 12/1 with Dr. Martin. Doing well.    Plan:  Weight bearing: Weightbearing as tolerated  Anticoagulation: Restart PTA anticoag POD 1  Precautions: No precautions  Pain control and monitoring  PT/OT  Antibiotics per protocol  X-ray in PACU  Discharge today or tomorrow when pain well controlled and ambulating safely.        Future Appointments   Date Time Provider Department Center   12/2/2023  1:00 PM Delilah Booth, PT [facility] [place]   12/11/2023  1:50 PM Dinorah Ruiz, PT [facility]   12/14/2023  1:50 PM Dinorah Ruiz, PT [facility]   12/19/2023  1:50 PM Dinorah Ruiz, PT [facility]   12/21/2023  1:50 PM Dinorah Ruiz, PT [facility]   12/22/2023  1:30 PM Ta Iyer PA-C [facility] [place]   1/17/2024  1:20 PM Armand Martin MD [facility] [place]         Torrey Ji MD  Orthopaedic Surgery PGY-4

## 2023-12-04 ENCOUNTER — APPOINTMENT (OUTPATIENT)
Dept: PHYSICAL THERAPY | Facility: CLINIC | Age: 58
End: 2023-12-04
Attending: STUDENT IN AN ORGANIZED HEALTH CARE EDUCATION/TRAINING PROGRAM
Payer: COMMERCIAL

## 2023-12-04 LAB
GLUCOSE BLDC GLUCOMTR-MCNC: 120 MG/DL (ref 70–99)
GLUCOSE BLDC GLUCOMTR-MCNC: 122 MG/DL (ref 70–99)
GLUCOSE BLDC GLUCOMTR-MCNC: 143 MG/DL (ref 70–99)
GLUCOSE BLDC GLUCOMTR-MCNC: 152 MG/DL (ref 70–99)
GLUCOSE BLDC GLUCOMTR-MCNC: 164 MG/DL (ref 70–99)
GLUCOSE BLDC GLUCOMTR-MCNC: 173 MG/DL (ref 70–99)

## 2023-12-04 PROCEDURE — 82962 GLUCOSE BLOOD TEST: CPT

## 2023-12-04 PROCEDURE — 250N000013 HC RX MED GY IP 250 OP 250 PS 637: Performed by: STUDENT IN AN ORGANIZED HEALTH CARE EDUCATION/TRAINING PROGRAM

## 2023-12-04 PROCEDURE — 99214 OFFICE O/P EST MOD 30 MIN: CPT | Performed by: INTERNAL MEDICINE

## 2023-12-04 PROCEDURE — 97110 THERAPEUTIC EXERCISES: CPT | Mod: GP

## 2023-12-04 PROCEDURE — 250N000013 HC RX MED GY IP 250 OP 250 PS 637

## 2023-12-04 PROCEDURE — 97530 THERAPEUTIC ACTIVITIES: CPT | Mod: GP

## 2023-12-04 RX ORDER — POLYETHYLENE GLYCOL 3350 17 G/17G
17 POWDER, FOR SOLUTION ORAL DAILY
DISCHARGE
Start: 2023-12-04 | End: 2023-12-08

## 2023-12-04 RX ORDER — AMOXICILLIN 250 MG
1-2 CAPSULE ORAL 2 TIMES DAILY
DISCHARGE
Start: 2023-12-04 | End: 2023-12-08

## 2023-12-04 RX ORDER — ACETAMINOPHEN 325 MG/1
650 TABLET ORAL EVERY 4 HOURS PRN
DISCHARGE
Start: 2023-12-04 | End: 2023-12-08

## 2023-12-04 RX ORDER — OXYCODONE HYDROCHLORIDE 5 MG/1
5-10 TABLET ORAL EVERY 4 HOURS PRN
Status: SHIPPED | DISCHARGE
Start: 2023-12-04 | End: 2023-12-08

## 2023-12-04 RX ORDER — IBUPROFEN 600 MG/1
600 TABLET, FILM COATED ORAL EVERY 6 HOURS PRN
DISCHARGE
Start: 2023-12-04 | End: 2024-05-03

## 2023-12-04 RX ADMIN — DOCUSATE SODIUM AND SENNOSIDES 1 TABLET: 8.6; 5 TABLET, FILM COATED ORAL at 21:11

## 2023-12-04 RX ADMIN — METOPROLOL SUCCINATE 50 MG: 25 TABLET, FILM COATED, EXTENDED RELEASE ORAL at 21:11

## 2023-12-04 RX ADMIN — OXYCODONE HYDROCHLORIDE 10 MG: 10 TABLET ORAL at 04:48

## 2023-12-04 RX ADMIN — OXYCODONE HYDROCHLORIDE 10 MG: 10 TABLET ORAL at 18:24

## 2023-12-04 RX ADMIN — OXYCODONE HYDROCHLORIDE 10 MG: 10 TABLET ORAL at 13:14

## 2023-12-04 RX ADMIN — APIXABAN 5 MG: 5 TABLET, FILM COATED ORAL at 21:11

## 2023-12-04 RX ADMIN — APIXABAN 5 MG: 5 TABLET, FILM COATED ORAL at 09:08

## 2023-12-04 RX ADMIN — ACETAMINOPHEN 650 MG: 325 TABLET, FILM COATED ORAL at 22:52

## 2023-12-04 RX ADMIN — ACETAMINOPHEN 650 MG: 325 TABLET, FILM COATED ORAL at 13:14

## 2023-12-04 RX ADMIN — DILTIAZEM HYDROCHLORIDE 240 MG: 240 CAPSULE, EXTENDED RELEASE ORAL at 09:08

## 2023-12-04 RX ADMIN — TRAZODONE HYDROCHLORIDE 100 MG: 100 TABLET ORAL at 22:52

## 2023-12-04 RX ADMIN — ROSUVASTATIN CALCIUM 10 MG: 5 TABLET, FILM COATED ORAL at 21:11

## 2023-12-04 RX ADMIN — ACETAMINOPHEN 975 MG: 325 TABLET, FILM COATED ORAL at 04:47

## 2023-12-04 RX ADMIN — INSULIN ASPART 1 UNITS: 100 INJECTION, SOLUTION INTRAVENOUS; SUBCUTANEOUS at 18:16

## 2023-12-04 RX ADMIN — SERTRALINE HYDROCHLORIDE 200 MG: 100 TABLET ORAL at 09:07

## 2023-12-04 RX ADMIN — DOCUSATE SODIUM AND SENNOSIDES 1 TABLET: 8.6; 5 TABLET, FILM COATED ORAL at 09:07

## 2023-12-04 RX ADMIN — OXYCODONE HYDROCHLORIDE 10 MG: 10 TABLET ORAL at 22:54

## 2023-12-04 RX ADMIN — BUPROPION HYDROCHLORIDE 450 MG: 150 TABLET, FILM COATED, EXTENDED RELEASE ORAL at 09:07

## 2023-12-04 RX ADMIN — OXYCODONE HYDROCHLORIDE 5 MG: 5 TABLET ORAL at 09:08

## 2023-12-04 RX ADMIN — ACETAMINOPHEN 650 MG: 325 TABLET, FILM COATED ORAL at 18:24

## 2023-12-04 RX ADMIN — METOPROLOL SUCCINATE 50 MG: 25 TABLET, FILM COATED, EXTENDED RELEASE ORAL at 09:08

## 2023-12-04 ASSESSMENT — ACTIVITIES OF DAILY LIVING (ADL)
ADLS_ACUITY_SCORE: 25

## 2023-12-04 NOTE — PROGRESS NOTES
12/4/23  Care Management Follow Up    CHW filed PAS for pt, [id number].    Noa Melo  Inpatient CHW  [facility] 5 Ortho, 8 Med Surge, & ED  PH: [phone]

## 2023-12-04 NOTE — UTILIZATION REVIEW
Concurrent stay review; Secondary Review Determination - [facility]        Under the authority of the Utilization Management Committee, the utilization review process indicated a secondary review on the above patient.  The review outcome is based on review of the medical records, discussions with staff, and applying clinical experience noted on the date of the review.        (x) Observation/outpatient Status Appropriate - Concurrent stay review       RATIONALE FOR DETERMINATION:     58 year old female who underwent a R TKA on 12/1 under outpatient status. She is progressing with therapies but plan is for TCU. She is otherwise not undergoing specific cares in the hospital but awaiting for placement.     Patient delayed discharge is related to disposition, there is no medical necessity for inpatient admission at the time of this review. If there is a change in patient status, please resend for review.    The information on this document is developed by the utilization review team in order for the business office to ensure compliance.  This only denotes the appropriateness of proper admission status and does not reflect the quality of care rendered.       The definitions of Inpatient Status and Observation Status used in making the determination above are those provided in the CMS Coverage Manual, Chapter 1 and Chapter 6, section 70.4.       Sincerely,    Yan Reaves, DO

## 2023-12-04 NOTE — PLAN OF CARE
Pt is A&Ox4. VSS. LS clear, on RA. BS active, LBM on 12/3/2023. Voiding well. Pain managed with oxycodone. L knee surgical dressing is c/d/I. Pt up with 1 assist. R PIV is patent and SL. Continue to monitor

## 2023-12-04 NOTE — PROGRESS NOTES
Care Management Follow Up    Length of Stay (days): 3    Expected Discharge Date: 12/04/2023     Concerns to be Addressed: Discharge planning      Patient plan of care discussed at interdisciplinary rounds: Yes    Anticipated Discharge Disposition: TCU     Anticipated Discharge Services: Therapies    Anticipated Discharge DME: n/a    Patient/family educated on Medicare website which has current facility and service quality ratings: Yes    Education Provided on the Discharge Plan: Yes    Patient/Family in Agreement with the Plan: Yes    Referrals Placed by CM/SW:     Accepted (pending insurance):  [facility]  [address] [place] [address]  P: [phone]  F: [phone]    Private pay costs discussed:  Not at this time    Additional Information: MARTINE saw message in Epic that pt is accepted to [facility].    MARTINE left message for , Ivy, about if pt can go today.    MARTINE received call from Ivy that she is going to request the prior authorization from pt's insurance right now, then will be in touch with this MARTINE.    MARTINE got update from Ivy that the prior authorization isn't yet approved.    MARTINE spoke with pt's brother, Ja, to provide update. Told him that pt may have a bed at TCU for tomorrow at the earliest. Ja in agreement to still provide discharge ride. Estimated time of  that works with his schedule: 1:30 pm.    KACY Ray  [facility]   Unit  Phone: [phone]

## 2023-12-04 NOTE — PROGRESS NOTES
Orthopaedic Surgery Progress Note 12/04/2023    S: No acute events overnight.  Pain well controlled. States she is making slow progress with therapy.     O:  Temp: 98  F (36.7  C) Temp src: Oral BP: 125/48 Pulse: 91   Resp: 16 SpO2: 94 % O2 Device: None (Room air)      Exam:  Gen: No acute distress, resting comfortably in bed.  Resp: Non-labored breathing  MSK:  RLE:  - Dressings c/d/i  - SILT femoral/tibial/sural/saphenous/DP/SP nerves  - Fires Quad, TA, EHL, FHL, GaSC  - PT/DP pulses 2+, foot wwp    Recent Labs   Lab 12/03/23  0722 12/02/23  0659   WBC  --  9.2   HGB 10.4* 11.2*   PLT  --  167         Assessment: Mary Gorman is a 58 year old female s/p R TKA on 12/1 with Dr. Martin. Doing well.    Plan:  Weight bearing: Weightbearing as tolerated  Anticoagulation: Restart PTA anticoag POD 1  Precautions: No precautions  Pain control and monitoring  PT/OT  Antibiotics per protocol  X-ray in PACU  Discharge pending TCU        Future Appointments   Date Time Provider Department Center   12/2/2023  1:00 PM Delilah Booth, PT [facility] [place]   12/11/2023  1:50 PM Dinorah Ruiz, PT [facility]   12/14/2023  1:50 PM Dinorah Ruiz, PT [facility]   12/19/2023  1:50 PM Dinorah Ruiz, PT [facility]   12/21/2023  1:50 PM Dinorah Ruiz, PT [facility]   12/22/2023  1:30 PM Ta Iyer PA-C [facility] [place]   1/17/2024  1:20 PM Armand Martin MD [facility] [place]     Hector Novak MD  Orthopedic Surgery PGY4

## 2023-12-04 NOTE — PROGRESS NOTES
[facility]    Medicine Progress Note - Hospitalist Service, GOLD TEAM 22    Date of Admission:  12/1/2023    Assessment & Plan   58 year old female admitted on 12/1/2023. She has a past medical history of primary OA of R knee (S/P R TKA 12/1/23), OA of L knee (S/P L TKA 01/2022), permanent Afib (on AC), T2DM c/b Diabetic Nephropathy, Stage IIIa CKD, BALTA, class III obesity, Lymphedema, HTN, HLD, chronic lower back pain, MDD, SHAQUILLE, who was admitted following elective R TKA. She was admitted for further monitoring and management, and Medicine was consulted for medical comanagement. Plan is to discharge to TCU when bed available. Patient is medically ready for discharge.    Primary Osteoarthritis of R Knee (S/P R TKA 12/1/23)  Hx of OA of L Knee (S/P L TKA 01/2022)  Prior hx of L TKA in January 2022, and had progressive OA in R knee, prompting the above procedure earlier today. The procedure was performed by Dr. Armand Martin, and EBL ~300mL. There were no intraoperative complications. Remains HDS on the floor.  - Post-op cares per Primary Team              - WBAT w/ knee precautions              - Continue apixaban              - PT/OT              - Pain mgmt per Primary Team    Permanent Atrial Fibrillation   Follows w/ Kayleen Gomez PA-C from Cardiology, last saw 9/1/23. Rate control w/ Diltiazem 240mg daily and Metoprolol XL 50mg BID, and is anticoagulated w/ Eliquis 5mg BID. Had updated ECHO 10/12/23 as part of pre-op cardiac workup, and this showed LVEF 60%, no valvular abnormalities, and normal RV size and function.   - continue Apixaban, metoprolol and cardizem  - Follow-up w/ Cardiology as directed    HTN  On Metoprolol as above for Afib, Spironolactone as below (for lymphedema), and additionally on Fosinopril for HTN mgmt.   - Continue Metoprolol as above  - Holding Spironolactone and Fosinopril  - Can be restarted in 1-2 days as tolerated by BP    Type 2  Diabetes Mellitus c/b Diabetic Nephropathy, non-insulin dependent, improving  Last A1c 5.9% on 11/15/23. PTA regimen includes Metformin 1000mg with dinner, and Trulicity 0.75mg weekly (last injection 11/18). She checks BG at home usually twice daily.   - Holding PTA Metformin and Trulicity              - Can resume these at discharge  - While here, placed on medium sliding scale  - BG checks TID AC + at bedtime + 0200  - Hypoglycemia protocol  - Follow-up w/ PCP    Stage IIIa CKD   CKD suspected to be d/t diabetic nephropathy. Baseline Cr ~1.1-1.3. Last check on 11/15/23 was 1.23. No established care with a Nephrologist.   - BMP in AM to assess renal function   - Avoid nephrotoxic agents       Class III Obesity  Lymphedema, suspect 2/2 Obesity-related Chronic Venous Insufficiency  Pt w/ hx of lymphedema in the setting of class III obesity (BMI 55 on admission) and limited mobility recently d/t end-stage OA in R knee (see above), all of which are likely contributing to chronic venous insufficiency. She follows w/ lymphedema therapy PTA, last saw 7/5/23. Was supposed to be using decongestive pumps daily on the BLEs for therapy, in a supine position, but d/t her back pain in supine position, has only been able to complete ~once per month. Also on Spironolactone as a form of adjuvant therapy w/ her HTN. Will need outpt OT/lymphedema therapy follow up       BALTA  PTA CPAP.  - Continue PTA CPAP     HLD  Continue PTA Statin.     Chronic Lower Back Pain  Began in Spring of this year without a precipitating event. Pain is present bilaterally. She saw [facility] on 10/12/23 at which time she was recommended to initiate the use of a TENS unit and get a lumbar spine MRI following her TKA. Sparingly uses Tramadol for acute flares of pain, last refill was by her PCP on 11/20/23 for #30 tabs of Tramadol 50mg.  - Follow-up w Pain clinic as directed  - Get outpatient lumbar MRI  - Follow-up w/ PCP      MDD  SHAQUILLE  Insomnia  PTA  "Wellbutrin 450mg daily (max dosing), Zoloft 200mg daily, Trazodone 100mg nightly.  - Continue PTA Wellbutrin, Zoloft daily and Trazodone nightly          Diet: Advance Diet as Tolerated: Regular Diet Adult  Discharge Instruction - Regular Diet Adult    DVT Prophylaxis: DOAC  Whalen Catheter: Not present  Lines: None     Cardiac Monitoring: None  Code Status: Full Code      Clinically Significant Risk Factors Present on Admission               # Drug Induced Coagulation Defect: home medication list includes an anticoagulant medication    # Hypertension: Noted on problem list      # Severe Obesity: Estimated body mass index is 55.47 kg/m  as calculated from the following:    Height as of this encounter: 1.651 m (5' 5\").    Weight as of this encounter: 151.2 kg (333 lb 5.4 oz).       # Financial/Environmental Concerns: none         Disposition Plan      Expected Discharge Date: 12/04/2023        Discharge Comments: ARIELLE Calloway MD  Hospitalist Service, GOLD TEAM 22  [facility]  Securely message with [employer] (more info)  Text page via [employer] Paging/Directory   See signed in provider for up to date coverage information  ______________________________________________________________________    Interval History   Patient resting comfortably. ROS neg    Physical Exam   Vital Signs: Temp: 98.2  F (36.8  C) Temp src: Oral BP: 129/68 Pulse: 96   Resp: 16 SpO2: 95 % O2 Device: None (Room air)    Weight: 333 lbs 5.37 oz    General Appearance: Well built and nourished, comfortable at rest, not in any acute cardio pulm distress  Respiratory: CTA bl  Cardiovascular: S1S2 normal RRR  GI: soft NT  Skin: NAD  Other: Aaox3 moving all 4 ext spont     Medical Decision Making             Data         Imaging results reviewed over the past 24 hrs:   No results found for this or any previous visit (from the past 24 hour(s)).  "

## 2023-12-05 ENCOUNTER — DOCUMENTATION ONLY (OUTPATIENT)
Dept: GERIATRICS | Facility: CLINIC | Age: 58
End: 2023-12-05
Payer: COMMERCIAL

## 2023-12-05 ENCOUNTER — APPOINTMENT (OUTPATIENT)
Dept: PHYSICAL THERAPY | Facility: CLINIC | Age: 58
End: 2023-12-05
Attending: STUDENT IN AN ORGANIZED HEALTH CARE EDUCATION/TRAINING PROGRAM
Payer: COMMERCIAL

## 2023-12-05 VITALS
WEIGHT: 293 LBS | HEIGHT: 65 IN | RESPIRATION RATE: 16 BRPM | SYSTOLIC BLOOD PRESSURE: 139 MMHG | OXYGEN SATURATION: 95 % | BODY MASS INDEX: 48.82 KG/M2 | TEMPERATURE: 98 F | HEART RATE: 96 BPM | DIASTOLIC BLOOD PRESSURE: 80 MMHG

## 2023-12-05 LAB
ANION GAP SERPL CALCULATED.3IONS-SCNC: 4 MMOL/L (ref 7–15)
BUN SERPL-MCNC: 15.4 MG/DL (ref 8–23)
CALCIUM SERPL-MCNC: 9.4 MG/DL (ref 8.6–10)
CHLORIDE SERPL-SCNC: 103 MMOL/L (ref 98–107)
CREAT SERPL-MCNC: 0.86 MG/DL (ref 0.51–0.95)
DEPRECATED HCO3 PLAS-SCNC: 31 MMOL/L (ref 22–29)
EGFRCR SERPLBLD CKD-EPI 2021: 77 ML/MIN/1.73M2
GLUCOSE BLDC GLUCOMTR-MCNC: 107 MG/DL (ref 70–99)
GLUCOSE BLDC GLUCOMTR-MCNC: 138 MG/DL (ref 70–99)
GLUCOSE SERPL-MCNC: 135 MG/DL (ref 70–99)
POTASSIUM SERPL-SCNC: 4.2 MMOL/L (ref 3.4–5.3)
SODIUM SERPL-SCNC: 138 MMOL/L (ref 135–145)

## 2023-12-05 PROCEDURE — 250N000013 HC RX MED GY IP 250 OP 250 PS 637: Performed by: STUDENT IN AN ORGANIZED HEALTH CARE EDUCATION/TRAINING PROGRAM

## 2023-12-05 PROCEDURE — 97116 GAIT TRAINING THERAPY: CPT | Mod: GP

## 2023-12-05 PROCEDURE — 36415 COLL VENOUS BLD VENIPUNCTURE: CPT | Performed by: INTERNAL MEDICINE

## 2023-12-05 PROCEDURE — 80048 BASIC METABOLIC PNL TOTAL CA: CPT | Performed by: INTERNAL MEDICINE

## 2023-12-05 PROCEDURE — 82962 GLUCOSE BLOOD TEST: CPT

## 2023-12-05 PROCEDURE — 97530 THERAPEUTIC ACTIVITIES: CPT | Mod: GP

## 2023-12-05 PROCEDURE — 99214 OFFICE O/P EST MOD 30 MIN: CPT | Performed by: STUDENT IN AN ORGANIZED HEALTH CARE EDUCATION/TRAINING PROGRAM

## 2023-12-05 PROCEDURE — 250N000013 HC RX MED GY IP 250 OP 250 PS 637

## 2023-12-05 RX ADMIN — OXYCODONE HYDROCHLORIDE 10 MG: 10 TABLET ORAL at 13:52

## 2023-12-05 RX ADMIN — DOCUSATE SODIUM AND SENNOSIDES 1 TABLET: 8.6; 5 TABLET, FILM COATED ORAL at 09:11

## 2023-12-05 RX ADMIN — ACETAMINOPHEN 650 MG: 325 TABLET, FILM COATED ORAL at 09:11

## 2023-12-05 RX ADMIN — BUPROPION HYDROCHLORIDE 450 MG: 150 TABLET, FILM COATED, EXTENDED RELEASE ORAL at 09:11

## 2023-12-05 RX ADMIN — TIZANIDINE 2 MG: 2 TABLET ORAL at 12:23

## 2023-12-05 RX ADMIN — APIXABAN 5 MG: 5 TABLET, FILM COATED ORAL at 09:11

## 2023-12-05 RX ADMIN — SERTRALINE HYDROCHLORIDE 200 MG: 100 TABLET ORAL at 09:11

## 2023-12-05 RX ADMIN — OXYCODONE HYDROCHLORIDE 10 MG: 10 TABLET ORAL at 09:11

## 2023-12-05 RX ADMIN — ACETAMINOPHEN 650 MG: 325 TABLET, FILM COATED ORAL at 13:52

## 2023-12-05 RX ADMIN — DILTIAZEM HYDROCHLORIDE 240 MG: 240 CAPSULE, EXTENDED RELEASE ORAL at 09:11

## 2023-12-05 RX ADMIN — METOPROLOL SUCCINATE 50 MG: 25 TABLET, FILM COATED, EXTENDED RELEASE ORAL at 09:11

## 2023-12-05 ASSESSMENT — ACTIVITIES OF DAILY LIVING (ADL)
ADLS_ACUITY_SCORE: 27

## 2023-12-05 NOTE — PLAN OF CARE
"VS: /68 (BP Location: Right arm)   Pulse 83   Temp 99  F (37.2  C) (Oral)   Resp 16   Ht 1.651 m (5' 5\")   Wt (!) 151.2 kg (333 lb 5.4 oz)   LMP 06/25/2019 (Approximate)   SpO2 97%   BMI 55.47 kg/m      O2: Sating >90% on RA. Lung sounds clear. Denies chest pain and SOB.   Output: Voids spontaneously and adequately in bathroom.    Last BM: 12/3/23. Bowel sounds active x4. Passing flatus.    Activity: Up with 1 assist, FWW, and gait belt. WBAT.    Skin: R knee incision.    Pain: Pain was managed with PRN Tylenol, PRN Oxycodone and ice.    CMS: A&Ox4. Baseline neuropathy to bilateral toes.    Dressing: Dressing to R knee has dried drainage at top of dressing, otherwise C/D/I.    Diet: Regular. Appetite was good. BG Q4 and at HS. Sliding scale available. Denies N/V.    LDA: No PIV access.     Equipment: IV pole, FWW, gait belt, personal walker, personal CPAP, and personal belongings.    Plan: Continue to monitor. Call light within reach and utilized appropriately. Discharge to [facility] tomorrow, 12/5/23 pending prior authorization from insurance.    Additional Info:       "

## 2023-12-05 NOTE — PLAN OF CARE
"VS: /80   Pulse 96   Temp 98  F (36.7  C) (Oral)   Resp 16   Ht 1.651 m (5' 5\")   Wt (!) 151.2 kg (333 lb 5.4 oz)   LMP 06/25/2019 (Approximate)   SpO2 95%   BMI 55.47 kg/m      O2: Sating >90% on RA. Lung sounds clear. Denies chest pain and SOB.   Output: Voids spontaneously and adequately in bathroom.    Last BM: 12/4/23. Bowel sounds active x4. Passing flatus.    Activity: Up with 1 assist, FWW, and gait belt. WBAT.    Skin: R knee incision.    Pain: Pain was managed with PRN Tylenol, PRN Oxycodone and ice.    CMS: A&Ox4. Baseline neuropathy to bilateral toes. Edema +2 to BLE.    Dressing: Dressing to R knee has dried drainage at top of dressing, otherwise C/D/I.    Diet: Regular. Appetite was good. BG Q4 and at HS. Sliding scale available. Denies N/V.    LDA: No PIV access.     Equipment: IV pole, FWW, gait belt, personal walker, personal CPAP, and personal belongings.    Plan: Continue to monitor. Call light within reach and utilized appropriately. Discharge to [facility] at 1515.    Additional Info:        "

## 2023-12-05 NOTE — PROGRESS NOTES
[facility]    Medicine Progress Note - Hospitalist Service, GOLD TEAM 22    Date of Admission:  12/1/2023    Assessment & Plan   58 year old female admitted on 12/1/2023. She has a past medical history of primary OA of R knee (S/P R TKA 12/1/23), OA of L knee (S/P L TKA 01/2022), permanent Afib (on AC), T2DM c/b Diabetic Nephropathy, Stage IIIa CKD, BALTA, class III obesity, Lymphedema, HTN, HLD, chronic lower back pain, MDD, SHAQUILLE, who was admitted following elective R TKA. She was admitted for further monitoring and management, and Medicine was consulted for medical comanagement. Plan is to discharge to TCU when bed available. Patient is medically ready for discharge.    Primary Osteoarthritis of R Knee (S/P R TKA 12/1/23)  Hx of OA of L Knee (S/P L TKA 01/2022)  Prior hx of L TKA in January 2022, and had progressive OA in R knee, prompting the above procedure earlier today. The procedure was performed by Dr. Armand Martin, and EBL ~300mL. There were no intraoperative complications.  - Post-op cares per ortho    Permanent Atrial Fibrillation   Follows w/ Kayleen Gomez PA-C from Cardiology, last saw 9/1/23. Rate control w/ Diltiazem 240mg daily and Metoprolol XL 50mg BID, and is anticoagulated w/ Eliquis 5mg BID. Had updated ECHO 10/12/23 as part of pre-op cardiac workup, and this showed LVEF 60%, no valvular abnormalities, and normal RV size and function.   - continue Apixaban, metoprolol and cardizem  - Follow-up w/ Cardiology as directed    HTN  On Metoprolol as above for Afib, Spironolactone as below (for lymphedema), and additionally on Fosinopril for HTN mgmt.   - Continue Metoprolol as above  - Held Spironolactone and Fosinopril inpatient, ok to resume on discharge (done in med rec    Type 2 Diabetes Mellitus c/b Diabetic Nephropathy, non-insulin dependent, improving  Last A1c 5.9% on 11/15/23. PTA regimen includes Metformin 1000mg with dinner, and Trulicity  0.75mg weekly (last injection 11/18). She checks BG at home usually twice daily.   - Held PTA Metformin and Trulicity inpatient --> can resume these at discharge  - Follow-up w/ PCP    Stage IIIa CKD   CKD suspected to be d/t diabetic nephropathy. Baseline Cr ~1.1-1.3. Last check on 11/15/23 was 1.23. No established care with a Nephrologist.   - Follow with PCP     Class III Obesity  Lymphedema, suspect 2/2 Obesity-related Chronic Venous Insufficiency  Pt w/ hx of lymphedema in the setting of class III obesity (BMI 55 on admission) and limited mobility recently d/t end-stage OA in R knee (see above), all of which are likely contributing to chronic venous insufficiency. She follows w/ lymphedema therapy PTA, last saw 7/5/23. Was supposed to be using decongestive pumps daily on the BLEs for therapy, in a supine position, but d/t her back pain in supine position, has only been able to complete ~once per month. Also on Spironolactone as a form of adjuvant therapy w/ her HTN. Will need outpt OT/lymphedema therapy follow up.     BALTA  PTA CPAP.  - Continue PTA CPAP     HLD  Continue PTA Statin.     Chronic Lower Back Pain  Began in Spring of this year without a precipitating event. Pain is present bilaterally. She saw [facility] on 10/12/23 at which time she was recommended to initiate the use of a TENS unit and get a lumbar spine MRI following her TKA. Sparingly uses Tramadol for acute flares of pain, last refill was by her PCP on 11/20/23 for #30 tabs of Tramadol 50mg.  - Follow-up w Pain clinic as directed  - Get outpatient lumbar MRI  - Follow-up w/ PCP      MDD  SHAQUILLE  Insomnia  PTA Wellbutrin 450mg daily (max dosing), Zoloft 200mg daily, Trazodone 100mg nightly.  - Continue PTA Wellbutrin, Zoloft daily and Trazodone nightly          Diet: Advance Diet as Tolerated: Regular Diet Adult  Discharge Instruction - Regular Diet Adult  Diet    DVT Prophylaxis: DOAC  Whalen Catheter: Not present  Lines: None     Cardiac  "Monitoring: None  Code Status: Full Code      Clinically Significant Risk Factors Present on Admission               # Drug Induced Coagulation Defect: home medication list includes an anticoagulant medication    # Hypertension: Noted on problem list      # Severe Obesity: Estimated body mass index is 55.47 kg/m  as calculated from the following:    Height as of this encounter: 1.651 m (5' 5\").    Weight as of this encounter: 151.2 kg (333 lb 5.4 oz).       # Financial/Environmental Concerns: none         Disposition Plan             Nuzhat Peraza MD  Hospitalist Service, GOLD TEAM 22  [facility]  Securely message with [employer] (more info)  Text page via [employer] Paging/Directory   See signed in provider for up to date coverage information  ______________________________________________________________________    Interval History   No acute events. Patient has no new concerns this morning.    Physical Exam   Vital Signs: Temp: 99  F (37.2  C) Temp src: Oral BP: 128/69 Pulse: 91   Resp: 16 SpO2: 95 % O2 Device: None (Room air)    Weight: 333 lbs 5.37 oz    Constitutional: awake, alert, cooperative, no apparent distress  Respiratory: breathing non-labored on RA  Cardiovascular: wwp  Musculoskeletal: RN working on leg wraps, 1-2+ bilateral LE edema (baseline per pt), badnage over left knee c/d/i  Neurologic: alert and oriented x3  Neuropsychiatric: calm, normal eye contact, affect normal      Medical Decision Making       **CLEAR ALL SELECTIONS**      Data     I have personally reviewed the following data over the past 24 hrs:    N/A  \   N/A   / N/A     138 103 15.4 /  107 (H)   4.2 31 (H) 0.86 \       "

## 2023-12-05 NOTE — DISCHARGE SUMMARY
DISCHARGE SUMMARY    Pt discharging to: [facility]   Transportation: brother via private vehicle  AVS given and discussed: Pt was given AVS and pt states understanding of content. Pt has no further questions.   Stoplight Tool given and discussed: Yes, no further questions.  Medications given: Yes, discussed. No further questions.   Belongings returned: Yes, ensured all belongings packed and sent with pt. No items in security.   Comments: Escorted safely to elevators. Pt left at 1520.

## 2023-12-05 NOTE — PROGRESS NOTES
Care Management Discharge Note    Discharge Date: 12/05/2023 at 3:30 pm     Discharge Disposition: [facility]  [address] [place] [address]  P: [phone]  F: [phone]  RN to RN Report: [phone]    Discharge Services: Therapies    Discharge DME: n/a    Discharge Transportation: family or friend will provide    Private pay costs discussed: Not applicable    Does the patient's insurance plan have a 3 day qualifying hospital stay waiver?  No    PAS Confirmation Code: [id number]    Patient/family educated on Medicare website which has current facility and service quality ratings: n/a    Education Provided on the Discharge Plan: Yes     Persons Notified of Discharge Plans: Pt, bedside RN, provider    Patient/Family in Agreement with the Plan: Yes    Handoff Referral Completed: Yes    Additional Information: SW was informed at 1:10 pm that pt's insurance has approved a TCU stay.    MARTINE informed pt about going to TCU today. Her brother, Ja, will transport her. He will arrive to pick her up about 3:30 pm.    MARTINE requested discharge orders/hard script from Ortho provider.    MARTINE sent orders to [facility] via [employer].     MARTINE faxed hard script to facility.    MARTINE completed handoff to PCP.    KACY Ray  [facility]   Unit MARTINE Phone: [phone]

## 2023-12-05 NOTE — PLAN OF CARE
"Goal Outcome Evaluation:      Plan of Care Reviewed With: patient    Overall Patient Progress: improving    Outcome Evaluation: pt is AXO 4, Vital stable, room air. Right knee  pain managed with PRN Meds. Right knee Dressing has dried drainage.  Edema to bilat lower extremities. Assist of 1 with gait and walker. LBM 12/3, no IV access.makes needs known.     Recent vital: /69 (BP Location: Right arm)   Pulse 91   Temp 99  F (37.2  C) (Oral)   Resp 16   Ht 1.651 m (5' 5\")   Wt (!) 151.2 kg (333 lb 5.4 oz)   LMP 06/25/2019 (Approximate)   SpO2 95%   BMI 55.47 kg/m      Plan: possible discharge to TCU ([facility]) today. Waiting for insurance to approve.     "

## 2023-12-05 NOTE — DISCHARGE SUMMARY
ORTHOPAEDIC SURGERY DISCHARGE SUMMARY     Date of Admission: 12/1/2023  Date of Discharge: 12/5/2023  Disposition: Home  Staff Physician: Nuzhat Patel MD  Primary Care Provider: Nica Workman    DISCHARGE DIAGNOSIS:  Osteoarthrosis, localized, primary, knee, right [M17.11]    PROCEDURES: Procedure(s):  ARTHROPLASTY, RIGHT KNEE, TOTAL on 12/1/2023    BRIEF HISTORY:  Mary Gorman is a 58-year-old female with history of chronic right knee pain and instability due to end-stage arthritis.  Patient insufficiently responded to nonoperative treatment elected to proceed with surgical intervention with a right total knee arthroplasty.    HOSPITAL COURSE:    The patient was admitted following the above listed procedures for pain control and rehabilitation. Mary Gorman did well post-operatively. Medicine was consulted post operatively to aid in management of medical co-morbidities. The patient received routine nursing cares and at the time of discharge was medically stable. Vital signs were stable throughout admission. The patient is tolerating a regular diet and is voiding spontaneously. PT/OT recommending continued therapies with TCU placement.  Pain is now controlled on oral medications which will be available on discharge. Stool softeners have been used while taking pain medications to help prevent constipation. Mary Gorman is deemed medically safe to discharge.     Antibiotics:  Ancef given periop and 24 hours postop.   DVT prophylaxis:  PTA Pradaxa resumed postoperatively.  PT Progress:  PT/OT recommending continued therapies with TCU placement.   Pain Meds:  Weaned off all IV pain meds by discharge.  Inpatient Events: No significant events or complications.     PHYSICAL EXAM:    Patient not examined by this writer.  Please see progress note dated 12/5/2023.    FOLLOWUP:    Follow up with Dr. Martin's team at 2 weeks postoperatively.    Future Appointments   Date Time Provider Department Center    12/6/2023  2:30 PM UR PT WAITLIST [facility] [place]   12/11/2023  1:50 PM Radha Zhao, PT [facility]   12/14/2023  1:50 PM Radha Zhao, PT [facility]   12/19/2023  1:50 PM Dinorah Ruiz, PT [facility]   12/21/2023  1:50 PM Dinorah Ruiz, PT [facility]   12/22/2023  1:30 PM Ta Iyer, ISA[facility] [place] [facility]   1/17/2024  1:20 PM Armand Martin MD [facility] [place] [facility]       Orthopaedic Surgery appointments are at the [facility] ([address] [place] [address]). Call [phone]  to schedule a follow-up appointment at this location with your provider.     PLANNED DISCHARGE ORDERS:      Current Discharge Medication List        START taking these medications    Details   !! acetaminophen (TYLENOL) 325 MG tablet Take 2 tablets (650 mg) by mouth every 4 hours as needed for other (mild pain)    Associated Diagnoses: Osteoarthrosis, localized, primary, knee, right      ibuprofen (ADVIL/MOTRIN) 600 MG tablet Take 1 tablet (600 mg) by mouth every 6 hours as needed for mild pain    Associated Diagnoses: Osteoarthrosis, localized, primary, knee, right      oxyCODONE (ROXICODONE) 5 MG tablet Take 1-2 tablets (5-10 mg) by mouth every 4 hours as needed for moderate to severe pain    Associated Diagnoses: Osteoarthrosis, localized, primary, knee, right      polyethylene glycol (MIRALAX) 17 GM/Dose powder Take 17 g by mouth daily    Associated Diagnoses: Osteoarthrosis, localized, primary, knee, right      senna-docusate (SENOKOT-S/PERICOLACE) 8.6-50 MG tablet Take 1-2 tablets by mouth 2 times daily Take while on oral narcotics to prevent or treat constipation.    Associated Diagnoses: Osteoarthrosis, localized, primary, knee, right       !! - Potential duplicate medications found. Please discuss with provider.        CONTINUE these medications which have NOT CHANGED    Details   !! acetaminophen (TYLENOL) 500 MG tablet Take 1,000 mg by  mouth 3 times daily as needed for pain      !! buPROPion (WELLBUTRIN XL) 150 MG 24 hr tablet Take 1 tablet (150 mg) by mouth every morning Take with 300 mg for total of 450 mg  Qty: 90 tablet, Refills: 1    Associated Diagnoses: Mild recurrent major depression (H24)      !! buPROPion (WELLBUTRIN XL) 300 MG 24 hr tablet Take 1 tablet (300 mg) by mouth every morning Take with 150 mg for total 450 mg  Qty: 90 tablet, Refills: 1    Associated Diagnoses: Mild recurrent major depression (H24)      cetirizine (ZYRTEC) 10 MG tablet Take 10 mg by mouth as needed for allergies      diltiazem ER (DILT-XR) 240 MG 24 hr ER beaded capsule Take 1 capsule (240 mg) by mouth daily  Qty: 90 capsule, Refills: 3    Associated Diagnoses: Chronic atrial fibrillation (H)      dulaglutide (TRULICITY) 0.75 MG/0.5ML pen Inject 0.75 mg Subcutaneous every 7 days  Qty: 2 mL, Refills: 3    Associated Diagnoses: Type 2 diabetes mellitus with stage 3a chronic kidney disease, without long-term current use of insulin (H)      ELIQUIS ANTICOAGULANT 5 MG tablet Take 1 tablet (5 mg) by mouth 2 times daily  Qty: 180 tablet, Refills: 3    Associated Diagnoses: Paroxysmal atrial fibrillation (H)      fosinopril (MONOPRIL) 10 MG tablet Take 1 tablet (10 mg) by mouth daily  Qty: 90 tablet, Refills: 3    Associated Diagnoses: Hypertension goal BP (blood pressure) < 140/90      metFORMIN (GLUCOPHAGE XR) 500 MG 24 hr tablet Take 2 tablets (1,000 mg) by mouth daily (with dinner)  Qty: 180 tablet, Refills: 3    Associated Diagnoses: Type 2 diabetes mellitus without complication, without long-term current use of insulin (H)      metoprolol succinate ER (TOPROL XL) 50 MG 24 hr tablet Take 1 tablet (50 mg) by mouth 2 times daily  Qty: 180 tablet, Refills: 3    Associated Diagnoses: Essential hypertension, benign      rosuvastatin (CRESTOR) 10 MG tablet Take 1 tablet (10 mg) by mouth daily  Qty: 90 tablet, Refills: 3    Comments: Refill when it is due  Associated  "Diagnoses: Hyperlipidemia LDL goal <70      sertraline (ZOLOFT) 100 MG tablet Take 200 mg by mouth daily 2 tabs (200mg) daily       spironolactone (ALDACTONE) 25 MG tablet Take 1 tablet (25 mg) by mouth daily  Qty: 90 tablet, Refills: 3    Associated Diagnoses: Essential hypertension      tiZANidine (ZANAFLEX) 2 MG tablet Take 1-2 tablets (2-4 mg) by mouth nightly as needed for muscle spasms  Qty: 180 tablet, Refills: 1    Associated Diagnoses: Muscle spasm      traZODone (DESYREL) 100 MG tablet Take 1 tablet (100 mg) by mouth At Bedtime  Qty: 90 tablet, Refills: 1    Associated Diagnoses: Difficulty sleeping      blood glucose (NO BRAND SPECIFIED) lancets standard Use to test blood sugar 2 times daily or as directed.  Qty: 200 each, Refills: 3    Associated Diagnoses: Type 2 diabetes mellitus with stage 3a chronic kidney disease, without long-term current use of insulin (H)      blood glucose (NO BRAND SPECIFIED) test strip Use to test blood sugar 2 times daily or as directed.  Qty: 200 strip, Refills: 3    Associated Diagnoses: Type 2 diabetes mellitus with stage 3a chronic kidney disease, without long-term current use of insulin (H)      blood glucose monitoring (NO BRAND SPECIFIED) meter device kit Use to test blood sugar 2 times daily or as directed.  Qty: 1 kit, Refills: 0    Associated Diagnoses: Type 2 diabetes mellitus with stage 3a chronic kidney disease, without long-term current use of insulin (H)       !! - Potential duplicate medications found. Please discuss with provider.        STOP taking these medications       traMADol (ULTRAM) 50 MG tablet Comments:   Reason for Stopping:                 Discharge Procedure Orders   Reason for your hospital stay   Order Comments: Right TKA     When to call - Contact Surgeon Team   Order Comments: You may experience symptoms that require follow-up before your scheduled appointment. Refer to the \"Stoplight Tool\" for instructions on when to contact your Surgeon " Team if you are concerned about pain control, blood clots, constipation, or if you are unable to urinate.     When to call - Reach out to Urgent Care   Order Comments: If you are not able to reach your Surgeon Team and you need immediate care, go to the Orthopedic Walk-in Clinic or Urgent Care at your Surgeon's office.  Do NOT go to the Emergency Room unless you have shortness of breath, chest pain, or other signs of a medical emergency.     When to call - Reasons to Call 911   Order Comments: Call 911 immediately if you experience sudden-onset chest pain, arm weakness/numbness, slurred speech, or shortness of breath     Discharge Instruction - Breathing exercises   Order Comments: Perform breathing exercises using your Incentive Spirometer 10 times per hour while awake for 2 weeks.     Symptoms - Fever Management   Order Comments: A low grade fever can be expected after surgery.  Use acetaminophen (TYLENOL) as needed for fever management.  Contact your Surgeon Team if you have a fever greater than 101.5 F, chills, and/or night sweats.     Symptoms - Constipation management   Order Comments: Constipation (hard, dry bowel movements) is expected after surgery due to the combination of being less active, the anesthetic, and the opioid pain medication.  You can do the following to help reduce constipation:  ~  FLUIDS:  Drink clear liquids (water or Gatorade), or juice (apple/prune).  ~  DIET:  Eat a fiber rich diet.    ~  ACTIVITY:  Get up and move around several times a day.  Increase your activity as you are able.  MEDICATIONS:  Reduce the risk of constipation by starting medications before you are constipated.  You can take Miralax   (1 packet as directed) and/or a stool softener (Senokot 1-2 tablets 1-2 times a day).  If you already have constipation and these medications are not working, you can get magnesium citrate and use as directed.  If you continue to have constipation you can try an over the counter  suppository or enema.  Call your Surgeon Team if it has been greater than 3 days since your last bowel movement.     Symptoms - Reduced Urine Output   Order Comments: Changes in the amount of fluids you drank before and after surgery may result in problems urinating.  It is important to stay well-hydrated after surgery and drink plenty of water. If it has been greater than 8 hours since you have urinated despite drinking plenty of water, call your Surgeon Team.     Activity - Exercises to prevent blood clots   Order Comments: Unless otherwise directed by your Surgeon team, perform the following exercises at least three times per day for the first four weeks after surgery to prevent blood clots in your legs: 1) Point and flex your feet (Ankle Pumps), 2) Move your ankle around in big circles, 3) Wiggle your toes, 4) Walk, even for short distances, several times a day, will help decrease the risk of blood clots.     Order Specific Question Answer Comments   Is discharge order? Yes      Comfort and Pain Management - Pain after Surgery   Order Comments: Pain after surgery is normal and expected.  You will have some amount of pain for several weeks after surgery.  Your pain will improve with time.  There are several things you can do to help reduce your pain including: rest, ice, elevation, and using pain medications as needed. Contact your Surgeon Team if you have pain that persists or worsens after surgery despite rest, ice, elevation, and taking your medication(s) as prescribed. Contact your Surgeon Team if you have new numbness, tingling, or weakness in your operative extremity.     Comfort and Pain Management - Swelling after Surgery   Order Comments: Swelling and/or bruising of the surgical extremity is common and may persist for several months after surgery. In addition to frequent icing and elevation, gentle compressive support with an ACE wrap or tubigrip may help with swelling. Apply compression regularly,  "removing at least twice daily to perform skin checks. Contact your Surgeon Team if your swelling increases and is NOT associated with an increase in your activity level, or if your swelling increases and is associated with redness and pain.     Comfort and Pain Management - LOWER Extremity Elevation   Order Comments: Swelling is expected for several months after surgery. This type of swelling is usually associated with gravity and activity, and can be improved with elevation.   The best way to do this is to get your \"toes above your nose\" by laying down and placing several pillows lengthwise under your calf and heel. When elevating your leg keep your knee completely straight. Perform this elevation as often as possible especially for the first two weeks after surgery.     Comfort and Pain Management - Cold therapy   Order Comments: Ice can be used to control swelling and discomfort after surgery. Place a thin towel over your operative site and apply the ice pack overtop. Leave ice pack in place for 20 minutes, then remove for 20 minutes. Repeat this 20 minutes on/20 minutes off routine as often as tolerated.     Medication Instructions - Acetaminophen (TYLENOL) Instructions   Order Comments: You were discharged with acetaminophen (TYLENOL) for pain management after surgery. Acetaminophen most effectively manages pain symptoms when it is taken on a schedule without missing doses (every four, six, or eight hours). Your Provider will prescribe a safe daily dose between 3000 - 4000 mg.  Do NOT exceed this daily dose. Most patients use acetaminophen for pain control for the first four weeks after surgery.  You can wean from this medication as your pain decreases.     Medication Instructions - NSAID Instructions   Order Comments: You were discharged with an anti-inflammatory medication for pain management to use in combination with acetaminophen (TYLENOL) and the narcotic pain medication.  Take this medication exactly as " directed.  You should only take one anti-inflammatory at a time.  Some common anti-inflammatories include: ibuprofen (ADVIL, MOTRIN), naproxen (ALEVE, NAPROSYN), celecoxib (CELEBREX), meloxicam (MOBIC), ketorolac (TORADOL).  Take this medication with food and water.     Medication Instructions - Opioids - Tapering Instructions   Order Comments: In the first three days following surgery, your symptoms may warrant use of the narcotic pain medication every four to six hours as prescribed. This is normal. As your pain symptoms improve, focus your efforts on decreasing (tapering) use of narcotic medications. The most successful tapering strategy is to first, decrease the number of tablets you take every 4-6 hours to the minimum prescribed. Then, increase the amount of time between doses.  For example:  First, taper to   or 1 tablet every 4-6 hours.  Then, taper to   or 1 tablet every 6-8 hours.  Then, taper to   or 1 tablet every 8-10 hours.  Then, taper to   or 1 tablet every 10-12 hours.  Then, taper to   or 1 tablet at bedtime.  The bedtime dose can help with comfort during sleep and is typically the last dose to be discontinued after surgery.     Follow Up Care   Order Comments: Follow-up with your Surgeon Team in 2 weeks for wound check.     Activity - Total Knee Arthroplasty     Order Specific Question Answer Comments   Is discharge order? Yes      Return to Driving   Order Comments: Return to driving - Driving is NOT permitted until directed by your provider. Under no circumstance are you permitted to drive while using narcotic pain medications.     Order Specific Question Answer Comments   Is discharge order? Yes      NO Precautions   Order Comments: No precautions directed by your Provider.  You may perform range of motion activities as tolerated.     Order Specific Question Answer Comments   Is discharge order? Yes      Weight bearing as tolerated   Order Comments: Weight bearing as tolerated on your operative  extremity.     Order Specific Question Answer Comments   Is discharge order? Yes      Dressing / Wound Care - Wound   Order Comments: You have a clean dressing on your surgical wound. Dressing change instructions as follows: remove your dressing in 7-10 days, and leave incision open to air. Contact your Surgeon Team if you have increased redness, warmth around the surgical wound, and/or drainage from the surgical wound.     Dressing / Wound care - Shower with wound/dressing covered   Order Comments: You must COVER your dressing or incision with saran wrap (or any other non-permeable covering) to allow the incision to remain dry while showering.  You may shower 2-3 days after surgery as long as the surgical wound stays dry. Continue to cover your dressing or incision for showering until your first office visit.  You are strictly prohibited from soaking   or submerging the surgical wound underwater.     Dressing / Wound Care - NO Tub Bathing   Order Comments: Tub bathing, swimming, or any other activities that will cause your incision to be submerged in water should be avoided until allowed by your Surgeon.     Resume anticoagulation as prior to admission   Order Comments: Resume home dose Pradaxa on post-op day 1     Medication Instructions - Opioid Instructions (Less than 65 years)   Order Comments: You were discharged with an opioid medication (hydromorphone, oxycodone, hydrocodone, or tramadol). This medication should only be taken for breakthrough pain that is not controlled with acetaminophen (TYLENOL). If you rate your pain less than 3 you do not need this medication.  Pain rating 0-3:  You do not need this medication.  Pain rating 4-6:  Take 1 tablet every 4-6 hours as needed  Pain rating 7-10:  Take 2 tablets every 4-6 hours as needed.  Do not exceed 6 tablets per day     Continue anticoagulation as prior to admission     General info for SNF   Order Comments: Length of Stay Estimate: Short Term Care:  Estimated # of Days <30 Condition at Discharge: Stable Level of care:skilled  Rehabilitation Potential: Good Admission H&P remains valid and up-to-date: Yes Recent Chemotherapy: N/A Use Nursing Home Standing Orders: Yes     Mantoux Instructions   Order Comments: Give two-step Mantoux (PPD) Per Facility Policy {[id number]     Incentive Spirometry   Order Comments: Incentive Spirometry 10 times per hour, 4 times per day.     Assess heels for pressure points     Shower with wound/dressing covered   Order Comments: You must COVER your dressing or incision with saran wrap (or any other non-permeable covering) to allow the incision to remain dry while showering.  You may shower 3 days after surgery as long as the surgical wound stays dry. Continue to cover your dressing or incision for showering until your first office visit.  You are strictly prohibited from soaking   or submerging the surgical wound underwater.     Physical Therapy Adult Consult   Order Comments: Evaluate and treat as clinically indicated.    Reason: Status Post Knee Surgery     Occupational Therapy Adult Consult   Order Comments: Evaluate and treat as clinically indicated.    Reason: Status Post Knee Surgery     Fall precautions     Crutches DME   Order Comments: DME Documentation: Describe the reason for need to support medical necessity: Impaired gait status post knee surgery. I, the undersigned, certify that the above prescribed supplies are medically necessary for this patient and is both reasonable and necessary in reference to accepted standards of medical practice in the treatment of this patient's condition and is not prescribed as a convenience.     Order Specific Question Answer Comments   DME Provider: [facility]    Crutch Type: Standard    Crutches Add On: NA    Length of Need: Lifetime      Cane DME   Order Comments: DME Documentation: Describe the reason for need to support medical necessity: Impaired gait status post knee surgery. I,  the undersigned, certify that the above prescribed supplies are medically necessary for this patient and is both reasonable and necessary in reference to accepted standards of medical practice in the treatment of this patient's condition and is not prescribed as a convenience.     Order Specific Question Answer Comments   DME Provider: [facility]    Cane Type: Single Tip    Reminder: Patient can typically get 1 every 5 years      Walker DME   Order Comments: DME Documentation: Describe the reason for need to support medical necessity: Impaired gait status post knee surgery. I, the undersigned, certify that the above prescribed supplies are medically necessary for this patient and is both reasonable and necessary in reference to accepted standards of medical practice in the treatment of this patient's condition and is not prescribed as a convenience.     Order Specific Question Answer Comments   DME Provider: [facility]    Walker Type: Standard (2 Wheel)    Accessories: N/A      Discharge Instruction - Regular Diet Adult   Order Comments: Return to your pre-surgery diet unless instructed otherwise     Order Specific Question Answer Comments   Is discharge order? Yes      Diet   Order Comments: Follow this diet upon discharge: Orders Placed This Encounter      Advance Diet as Tolerated: Regular Diet Adult      Discharge Instruction - Regular Diet Adult     Order Specific Question Answer Comments   Is discharge order? Yes        FITZ MURILLO PA-C  12/5/2023 1:23 PM   Orthopaedic Surgery

## 2023-12-05 NOTE — CONSULTS
Acupuncture Clinical Internship Intake and Treatment Documentation   [facility]    Date:  12/5/2023  Patient Name:  Mary Gorman   YOB: 1965     Repeat Patient:  No  Has patient had acupoint/acupressure treatment before:  No    Signed consent placed in the medical record:  Yes  Patient/Family verbalizes understanding of risks and benefits:  Yes  Required information provided to patient:  Yes    Diagnosis:  Osteoarthrosis, localized, primary, knee, right [M17.11]  S/P total knee arthroplasty, right [Z96.651]    Patient condition and treatment:  Osteoarthrosis, localized, primary, knee, right [M17.11]  S/P total knee arthroplasty, right [Z96.651]    Reason for Intervention Today/Chief Complaint:  Right knee had surgury and upper muscle and lower thigh has sharp pain and pain is 3/10 now and 8/10 when she is walking or standing. Low back pain and hip the pain quality is achy and it is 8/10    Isolation:  No  Type:  None    PRE-SCORE:  moderate    Other Western medical information:  Osteoarthrosis, localized, primary, knee, right [M17.11]  S/P total knee arthroplasty, right [Z96.651]      Medications  Current Outpatient Medications   Medication Sig Dispense Refill    acetaminophen (TYLENOL) 325 MG tablet Take 2 tablets (650 mg) by mouth every 4 hours as needed for other (mild pain)      ibuprofen (ADVIL/MOTRIN) 600 MG tablet Take 1 tablet (600 mg) by mouth every 6 hours as needed for mild pain      oxyCODONE (ROXICODONE) 5 MG tablet Take 1-2 tablets (5-10 mg) by mouth every 4 hours as needed for moderate to severe pain      polyethylene glycol (MIRALAX) 17 GM/Dose powder Take 17 g by mouth daily      senna-docusate (SENOKOT-S/PERICOLACE) 8.6-50 MG tablet Take 1-2 tablets by mouth 2 times daily Take while on oral narcotics to prevent or treat constipation.         Pre-Treatment Assessment  Chief Complaint/ Reason for Intervention Today:  Right knee had surgury and upper  muscle and lower thigh has sharp pain and pain is 3/10 now and 8/10 when she is walking or standing. Low back pain and hip the pain quality is achy and it is 8/10    Chief Complaint Pre-Score:  moderate   Describe:  Right knee had surgury and upper muscle and lower tigh has sharp pain and pain is 3/10 now and 8/10 when she is walking or standing. Low back pain and hip the pain quality is achy and it is 8/10    Pain Location:  Right knee had surgury and upper muscle and lower thigh has sharp pain and pain is 3/10 now and 8/10 when she is walking or standing. Low back pain and hip the pain quality is achy and it is 8/10    Pre Session Pain:  Moderate  Pre Session Anxiety:  Moderate  Pre Session Nausea:  Moderate    10 Traditional Chinese Medicine Assessment Questions  - Cold/ Heat:  Nutral  - Sweat:  N/A  - Headaches/Body aches:  Lower back pain and right knee pain  - Chest/Abdomen:  N/A  - Digestion:  Bloating and discomfort after eating  - Bowel Movement/Urination:  Constipated because of medication  - Hearing/Vision:  Fine  - Sleep (prior to hospital):  6 hours last night  - Energy:  Below average  - Emotions:  Anxiety and depression. Patient gets nervousness, restless, overthinking, and hard to concentrate  - Ob Gyn:  N/A  - Miscellaneous:  N/A    Traditional Chinese Medicine Assessment  - TONGUE:  Pale, Toothmark, and White coat  - PULSE:  Right hand was deep and slippery  - OBSERVATIONS:  Patient had pain on knee and lower back and edema on her legs     Traditional Chinese Medicine Diagnosis  - BRANCH:  Spleen and Liver Qi stagnation  - ROOT:  Blood Stasis     Traditional Chinese Medicine Treatment  - OTHER:  Tuning fork on points (L)(Sp6, St36, Liv3) (R)(Budha triangle, Li4), Du20, Yin Rg, Si lind Heber, On ear lind men and hip     Magnet informed consent signed and given:  No    Post Treatment Assessment  Chief complaint post score:  same  Post Session Observation:  Patient said she was more  relaxed  Patient/Family Education:  Yes, Suggested acupressure on buddha's triangle points (Ht7, Pc6, Lu9) for depression and anxiety that she has.  Verbal information provided:  Yes  Written information provided:  No  All questions answered at time of treatment:  Yes    Treatment/Procedure(s) performed by: Mary Sinha   Date: 12/5/2023     I attest that this acupuncture treatment was done under my supervision and this note is complete and true.     Supervising acupuncturist:    Jermaine James LAc [facility] [place] [facility]    Associate Clinic Faculty    [facility] Acupuncture license #: [id number]  P: [phone]

## 2023-12-05 NOTE — PROGRESS NOTES
Orthopaedic Surgery Progress Note 12/05/2023    S: No acute events overnight.  Pain well controlled.     O:  Temp: 99  F (37.2  C) Temp src: Oral BP: 128/69 Pulse: 91   Resp: 16 SpO2: 95 % O2 Device: None (Room air)      Exam:  Gen: No acute distress, resting comfortably in bed.  Resp: Non-labored breathing  MSK:  RLE:  - Dressings c/d/i  - SILT femoral/tibial/sural/saphenous/DP/SP nerves  - Fires Quad, TA, EHL, FHL, GaSC  - PT/DP pulses 2+, foot wwp    Recent Labs   Lab 12/03/23  0722 12/02/23  0659   WBC  --  9.2   HGB 10.4* 11.2*   PLT  --  167         Assessment: Mary Gorman is a 58 year old female s/p R TKA on 12/1 with Dr. Martin. Doing well.    Plan:  Weight bearing: Weightbearing as tolerated  Anticoagulation: Restart PTA anticoag POD 1  Precautions: No precautions  Pain control and monitoring  PT/OT  Antibiotics per protocol  X-ray in PACU  Discharge pending TCU        Future Appointments   Date Time Provider Department Center   12/2/2023  1:00 PM Delilah Booth, PT URPT [place]   12/11/2023  1:50 PM Dinorah Ruiz, PT [facility]   12/14/2023  1:50 PM Dinorah Ruiz, PT [facility]   12/19/2023  1:50 PM Dinorah Ruiz, PT [facility]   12/21/2023  1:50 PM Dinorah Ruiz, PT [facility]   12/22/2023  1:30 PM Ta Iyer PA-C [facility] [place]   1/17/2024  1:20 PM Armand Martin MD [facility] [place]     Hector Novak MD  Orthopedic Surgery PGY4

## 2023-12-06 ENCOUNTER — PATIENT OUTREACH (OUTPATIENT)
Dept: CARE COORDINATION | Facility: CLINIC | Age: 58
End: 2023-12-06
Payer: COMMERCIAL

## 2023-12-06 ASSESSMENT — ACTIVITIES OF DAILY LIVING (ADL): DEPENDENT_IADLS:: INDEPENDENT

## 2023-12-06 NOTE — PROGRESS NOTES
Clinic Care Coordination Contact  Care Coordination Transition Communication    Clinical Data: Patient was hospitalized at [facility] from 12/1/2023 to 12/5/2023 with diagnosis of s/p right TOTAL KNEE ARTHOPLASTY, osteoarthrosis.     Assessment: Patient has transitioned to TCU/ARU for short term rehabilitation:    Facility Name: [facility]   Transition Communication:  Notified facility of Primary Care- Care Coordination support via Epic fax.    Plan: Care Coordinator will await notification from facility staff informing of patient's discharge plans/needs. Care Coordinator will review chart and outreach to facility staff every 4 weeks and as needed.      Edna Hutchison RN, BSN, PHN  Primary Care / Care Coordinator   [facility]  [email or web address]   [phone]

## 2023-12-06 NOTE — LETTER
[facility]   To:   [facility]           Please give to facility    From:   Edna Hutchison RN  Care Coordinator   [facility]   P: [phone]  [email or web address]   Patient Name:  Mary Gorman YOB: 1965   Admit date: 12/5/2023      *Information Needed:  Please contact me when the patient will discharge (or if they will move to long term care)- include the discharge date, disposition, and main diagnosis   If the patient is discharged with home care services, please provide the name of the agency    Also- Please inform me if a care conference is being held.   Phone, Fax or Email with information      Edna Hutchison RN, BSN, PHN  Primary Care / Care Coordinator   [facility]  E-mail [email or web address]   [phone]                Thank you

## 2023-12-06 NOTE — PLAN OF CARE
Physical Therapy Discharge Summary    Reason for therapy discharge:    Discharged to transitional care facility.    Progress towards therapy goal(s). See goals on Care Plan in [facility] electronic health record for goal details.  Goals partially met.  Barriers to achieving goals:   discharge from facility.    Therapy recommendation(s):    Continued therapy is recommended.  Rationale/Recommendations:  continue to progress strength, stamina, and return to IND mobility post TKA.

## 2023-12-07 ENCOUNTER — LAB REQUISITION (OUTPATIENT)
Dept: LAB | Facility: CLINIC | Age: 58
End: 2023-12-07
Payer: COMMERCIAL

## 2023-12-07 ENCOUNTER — TRANSITIONAL CARE UNIT VISIT (OUTPATIENT)
Dept: GERIATRICS | Facility: CLINIC | Age: 58
End: 2023-12-07
Payer: COMMERCIAL

## 2023-12-07 VITALS
TEMPERATURE: 97.2 F | BODY MASS INDEX: 48.82 KG/M2 | WEIGHT: 293 LBS | HEART RATE: 87 BPM | SYSTOLIC BLOOD PRESSURE: 134 MMHG | OXYGEN SATURATION: 96 % | DIASTOLIC BLOOD PRESSURE: 73 MMHG | HEIGHT: 65 IN | RESPIRATION RATE: 18 BRPM

## 2023-12-07 DIAGNOSIS — N18.31 TYPE 2 DIABETES MELLITUS WITH STAGE 3A CHRONIC KIDNEY DISEASE, WITHOUT LONG-TERM CURRENT USE OF INSULIN (H): ICD-10-CM

## 2023-12-07 DIAGNOSIS — I48.21 PERMANENT ATRIAL FIBRILLATION (H): ICD-10-CM

## 2023-12-07 DIAGNOSIS — I89.0 LYMPHEDEMA OF BOTH LOWER EXTREMITIES: ICD-10-CM

## 2023-12-07 DIAGNOSIS — Z98.890 S/P RIGHT KNEE ARTHROSCOPY: ICD-10-CM

## 2023-12-07 DIAGNOSIS — R53.81 PHYSICAL DECONDITIONING: ICD-10-CM

## 2023-12-07 DIAGNOSIS — Z11.1 ENCOUNTER FOR SCREENING FOR RESPIRATORY TUBERCULOSIS: ICD-10-CM

## 2023-12-07 DIAGNOSIS — M54.16 LUMBAR RADICULITIS: ICD-10-CM

## 2023-12-07 DIAGNOSIS — G47.33 OSA (OBSTRUCTIVE SLEEP APNEA): ICD-10-CM

## 2023-12-07 DIAGNOSIS — M17.11 PRIMARY OSTEOARTHRITIS OF RIGHT KNEE: Primary | ICD-10-CM

## 2023-12-07 DIAGNOSIS — E11.22 TYPE 2 DIABETES MELLITUS WITH STAGE 3A CHRONIC KIDNEY DISEASE, WITHOUT LONG-TERM CURRENT USE OF INSULIN (H): ICD-10-CM

## 2023-12-07 DIAGNOSIS — M17.11 OSTEOARTHROSIS, LOCALIZED, PRIMARY, KNEE, RIGHT: ICD-10-CM

## 2023-12-07 PROCEDURE — 99310 SBSQ NF CARE HIGH MDM 45: CPT | Performed by: NURSE PRACTITIONER

## 2023-12-07 RX ORDER — METFORMIN HCL 500 MG
1000 TABLET, EXTENDED RELEASE 24 HR ORAL
COMMUNITY
End: 2024-05-03

## 2023-12-07 RX ORDER — AMOXICILLIN 250 MG
1 CAPSULE ORAL 2 TIMES DAILY
COMMUNITY
End: 2024-05-03

## 2023-12-07 NOTE — LETTER
12/7/2023        RE: Mary Gorman  [address]  [facility] [address]        [facility] GERIATRICS    PRIMARY CARE PROVIDER AND CLINIC:  Nica Greco PA-C, [address] [place] [address]  / BRITTANY NICHOLS [address]  Chief Complaint   Patient presents with     [facility] Medical Record Number:  [id number]  Place of Service where encounter took place:  [facility] (TCU) [[id number]]    Mary Gorman  is a 58 year old  (1965), admitted to the above facility from  [facility]. Hospital stay 12/1/23 through 12/5/23..     HPI:      PMH: diabetes, atrial fibrillation, HTN    Admitted to [facility] 12/1-12/5/23 due to elective R knee replacement secondary to end-stage arthritis. No postop complications.     Transfer[facility] to [facility] on 12/5/23.      Today's concerns:    During exam, patient seen resting in bed. Reports doing well. Has been participating in therapy. Ambulates w/walker. States pain controlled with tylenol and oxycodone PRN. Denies constipation, diarrhea. Goal is to discharge to mom's house early next week (Monday), but wants to see how stairs go tomorrow. Plans to stay with her mom for a few weeks prior to returning to her home, which does have stairs.       CODE STATUS/ADVANCE DIRECTIVES DISCUSSION:  CPR/Full code   ALLERGIES:   Allergies   Allergen Reactions     Dyazide [Hydrochlorothiazide W-Triamterene] Unknown     Vistaril [Hydroxyzine] Visual Disturbance     Naproxen Rash     Nickel Rash     Robaxin [Methocarbamol] Rash     Sulfa Antibiotics Rash      PAST MEDICAL HISTORY:   Past Medical History:   Diagnosis Date     Arthritis Nov 2019     Atrial fibrillation (H)      COPD (chronic obstructive pulmonary disease) (H)     colds turn to bronchitis easily     Depression      Depressive disorder     in chart     Diabetes (H)      GERD (gastroesophageal reflux disease)      HTN (hypertension)      Hyperlipidemia      Obese       BALTA (obstructive sleep apnea)     uses CPAP     Permanent atrial fibrillation (H) 06/05/2011     Uncomplicated asthma     mentioned by urgent care md      PAST SURGICAL HISTORY:   has a past surgical history that includes Cholecystectomy; knee surgery; Cardioversion (06/07/2011); Dilation and curettage, hysteroscopy diagnostic, combined (12/08/2011); Dilation and curettage (04/26/2012); Arthroplasty knee (Left, 01/20/2022); Remove hardware lower extremity (Left, 01/20/2022); Abdomen surgery; biopsy; colonoscopy (2013); and Arthroplasty knee (Right, 12/1/2023).  FAMILY HISTORY: family history includes Arthritis in her mother; Cancer (age of onset: 50) in her father; Cancer (age of onset: 90) in her paternal grandmother; Colon Cancer in her paternal grandmother; Coronary Artery Disease in her father; Heart Disease in her father and mother; Hyperlipidemia in her father and mother; Hypertension in her father and mother; Obesity in her brother, mother, and paternal grandmother; Other Cancer in her father; Respiratory in her father; Unknown/Adopted in her brother.  SOCIAL HISTORY:   reports that she has never smoked. She has never been exposed to tobacco smoke. She has never used smokeless tobacco. She reports that she does not currently use alcohol. She reports that she does not use drugs.  Patient's living condition: lives with family, mother     Post Discharge Medication Reconciliation Status:   MED REC REQUIRED  Post Medication Reconciliation Status: discharge medications reconciled and changed, per note/orders     Current Outpatient Medications   Medication Sig     acetaminophen (TYLENOL) 500 MG tablet Take 1,000 mg by mouth 3 times daily And 1000mg every day PRN     blood glucose (NO BRAND SPECIFIED) lancets standard Use to test blood sugar 2 times daily or as directed.     blood glucose (NO BRAND SPECIFIED) test strip Use to test blood sugar 2 times daily or as directed.     blood glucose monitoring (NO BRAND  SPECIFIED) meter device kit Use to test blood sugar 2 times daily or as directed.     buPROPion (WELLBUTRIN XL) 150 MG 24 hr tablet Take 1 tablet (150 mg) by mouth every morning Take with 300 mg for total of 450 mg     buPROPion (WELLBUTRIN XL) 300 MG 24 hr tablet Take 1 tablet (300 mg) by mouth every morning Take with 150 mg for total 450 mg     cetirizine (ZYRTEC) 10 MG tablet Take 10 mg by mouth as needed for allergies     diltiazem ER (DILT-XR) 240 MG 24 hr ER beaded capsule Take 1 capsule (240 mg) by mouth daily     dulaglutide (TRULICITY) 0.75 MG/0.5ML pen Inject 0.75 mg Subcutaneous every 7 days     ELIQUIS ANTICOAGULANT 5 MG tablet Take 1 tablet (5 mg) by mouth 2 times daily     fosinopril (MONOPRIL) 10 MG tablet Take 1 tablet (10 mg) by mouth daily     ibuprofen (ADVIL/MOTRIN) 600 MG tablet Take 1 tablet (600 mg) by mouth every 6 hours as needed for mild pain     metFORMIN (GLUCOPHAGE XR) 500 MG 24 hr tablet Take 1,000 mg by mouth daily (with dinner)     metoprolol succinate ER (TOPROL XL) 50 MG 24 hr tablet Take 1 tablet (50 mg) by mouth 2 times daily     oxyCODONE (ROXICODONE) 5 MG tablet Take 1-2 tablets (5-10 mg) by mouth every 4 hours as needed for moderate to severe pain     rosuvastatin (CRESTOR) 10 MG tablet Take 1 tablet (10 mg) by mouth daily     senna-docusate (SENOKOT-S/PERICOLACE) 8.6-50 MG tablet Take 1 tablet by mouth 2 times daily     sertraline (ZOLOFT) 100 MG tablet Take 200 mg by mouth daily 2 tabs (200mg) daily      spironolactone (ALDACTONE) 25 MG tablet Take 1 tablet (25 mg) by mouth daily     tiZANidine (ZANAFLEX) 2 MG tablet Take 1-2 tablets (2-4 mg) by mouth nightly as needed for muscle spasms     traZODone (DESYREL) 100 MG tablet Take 1 tablet (100 mg) by mouth At Bedtime     No current facility-administered medications for this visit.       ROS:  10 point ROS of systems including Constitutional, Eyes, Respiratory, Cardiovascular, Gastroenterology, Genitourinary, Integumentary,  "Musculoskeletal, Psychiatric were all negative except for pertinent positives noted in my HPI.      Vitals:  /73   Pulse 87   Temp 97.2  F (36.2  C)   Resp 18   Ht 1.651 m (5' 5\")   Wt (!) 153 kg (337 lb 6.4 oz)   LMP 06/25/2019 (Approximate)   SpO2 96%   BMI 56.15 kg/m    Exam:  GENERAL APPEARANCE:  Alert, in no distress, appears healthy, oriented, cooperative  ENT:  Mouth and posterior oropharynx normal, moist mucous membranes, normal hearing acuity  EYES:  EOM, conjunctivae, lids, pupils and irises normal, PERRL  RESP:  respiratory effort and palpation of chest normal, lungs clear to auscultation , no respiratory distress  CV:  Regular rate and rhythm, no murmur, rub, or gallop. BLE lymphedema  ABDOMEN:  normal bowel sounds, soft, nontender, no guarding or rebound  M/S:   Gait and station abnormal, resting in bed.   SKIN:  Inspection of skin and subcutaneous tissue baseline, Palpation of skin and subcutaneous tissue baseline. R knee incision dressing intact with mild amount of s/s shadowing noted to top of dressing.  NEURO:   Cranial nerves 2-12 are normal tested and grossly at patient's baseline, Examination of sensation by touch normal  PSYCH:  oriented X 3, affect and mood normal      Lab/Diagnostic data:  Recent labs in [facility] reviewed by me today.  and Most Recent 3 CBC's:  Recent Labs   Lab Test 12/03/23  0722 12/02/23  0659 11/15/23  1521 08/18/23  1508   WBC  --  9.2 7.9 6.8   HGB 10.4* 11.2* 14.2 14.3   MCV  --  90 88 87   PLT  --  167 230 234     Most Recent 3 BMP's:  Recent Labs   Lab Test 12/05/23  0809 12/05/23  0653 12/05/23  0148 12/03/23  0754 12/03/23  0722 12/02/23  0742 12/02/23  0659   NA  --  138  --   --  138  --  135   POTASSIUM  --  4.2  --   --  4.6  --  5.0   CHLORIDE  --  103  --   --  104  --  102   CO2  --  31*  --   --  27  --  27   BUN  --  15.4  --   --  22.4  --  25.2*   CR  --  0.86  --   --  1.06*  --  1.25*   ANIONGAP  --  4*  --   --  7  --  6*   CHERYL  --  9.4  --   " --  9.3  --  9.1   * 135* 138*   < > 125*   < > 181*    < > = values in this interval not displayed.     TSH   Date Value Ref Range Status   05/10/2021 1.91 0.40 - 4.00 mU/L Final     Lab Results   Component Value Date    A1C 5.9 11/15/2023    A1C 6.9 08/18/2023    A1C 7.7 05/18/2023    A1C 7.4 12/20/2022    A1C 6.4 07/19/2022    A1C 6.9 06/28/2021    A1C 6.4 11/25/2019    A1C 6.4 10/25/2018    A1C 6.2 10/17/2017    A1C 6.2 05/31/2017         ASSESSMENT/PLAN:    (M17.11) Primary osteoarthritis of right knee  (primary encounter diagnosis)  (Z98.890) S/P right knee arthroscopy  (R53.81) Physical deconditioning  Comment: Chronic end-stage arthritis to R knee, elective s/p R TKA with Dr. Martin.  Plan:   - Discontinue tylenol orders  - Add tylenol 1000mg TID and 1000mg every day PRN dx pain   - Discontinue miralax  - Check BMP, Hgb on 12/11/23 dx anemia, CKD  - Continue senna-s 1 tab BID dx constipation; hold is loose stools   - Continue oxycodone PRN  - Patient to follow-up with Ortho in 2 weeks on 12/22/23  - Encourage active participation in therapy session to increase strength and promote independence in activities and ADLs.   - SW following for discharge planning. Goals is to discharge on Monday. Updated SW regarding patient's discharge goals.   - Confirmed code status: full code    (M54.16) Lumbar radiculitis  Comment: Chronic  Plan:   - Continue tylenol TID, tizanidine PRN    (E11.22,  N18.31) Type 2 diabetes mellitus with stage 3a chronic kidney disease, without long-term current use of insulin (H)  Comment: Controlled, last A1c 5.9% on 11/15/23  Plan:   - Continue metformin, trulicity  - Decrease BG checks to BID    (I48.21) Permanent atrial fibrillation (H)  Comment: Chronic  Plan:   - Continue eliquis, diltiazem, metoprolol    (G47.33) BALTA (obstructive sleep apnea)  Comment: Chronic  Plan:   - Wear CPAP qHS    (I89.0) Lymphedema of both lower extremities  Comment: Chronic BLE lymphedema  Plan:   -  OT to eval/treat edema. Patient has lymphedema supplies.   - Patient has home supply compression pump to be applied LLE for one hour, start at 1500 dx LLE lymphedema  - Continue spironolactone       Orders:  - Discontinue tylenol orders  - Add tylenol 1000mg TID and 1000mg every day PRN dx pain   - Discontinue miralax  - Check BMP, Hgb on 12/11/23 dx anemia, CKD  - Continue senna-s 1 tab BID dx constipation; hold is loose stools   - Clarification: trulicity to be given once a week on Saturdays   - Decrease BG checks BID w/breakfast and dinner   - OT to eval/treat edema. Patient has lymphedema supplies.   - Patient has home supply compression pump to be applied LLE for one hour, start at 1500 dx LLE lymphedema      Total time spent during today's visit was 45 mins including patient visit and review of past records. Time also spent coordinating care with SW regarding discharge planning.     Electronically signed by:  YAMIL Renteria CNP                     Sincerely,        YAMIL Renteria CNP

## 2023-12-07 NOTE — PROGRESS NOTES
[facility] GERIATRICS    PRIMARY CARE PROVIDER AND CLINIC:  Nica Greco PA-C, [address] [place] [address]  / BRITTANY NICHOLS [id number]  Chief Complaint   Patient presents with    [facility] Medical Record Number:  [id number]  Place of Service where encounter took place:  [facility] (TCU) [[id number]]    Mary Gorman  is a 58 year old  (1965), admitted to the above facility from  [facility]. Hospital stay 12/1/23 through 12/5/23..     HPI:      PMH: diabetes, atrial fibrillation, HTN    Admitted to [facility] 12/1-12/5/23 due to elective R knee replacement secondary to end-stage arthritis. No postop complications.     Transferred to [facility] on 12/5/23.      Today's concerns:    During exam, patient seen resting in bed. Reports doing well. Has been participating in therapy. Ambulates w/walker. States pain controlled with tylenol and oxycodone PRN. Denies constipation, diarrhea. Goal is to discharge to mom's house early next week (Monday), but wants to see how stairs go tomorrow. Plans to stay with her mom for a few weeks prior to returning to her home, which does have stairs.       CODE STATUS/ADVANCE DIRECTIVES DISCUSSION:  CPR/Full code   ALLERGIES:   Allergies   Allergen Reactions    Dyazide [Hydrochlorothiazide W-Triamterene] Unknown    Vistaril [Hydroxyzine] Visual Disturbance    Naproxen Rash    Nickel Rash    Robaxin [Methocarbamol] Rash    Sulfa Antibiotics Rash      PAST MEDICAL HISTORY:   Past Medical History:   Diagnosis Date    Arthritis Nov 2019    Atrial fibrillation (H)     COPD (chronic obstructive pulmonary disease) (H)     colds turn to bronchitis easily    Depression     Depressive disorder     in chart    Diabetes (H)     GERD (gastroesophageal reflux disease)     HTN (hypertension)     Hyperlipidemia     Obese     BALTA (obstructive sleep apnea)     uses CPAP    Permanent atrial fibrillation (H) 06/05/2011     Uncomplicated asthma     mentioned by urgent care md      PAST SURGICAL HISTORY:   has a past surgical history that includes Cholecystectomy; knee surgery; Cardioversion (06/07/2011); Dilation and curettage, hysteroscopy diagnostic, combined (12/08/2011); Dilation and curettage (04/26/2012); Arthroplasty knee (Left, 01/20/2022); Remove hardware lower extremity (Left, 01/20/2022); Abdomen surgery; biopsy; colonoscopy (2013); and Arthroplasty knee (Right, 12/1/2023).  FAMILY HISTORY: family history includes Arthritis in her mother; Cancer (age of onset: 50) in her father; Cancer (age of onset: 90) in her paternal grandmother; Colon Cancer in her paternal grandmother; Coronary Artery Disease in her father; Heart Disease in her father and mother; Hyperlipidemia in her father and mother; Hypertension in her father and mother; Obesity in her brother, mother, and paternal grandmother; Other Cancer in her father; Respiratory in her father; Unknown/Adopted in her brother.  SOCIAL HISTORY:   reports that she has never smoked. She has never been exposed to tobacco smoke. She has never used smokeless tobacco. She reports that she does not currently use alcohol. She reports that she does not use drugs.  Patient's living condition: lives with family, mother     Post Discharge Medication Reconciliation Status:   MED REC REQUIRED  Post Medication Reconciliation Status: discharge medications reconciled and changed, per note/orders     Current Outpatient Medications   Medication Sig    acetaminophen (TYLENOL) 500 MG tablet Take 1,000 mg by mouth 3 times daily And 1000mg every day PRN    blood glucose (NO BRAND SPECIFIED) lancets standard Use to test blood sugar 2 times daily or as directed.    blood glucose (NO BRAND SPECIFIED) test strip Use to test blood sugar 2 times daily or as directed.    blood glucose monitoring (NO BRAND SPECIFIED) meter device kit Use to test blood sugar 2 times daily or as directed.    buPROPion (WELLBUTRIN  XL) 150 MG 24 hr tablet Take 1 tablet (150 mg) by mouth every morning Take with 300 mg for total of 450 mg    buPROPion (WELLBUTRIN XL) 300 MG 24 hr tablet Take 1 tablet (300 mg) by mouth every morning Take with 150 mg for total 450 mg    cetirizine (ZYRTEC) 10 MG tablet Take 10 mg by mouth as needed for allergies    diltiazem ER (DILT-XR) 240 MG 24 hr ER beaded capsule Take 1 capsule (240 mg) by mouth daily    dulaglutide (TRULICITY) 0.75 MG/0.5ML pen Inject 0.75 mg Subcutaneous every 7 days    ELIQUIS ANTICOAGULANT 5 MG tablet Take 1 tablet (5 mg) by mouth 2 times daily    fosinopril (MONOPRIL) 10 MG tablet Take 1 tablet (10 mg) by mouth daily    ibuprofen (ADVIL/MOTRIN) 600 MG tablet Take 1 tablet (600 mg) by mouth every 6 hours as needed for mild pain    metFORMIN (GLUCOPHAGE XR) 500 MG 24 hr tablet Take 1,000 mg by mouth daily (with dinner)    metoprolol succinate ER (TOPROL XL) 50 MG 24 hr tablet Take 1 tablet (50 mg) by mouth 2 times daily    oxyCODONE (ROXICODONE) 5 MG tablet Take 1-2 tablets (5-10 mg) by mouth every 4 hours as needed for moderate to severe pain    rosuvastatin (CRESTOR) 10 MG tablet Take 1 tablet (10 mg) by mouth daily    senna-docusate (SENOKOT-S/PERICOLACE) 8.6-50 MG tablet Take 1 tablet by mouth 2 times daily    sertraline (ZOLOFT) 100 MG tablet Take 200 mg by mouth daily 2 tabs (200mg) daily     spironolactone (ALDACTONE) 25 MG tablet Take 1 tablet (25 mg) by mouth daily    tiZANidine (ZANAFLEX) 2 MG tablet Take 1-2 tablets (2-4 mg) by mouth nightly as needed for muscle spasms    traZODone (DESYREL) 100 MG tablet Take 1 tablet (100 mg) by mouth At Bedtime     No current facility-administered medications for this visit.       ROS:  10 point ROS of systems including Constitutional, Eyes, Respiratory, Cardiovascular, Gastroenterology, Genitourinary, Integumentary, Musculoskeletal, Psychiatric were all negative except for pertinent positives noted in my HPI.      Vitals:  /73   Pulse  "87   Temp 97.2  F (36.2  C)   Resp 18   Ht 1.651 m (5' 5\")   Wt (!) 153 kg (337 lb 6.4 oz)   LMP 06/25/2019 (Approximate)   SpO2 96%   BMI 56.15 kg/m    Exam:  GENERAL APPEARANCE:  Alert, in no distress, appears healthy, oriented, cooperative  ENT:  Mouth and posterior oropharynx normal, moist mucous membranes, normal hearing acuity  EYES:  EOM, conjunctivae, lids, pupils and irises normal, PERRL  RESP:  respiratory effort and palpation of chest normal, lungs clear to auscultation , no respiratory distress  CV:  Regular rate and rhythm, no murmur, rub, or gallop. BLE lymphedema  ABDOMEN:  normal bowel sounds, soft, nontender, no guarding or rebound  M/S:   Gait and station abnormal, resting in bed.   SKIN:  Inspection of skin and subcutaneous tissue baseline, Palpation of skin and subcutaneous tissue baseline. R knee incision dressing intact with mild amount of s/s shadowing noted to top of dressing.  NEURO:   Cranial nerves 2-12 are normal tested and grossly at patient's baseline, Examination of sensation by touch normal  PSYCH:  oriented X 3, affect and mood normal      Lab/Diagnostic data:  Recent labs in [facility] reviewed by me today.  and Most Recent 3 CBC's:  Recent Labs   Lab Test 12/03/23  0722 12/02/23  0659 11/15/23  1521 08/18/23  1508   WBC  --  9.2 7.9 6.8   HGB 10.4* 11.2* 14.2 14.3   MCV  --  90 88 87   PLT  --  167 230 234     Most Recent 3 BMP's:  Recent Labs   Lab Test 12/05/23  0809 12/05/23  0653 12/05/23  0148 12/03/23  0754 12/03/23  0722 12/02/23  0742 12/02/23  0659   NA  --  138  --   --  138  --  135   POTASSIUM  --  4.2  --   --  4.6  --  5.0   CHLORIDE  --  103  --   --  104  --  102   CO2  --  31*  --   --  27  --  27   BUN  --  15.4  --   --  22.4  --  25.2*   CR  --  0.86  --   --  1.06*  --  1.25*   ANIONGAP  --  4*  --   --  7  --  6*   CHERYL  --  9.4  --   --  9.3  --  9.1   * 135* 138*   < > 125*   < > 181*    < > = values in this interval not displayed.     TSH   Date " Value Ref Range Status   05/10/2021 1.91 0.40 - 4.00 mU/L Final     Lab Results   Component Value Date    A1C 5.9 11/15/2023    A1C 6.9 08/18/2023    A1C 7.7 05/18/2023    A1C 7.4 12/20/2022    A1C 6.4 07/19/2022    A1C 6.9 06/28/2021    A1C 6.4 11/25/2019    A1C 6.4 10/25/2018    A1C 6.2 10/17/2017    A1C 6.2 05/31/2017         ASSESSMENT/PLAN:    (M17.11) Primary osteoarthritis of right knee  (primary encounter diagnosis)  (Z98.890) S/P right knee arthroscopy  (R53.81) Physical deconditioning  Comment: Chronic end-stage arthritis to R knee, elective s/p R TKA with Dr. Martin.  Plan:   - Discontinue tylenol orders  - Add tylenol 1000mg TID and 1000mg every day PRN dx pain   - Discontinue miralax  - Check BMP, Hgb on 12/11/23 dx anemia, CKD  - Continue senna-s 1 tab BID dx constipation; hold is loose stools   - Continue oxycodone PRN  - Patient to follow-up with Ortho in 2 weeks on 12/22/23  - Encourage active participation in therapy session to increase strength and promote independence in activities and ADLs.   - SW following for discharge planning. Goals is to discharge on Monday. Updated SW regarding patient's discharge goals.   - Confirmed code status: full code    (M54.16) Lumbar radiculitis  Comment: Chronic  Plan:   - Continue tylenol TID, tizanidine PRN    (E11.22,  N18.31) Type 2 diabetes mellitus with stage 3a chronic kidney disease, without long-term current use of insulin (H)  Comment: Controlled, last A1c 5.9% on 11/15/23  Plan:   - Continue metformin, trulicity  - Decrease BG checks to BID    (I48.21) Permanent atrial fibrillation (H)  Comment: Chronic  Plan:   - Continue eliquis, diltiazem, metoprolol    (G47.33) BALTA (obstructive sleep apnea)  Comment: Chronic  Plan:   - Wear CPAP qHS    (I89.0) Lymphedema of both lower extremities  Comment: Chronic BLE lymphedema  Plan:   - OT to eval/treat edema. Patient has lymphedema supplies.   - Patient has home supply compression pump to be applied LLE  for one hour, start at 1500 dx LLE lymphedema  - Continue spironolactone       Orders:  - Discontinue tylenol orders  - Add tylenol 1000mg TID and 1000mg every day PRN dx pain   - Discontinue miralax  - Check BMP, Hgb on 12/11/23 dx anemia, CKD  - Continue senna-s 1 tab BID dx constipation; hold is loose stools   - Clarification: trulicity to be given once a week on Saturdays   - Decrease BG checks BID w/breakfast and dinner   - OT to eval/treat edema. Patient has lymphedema supplies.   - Patient has home supply compression pump to be applied LLE for one hour, start at 1500 dx LLE lymphedema      Total time spent during today's visit was 45 mins including patient visit and review of past records. Time also spent coordinating care with SW regarding discharge planning.     Electronically signed by:  YAMIL Renteria CNP

## 2023-12-08 ENCOUNTER — LAB REQUISITION (OUTPATIENT)
Dept: LAB | Facility: CLINIC | Age: 58
End: 2023-12-08
Payer: COMMERCIAL

## 2023-12-08 DIAGNOSIS — D64.9 ANEMIA, UNSPECIFIED: ICD-10-CM

## 2023-12-08 DIAGNOSIS — N18.4 CHRONIC KIDNEY DISEASE, STAGE 4 (SEVERE) (H): ICD-10-CM

## 2023-12-08 PROCEDURE — P9604 ONE-WAY ALLOW PRORATED TRIP: HCPCS | Performed by: NURSE PRACTITIONER

## 2023-12-08 PROCEDURE — 86481 TB AG RESPONSE T-CELL SUSP: CPT | Performed by: NURSE PRACTITIONER

## 2023-12-08 PROCEDURE — 36415 COLL VENOUS BLD VENIPUNCTURE: CPT | Performed by: NURSE PRACTITIONER

## 2023-12-08 RX ORDER — OXYCODONE HYDROCHLORIDE 5 MG/1
5-10 TABLET ORAL EVERY 4 HOURS PRN
Qty: 30 TABLET | Refills: 0 | Status: SHIPPED | OUTPATIENT
Start: 2023-12-08 | End: 2023-12-12

## 2023-12-10 LAB
GAMMA INTERFERON BACKGROUND BLD IA-ACNC: 0 IU/ML
M TB IFN-G BLD-IMP: NEGATIVE
M TB IFN-G CD4+ BCKGRND COR BLD-ACNC: 3.69 IU/ML
MITOGEN IGNF BCKGRD COR BLD-ACNC: 0.01 IU/ML
MITOGEN IGNF BCKGRD COR BLD-ACNC: 0.01 IU/ML
QUANTIFERON MITOGEN: 3.69 IU/ML
QUANTIFERON NIL TUBE: 0 IU/ML
QUANTIFERON TB1 TUBE: 0.01 IU/ML
QUANTIFERON TB2 TUBE: 0.01

## 2023-12-11 LAB
ANION GAP SERPL CALCULATED.3IONS-SCNC: 11 MMOL/L (ref 7–15)
BUN SERPL-MCNC: 15.1 MG/DL (ref 8–23)
CALCIUM SERPL-MCNC: 9.6 MG/DL (ref 8.6–10)
CHLORIDE SERPL-SCNC: 102 MMOL/L (ref 98–107)
CREAT SERPL-MCNC: 1.11 MG/DL (ref 0.51–0.95)
DEPRECATED HCO3 PLAS-SCNC: 27 MMOL/L (ref 22–29)
EGFRCR SERPLBLD CKD-EPI 2021: 57 ML/MIN/1.73M2
GLUCOSE SERPL-MCNC: 90 MG/DL (ref 70–99)
HGB BLD-MCNC: 10.2 G/DL (ref 11.7–15.7)
POTASSIUM SERPL-SCNC: 4.4 MMOL/L (ref 3.4–5.3)
SODIUM SERPL-SCNC: 140 MMOL/L (ref 135–145)

## 2023-12-11 PROCEDURE — 80048 BASIC METABOLIC PNL TOTAL CA: CPT | Performed by: NURSE PRACTITIONER

## 2023-12-11 PROCEDURE — P9604 ONE-WAY ALLOW PRORATED TRIP: HCPCS | Performed by: NURSE PRACTITIONER

## 2023-12-11 PROCEDURE — 85018 HEMOGLOBIN: CPT | Performed by: NURSE PRACTITIONER

## 2023-12-12 ENCOUNTER — DOCUMENTATION ONLY (OUTPATIENT)
Dept: GERIATRICS | Facility: CLINIC | Age: 58
End: 2023-12-12

## 2023-12-12 ENCOUNTER — TRANSITIONAL CARE UNIT VISIT (OUTPATIENT)
Dept: GERIATRICS | Facility: CLINIC | Age: 58
End: 2023-12-12
Payer: COMMERCIAL

## 2023-12-12 VITALS
OXYGEN SATURATION: 97 % | TEMPERATURE: 97.6 F | DIASTOLIC BLOOD PRESSURE: 80 MMHG | HEART RATE: 74 BPM | BODY MASS INDEX: 48.82 KG/M2 | RESPIRATION RATE: 18 BRPM | SYSTOLIC BLOOD PRESSURE: 134 MMHG | WEIGHT: 293 LBS | HEIGHT: 65 IN

## 2023-12-12 DIAGNOSIS — M17.11 OSTEOARTHROSIS, LOCALIZED, PRIMARY, KNEE, RIGHT: ICD-10-CM

## 2023-12-12 DIAGNOSIS — Z98.890 S/P RIGHT KNEE ARTHROSCOPY: ICD-10-CM

## 2023-12-12 DIAGNOSIS — I89.0 LYMPHEDEMA OF BOTH LOWER EXTREMITIES: ICD-10-CM

## 2023-12-12 DIAGNOSIS — I48.21 PERMANENT ATRIAL FIBRILLATION (H): ICD-10-CM

## 2023-12-12 DIAGNOSIS — Z98.890 S/P RIGHT KNEE ARTHROSCOPY: Primary | ICD-10-CM

## 2023-12-12 PROCEDURE — 99305 1ST NF CARE MODERATE MDM 35: CPT | Performed by: INTERNAL MEDICINE

## 2023-12-12 RX ORDER — OXYCODONE HYDROCHLORIDE 5 MG/1
5-10 TABLET ORAL EVERY 4 HOURS PRN
Qty: 30 TABLET | Refills: 0 | Status: SHIPPED | OUTPATIENT
Start: 2023-12-12 | End: 2024-05-03

## 2023-12-12 NOTE — PROGRESS NOTES
[facility] Discharge Orders    Name: Mary Gorman  : 1965  Planned Discharge Date: 12/15/23  Discharged to: previous independent home      MEDICAL FOLLOW UP  Follow up with PCP in 1-2 weeks   Follow up with [facility] - Ortho      FUTURE LABS: No labs orders/due      ORDER CHANGES:  - Changed tylenol to 1000mg TID and 1000mg every day PRN  - Discontinued miralax      DISCHARGE MEDICATIONS:  The patient s pharmacy is authorized to dispense a 30-day supply of medications. Refill requests should be directed to the primary provider, Nica Workman.       At discharge, the facility may send patient's remaining supply of controlled substances, specifically oxycodone 5mg, #20tabs.    Current Outpatient Medications   Medication Sig Dispense Refill    acetaminophen (TYLENOL) 500 MG tablet Take 1,000 mg by mouth 3 times daily And 1000mg every day PRN      blood glucose (NO BRAND SPECIFIED) lancets standard Use to test blood sugar 2 times daily or as directed. 200 each 3    blood glucose (NO BRAND SPECIFIED) test strip Use to test blood sugar 2 times daily or as directed. 200 strip 3    blood glucose monitoring (NO BRAND SPECIFIED) meter device kit Use to test blood sugar 2 times daily or as directed. 1 kit 0    buPROPion (WELLBUTRIN XL) 150 MG 24 hr tablet Take 1 tablet (150 mg) by mouth every morning Take with 300 mg for total of 450 mg 90 tablet 1    buPROPion (WELLBUTRIN XL) 300 MG 24 hr tablet Take 1 tablet (300 mg) by mouth every morning Take with 150 mg for total 450 mg 90 tablet 1    cetirizine (ZYRTEC) 10 MG tablet Take 10 mg by mouth as needed for allergies      diltiazem ER (DILT-XR) 240 MG 24 hr ER beaded capsule Take 1 capsule (240 mg) by mouth daily 90 capsule 3    dulaglutide (TRULICITY) 0.75 MG/0.5ML pen Inject 0.75 mg Subcutaneous every 7 days 2 mL 3    ELIQUIS ANTICOAGULANT 5 MG tablet Take 1 tablet (5 mg) by mouth 2 times daily 180 tablet 3    fosinopril (MONOPRIL) 10 MG tablet  Take 1 tablet (10 mg) by mouth daily 90 tablet 3    ibuprofen (ADVIL/MOTRIN) 600 MG tablet Take 1 tablet (600 mg) by mouth every 6 hours as needed for mild pain      metFORMIN (GLUCOPHAGE XR) 500 MG 24 hr tablet Take 1,000 mg by mouth daily (with dinner)      metoprolol succinate ER (TOPROL XL) 50 MG 24 hr tablet Take 1 tablet (50 mg) by mouth 2 times daily 180 tablet 3    oxyCODONE (ROXICODONE) 5 MG tablet Take 1-2 tablets (5-10 mg) by mouth every 4 hours as needed for moderate to severe pain 20 tablet 0    rosuvastatin (CRESTOR) 10 MG tablet Take 1 tablet (10 mg) by mouth daily 90 tablet 3    senna-docusate (SENOKOT-S/PERICOLACE) 8.6-50 MG tablet Take 1 tablet by mouth 2 times daily      sertraline (ZOLOFT) 100 MG tablet Take 200 mg by mouth daily 2 tabs (200mg) daily       spironolactone (ALDACTONE) 25 MG tablet Take 1 tablet (25 mg) by mouth daily 90 tablet 3    tiZANidine (ZANAFLEX) 2 MG tablet Take 1-2 tablets (2-4 mg) by mouth nightly as needed for muscle spasms 180 tablet 1    traZODone (DESYREL) 100 MG tablet Take 1 tablet (100 mg) by mouth At Bedtime 90 tablet 1       SERVICES:  Outpatient PT, OT    ADDITIONAL INSTRUCTIONS:  Monitor blood glucose monitoring 2-3 times a day.   Keep a record and bring it to your next primary provider visit.  Notify your surgeon if you have increased redness, swelling, tenderness, or drainage at your incision site.    Notify PCP if you have a fever greater than 100.5 degrees.    DO NOT DRIVE while taking narcotic pain medications.       YAMIL Renteria CNP  This document was electronically signed on December 12, 2023       no

## 2023-12-12 NOTE — PROGRESS NOTES
[facility] GERIATRICS    PRIMARY CARE PROVIDER AND CLINIC:  Nica Greco PA-C, [address] [place] [address]  / BRITTANY NICHOLS [id number]  Chief Complaint   Patient presents with    Hospital F/U      [facility] Medical Record Number:  [id number]  Place of Service where encounter took place:  [facility] ([facility])     Mary Gorman  is a 58 year old  (1965), admitted to the above facility from  [facility]. Hospital stay 12/1/23 through 12/5/23..     Hospital course was reviewed by me, is as per the hospital discharge summary and NP note    Patient is a past medical history of diabetes mellitus, hypertension, chronic atrial fibrillation.  She underwent elective right total knee arthroplasty on 12/1/2023.    There were no significant postoperative complications.  Heart rate controlled with prior to admission diltiazem and metoprolol.  She is chronically anticoagulated with apixaban.  Blood pressure managed with spironolactone and fosinopril.  History of diabetes mellitus on metformin with dinner and Trulicity weekly.  Blood glucoses were stable perioperatively.    Patient reports feeling well, states pain is controlled.  Chronic lower extremity edema is stable.  Patient is progressing in therapy, hopes to discharge to home in a few days.  Vital signs and blood glucoses have been stable.          CODE STATUS/ADVANCE DIRECTIVES DISCUSSION:  Full Code  CPR/Full code   ALLERGIES:   Allergies   Allergen Reactions    Dyazide [Hydrochlorothiazide W-Triamterene] Unknown    Vistaril [Hydroxyzine] Visual Disturbance    Naproxen Rash    Nickel Rash    Robaxin [Methocarbamol] Rash    Sulfa Antibiotics Rash      PAST MEDICAL HISTORY:   Past Medical History:   Diagnosis Date    Arthritis Nov 2019    Atrial fibrillation (H)     COPD (chronic obstructive pulmonary disease) (H)     colds turn to bronchitis easily    Depression     Depressive disorder     in chart     Diabetes (H)     GERD (gastroesophageal reflux disease)     HTN (hypertension)     Hyperlipidemia     Obese     BALTA (obstructive sleep apnea)     uses CPAP    Permanent atrial fibrillation (H) 06/05/2011    Uncomplicated asthma     mentioned by urgent care md      PAST SURGICAL HISTORY:   has a past surgical history that includes Cholecystectomy; knee surgery; Cardioversion (06/07/2011); Dilation and curettage, hysteroscopy diagnostic, combined (12/08/2011); Dilation and curettage (04/26/2012); Arthroplasty knee (Left, 01/20/2022); Remove hardware lower extremity (Left, 01/20/2022); Abdomen surgery; biopsy; colonoscopy (2013); and Arthroplasty knee (Right, 12/1/2023).  FAMILY HISTORY: family history includes Arthritis in her mother; Cancer (age of onset: 50) in her father; Cancer (age of onset: 90) in her paternal grandmother; Colon Cancer in her paternal grandmother; Coronary Artery Disease in her father; Heart Disease in her father and mother; Hyperlipidemia in her father and mother; Hypertension in her father and mother; Obesity in her brother, mother, and paternal grandmother; Other Cancer in her father; Respiratory in her father; Unknown/Adopted in her brother.  SOCIAL HISTORY:   reports that she has never smoked. She has never been exposed to tobacco smoke. She has never used smokeless tobacco. She reports that she does not currently use alcohol. She reports that she does not use drugs.  Patient's living condition: lives with family, mother     Current medications were reviewed by me today    Current Outpatient Medications   Medication Sig    acetaminophen (TYLENOL) 500 MG tablet Take 1,000 mg by mouth 3 times daily And 1000mg every day PRN    blood glucose (NO BRAND SPECIFIED) lancets standard Use to test blood sugar 2 times daily or as directed.    blood glucose (NO BRAND SPECIFIED) test strip Use to test blood sugar 2 times daily or as directed.    blood glucose monitoring (NO BRAND SPECIFIED) meter device  kit Use to test blood sugar 2 times daily or as directed.    buPROPion (WELLBUTRIN XL) 150 MG 24 hr tablet Take 1 tablet (150 mg) by mouth every morning Take with 300 mg for total of 450 mg    buPROPion (WELLBUTRIN XL) 300 MG 24 hr tablet Take 1 tablet (300 mg) by mouth every morning Take with 150 mg for total 450 mg    cetirizine (ZYRTEC) 10 MG tablet Take 10 mg by mouth as needed for allergies    diltiazem ER (DILT-XR) 240 MG 24 hr ER beaded capsule Take 1 capsule (240 mg) by mouth daily    dulaglutide (TRULICITY) 0.75 MG/0.5ML pen Inject 0.75 mg Subcutaneous every 7 days    ELIQUIS ANTICOAGULANT 5 MG tablet Take 1 tablet (5 mg) by mouth 2 times daily    fosinopril (MONOPRIL) 10 MG tablet Take 1 tablet (10 mg) by mouth daily    ibuprofen (ADVIL/MOTRIN) 600 MG tablet Take 1 tablet (600 mg) by mouth every 6 hours as needed for mild pain    metFORMIN (GLUCOPHAGE XR) 500 MG 24 hr tablet Take 1,000 mg by mouth daily (with dinner)    metoprolol succinate ER (TOPROL XL) 50 MG 24 hr tablet Take 1 tablet (50 mg) by mouth 2 times daily    oxyCODONE (ROXICODONE) 5 MG tablet Take 1-2 tablets (5-10 mg) by mouth every 4 hours as needed for moderate to severe pain    rosuvastatin (CRESTOR) 10 MG tablet Take 1 tablet (10 mg) by mouth daily    senna-docusate (SENOKOT-S/PERICOLACE) 8.6-50 MG tablet Take 1 tablet by mouth 2 times daily    sertraline (ZOLOFT) 100 MG tablet Take 200 mg by mouth daily 2 tabs (200mg) daily     spironolactone (ALDACTONE) 25 MG tablet Take 1 tablet (25 mg) by mouth daily    tiZANidine (ZANAFLEX) 2 MG tablet Take 1-2 tablets (2-4 mg) by mouth nightly as needed for muscle spasms    traZODone (DESYREL) 100 MG tablet Take 1 tablet (100 mg) by mouth At Bedtime     No current facility-administered medications for this visit.       ROS:  10 point ROS of systems including Constitutional, Eyes, Respiratory, Cardiovascular, Gastroenterology, Genitourinary, Integumentary, Musculoskeletal, Psychiatric were all  "negative except for pertinent positives noted in my HPI.    Vitals:  /80   Pulse 74   Temp 97.6  F (36.4  C)   Resp 18   Ht 1.651 m (5' 5\")   Wt (!) 153.4 kg (338 lb 1.6 oz)   LMP 06/25/2019 (Approximate)   SpO2 97%   BMI 56.26 kg/m    Exam:    Very pleasant, obese female, lying in bed.  She appears comfortable, is in good spirits  Lungs clear  CV irregular, heart rate 70s  Abdomen soft, protuberant  Right knee incision was not examined.  2+ edema right calf and ankle.  No calf redness or drainage  Left leg wrapped  Neuro: Fully oriented.  Cranial nerves intact.      Lab/Diagnostic data:  Most Recent 3 CBC's:  Recent Labs   Lab Test 12/11/23  0835 12/03/23  0722 12/02/23  0659 11/15/23  1521 08/18/23  1508   WBC  --   --  9.2 7.9 6.8   HGB 10.2* 10.4* 11.2* 14.2 14.3   MCV  --   --  90 88 87   PLT  --   --  167 230 234     Most Recent 3 BMP's:  Recent Labs   Lab Test 12/11/23  0835 12/05/23  0809 12/05/23  0653 12/03/23  0754 12/03/23  0722     --  138  --  138   POTASSIUM 4.4  --  4.2  --  4.6   CHLORIDE 102  --  103  --  104   CO2 27  --  31*  --  27   BUN 15.1  --  15.4  --  22.4   CR 1.11*  --  0.86  --  1.06*   ANIONGAP 11  --  4*  --  7   CHERYL 9.6  --  9.4  --  9.3   GLC 90 107* 135*   < > 125*    < > = values in this interval not displayed.     Most Recent Hemoglobin A1c:  Recent Labs   Lab Test 11/15/23  1521   A1C 5.9*       ASSESSMENT/PLAN:    Status post right total knee arthroplasty for the treatment of osteoarthritis of the knee  Pain has been controlled.  Patient is progressing in therapy.  Plan: Continue current cares.  Monitor bowel status.  Chronic anticoagulation with apixaban  Orthopedics follow-up    Diabetes mellitus type 2  Stable on metformin and weekly Trulicity  Plan: Surveillance blood glucose monitoring    Obstructive sleep apnea  Plan: Continue CPAP at bedtime    Chronic lower extremity edema  Prior to admission on spironolactone  Patient utilizes compression pump with " lymphedema treatment  Plan: Continue spironolactone.  Leg elevation.  Resume lymphedema treatments after discharge    Gelacio Sullivan MD

## 2023-12-12 NOTE — LETTER
12/12/2023        RE: Mary Gorman  [address]  [facility] [address]        [facility] GERIATRICS    PRIMARY CARE PROVIDER AND CLINIC:  Nica Greco PA-C, [address] [place] [address]  / BRITTANY NICHOLS [address]  Chief Complaint   Patient presents with     Hospital F/U      [place] Medical Record Number:  [id number]  Place of Service where encounter took place:  [facility] (U)     Mary Gorman  is a 58 year old  (1965), admitted to the above facility from  [facility]. Hospital stay 12/1/23 through 12/5/23..     Hospital course was reviewed by me, is as per the hospital discharge summary and NP note    Patient is a past medical history of diabetes mellitus, hypertension, chronic atrial fibrillation.  She underwent elective right total knee arthroplasty on 12/1/2023.    There were no significant postoperative complications.  Heart rate controlled with prior to admission diltiazem and metoprolol.  She is chronically anticoagulated with apixaban.  Blood pressure managed with spironolactone and fosinopril.  History of diabetes mellitus on metformin with dinner and Trulicity weekly.  Blood glucoses were stable perioperatively.    Patient reports feeling well, states pain is controlled.  Chronic lower extremity edema is stable.  Patient is progressing in therapy, hopes to discharge to home in a few days.  Vital signs and blood glucoses have been stable.          CODE STATUS/ADVANCE DIRECTIVES DISCUSSION:  Full Code  CPR/Full code   ALLERGIES:   Allergies   Allergen Reactions     Dyazide [Hydrochlorothiazide W-Triamterene] Unknown     Vistaril [Hydroxyzine] Visual Disturbance     Naproxen Rash     Nickel Rash     Robaxin [Methocarbamol] Rash     Sulfa Antibiotics Rash      PAST MEDICAL HISTORY:   Past Medical History:   Diagnosis Date     Arthritis Nov 2019     Atrial fibrillation (H)      COPD (chronic obstructive pulmonary disease)  (H)     colds turn to bronchitis easily     Depression      Depressive disorder     in chart     Diabetes (H)      GERD (gastroesophageal reflux disease)      HTN (hypertension)      Hyperlipidemia      Obese      BALTA (obstructive sleep apnea)     uses CPAP     Permanent atrial fibrillation (H) 06/05/2011     Uncomplicated asthma     mentioned by urgent care md      PAST SURGICAL HISTORY:   has a past surgical history that includes Cholecystectomy; knee surgery; Cardioversion (06/07/2011); Dilation and curettage, hysteroscopy diagnostic, combined (12/08/2011); Dilation and curettage (04/26/2012); Arthroplasty knee (Left, 01/20/2022); Remove hardware lower extremity (Left, 01/20/2022); Abdomen surgery; biopsy; colonoscopy (2013); and Arthroplasty knee (Right, 12/1/2023).  FAMILY HISTORY: family history includes Arthritis in her mother; Cancer (age of onset: 50) in her father; Cancer (age of onset: 90) in her paternal grandmother; Colon Cancer in her paternal grandmother; Coronary Artery Disease in her father; Heart Disease in her father and mother; Hyperlipidemia in her father and mother; Hypertension in her father and mother; Obesity in her brother, mother, and paternal grandmother; Other Cancer in her father; Respiratory in her father; Unknown/Adopted in her brother.  SOCIAL HISTORY:   reports that she has never smoked. She has never been exposed to tobacco smoke. She has never used smokeless tobacco. She reports that she does not currently use alcohol. She reports that she does not use drugs.  Patient's living condition: lives with family, mother     Current medications were reviewed by me today    Current Outpatient Medications   Medication Sig     acetaminophen (TYLENOL) 500 MG tablet Take 1,000 mg by mouth 3 times daily And 1000mg every day PRN     blood glucose (NO BRAND SPECIFIED) lancets standard Use to test blood sugar 2 times daily or as directed.     blood glucose (NO BRAND SPECIFIED) test strip Use to  test blood sugar 2 times daily or as directed.     blood glucose monitoring (NO BRAND SPECIFIED) meter device kit Use to test blood sugar 2 times daily or as directed.     buPROPion (WELLBUTRIN XL) 150 MG 24 hr tablet Take 1 tablet (150 mg) by mouth every morning Take with 300 mg for total of 450 mg     buPROPion (WELLBUTRIN XL) 300 MG 24 hr tablet Take 1 tablet (300 mg) by mouth every morning Take with 150 mg for total 450 mg     cetirizine (ZYRTEC) 10 MG tablet Take 10 mg by mouth as needed for allergies     diltiazem ER (DILT-XR) 240 MG 24 hr ER beaded capsule Take 1 capsule (240 mg) by mouth daily     dulaglutide (TRULICITY) 0.75 MG/0.5ML pen Inject 0.75 mg Subcutaneous every 7 days     ELIQUIS ANTICOAGULANT 5 MG tablet Take 1 tablet (5 mg) by mouth 2 times daily     fosinopril (MONOPRIL) 10 MG tablet Take 1 tablet (10 mg) by mouth daily     ibuprofen (ADVIL/MOTRIN) 600 MG tablet Take 1 tablet (600 mg) by mouth every 6 hours as needed for mild pain     metFORMIN (GLUCOPHAGE XR) 500 MG 24 hr tablet Take 1,000 mg by mouth daily (with dinner)     metoprolol succinate ER (TOPROL XL) 50 MG 24 hr tablet Take 1 tablet (50 mg) by mouth 2 times daily     oxyCODONE (ROXICODONE) 5 MG tablet Take 1-2 tablets (5-10 mg) by mouth every 4 hours as needed for moderate to severe pain     rosuvastatin (CRESTOR) 10 MG tablet Take 1 tablet (10 mg) by mouth daily     senna-docusate (SENOKOT-S/PERICOLACE) 8.6-50 MG tablet Take 1 tablet by mouth 2 times daily     sertraline (ZOLOFT) 100 MG tablet Take 200 mg by mouth daily 2 tabs (200mg) daily      spironolactone (ALDACTONE) 25 MG tablet Take 1 tablet (25 mg) by mouth daily     tiZANidine (ZANAFLEX) 2 MG tablet Take 1-2 tablets (2-4 mg) by mouth nightly as needed for muscle spasms     traZODone (DESYREL) 100 MG tablet Take 1 tablet (100 mg) by mouth At Bedtime     No current facility-administered medications for this visit.       ROS:  10 point ROS of systems including Constitutional,  "Eyes, Respiratory, Cardiovascular, Gastroenterology, Genitourinary, Integumentary, Musculoskeletal, Psychiatric were all negative except for pertinent positives noted in my HPI.    Vitals:  /80   Pulse 74   Temp 97.6  F (36.4  C)   Resp 18   Ht 1.651 m (5' 5\")   Wt (!) 153.4 kg (338 lb 1.6 oz)   LMP 06/25/2019 (Approximate)   SpO2 97%   BMI 56.26 kg/m    Exam:    Very pleasant, obese female, lying in bed.  She appears comfortable, is in good spirits  Lungs clear  CV irregular, heart rate 70s  Abdomen soft, protuberant  Right knee incision was not examined.  2+ edema right calf and ankle.  No calf redness or drainage  Left leg wrapped  Neuro: Fully oriented.  Cranial nerves intact.      Lab/Diagnostic data:  Most Recent 3 CBC's:  Recent Labs   Lab Test 12/11/23  0835 12/03/23  0722 12/02/23  0659 11/15/23  1521 08/18/23  1508   WBC  --   --  9.2 7.9 6.8   HGB 10.2* 10.4* 11.2* 14.2 14.3   MCV  --   --  90 88 87   PLT  --   --  167 230 234     Most Recent 3 BMP's:  Recent Labs   Lab Test 12/11/23  0835 12/05/23  0809 12/05/23  0653 12/03/23  0754 12/03/23  0722     --  138  --  138   POTASSIUM 4.4  --  4.2  --  4.6   CHLORIDE 102  --  103  --  104   CO2 27  --  31*  --  27   BUN 15.1  --  15.4  --  22.4   CR 1.11*  --  0.86  --  1.06*   ANIONGAP 11  --  4*  --  7   CHERYL 9.6  --  9.4  --  9.3   GLC 90 107* 135*   < > 125*    < > = values in this interval not displayed.     Most Recent Hemoglobin A1c:  Recent Labs   Lab Test 11/15/23  1521   A1C 5.9*       ASSESSMENT/PLAN:    Status post right total knee arthroplasty for the treatment of osteoarthritis of the knee  Pain has been controlled.  Patient is progressing in therapy.  Plan: Continue current cares.  Monitor bowel status.  Chronic anticoagulation with apixaban  Orthopedics follow-up    Diabetes mellitus type 2  Stable on metformin and weekly Trulicity  Plan: Surveillance blood glucose monitoring    Obstructive sleep apnea  Plan: Continue CPAP " at bedtime    Chronic lower extremity edema  Prior to admission on spironolactone  Patient utilizes compression pump with lymphedema treatment  Plan: Continue spironolactone.  Leg elevation.  Resume lymphedema treatments after discharge    Gelacio Sullivan MD          Sincerely,        Gelacio Sullivan MD

## 2023-12-15 ENCOUNTER — PATIENT OUTREACH (OUTPATIENT)
Dept: CARE COORDINATION | Facility: CLINIC | Age: 58
End: 2023-12-15
Payer: COMMERCIAL

## 2023-12-15 NOTE — PROGRESS NOTES
Clinical Product Navigator RN reviewed chart; patient on payer product coverage.  Review results:   CPN Initial Information Gathering  Referral Source: Health Plan    Patient identified by her health plan for potential care management services.  Note patient is currently followed by primary care - care coordination.  No further needs identified at this time.    Melissa Behl BSN, RN, PHN, CCM  RN Clinical Product Navigator  Ph: [phone]

## 2023-12-19 ENCOUNTER — THERAPY VISIT (OUTPATIENT)
Dept: PHYSICAL THERAPY | Facility: CLINIC | Age: 58
End: 2023-12-19
Attending: STUDENT IN AN ORGANIZED HEALTH CARE EDUCATION/TRAINING PROGRAM
Payer: COMMERCIAL

## 2023-12-19 DIAGNOSIS — R26.9 ABNORMAL GAIT: Primary | ICD-10-CM

## 2023-12-19 PROCEDURE — 97110 THERAPEUTIC EXERCISES: CPT | Mod: GP | Performed by: PHYSICAL THERAPIST

## 2023-12-19 PROCEDURE — 97161 PT EVAL LOW COMPLEX 20 MIN: CPT | Mod: GP | Performed by: PHYSICAL THERAPIST

## 2023-12-19 ASSESSMENT — ACTIVITIES OF DAILY LIVING (ADL)
RISE FROM A CHAIR: ACTIVITY IS SOMEWHAT DIFFICULT
KNEEL ON THE FRONT OF YOUR KNEE: I AM UNABLE TO DO THE ACTIVITY
WALK: ACTIVITY IS FAIRLY DIFFICULT
PAIN: THE SYMPTOM AFFECTS MY ACTIVITY MODERATELY
SWELLING: THE SYMPTOM AFFECTS MY ACTIVITY MODERATELY
STIFFNESS: THE SYMPTOM AFFECTS MY ACTIVITY MODERATELY
HOW_WOULD_YOU_RATE_THE_OVERALL_FUNCTION_OF_YOUR_KNEE_DURING_YOUR_USUAL_DAILY_ACTIVITIES?: ABNORMAL
WEAKNESS: THE SYMPTOM AFFECTS MY ACTIVITY MODERATELY
RAW_SCORE: 29
SQUAT: I AM UNABLE TO DO THE ACTIVITY
GO UP STAIRS: I AM UNABLE TO DO THE ACTIVITY
KNEE_ACTIVITY_OF_DAILY_LIVING_SUM: 29
AS_A_RESULT_OF_YOUR_KNEE_INJURY,_HOW_WOULD_YOU_RATE_YOUR_CURRENT_LEVEL_OF_DAILY_ACTIVITY?: ABNORMAL
GIVING WAY, BUCKLING OR SHIFTING OF KNEE: I DO NOT HAVE THE SYMPTOM
KNEE_ACTIVITY_OF_DAILY_LIVING_SCORE: 41.43
LIMPING: I HAVE THE SYMPTOM BUT IT DOES NOT AFFECT MY ACTIVITY
HOW_WOULD_YOU_RATE_THE_CURRENT_FUNCTION_OF_YOUR_KNEE_DURING_YOUR_USUAL_DAILY_ACTIVITIES_ON_A_SCALE_FROM_0_TO_100_WITH_100_BEING_YOUR_LEVEL_OF_KNEE_FUNCTION_PRIOR_TO_YOUR_INJURY_AND_0_BEING_THE_INABILITY_TO_PERFORM_ANY_OF_YOUR_USUAL_DAILY_ACTIVITIES?: 25
GO DOWN STAIRS: I AM UNABLE TO DO THE ACTIVITY
SIT WITH YOUR KNEE BENT: ACTIVITY IS MINIMALLY DIFFICULT
STAND: ACTIVITY IS SOMEWHAT DIFFICULT

## 2023-12-19 NOTE — PROGRESS NOTES
PHYSICAL THERAPY EVALUATION  Type of Visit: Evaluation    See electronic medical record for Abuse and Falls Screening details.    Subjective       Presenting condition or subjective complaint: Pt presents to PT s/p R TKA 12-1-23. Pt reported she was discharged to [facility] on 12-5-23 until d/c home on 12-15-23. Pt reported she is currently staying with her mother in her apartment until she is able to return to her [facility]. Pt reported she is currently uzing OTC meds for pain control; no longer on prescription pain meds. Pt reported she developed lymphedema of B LE's within the past couple of years and currently uses compression garments for control of swelling. Pt has not yet return to use of compression garment on the R LE; will discuss with surgeon at f/u visit on 12-22-23.  Date of onset: 12/01/23    Relevant medical history: Arthritis; Depression; Diabetes; Heart problems; High blood pressure; Overweight; Pain at night or rest; Sleep disorder like apnea   Dates & types of surgery: 1/2022 LTKA; 12/1/23 RTKA    Prior diagnostic imaging/testing results: X-ray     Prior therapy history for the same diagnosis, illness or injury: Yes left tka 2 yrs ago with therapy    Prior Level of Function  Transfers: Assistive equipment  Ambulation: Assistive equipment  ADL: Assistive equipment and person    Living Environment  Social support: Alone   Type of home: [facility]   Stairs to enter the home: Yes 4 Is there a railing: Yes   Ramp: No   Stairs inside the home: Yes 16 Is there a railing: Yes   Help at home: Home and Yard maintenance tasks  Equipment owned: Straight Cane; Walker with wheels; Raised toilet seat; Dressing equipment     Employment: Yes RN  Hobbies/Interests: play with dog; be with family    Patient goals for therapy: Utilize stairs independently; walk without assistive device.       Objective   KNEE EVALUATION  PAIN: Pain Level at Rest: 0/10  Pain Location: superior/inferior anterior knee  Pain Frequency:  intermittent  INTEGUMENTARY (edema, incisions):  Unable to assess. Pt wears compression garment on B LE's secondary to lymphedema swelling. Pt arrived to clinic with compression garment on L LE only; will discuss with surgeon at follow-up visit when she will resume wearing on the R LE.  GAIT:  Weightbearing Status: WBAT  Assistive Device(s): Walker (front wheeled)  Gait Deviations: WFL  ROM:  knee flexion: 91 degrees; knee extension (passive): 10  degrees.  STRENGTH:  quad set: fair.  FUNCTIONAL TESTS:  Pt demonstrated ability to lift R LE onto plinth without assistance.      Assessment & Plan   CLINICAL IMPRESSIONS  Medical Diagnosis: s/p R TKA    Treatment Diagnosis: s/p R TKA; knee pain; abnormal gait   Impression/Assessment: Patient is a 58 year old female with R complaints s/p R TKA.  The following significant findings have been identified: Pain, Decreased ROM/flexibility, Decreased strength, Edema, Impaired gait, Impaired muscle performance, and Decreased activity tolerance. These impairments interfere with their ability to perform self care tasks, work tasks, driving , household mobility, and community mobility as compared to previous level of function.     Clinical Decision Making (Complexity):  Clinical Presentation: Stable/Uncomplicated  Clinical Presentation Rationale: based on medical and personal factors listed in PT evaluation  Clinical Decision Making (Complexity): Low complexity    PLAN OF CARE  Treatment Interventions:  Interventions: Neuromuscular Re-education, Therapeutic Activity, Therapeutic Exercise, Self-Care/Home Management    Long Term Goals     PT Goal 1  Goal Identifier: Ambulation  Goal Description: Ambulate with SEC without gait deviation.  Rationale: to maximize safety and independence with performance of ADLs and functional tasks;to maximize safety and independence within the home;to maximize safety and independence within the community;to maximize safety and independence with self  cares  Target Date: 02/13/24      Frequency of Treatment: 2x/week for 4 weeks, tapering to 1x/week for 4 weeks  Duration of Treatment: 8 weeks    Education Assessment:      Risks and benefits of evaluation/treatment have been explained.   Patient/Family/caregiver agrees with Plan of Care.     Evaluation Time:     PT Eval, Low Complexity Minutes (37370): 15     Signing Clinician: Kyleigh Montenegro PT

## 2023-12-21 ENCOUNTER — TELEPHONE (OUTPATIENT)
Dept: OTHER | Facility: CLINIC | Age: 58
End: 2023-12-21

## 2023-12-21 ENCOUNTER — THERAPY VISIT (OUTPATIENT)
Dept: PHYSICAL THERAPY | Facility: CLINIC | Age: 58
End: 2023-12-21
Attending: STUDENT IN AN ORGANIZED HEALTH CARE EDUCATION/TRAINING PROGRAM
Payer: COMMERCIAL

## 2023-12-21 DIAGNOSIS — Z96.651 S/P TOTAL KNEE ARTHROPLASTY, RIGHT: Primary | ICD-10-CM

## 2023-12-21 PROCEDURE — 97110 THERAPEUTIC EXERCISES: CPT | Mod: GP | Performed by: PHYSICAL THERAPIST

## 2023-12-21 NOTE — TELEPHONE ENCOUNTER
LM for patient that per Dr. Quinn's nurse she can be seen in January with him.    Patient needs to be scheduled for an office visit with Dr. Quinn.  Labs have already been done.

## 2023-12-21 NOTE — TELEPHONE ENCOUNTER
Future Appointments   Date Time Provider Department Center   1/19/2024  2:00 PM Mekhi Quinn MD [facility]

## 2023-12-22 ENCOUNTER — OFFICE VISIT (OUTPATIENT)
Dept: ORTHOPEDICS | Facility: CLINIC | Age: 58
End: 2023-12-22
Payer: COMMERCIAL

## 2023-12-22 VITALS
WEIGHT: 293 LBS | DIASTOLIC BLOOD PRESSURE: 62 MMHG | SYSTOLIC BLOOD PRESSURE: 141 MMHG | BODY MASS INDEX: 48.82 KG/M2 | HEIGHT: 65 IN

## 2023-12-22 DIAGNOSIS — Z96.652 S/P TOTAL KNEE ARTHROPLASTY, LEFT: Primary | ICD-10-CM

## 2023-12-22 DIAGNOSIS — Z96.651 STATUS POST TOTAL KNEE REPLACEMENT, RIGHT: ICD-10-CM

## 2023-12-22 PROCEDURE — 99024 POSTOP FOLLOW-UP VISIT: CPT | Performed by: PHYSICIAN ASSISTANT

## 2023-12-22 NOTE — LETTER
12/22/2023         RE: Mary Gorman  [address]  [facility] [address]        Dear Colleague,    Thank you for referring your patient, Mary Gorman, to the [facility] [place]. Please see a copy of my visit note below.        [facility]  Orthopaedic Surgery Consultation by Armand Martin M.D.    Mary Gorman MRN# [id number]   Age: 56 year old YOB: 1965     Requesting physician: No ref. provider found  Nicole Castellanos     Background history:  DX:  Lumbago  OSAS  GERD  Morbid obesity  Hyperlipidemia  Prediabetes  Hypertension  Chronic kidney disease stage III  Atrial fibrillation on Pradaxa  Depressive disorder    TREATMENTS:  11/5/2007, laparoscopic cholecystectomy, Dr. Buckley  1/20/2022, left total knee arthroplasty with removal of hardware, Dr. Martin  12/01/2023, right total knee arthroplasty, Dr Martin, [facility]           History of Present Illness:   58-year-old female presents today 3 weeks status post right total knee arthroplasty.  Pain is well-controlled with Tylenol and ibuprofen.  She is taking apixaban for DVT prophylaxis.  She is currently ambulating with a walker.  She made good progress of physical therapy.  She is questioning whether she can put her compression sleeve back on her chronic venous stasis.  She denies any chest pain shortness of breath or calf pain.    Social:   Occupation: nurse  Living situation: alone   Hobbies / Sports: everything    Smoking: No  Alcohol: 1 x a year  Illicit drug use: No         Physical Exam:     EXAMINATION pertinent findings:   PSYCH: Pleasant, healthy-appearing, alert, oriented x3, cooperative. Normal mood and affect.  VITAL SIGNS: Last menstrual period 06/25/2019, not currently breastfeeding.  Reviewed nursing intake notes.   There is no height or weight on file to calculate BMI.  RESP: non labored breathing   ABD: benign, soft, non-tender, no acute peritoneal  findings  SKIN: grossly normal   LYMPHATIC: grossly normal, no adenopathy, no extremity edema  NEURO: grossly normal , no motor deficits  VASCULAR: satisfactory perfusion of all extremities   MUSCULOSKELETAL:   Alignment: Neutral      R knee: The incision is clean, dry, and intact with no erythema, dehiscence, or discharge. ROM 90-0-0  .  Effusion large. Ligamentously stable in both ML and AP direction.  No gross laxity in ML direction.  Normal PF tracking.  Calves are soft nontender with negative Homans' sign    Bilateral LE:   Thigh and leg compartments soft and compressible   +Quad/TA/GSC/FHL/EHL   SILT DP/SP/Ziyad/Saph/Tib nerve distributions   Palpable dorsalis pedis pulse          Data:   All laboratory data reviewed  All imaging studies reviewed by me personally.    XR right knee on 12/1/2023:  My interpretation: Status post right total knee arthroplasty.  Adequate sizing, fixation and orientation of components.  No signs of immediate postoperative complications.           Assessment and Plan:   Assessment:  58-year-old female history of chronic right knee pain and instability due to end-stage osteoarthritic changes in all 3 compartments who was insufficiently responding to nonoperative treatment measures therefore underwent a right total knee arthroplasty on 12/1/2023.  Recovering appropriately     Plan:  I extensively discussed my findings with the patient.  We discussed the intraoperative findings and today's findings.  I would like her to resume her compression sleeve for chronic venous stasis patient will continue to work with physical therapy on range of motion, strengthening and gait training.  Patient will continue their DVT prophylaxis until 4 weeks postoperatively.  Suture tails were removed.  All questions were answered.  Patient understands and agrees to the treatment plan as set forth.  We will follow-up with patient at the 6-week postoperative date with renewed radiographic imaging studies.      Ta Iyer PA-C  Physician Assistant   Oncology and Adult Reconstructive Surgery  Dept Orthopaedic Surgery, [facility]    This note was created using dictation software and may contain errors.  Please contact the creator for any clarifications that are needed.            Again, thank you for allowing me to participate in the care of your patient.        Sincerely,        Ta Iyer PA-C

## 2023-12-22 NOTE — PROGRESS NOTES
[facility]  Orthopaedic Surgery Consultation by Armand Martin M.D.    Mary Gorman MRN# [id number]   Age: 56 year old YOB: 1965     Requesting physician: No ref. provider found  Nicole Castellanos     Background history:  DX:  Lumbago  OSAS  GERD  Morbid obesity  Hyperlipidemia  Prediabetes  Hypertension  Chronic kidney disease stage III  Atrial fibrillation on Pradaxa  Depressive disorder    TREATMENTS:  11/5/2007, laparoscopic cholecystectomy, Dr. Buckley  1/20/2022, left total knee arthroplasty with removal of hardware, Dr. Martin  12/01/2023, right total knee arthroplasty, Dr Martin, [facility]           History of Present Illness:   58-year-old female presents today 3 weeks status post right total knee arthroplasty.  Pain is well-controlled with Tylenol and ibuprofen.  She is taking apixaban for DVT prophylaxis.  She is currently ambulating with a walker.  She made good progress of physical therapy.  She is questioning whether she can put her compression sleeve back on her chronic venous stasis.  She denies any chest pain shortness of breath or calf pain.    Social:   Occupation: nurse  Living situation: alone   Hobbies / Sports: everything    Smoking: No  Alcohol: 1 x a year  Illicit drug use: No         Physical Exam:     EXAMINATION pertinent findings:   PSYCH: Pleasant, healthy-appearing, alert, oriented x3, cooperative. Normal mood and affect.  VITAL SIGNS: Last menstrual period 06/25/2019, not currently breastfeeding.  Reviewed nursing intake notes.   There is no height or weight on file to calculate BMI.  RESP: non labored breathing   ABD: benign, soft, non-tender, no acute peritoneal findings  SKIN: grossly normal   LYMPHATIC: grossly normal, no adenopathy, no extremity edema  NEURO: grossly normal , no motor deficits  VASCULAR: satisfactory perfusion of all extremities   MUSCULOSKELETAL:   Alignment: Neutral      R knee: The incision is clean, dry, and  intact with no erythema, dehiscence, or discharge. ROM 90-0-0  .  Effusion large. Ligamentously stable in both ML and AP direction.  No gross laxity in ML direction.  Normal PF tracking.  Calves are soft nontender with negative Homans' sign    Bilateral LE:   Thigh and leg compartments soft and compressible   +Quad/TA/GSC/FHL/EHL   SILT DP/SP/Ziyad/Saph/Tib nerve distributions   Palpable dorsalis pedis pulse          Data:   All laboratory data reviewed  All imaging studies reviewed by me personally.    XR right knee on 12/1/2023:  My interpretation: Status post right total knee arthroplasty.  Adequate sizing, fixation and orientation of components.  No signs of immediate postoperative complications.           Assessment and Plan:   Assessment:  58-year-old female history of chronic right knee pain and instability due to end-stage osteoarthritic changes in all 3 compartments who was insufficiently responding to nonoperative treatment measures therefore underwent a right total knee arthroplasty on 12/1/2023.  Recovering appropriately     Plan:  I extensively discussed my findings with the patient.  We discussed the intraoperative findings and today's findings.  I would like her to resume her compression sleeve for chronic venous stasis patient will continue to work with physical therapy on range of motion, strengthening and gait training.  Patient will continue their DVT prophylaxis until 4 weeks postoperatively.  Suture tails were removed.  All questions were answered.  Patient understands and agrees to the treatment plan as set forth.  We will follow-up with patient at the 6-week postoperative date with renewed radiographic imaging studies.     Ta Iyer PA-C  Physician Assistant   Oncology and Adult Reconstructive Surgery  Dept Orthopaedic Surgery, [facility]    This note was created using dictation software and may contain errors.  Please contact the creator for any clarifications that are needed.

## 2023-12-28 ENCOUNTER — THERAPY VISIT (OUTPATIENT)
Dept: PHYSICAL THERAPY | Facility: CLINIC | Age: 58
End: 2023-12-28
Payer: COMMERCIAL

## 2023-12-28 DIAGNOSIS — Z96.651 S/P TOTAL KNEE ARTHROPLASTY, RIGHT: Primary | ICD-10-CM

## 2023-12-28 PROCEDURE — 97110 THERAPEUTIC EXERCISES: CPT | Mod: GP | Performed by: PHYSICAL THERAPIST

## 2024-01-05 ENCOUNTER — THERAPY VISIT (OUTPATIENT)
Dept: PHYSICAL THERAPY | Facility: CLINIC | Age: 59
End: 2024-01-05
Payer: COMMERCIAL

## 2024-01-05 DIAGNOSIS — Z96.651 S/P TOTAL KNEE ARTHROPLASTY, RIGHT: Primary | ICD-10-CM

## 2024-01-05 DIAGNOSIS — Z96.652 AFTERCARE FOLLOWING LEFT KNEE JOINT REPLACEMENT SURGERY: ICD-10-CM

## 2024-01-05 DIAGNOSIS — N18.31 TYPE 2 DIABETES MELLITUS WITH STAGE 3A CHRONIC KIDNEY DISEASE, WITHOUT LONG-TERM CURRENT USE OF INSULIN (H): ICD-10-CM

## 2024-01-05 DIAGNOSIS — R26.9 ABNORMAL GAIT: ICD-10-CM

## 2024-01-05 DIAGNOSIS — Z47.1 AFTERCARE FOLLOWING LEFT KNEE JOINT REPLACEMENT SURGERY: ICD-10-CM

## 2024-01-05 DIAGNOSIS — E11.22 TYPE 2 DIABETES MELLITUS WITH STAGE 3A CHRONIC KIDNEY DISEASE, WITHOUT LONG-TERM CURRENT USE OF INSULIN (H): ICD-10-CM

## 2024-01-05 PROCEDURE — 97110 THERAPEUTIC EXERCISES: CPT | Mod: GP | Performed by: PHYSICAL THERAPIST

## 2024-01-08 ENCOUNTER — THERAPY VISIT (OUTPATIENT)
Dept: PHYSICAL THERAPY | Facility: CLINIC | Age: 59
End: 2024-01-08
Payer: COMMERCIAL

## 2024-01-08 DIAGNOSIS — Z96.651 S/P TOTAL KNEE ARTHROPLASTY, RIGHT: Primary | ICD-10-CM

## 2024-01-08 PROCEDURE — 97110 THERAPEUTIC EXERCISES: CPT | Mod: GP | Performed by: PHYSICAL THERAPIST

## 2024-01-08 PROCEDURE — 97530 THERAPEUTIC ACTIVITIES: CPT | Mod: GP | Performed by: PHYSICAL THERAPIST

## 2024-01-09 ENCOUNTER — PATIENT OUTREACH (OUTPATIENT)
Dept: CARE COORDINATION | Facility: CLINIC | Age: 59
End: 2024-01-09
Payer: COMMERCIAL

## 2024-01-09 ASSESSMENT — ACTIVITIES OF DAILY LIVING (ADL): DEPENDENT_IADLS:: INDEPENDENT

## 2024-01-09 NOTE — PROGRESS NOTES
Clinic Care Coordination Contact    Situation: Patient chart reviewed by care coordinator.    Background:   Patient was admitted to [facility] from 12/1/2023 to 12/5/2023 with a diagnoses of s/p right TOTAL KNEE ARTHOPLASTY, osteoarthrosis and discharged to [facility] Transitional Care Unit.    Assessment:   RNCC was not notified of the Transitional Care Unit discharge on 12/15/2023; however the patient has been following up with outpatient Physical Therapy and is following the plan of care as outlined by the care team.    Plan/Recommendations:   -Patient will contact the care team with questions, concerns, support needs   -Patient will use the clinic as a resource and understands (s)he can contact the [facility] with 24/7 after hours services available  -Care Coordinator will remain available as needed  -RNCC will follow up in one month for follow up appointments/recommendations and goal progression     Edna Hutchison RN, BSN, PHN  Primary Care / Care Coordinator   [facility]  [email or web address]   [phone]

## 2024-01-11 ENCOUNTER — THERAPY VISIT (OUTPATIENT)
Dept: PHYSICAL THERAPY | Facility: CLINIC | Age: 59
End: 2024-01-11
Payer: COMMERCIAL

## 2024-01-11 DIAGNOSIS — Z96.651 S/P TOTAL KNEE ARTHROPLASTY, RIGHT: Primary | ICD-10-CM

## 2024-01-11 PROCEDURE — 97530 THERAPEUTIC ACTIVITIES: CPT | Mod: GP | Performed by: PHYSICAL THERAPIST

## 2024-01-11 PROCEDURE — 97110 THERAPEUTIC EXERCISES: CPT | Mod: GP | Performed by: PHYSICAL THERAPIST

## 2024-01-11 PROCEDURE — 97112 NEUROMUSCULAR REEDUCATION: CPT | Mod: GP | Performed by: PHYSICAL THERAPIST

## 2024-01-15 ENCOUNTER — THERAPY VISIT (OUTPATIENT)
Dept: PHYSICAL THERAPY | Facility: CLINIC | Age: 59
End: 2024-01-15
Payer: COMMERCIAL

## 2024-01-15 DIAGNOSIS — Z96.651 S/P TOTAL KNEE ARTHROPLASTY, RIGHT: Primary | ICD-10-CM

## 2024-01-15 PROCEDURE — 97110 THERAPEUTIC EXERCISES: CPT | Mod: GP | Performed by: PHYSICAL THERAPIST

## 2024-01-15 PROCEDURE — 97112 NEUROMUSCULAR REEDUCATION: CPT | Mod: GP | Performed by: PHYSICAL THERAPIST

## 2024-01-15 PROCEDURE — 97530 THERAPEUTIC ACTIVITIES: CPT | Mod: GP | Performed by: PHYSICAL THERAPIST

## 2024-01-15 ASSESSMENT — ACTIVITIES OF DAILY LIVING (ADL)
LIMPING: I DO NOT HAVE THE SYMPTOM
GO DOWN STAIRS: ACTIVITY IS SOMEWHAT DIFFICULT
HOW_WOULD_YOU_RATE_THE_CURRENT_FUNCTION_OF_YOUR_KNEE_DURING_YOUR_USUAL_DAILY_ACTIVITIES_ON_A_SCALE_FROM_0_TO_100_WITH_100_BEING_YOUR_LEVEL_OF_KNEE_FUNCTION_PRIOR_TO_YOUR_INJURY_AND_0_BEING_THE_INABILITY_TO_PERFORM_ANY_OF_YOUR_USUAL_DAILY_ACTIVITIES?: 100
STAND: ACTIVITY IS NOT DIFFICULT
GO UP STAIRS: ACTIVITY IS SOMEWHAT DIFFICULT
KNEE_ACTIVITY_OF_DAILY_LIVING_SUM: 41
AS_A_RESULT_OF_YOUR_KNEE_INJURY,_HOW_WOULD_YOU_RATE_YOUR_CURRENT_LEVEL_OF_DAILY_ACTIVITY?: NEARLY NORMAL
WEAKNESS: THE SYMPTOM AFFECTS MY ACTIVITY MODERATELY
PAIN: THE SYMPTOM AFFECTS MY ACTIVITY SLIGHTLY
SQUAT: I AM UNABLE TO DO THE ACTIVITY
RAW_SCORE: 41
SIT WITH YOUR KNEE BENT: ACTIVITY IS MINIMALLY DIFFICULT
KNEE_ACTIVITY_OF_DAILY_LIVING_SCORE: 58.57
HOW_WOULD_YOU_RATE_THE_OVERALL_FUNCTION_OF_YOUR_KNEE_DURING_YOUR_USUAL_DAILY_ACTIVITIES?: NEARLY NORMAL
RISE FROM A CHAIR: ACTIVITY IS MINIMALLY DIFFICULT
KNEEL ON THE FRONT OF YOUR KNEE: I AM UNABLE TO DO THE ACTIVITY
SWELLING: THE SYMPTOM AFFECTS MY ACTIVITY SLIGHTLY
GIVING WAY, BUCKLING OR SHIFTING OF KNEE: I HAVE THE SYMPTOM BUT IT DOES NOT AFFECT MY ACTIVITY
STIFFNESS: THE SYMPTOM AFFECTS MY ACTIVITY MODERATELY
WALK: ACTIVITY IS SOMEWHAT DIFFICULT

## 2024-01-15 NOTE — PROGRESS NOTES
"    PLAN  Pt has completed 7 visits since 12-19-23. Pt reports return for follow-up visit with surgeon on 1-17-24.  Pt demonstrates symmetrical ROM to L LE. Progressing with functional strength deficits and balance issues.  Current plan is to continue decrease to 1x/week for 4 weeks; re-assess visit number of visits at that time.   01/15/24 0500   Appointment Info   Signing clinician's name / credentials Kyleigh Montenegro PT   Total/Authorized Visits 12   Visits Used 7   Medical Diagnosis s/p R TKA   PT Tx Diagnosis s/p R TKA; knee pain; abnormal gait   Progress Note/Certification   Onset of illness/injury or Date of Surgery 12/01/23   Therapy Frequency 2x/week for 4 weeks, tapering to 1x/week for 4 weeks   Predicted Duration 8 weeks   PT Goal 1   Goal Identifier Ambulation   Goal Description Ambulate with SEC without gait deviation.   Rationale to maximize safety and independence with performance of ADLs and functional tasks;to maximize safety and independence within the home;to maximize safety and independence within the community;to maximize safety and independence with self cares   Target Date 02/13/24   Subjective Report   Subjective Report No specific issues. Overall doing well with HEP. Navigating well at home.   Objective Measures   Objective Measures Objective Measure 1;Objective Measure 2;Objective Measure 3   Objective Measure 1   Objective Measure Knee ROM   Details Knee flexion: 110 degrees (heelslide); knee extension: 0 degrees.   Objective Measure 2   Objective Measure Step-up   Details Able to perform step-up to 4\" height with mild UE assistance; moderate challenge with 5\" height.   Objective Measure 3   Objective Measure SLS   Details R: unable; L: 2 seconds.   Treatment Interventions (PT)   Interventions Therapeutic Procedure/Exercise;Therapeutic Activity;Neuromuscular Re-education   Therapeutic Procedure/Exercise   Therapeutic Procedures: strength, endurance, ROM, flexibillity minutes (39352) 20 " "  Therapeutic Procedures Ther Proc 2;Ther Proc 3;Ther Proc 4;Ther Proc 5;Ther Proc 6;Ther Proc 7   Ther Proc 1 Nu-step   Ther Proc 1 - Details seat 8, 10 minutes, load 4   Ther Proc 2 Passive knee extension   Ther Proc 2 - Details 0 degrees   Ther Proc 3 Knee flexion  (not today)   Ther Proc 3 - Details 110 degrees-heelslide w/PT assist.   Ther Proc 4 Active hip/knee flexion   Ther Proc 4 - Details x10 to 10 step height; x2   Ther Proc 5 toe raises - option in sitting and standing  (not today)   Ther Proc 5 - Details 5-10x work up to 20   Ther Proc 6 ball vs. wall  to work on knee ext  (not today)   Ther Proc 6 - Details 5x5\"   Ther Proc 7 Step-up   Ther Proc 7 - Details Step-up to 4\" height with use of railing and cane for support; trial of 5\" height-too challenging-continue with 4\" height   Skilled Intervention Instructed in ROM ex's to assist in return normal gait pattern.   Therapeutic Activity   Therapeutic Activities: dynamic activities to improve functional performance minutes (08850) 10   Ther Act 1 Modified squat   Ther Act 1 - Details Reviewed performing partial squat to wall. Completed 10 reps with fatigue noted by last several reps.   Skilled Intervention Instructed in functional strengthening ex's to assist with transfers-sit to stand, stairs, etc.   Neuromuscular Re-education   Neuromuscular re-ed of mvmt, balance, coord, kinesthetic sense, posture, proprioception minutes (30002) 10   Neuro Re-ed 1 Modified tandem stance   Neuro Re-ed 1 - Details Instructed in standing at table surface height, hands hovering over table. Instructed to stand with feet together in staggered stance. Goal 30 seconds. Instructed to trial with each foot behind.   Skilled Intervention Instructed in balance ex's to assist with stability during gait.   Neuro Re-ed 2 Side-step/step-up   Neuro Re-ed 2 - Details Practiced side-stepping weight bearing fully through R LE. Trial of step-up to 2\" height without UE support. Remains " hesitant with fully weight bearing on R LE.   Neuromuscular Re-education Neuro Re-ed 2         Beginning/End Dates of Progress Note Reporting Period:    to 01/15/2024    Referring Provider:  Armand Martin

## 2024-01-15 NOTE — PROGRESS NOTES
[facility]  Orthopaedic Surgery Consultation by Armand Martin M.D.    Mary Gorman MRN# [id number]   Age: 56 year old YOB: 1965     Requesting physician: No ref. provider found  Nicole Castellanos     Background history:  DX:  Lumbago  OSAS  GERD  Morbid obesity  Hyperlipidemia  Prediabetes  Hypertension  Chronic kidney disease stage III  Atrial fibrillation on Pradaxa  Depressive disorder    TREATMENTS:  11/5/2007, laparoscopic cholecystectomy, Dr. Buckley  1/20/2022, left total knee arthroplasty with removal of hardware, Dr. Martin  12/01/2023, right total knee arthroplasty, Dr Martin, [facility]           History of Present Illness:     58-year-old female presents today 6 weeks status post right total knee arthroplasty.  Pain is well-controlled with Tylenol and ibuprofen.  She has completed her DVT prophylaxis.  She is making good progress of physical therapy.  She has been attending physical therapy (10 visits) and is working on a home exercise program everyday. She reports that she feels things are moving slower than she feels they should be, she is struggling to regain strength. Overall she thinks things are improving. Patient is still using a cane for ambulation 95% of time.  The incision is healing well.  No signs of infection.    Social:   Occupation: nurse  Living situation: alone   Hobbies / Sports: everything    Smoking: No  Alcohol: 1 x a year  Illicit drug use: No         Physical Exam:     EXAMINATION pertinent findings:   PSYCH: Pleasant, healthy-appearing, alert, oriented x3, cooperative. Normal mood and affect.  VITAL SIGNS: Last menstrual period 06/25/2019, not currently breastfeeding.  Reviewed nursing intake notes.   There is no height or weight on file to calculate BMI.  RESP: non labored breathing   ABD: benign, soft, non-tender, no acute peritoneal findings  SKIN: grossly normal   LYMPHATIC: grossly normal, no adenopathy, no extremity  edema  NEURO: grossly normal , no motor deficits  VASCULAR: satisfactory perfusion of all extremities   MUSCULOSKELETAL:   Alignment: Neutral      R knee: The incision is clean, dry, and intact with no erythema, dehiscence, or discharge. -0-0  .  Some postsurgical effusion present. Ligamentously stable in both ML and AP direction.  Normal PF tracking.  Calves are soft nontender with negative Homans' sign    Bilateral LE:   Thigh and leg compartments soft and compressible   +Quad/TA/GSC/FHL/EHL   SILT DP/SP/Ziyad/Saph/Tib nerve distributions   Palpable dorsalis pedis pulse          Data:   All laboratory data reviewed  All imaging studies reviewed by me personally.    XR right knee on 1/17/2024:  My interpretation: Status post right total knee arthroplasty.  Adequate sizing, fixation and orientation of components.  No signs of  complications.           Assessment and Plan:   Assessment:  58-year-old female history of chronic right knee pain and instability due to end-stage osteoarthritic changes in all 3 compartments who was insufficiently responding to nonoperative treatment measures therefore underwent a right total knee arthroplasty on 12/1/2023.  Recovering appropriately     Plan:  I extensively discussed my findings with the patient.  Patient appears to be recovering appropriately.  Patient will continue to work with physical therapy on range of motion, strengthening and gait training.    All questions were answered.  Patient understands and agrees to the treatment plan as set forth.  We will follow-up with patient at the 1 year postoperative date with renewed radiographic imaging studies.      Armand Martin MD, PhD     Adult Reconstruction  [facility] Department of Orthopaedic Surgery      This note was created using dictation software and may contain errors.  Please contact the creator for any clarifications that are needed.

## 2024-01-17 ENCOUNTER — OFFICE VISIT (OUTPATIENT)
Dept: ORTHOPEDICS | Facility: CLINIC | Age: 59
End: 2024-01-17
Payer: COMMERCIAL

## 2024-01-17 ENCOUNTER — ANCILLARY PROCEDURE (OUTPATIENT)
Dept: GENERAL RADIOLOGY | Facility: CLINIC | Age: 59
End: 2024-01-17
Attending: STUDENT IN AN ORGANIZED HEALTH CARE EDUCATION/TRAINING PROGRAM
Payer: COMMERCIAL

## 2024-01-17 ENCOUNTER — TELEPHONE (OUTPATIENT)
Dept: ORTHOPEDICS | Facility: CLINIC | Age: 59
End: 2024-01-17

## 2024-01-17 VITALS
DIASTOLIC BLOOD PRESSURE: 75 MMHG | WEIGHT: 293 LBS | OXYGEN SATURATION: 97 % | SYSTOLIC BLOOD PRESSURE: 122 MMHG | BODY MASS INDEX: 56.25 KG/M2

## 2024-01-17 DIAGNOSIS — Z96.651 STATUS POST TOTAL KNEE REPLACEMENT, RIGHT: Primary | ICD-10-CM

## 2024-01-17 DIAGNOSIS — Z96.651 STATUS POST TOTAL KNEE REPLACEMENT, RIGHT: ICD-10-CM

## 2024-01-17 PROCEDURE — 73562 X-RAY EXAM OF KNEE 3: CPT | Mod: TC | Performed by: RADIOLOGY

## 2024-01-17 PROCEDURE — 99024 POSTOP FOLLOW-UP VISIT: CPT | Performed by: STUDENT IN AN ORGANIZED HEALTH CARE EDUCATION/TRAINING PROGRAM

## 2024-01-17 ASSESSMENT — KOOS JR
TWISING OR PIVOTING ON KNEE: MILD
KOOS JR SCORING: 61.58
STRAIGHTENING KNEE FULLY: MILD
BENDING TO THE FLOOR TO PICK UP OBJECT: MODERATE
GOING UP OR DOWN STAIRS: MODERATE
RISING FROM SITTING: MILD
STANDING UPRIGHT: MILD
HOW SEVERE IS YOUR KNEE STIFFNESS AFTER FIRST WAKING IN MORNING: MODERATE

## 2024-01-17 NOTE — LETTER
1/17/2024         RE: Mary Gorman  [address]  [place] [address]        Dear Colleague,    Thank you for referring your patient, Mary Gorman, to the [facility] [place]. Please see a copy of my visit note below.        [facility]  Orthopaedic Surgery Consultation by Armand Martin M.D.    Mary Gorman MRN# [id number]   Age: 56 year old YOB: 1965     Requesting physician: No ref. provider found  Nicole Castellanos     Background history:  DX:  Lumbago  OSAS  GERD  Morbid obesity  Hyperlipidemia  Prediabetes  Hypertension  Chronic kidney disease stage III  Atrial fibrillation on Pradaxa  Depressive disorder    TREATMENTS:  11/5/2007, laparoscopic cholecystectomy, Dr. Buckley  1/20/2022, left total knee arthroplasty with removal of hardware, Dr. Martin  12/01/2023, right total knee arthroplasty, Dr Martin, [facility]           History of Present Illness:     58-year-old female presents today 6 weeks status post right total knee arthroplasty.  Pain is well-controlled with Tylenol and ibuprofen.  She has completed her DVT prophylaxis.  She is making good progress of physical therapy.  She has been attending physical therapy (10 visits) and is working on a home exercise program everyday. She reports that she feels things are moving slower than she feels they should be, she is struggling to regain strength. Overall she thinks things are improving. Patient is still using a cane for ambulation 95% of time.  The incision is healing well.  No signs of infection.    Social:   Occupation: nurse  Living situation: alone   Hobbies / Sports: everything    Smoking: No  Alcohol: 1 x a year  Illicit drug use: No         Physical Exam:     EXAMINATION pertinent findings:   PSYCH: Pleasant, healthy-appearing, alert, oriented x3, cooperative. Normal mood and affect.  VITAL SIGNS: Last menstrual period 06/25/2019, not currently breastfeeding.   Reviewed nursing intake notes.   There is no height or weight on file to calculate BMI.  RESP: non labored breathing   ABD: benign, soft, non-tender, no acute peritoneal findings  SKIN: grossly normal   LYMPHATIC: grossly normal, no adenopathy, no extremity edema  NEURO: grossly normal , no motor deficits  VASCULAR: satisfactory perfusion of all extremities   MUSCULOSKELETAL:   Alignment: Neutral      R knee: The incision is clean, dry, and intact with no erythema, dehiscence, or discharge. -0-0  .  Some postsurgical effusion present. Ligamentously stable in both ML and AP direction.  Normal PF tracking.  Calves are soft nontender with negative Homans' sign    Bilateral LE:   Thigh and leg compartments soft and compressible   +Quad/TA/GSC/FHL/EHL   SILT DP/SP/Ziyad/Saph/Tib nerve distributions   Palpable dorsalis pedis pulse          Data:   All laboratory data reviewed  All imaging studies reviewed by me personally.    XR right knee on 1/17/2024:  My interpretation: Status post right total knee arthroplasty.  Adequate sizing, fixation and orientation of components.  No signs of  complications.           Assessment and Plan:   Assessment:  58-year-old female history of chronic right knee pain and instability due to end-stage osteoarthritic changes in all 3 compartments who was insufficiently responding to nonoperative treatment measures therefore underwent a right total knee arthroplasty on 12/1/2023.  Recovering appropriately     Plan:  I extensively discussed my findings with the patient.  Patient appears to be recovering appropriately.  Patient will continue to work with physical therapy on range of motion, strengthening and gait training.    All questions were answered.  Patient understands and agrees to the treatment plan as set forth.  We will follow-up with patient at the 1 year postoperative date with renewed radiographic imaging studies.      Armand Martin MD, PhD     Adult  Reconstruction  [facility] Department of Orthopaedic Surgery      This note was created using dictation software and may contain errors.  Please contact the creator for any clarifications that are needed.          Again, thank you for allowing me to participate in the care of your patient.        Sincerely,        Armand Martin MD

## 2024-01-17 NOTE — TELEPHONE ENCOUNTER
Reason for Call:  Form, our goal is to have forms completed with 7 days, however, some forms may require a visit or additional information.    Type of letter, form or note:   [employer] Form      Who is the form from?: Patient    Where did the form come from: Patient or family brought in   to appointment on 1/17/24    Phone number of person requesting form: patient- Mary- phone#: [phone]    Desired completion date of form: ASAP      How will form be returned?:  unknown- office left VM for patient to return call to discuss this further    Additional comments: s/p right total knee arthroplasty, 12/1/23, Dr. Martin    Form was started and place in accordion folder labeled forms for provider review/ completion at [facility].

## 2024-01-17 NOTE — TELEPHONE ENCOUNTER
Left voicemail for patient to return call. No details left.     If patient returns call, office needs to know if she is currently working or off of work. She discussed return to work without restriction starting 2/5/24 but we need clarification if she is currently working with restrictions or off of work completed until 2/5/24.     Also unsure if she would like this form faxed in, mailed to her, or left for  in [place].    Mechelle Delgado MSA, ATC  Certified Athletic Trainer

## 2024-01-18 ENCOUNTER — THERAPY VISIT (OUTPATIENT)
Dept: PHYSICAL THERAPY | Facility: CLINIC | Age: 59
End: 2024-01-18
Payer: COMMERCIAL

## 2024-01-18 DIAGNOSIS — Z96.651 S/P TOTAL KNEE ARTHROPLASTY, RIGHT: Primary | ICD-10-CM

## 2024-01-18 PROCEDURE — 97110 THERAPEUTIC EXERCISES: CPT | Mod: GP | Performed by: PHYSICAL THERAPIST

## 2024-01-18 PROCEDURE — 97112 NEUROMUSCULAR REEDUCATION: CPT | Mod: GP | Performed by: PHYSICAL THERAPIST

## 2024-01-19 ENCOUNTER — OFFICE VISIT (OUTPATIENT)
Dept: OTHER | Facility: CLINIC | Age: 59
End: 2024-01-19
Attending: INTERNAL MEDICINE
Payer: COMMERCIAL

## 2024-01-19 VITALS
OXYGEN SATURATION: 97 % | DIASTOLIC BLOOD PRESSURE: 63 MMHG | HEART RATE: 74 BPM | WEIGHT: 293 LBS | SYSTOLIC BLOOD PRESSURE: 137 MMHG | BODY MASS INDEX: 48.82 KG/M2 | HEIGHT: 65 IN

## 2024-01-19 DIAGNOSIS — E78.5 HYPERLIPIDEMIA LDL GOAL <70: ICD-10-CM

## 2024-01-19 DIAGNOSIS — G47.33 OSA (OBSTRUCTIVE SLEEP APNEA): ICD-10-CM

## 2024-01-19 DIAGNOSIS — E11.9 TYPE 2 DIABETES MELLITUS WITHOUT COMPLICATION, WITHOUT LONG-TERM CURRENT USE OF INSULIN (H): ICD-10-CM

## 2024-01-19 DIAGNOSIS — E66.01 MORBID OBESITY (H): ICD-10-CM

## 2024-01-19 DIAGNOSIS — I89.0 LYMPHEDEMA OF BOTH LOWER EXTREMITIES: Primary | ICD-10-CM

## 2024-01-19 DIAGNOSIS — I48.21 PERMANENT ATRIAL FIBRILLATION (H): ICD-10-CM

## 2024-01-19 DIAGNOSIS — I10 BENIGN ESSENTIAL HYPERTENSION: ICD-10-CM

## 2024-01-19 DIAGNOSIS — I87.2 STASIS DERMATITIS OF BOTH LEGS: ICD-10-CM

## 2024-01-19 PROCEDURE — 99214 OFFICE O/P EST MOD 30 MIN: CPT | Performed by: INTERNAL MEDICINE

## 2024-01-19 PROCEDURE — 99213 OFFICE O/P EST LOW 20 MIN: CPT | Performed by: INTERNAL MEDICINE

## 2024-01-19 RX ORDER — TRIAMCINOLONE ACETONIDE 1 MG/G
CREAM TOPICAL 2 TIMES DAILY
Qty: 80 G | Refills: 3 | Status: SHIPPED | OUTPATIENT
Start: 2024-01-19

## 2024-01-19 RX ORDER — ROSUVASTATIN CALCIUM 10 MG/1
10 TABLET, COATED ORAL DAILY
Qty: 90 TABLET | Refills: 3 | Status: SHIPPED | OUTPATIENT
Start: 2024-01-19

## 2024-01-19 NOTE — PROGRESS NOTES
"Patient is here to discuss follow up    /63 (BP Location: Right arm, Patient Position: Chair, Cuff Size: Adult Regular)   Pulse 74   Ht 5' 5\" (1.651 m)   Wt 319 lb 12.8 oz (145.1 kg)   LMP 06/25/2019 (Approximate)   SpO2 97%   BMI 53.22 kg/m      Questions patient would like addressed today are: N/A.    Refills are needed: No    Has homecare services and agency name:  [facility]"

## 2024-01-19 NOTE — PATIENT INSTRUCTIONS
Continue current medications , refilled crestor     Use triamcinolone cream twice a day to the leg area     Follow edema therapist recommendations     Follow up in 6 months

## 2024-01-19 NOTE — PROGRESS NOTES
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  She is also planning for right knee replacement surgery.  She is unable to do any type of exercise due to her DJD of the knees and weight.  She takes gabapentin for sleep regulation 600 mg at bedtime.  She currently follows up with cardiologist and recently they decrease the dose of diltiazem and eventually stopped and that did not change her leg swelling then restarted back.   Previous echo normal LV function.  She denies any chest pain, shortness of breath or palpitations  During last visit optimized medications initiated Crestor and stopped gabapentin  Arranged referral to see edema therapist  Appropriate dose of Eliquis was suggested  She followed through the treatment plan seen and evaluated by edema therapist improved symptoms but given overwhelming secondary lymphedema and venous insufficiency she was suggested to get pump therapy in the process of getting it  She stopped gabapentin  Taking Crestor  Lipids are  improved  Kidney Fx improved   Underwent successful right total knee arthroplasty       HISTORIES:  PROBLEM LIST:   Patient Active Problem List   Diagnosis    BALTA (obstructive sleep apnea)    Low back pain    Lumbar radiculitis    Hyperlipidemia LDL goal <100    Mild recurrent major depression (H24)    Hypertension goal BP (blood  pressure) < 140/90    ACP (advance care planning)    Permanent atrial fibrillation (H)    Lipoma of skin and subcutaneous tissue    Morbid obesity due to excess calories (H)    Prediabetes    Hip pain, right    Bilateral knee pain    Osteoarthrosis, localized, primary, knee, left    S/P total knee arthroplasty, left    Aftercare following left knee joint replacement surgery    Diverticular disease of colon    Diabetes mellitus, type 2 (H)    Chronic kidney disease, stage 3a (H)    S/P total knee arthroplasty, right    Abnormal gait     PAST MEDICAL HISTORY:  Past Medical History:   Diagnosis Date    Arthritis Nov 2019    Atrial fibrillation (H)     COPD (chronic obstructive pulmonary disease) (H)     colds turn to bronchitis easily    Depression     Depressive disorder     in chart    Diabetes (H)     GERD (gastroesophageal reflux disease)     HTN (hypertension)     Hyperlipidemia     Obese     BALTA (obstructive sleep apnea)     uses CPAP    Permanent atrial fibrillation (H) 06/05/2011    Uncomplicated asthma     mentioned by urgent care md     PAST SURGICAL HISTORY:  Past Surgical History:   Procedure Laterality Date    ABDOMEN SURGERY      in chart    ARTHROPLASTY KNEE Left 01/20/2022    Procedure: ARTHROPLASTY, LEFT KNEE, TOTAL;  Surgeon: Armand Martin MD;  Location: [facility]    ARTHROPLASTY KNEE Right 12/1/2023    Procedure: ARTHROPLASTY, RIGHT KNEE, TOTAL;  Surgeon: Armand Martin MD;  Location: [facility]    BIOPSY      skin and uterine lining    CARDIOVERSION  06/07/2011    CHOLECYSTECTOMY      COLONOSCOPY  2013    in chart    DILATION AND CURETTAGE  04/26/2012    Procedure:DILATION AND CURETTAGE; DILATION AND CURETTAGE; Surgeon:JEAN-PAUL DEL CID; Location:[facility]    DILATION AND CURETTAGE, HYSTEROSCOPY DIAGNOSTIC, COMBINED  12/08/2011    Procedure:COMBINED DILATION AND CURETTAGE, HYSTEROSCOPY DIAGNOSTIC; DILATION AND CURETTAGE, DIAGNOSTIC HYSTEROSCOPY ; Surgeon:JEAN-PAUL DEL CID; Location:[facility]    KNEE SURGERY      REMOVE  HARDWARE LOWER EXTREMITY Left 01/20/2022    Procedure: REMOVAL, HARDWARE, LEFT LOWER EXTREMITY;  Surgeon: Armand Martin MD;  Location: [facility]     CURRENT MEDICATIONS:  Current Outpatient Medications   Medication Sig Dispense Refill    acetaminophen (TYLENOL) 500 MG tablet Take 1,000 mg by mouth 3 times daily And 1000mg every day PRN      blood glucose (NO BRAND SPECIFIED) lancets standard Use to test blood sugar 2 times daily or as directed. 200 each 3    blood glucose (NO BRAND SPECIFIED) test strip Use to test blood sugar 2 times daily or as directed. 200 strip 3    blood glucose monitoring (NO BRAND SPECIFIED) meter device kit Use to test blood sugar 2 times daily or as directed. 1 kit 0    buPROPion (WELLBUTRIN XL) 150 MG 24 hr tablet Take 1 tablet (150 mg) by mouth every morning Take with 300 mg for total of 450 mg 90 tablet 1    buPROPion (WELLBUTRIN XL) 300 MG 24 hr tablet Take 1 tablet (300 mg) by mouth every morning Take with 150 mg for total 450 mg 90 tablet 1    cetirizine (ZYRTEC) 10 MG tablet Take 10 mg by mouth as needed for allergies      diltiazem ER (DILT-XR) 240 MG 24 hr ER beaded capsule Take 1 capsule (240 mg) by mouth daily 90 capsule 3    dulaglutide (TRULICITY) 0.75 MG/0.5ML pen Inject 0.75 mg Subcutaneous every 7 days 2 mL 3    ELIQUIS ANTICOAGULANT 5 MG tablet Take 1 tablet (5 mg) by mouth 2 times daily 180 tablet 3    fosinopril (MONOPRIL) 10 MG tablet Take 1 tablet (10 mg) by mouth daily 90 tablet 3    ibuprofen (ADVIL/MOTRIN) 600 MG tablet Take 1 tablet (600 mg) by mouth every 6 hours as needed for mild pain      metFORMIN (GLUCOPHAGE XR) 500 MG 24 hr tablet Take 1,000 mg by mouth daily (with dinner)      metoprolol succinate ER (TOPROL XL) 50 MG 24 hr tablet Take 1 tablet (50 mg) by mouth 2 times daily 180 tablet 3    oxyCODONE (ROXICODONE) 5 MG tablet Take 1-2 tablets (5-10 mg) by mouth every 4 hours as needed for moderate to severe pain 30 tablet 0    rosuvastatin (CRESTOR) 10 MG  tablet Take 1 tablet (10 mg) by mouth daily 90 tablet 3    senna-docusate (SENOKOT-S/PERICOLACE) 8.6-50 MG tablet Take 1 tablet by mouth 2 times daily      sertraline (ZOLOFT) 100 MG tablet Take 200 mg by mouth daily 2 tabs (200mg) daily       spironolactone (ALDACTONE) 25 MG tablet Take 1 tablet (25 mg) by mouth daily 90 tablet 3    tiZANidine (ZANAFLEX) 2 MG tablet Take 1-2 tablets (2-4 mg) by mouth nightly as needed for muscle spasms 180 tablet 1    traZODone (DESYREL) 100 MG tablet Take 1 tablet (100 mg) by mouth At Bedtime 90 tablet 1    triamcinolone (KENALOG) 0.1 % external cream Apply topically 2 times daily 80 g 3     ALLERGIES:  Allergies   Allergen Reactions    Dyazide [Hydrochlorothiazide W-Triamterene] Unknown    Vistaril [Hydroxyzine] Visual Disturbance    Naproxen Rash    Nickel Rash    Robaxin [Methocarbamol] Rash    Sulfa Antibiotics Rash     SOCIAL HISTORY:  Social History     Socioeconomic History    Marital status: Single     Spouse name: Not on file    Number of children: Not on file    Years of education: Not on file    Highest education level: Bachelor's degree (e.g., BA, AB, BS)   Occupational History    Not on file   Tobacco Use    Smoking status: Never     Passive exposure: Never    Smokeless tobacco: Never   Vaping Use    Vaping Use: Never used   Substance and Sexual Activity    Alcohol use: Yes     Comment: once in a while    Drug use: No    Sexual activity: Not Currently     Partners: Male     Birth control/protection: None   Other Topics Concern    Parent/sibling w/ CABG, MI or angioplasty before 65F 55M? Yes     Comment: father triple bypass     Service Not Asked    Blood Transfusions Not Asked    Caffeine Concern Not Asked    Occupational Exposure Not Asked    Hobby Hazards Not Asked    Sleep Concern Not Asked    Stress Concern Not Asked    Weight Concern Not Asked    Special Diet No    Back Care Not Asked    Exercise Yes     Comment: walks 2 miles minimum 5 x weekly     Bike  Helmet Not Asked    Seat Belt Not Asked    Self-Exams Not Asked   Social History Narrative    Not on file     Social Determinants of Health     Financial Resource Strain: Low Risk  (11/14/2023)    Financial Resource Strain     Within the past 12 months, have you or your family members you live with been unable to get utilities (heat, electricity) when it was really needed?: No   Food Insecurity: Low Risk  (11/14/2023)    Food Insecurity     Within the past 12 months, did you worry that your food would run out before you got money to buy more?: No     Within the past 12 months, did the food you bought just not last and you didn t have money to get more?: No   Transportation Needs: Low Risk  (11/14/2023)    Transportation Needs     Within the past 12 months, has lack of transportation kept you from medical appointments, getting your medicines, non-medical meetings or appointments, work, or from getting things that you need?: No   Physical Activity: Inactive (1/4/2022)    Exercise Vital Sign     Days of Exercise per Week: 0 days     Minutes of Exercise per Session: 0 min   Stress: Stress Concern Present (1/4/2022)    [employer] - Occupational Stress Questionnaire     Feeling of Stress : To some extent   Social Connections: Moderately Integrated (1/4/2022)    Social Connection and Isolation Panel [NHANES]     Frequency of Communication with Friends and Family: More than three times a week     Frequency of Social Gatherings with Friends and Family: Once a week     Attends Rastafari Services: More than 4 times per year     Active Member of Clubs or Organizations: Yes     Attends Club or Organization Meetings: Not on file     Marital Status: Never    Interpersonal Safety: Not on file   Housing Stability: Low Risk  (11/14/2023)    Housing Stability     Do you have housing? : Yes     Are you worried about losing your housing?: No     FAMILY HISTORY:  Family History   Problem Relation Age  "of Onset    Hypertension Mother     Heart Disease Mother         A-fib    Arthritis Mother     Hyperlipidemia Mother     Obesity Mother     Heart Disease Father         triple bypass    Cancer Father 50        bladder    Hypertension Father     Respiratory Father         smoker    Coronary Artery Disease Father         chf, pulm htn, by pass    Hyperlipidemia Father     Other Cancer Father         bladder    Cancer Paternal Grandmother 90        rectal    Colon Cancer Paternal Grandmother     Obesity Paternal Grandmother     Unknown/Adopted Brother     Obesity Brother      REVIEW OF SYSTEMS:  CONSTITUTIONAL:no malaise, fatigue, or other general symptoms  EYES: no subjective changes in visual acuity, no photophobia  ENT/MOUTH: no complaints of rhinorrhea, nasal congestion, sore throat, hearing changes  RESP:no SOB  CV: no c/o exertional chest pressure or STYLES  GI: No abdominal pain, constipation, change in bowel movements, nausea, pyrosis, BRBPR  :no polyuria or polydipsia, no dysuria, no gross hematuria  MUSCULOSKELATAL:no arthalgias or myalgias  INTEGUMENTARY/SKIN: no pruritis, rashes, or moles with recent change in size, shape, or pigmentation  NEURO: no gross sensory or motor symptoms, no dizziness, no confusion  ENDOCRINE: no polyuria or polydipsia, no heat or cold intolerance  HEME/ALLERGY/IMMUNE: no fevers, chills, night sweats, or unwanted weight loss  PSYCHIATRIC: no depression, anxiety, or internal stimuli  EXAM:  /63 (BP Location: Right arm, Patient Position: Chair, Cuff Size: Adult Regular)   Pulse 74   Ht 5' 5\" (1.651 m)   Wt 319 lb 12.8 oz (145.1 kg)   LMP 06/25/2019 (Approximate)   SpO2 97%   BMI 53.22 kg/m    BMI: Body mass index is 53.22 kg/m .  GENERAL APPEARANCE:  Pleasant  Healthy appearing male , alert, active, no distress cooperative.  EXAM:  EYES: clear conjunctiva, no cataracts, no obvious fundoscopic abnormalities  HENT: oropharynx, nares, and TMs are WNL  NECK: no JVD, " thyromegaly or lymphadenopathy, no cervical bruits  RESP: clear to auscultation without rales, wheezes, or rhonchi  CV: RRR, no murmurs, gallops, or rubs  LYMPH: no cervical , axillary, or inguinal lymphadenopathy appreciated  GI: NABS, ND/NT, no masses or organomegally appreciated  : normal external genitalia and anus, no lumps, masses  MS: no obvoius clinicallly relevant arthropathy, no evidence vasculitis  SKIN: Stasis dermatitis of both lower extremities  NEURO: CN II-XII intact, no localizing sensory or motor abnoramlities noted, DTRs symmetrical bilaterally  PSYCH: Mental status exam reveals the pt to have normal mood and affect. There is no disorder of thought form or content. There is no response to internal stimuli. There is no suicidal or homicidal ideation.    A/P;    (I89.0) Lymphedema of both lower extremities  (primary encounter diagnosis)  S/p Rt Knee arthroplasty   (E78.5) Hyperlipidemia LDL goal <70  Plan: rosuvastatin (CRESTOR) 10 MG tablet  (G47.33) BALTA (obstructive sleep apnea)  (I48.21) Permanent atrial fibrillation (H)  (E11.9) Type 2 diabetes mellitus without complication, without long-term current use of insulin (H)  (I10) Benign essential hypertension  (E66.01) Morbid obesity (H)  (I87.2) Stasis dermatitis of both legs  Comment:   Plan: triamcinolone (KENALOG) 0.1 % external cream    She is clinically doing well after the knee surgery but dealing with the discoloration of the legs  Reviewed recent hospitalization records  Refilled rosuvastatin  For stasis dermatitis prescription of Kenalog cream given  Continue Eliquis and rest of the medications  Utilize CPAP  Follow-up in 6 months     I have discussed with patient the risks, benefits, medications, treatment options and modalities.   I have instructed the patient to call or schedule a follow-up appointment if any problems or failure to improve.    Mekhi Quinn MD, YULIANA, FS, FNLA  Vascular Medicine  Clinical  Lipidologist  Clinical Hypertension specialist

## 2024-01-22 ENCOUNTER — THERAPY VISIT (OUTPATIENT)
Dept: PHYSICAL THERAPY | Facility: CLINIC | Age: 59
End: 2024-01-22
Payer: COMMERCIAL

## 2024-01-22 DIAGNOSIS — Z96.651 S/P TOTAL KNEE ARTHROPLASTY, RIGHT: Primary | ICD-10-CM

## 2024-01-22 PROCEDURE — 97110 THERAPEUTIC EXERCISES: CPT | Mod: GP | Performed by: PHYSICAL THERAPIST

## 2024-01-22 PROCEDURE — 97112 NEUROMUSCULAR REEDUCATION: CPT | Mod: GP | Performed by: PHYSICAL THERAPIST

## 2024-01-23 NOTE — TELEPHONE ENCOUNTER
There is not a consent to communicate on file.   Left 2nd voicemail on cell asking for a return call.  number was provided.     SOHEILA Walton RN

## 2024-01-25 ENCOUNTER — THERAPY VISIT (OUTPATIENT)
Dept: PHYSICAL THERAPY | Facility: CLINIC | Age: 59
End: 2024-01-25
Payer: COMMERCIAL

## 2024-01-25 DIAGNOSIS — Z96.651 S/P TOTAL KNEE ARTHROPLASTY, RIGHT: Primary | ICD-10-CM

## 2024-01-25 PROCEDURE — 97112 NEUROMUSCULAR REEDUCATION: CPT | Mod: GP | Performed by: PHYSICAL THERAPIST

## 2024-01-25 PROCEDURE — 97110 THERAPEUTIC EXERCISES: CPT | Mod: GP | Performed by: PHYSICAL THERAPIST

## 2024-01-29 ENCOUNTER — THERAPY VISIT (OUTPATIENT)
Dept: PHYSICAL THERAPY | Facility: CLINIC | Age: 59
End: 2024-01-29
Payer: COMMERCIAL

## 2024-01-29 DIAGNOSIS — Z96.651 S/P TOTAL KNEE ARTHROPLASTY, RIGHT: Primary | ICD-10-CM

## 2024-01-29 PROCEDURE — 97110 THERAPEUTIC EXERCISES: CPT | Mod: GP | Performed by: PHYSICAL THERAPIST

## 2024-01-29 PROCEDURE — 97112 NEUROMUSCULAR REEDUCATION: CPT | Mod: GP | Performed by: PHYSICAL THERAPIST

## 2024-01-31 ENCOUNTER — MYC MEDICAL ADVICE (OUTPATIENT)
Dept: ORTHOPEDICS | Facility: CLINIC | Age: 59
End: 2024-01-31
Payer: COMMERCIAL

## 2024-02-01 NOTE — TELEPHONE ENCOUNTER
Please see form encounter dated 1/17/24 for reference. Form is still in accordion folder labeled forms until information is given by patient.     Mechelle Delgado, ATC

## 2024-02-01 NOTE — TELEPHONE ENCOUNTER
Please see MyChart encounter dated 1/31/24.     Mechelle Delgado MSA, ATC  Certified Athletic Trainer

## 2024-02-06 ENCOUNTER — THERAPY VISIT (OUTPATIENT)
Dept: PHYSICAL THERAPY | Facility: CLINIC | Age: 59
End: 2024-02-06
Payer: COMMERCIAL

## 2024-02-06 ENCOUNTER — TELEPHONE (OUTPATIENT)
Dept: ORTHOPEDICS | Facility: CLINIC | Age: 59
End: 2024-02-06

## 2024-02-06 DIAGNOSIS — Z96.651 S/P TOTAL KNEE ARTHROPLASTY, RIGHT: ICD-10-CM

## 2024-02-06 DIAGNOSIS — M25.551 HIP PAIN, RIGHT: Primary | ICD-10-CM

## 2024-02-06 PROCEDURE — 97110 THERAPEUTIC EXERCISES: CPT | Mod: GP | Performed by: PHYSICAL THERAPIST

## 2024-02-06 PROCEDURE — 97530 THERAPEUTIC ACTIVITIES: CPT | Mod: GP | Performed by: PHYSICAL THERAPIST

## 2024-02-06 NOTE — TELEPHONE ENCOUNTER
Workability form faxed to [facility] [employer] at: [phone] and confirmed it went through via Right fax.     Left voicemail informing patient that from was faxed. Apologized for the delay.  Asked that she call back for further concerns.     Copy sent to camila.     SOHEILA Walton RN

## 2024-02-06 NOTE — TELEPHONE ENCOUNTER
[facility] Call Center    Phone Message    May a detailed message be left on voicemail: yes     Reason for Call: Other: Patient following up on return to work paperwork would like to speak to a member of the care team so she can return to work      Action Taken: Other: Veronica Villeda    Travel Screening: Not Applicable

## 2024-02-19 ENCOUNTER — VIRTUAL VISIT (OUTPATIENT)
Dept: FAMILY MEDICINE | Facility: CLINIC | Age: 59
End: 2024-02-19
Payer: COMMERCIAL

## 2024-02-19 DIAGNOSIS — J06.9 UPPER RESPIRATORY TRACT INFECTION, UNSPECIFIED TYPE: Primary | ICD-10-CM

## 2024-02-19 DIAGNOSIS — J32.9 SINUSITIS, UNSPECIFIED CHRONICITY, UNSPECIFIED LOCATION: ICD-10-CM

## 2024-02-19 PROCEDURE — 99213 OFFICE O/P EST LOW 20 MIN: CPT | Mod: 95 | Performed by: FAMILY MEDICINE

## 2024-02-19 ASSESSMENT — PATIENT HEALTH QUESTIONNAIRE - PHQ9
SUM OF ALL RESPONSES TO PHQ QUESTIONS 1-9: 8
SUM OF ALL RESPONSES TO PHQ QUESTIONS 1-9: 8
10. IF YOU CHECKED OFF ANY PROBLEMS, HOW DIFFICULT HAVE THESE PROBLEMS MADE IT FOR YOU TO DO YOUR WORK, TAKE CARE OF THINGS AT HOME, OR GET ALONG WITH OTHER PEOPLE: VERY DIFFICULT

## 2024-02-19 ASSESSMENT — ENCOUNTER SYMPTOMS
COUGH: 1
FEVER: 1

## 2024-02-19 NOTE — PROGRESS NOTES
"Mary is a 58 year old who is being evaluated via a billable video visit.      How would you like to obtain your AVS? MyChart  If the video visit is dropped, the invitation should be resent by: Text to cell phone: [phone]  Will anyone else be joining your video visit? No          Assessment & Plan     (J06.9) Upper respiratory tract infection, unspecified type  (primary encounter diagnosis)  Comment: Plan: amoxicillin-clavulanate (AUGMENTIN) 875-125 MG         tablet            (J32.9) Sinusitis, unspecified chronicity, unspecified location  Comment:   Plan: amoxicillin-clavulanate (AUGMENTIN) 875-125 MG         tablet                 URI lingering onto sinusitis. Treat with Augmentin. Cares and symptomatic treatment discussed  . Follow up if problem.     Script  sent.Cares and  treatment discussed. follow up if problem   Patient expressed understanding and agreement with treatment plan. All patient's questions were answered, will let me know if has more later.  Medications: Rx's: Reviewed the potential side effects/complications of medications prescribed.           BMI  Estimated body mass index is 53.22 kg/m  as calculated from the following:    Height as of 1/19/24: 1.651 m (5' 5\").    Weight as of 1/19/24: 145.1 kg (319 lb 12.8 oz).             Subjective   Mary is a 58 year old, presenting for the following health issues:  Sinus Problem (Probable sinus infection with fever, COVID test negative 2/18 at home), Fever, and Cough (blowing yellow and green phlegm, harsh cough)        11/15/2023     2:18 PM   Additional Questions   Roomed by bobby     Sinus Problem   Associated symptoms include cough.   Fever  Associated symptoms include coughing and a fever.   Cough    History of Present Illness       Reason for visit:  Sinus infection,  Symptom onset:  1-3 days ago  Symptoms include:  Congestion and runny nose with yellow/green as well as cough yellow/green. Fever up to 101 since friday  Symptom intensity:  " Severe  Symptom progression:  Staying the same  Had these symptoms before:  No  What makes it worse:  Don't know  What makes it better:  Don't know    She eats 2-3 servings of fruits and vegetables daily.She consumes 1 sweetened beverage(s) daily.She exercises with enough effort to increase her heart rate 9 or less minutes per day.  She exercises with enough effort to increase her heart rate 3 or less days per week.   She is taking medications regularly.   Probable sinus infection with fever, coughing and blowing yellow and green, harsh cough hard to bring up phlegm ,   COVID test negative 2/18 at home         Review of Systems  Constitutional, HEENT, cardiovascular, pulmonary, GI, , musculoskeletal, neuro, skin, endocrine and psych systems are negative, except as otherwise noted.      Objective           Vitals:  No vitals were obtained today due to virtual visit.    Physical Exam   GENERAL: alert and no distress  EYES: Eyes grossly normal to inspection.  No discharge or erythema, or obvious scleral/conjunctival abnormalities.  RESP: No audible wheeze, cough, or visible cyanosis.    SKIN: Visible skin clear. No significant rash, abnormal pigmentation or lesions.  NEURO: Cranial nerves grossly intact.  Mentation and speech appropriate for age.  PSYCH: Appropriate affect, tone, and pace of words          Video-Visit Details    Type of service:  Video Visit     Originating Location (pt. Location): Home    Distant Location (provider location):  Off-site  Platform used for Video Visit: Sarah  Signed Electronically by: María Gayle MD

## 2024-02-21 ENCOUNTER — VIRTUAL VISIT (OUTPATIENT)
Dept: FAMILY MEDICINE | Facility: CLINIC | Age: 59
End: 2024-02-21
Payer: COMMERCIAL

## 2024-02-21 DIAGNOSIS — M62.838 MUSCLE SPASM: ICD-10-CM

## 2024-02-21 DIAGNOSIS — I10 HYPERTENSION GOAL BP (BLOOD PRESSURE) < 140/90: ICD-10-CM

## 2024-02-21 DIAGNOSIS — N18.31 TYPE 2 DIABETES MELLITUS WITH STAGE 3A CHRONIC KIDNEY DISEASE, WITHOUT LONG-TERM CURRENT USE OF INSULIN (H): ICD-10-CM

## 2024-02-21 DIAGNOSIS — J32.9 SINUSITIS, UNSPECIFIED CHRONICITY, UNSPECIFIED LOCATION: Primary | ICD-10-CM

## 2024-02-21 DIAGNOSIS — Z96.651 S/P TOTAL KNEE ARTHROPLASTY, RIGHT: ICD-10-CM

## 2024-02-21 DIAGNOSIS — E11.22 TYPE 2 DIABETES MELLITUS WITH STAGE 3A CHRONIC KIDNEY DISEASE, WITHOUT LONG-TERM CURRENT USE OF INSULIN (H): ICD-10-CM

## 2024-02-21 DIAGNOSIS — G47.9 DIFFICULTY SLEEPING: ICD-10-CM

## 2024-02-21 PROCEDURE — 99214 OFFICE O/P EST MOD 30 MIN: CPT | Mod: 95 | Performed by: PHYSICIAN ASSISTANT

## 2024-02-21 RX ORDER — TRAZODONE HYDROCHLORIDE 100 MG/1
100 TABLET ORAL AT BEDTIME
Qty: 90 TABLET | Refills: 3 | Status: SHIPPED | OUTPATIENT
Start: 2024-02-21

## 2024-02-21 RX ORDER — FLUTICASONE PROPIONATE 50 MCG
1 SPRAY, SUSPENSION (ML) NASAL AT BEDTIME
Qty: 16 ML | Refills: 0 | Status: SHIPPED | OUTPATIENT
Start: 2024-02-21

## 2024-02-21 RX ORDER — TIZANIDINE 2 MG/1
2-4 TABLET ORAL
Qty: 180 TABLET | Refills: 1 | Status: SHIPPED | OUTPATIENT
Start: 2024-02-21

## 2024-02-21 RX ORDER — FOSINOPRIL SODIUM 10 MG/1
10 TABLET ORAL DAILY
Qty: 90 TABLET | Refills: 3 | Status: SHIPPED | OUTPATIENT
Start: 2024-02-21

## 2024-02-21 ASSESSMENT — ENCOUNTER SYMPTOMS: COUGH: 1

## 2024-02-21 NOTE — PROGRESS NOTES
Mary is a 58 year old who is being evaluated via a billable video visit.      How would you like to obtain your AVS? MyChart  If the video visit is dropped, the invitation should be resent by: Text to cell phone: [phone]  Will anyone else be joining your video visit? No    Assessment & Plan     Sinusitis, unspecified chronicity, unspecified location  Slow improvement since starting augmentin  Continue/finish augmentin  Stop afrin  Add flonase at bedtime  Add saline nasal spray or rinses throughout the day as needed  Mucinex DM for congestion and cough   - add fluticasone (FLONASE) 50 MCG/ACT nasal spray  Dispense: 16 mL; Refill: 0    Difficulty sleeping  - refill traZODone (DESYREL) 100 MG tablet  Dispense: 90 tablet; Refill: 3    Hypertension goal BP (blood pressure) < 140/90  - refill fosinopril (MONOPRIL) 10 MG tablet  Dispense: 90 tablet; Refill: 3    Muscle spasm  - refill tiZANidine (ZANAFLEX) 2 MG tablet  Dispense: 180 tablet; Refill: 1    Type 2 diabetes mellitus with stage 3a chronic kidney disease, without long-term current use of insulin (H)  Excellent control. Plan for follow up in April/May.     S/P total knee arthroplasty, right  Recovering well.     Follow up April/May on diabetes, kidneys, etc     Nica Workman PA-C on 2/21/2024 at 5:12 PM      Subjective   Mary is a 58 year old, presenting for the following health issues:  Cold Symptoms (fever), Covid 19 Testing (NEGATIVE 2/18), Cough (W/ MUCUS), and Nasal Congestion        2/21/2024     3:59 PM   Additional Questions   Roomed by Billy   Accompanied by Not applicable, by themselves     History of Present Illness       Reason for visit:  Sinus infection,  Symptom onset:  1-3 days ago  Symptoms include:  Congestion and runny nose with yellow/green as well as cough yellow/green. Fever up to 101 since friday  Symptom intensity:  Severe  Symptom progression:  Staying the same  Had these symptoms before:  No  What makes it worse:  Don't  know  What makes it better:  Don't know    She eats 2-3 servings of fruits and vegetables daily.She consumes 1 sweetened beverage(s) daily.She exercises with enough effort to increase her heart rate 9 or less minutes per day.  She exercises with enough effort to increase her heart rate 3 or less days per week.   She is taking medications regularly.    Sick since last Wednesday  Saw Dr. Gayle 2/19 virtually   Given prescription for augmentin for sinus infection  Feeling some improvement but very slow   Still blowing nose and coughing - yellow and green phlegm   No fever today which is an improvement - otherwise has had daily fever for a week   Using afrin nasal spray at bedtime     S/P right TKA 12/1/23  Went to rehab after then stayed with mom, now home  Doing really well     Diabetes  Last A1c 5.9 in 11/2023      Review of Systems  Constitutional, HEENT, cardiovascular, pulmonary, gi and gu systems are negative, except as otherwise noted.      Objective         Vitals:  No vitals were obtained today due to virtual visit.    Physical Exam   GENERAL: alert and no distress. Sounds congested.   EYES: Eyes grossly normal to inspection.  No discharge or erythema, or obvious scleral/conjunctival abnormalities.  RESP: No audible wheeze, cough, or visible cyanosis.    SKIN: Visible skin clear. No significant rash, abnormal pigmentation or lesions.  NEURO: Cranial nerves grossly intact.  Mentation and speech appropriate for age.  PSYCH: Appropriate affect, tone, and pace of words        Video-Visit Details    Type of service:  Video Visit   Video start time: 5:22 PM   Video end time: 5:42 PM      Originating Location (pt. Location): Home  Distant Location (provider location):  On-site  Platform used for Video Visit: Sarah    Signed Electronically by: Nica Workman PA-C on 2/21/2024 at 5:13 PM

## 2024-03-24 ENCOUNTER — HEALTH MAINTENANCE LETTER (OUTPATIENT)
Age: 59
End: 2024-03-24

## 2024-04-01 ENCOUNTER — TRANSFERRED RECORDS (OUTPATIENT)
Dept: MULTI SPECIALTY CLINIC | Facility: CLINIC | Age: 59
End: 2024-04-01

## 2024-04-01 LAB — RETINOPATHY: NORMAL

## 2024-04-06 ENCOUNTER — MYC MEDICAL ADVICE (OUTPATIENT)
Dept: ORTHOPEDICS | Facility: CLINIC | Age: 59
End: 2024-04-06
Payer: COMMERCIAL

## 2024-04-15 PROBLEM — Z96.651 S/P TOTAL KNEE ARTHROPLASTY, RIGHT: Status: RESOLVED | Noted: 2023-12-01 | Resolved: 2024-04-15

## 2024-04-15 NOTE — PROGRESS NOTES
"    DISCHARGE  Reason for Discharge: Pt has completed 12 visits. Pt cancelled last two scheduled appt's; no follow-ups additionally scheduled.  Objective status at last visit:   Knee flexion: 110 degrees; knee extension: 0 degrees.  Step-up forwards: able to perform to 5\" height with control; able to perform step-down forwards from 4\" height.   Stairs: continuing to perform with step-to gait ascending/descending.      Discharge Plan: Patient to continue home program.   02/06/24 0500   Appointment Info   Signing clinician's name / credentials Kyleigh Montenegro PT   Total/Authorized Visits revised visit number; up to 18   Visits Used 12   Medical Diagnosis s/p R TKA   PT Tx Diagnosis s/p R TKA; knee pain; abnormal gait   Progress Note/Certification   Onset of illness/injury or Date of Surgery 12/01/23   Therapy Frequency 1x/week   Predicted Duration 4 weeks; tapering to every other week for 4 weeks   PT Goal 1   Goal Identifier Ambulation   Goal Description Ambulate with SEC without gait deviation.   Rationale to maximize safety and independence with performance of ADLs and functional tasks;to maximize safety and independence within the home;to maximize safety and independence within the community;to maximize safety and independence with self cares   Goal Progress progressing;   Target Date 02/13/24   Subjective Report   Subjective Report Paperwork has not been signed off since my surgery and so has not technically returned to work. Reports she is walking without her cane in her townhome. Continues to utilize stairs with a step-to gait ascending/descending.   Objective Measures   Objective Measures Objective Measure 1;Objective Measure 2;Objective Measure 3;Objective Measure 4   Objective Measure 1   Objective Measure Knee ROM   Objective Measure 2   Objective Measure Step-up forwards/step-down forwards   Details Step-up forwards-4 and 5\" heights. Increased UE usage with 5\" height. Step-down forwards performed with 3 " "and 4\" heights.   Objective Measure 3   Objective Measure SLS/Tandem/Semi-tandem stance   Objective Measure 4   Objective Measure Squat   Details Reviewed modified squat to the wall. Trial of modified SL squat-to the wall.   Treatment Interventions (PT)   Interventions Therapeutic Procedure/Exercise;Therapeutic Activity;Neuromuscular Re-education   Therapeutic Procedure/Exercise   Therapeutic Procedures: strength, endurance, ROM, flexibility minutes (90133) 30   Therapeutic Procedures Ther Proc 2;Ther Proc 3;Ther Proc 4;Ther Proc 5;Ther Proc 6;Ther Proc 7   Ther Proc 1 Nu-step   Ther Proc 1 - Details seat 8, 10 minutes, load 4   Ther Proc 2 HEP (passive knee extension)   Ther Proc 2 - Details 0 degrees   Ther Proc 3 Knee flexion  (not today)   Ther Proc 3 - Details 110 degrees-heelslide w/PT assist.  (re-asssess next visit)   Ther Proc 4 Active hip/knee flexion  (not today)   Ther Proc 4 - Details x10 to 10 step height   Ther Proc 5 toe raises - option in sitting and standing  (not today)   Ther Proc 5 - Details 5-10x work up to 20   Ther Proc 6 ball vs. wall  to work on knee ext  (not today)   Ther Proc 6 - Details 5x5\"   Ther Proc 7 Step-up/step-down   Ther Proc 7 - Details Step-up forwards to 4, 5 and 6\" heights. Use of railing and cane at all heights. Step-down forwards from 3 and 4\" heights with use of cane/counter for support.   Skilled Intervention Instructed in ROM ex's to assist in return normal gait pattern.   Therapeutic Activity   Therapeutic Activities: dynamic activities to improve functional performance minutes (83766) 10   Ther Act 1 Modified squat   Ther Act 1 - Details Reviewed performing partial squat to wall. Completed 10 reps with fatigue noted by last several reps. Trial of modified SL squat-to the wall. Required use of chair for support.   Skilled Intervention Instructed in functional strengthening ex's to assist with transfers-sit to stand, stairs, etc.   Neuromuscular Re-education " "  Neuromuscular Re-education Neuro Re-ed 2   Neuro Re-ed 1 Modified tandem stance   Neuro Re-ed 1 - Details Progressed to tandem stance. Goal 20-30 seconds.   Neuro Re-ed 2 \"High step\" walking forwards; backwards walking without UE support.   Neuro Re-ed 2 - Details Movement in all directions without UE support to surface. Hesitancy with backwards walking but able to perform without UE support.   Skilled Intervention Instructed in balance ex's to assist with stability during gait.   Total Session Time   Timed Code Treatment Minutes 40   Total Treatment Time (sum of timed and untimed services) 40         Referring Provider:  Armand Martin    "

## 2024-04-30 DIAGNOSIS — N18.31 TYPE 2 DIABETES MELLITUS WITH STAGE 3A CHRONIC KIDNEY DISEASE, WITHOUT LONG-TERM CURRENT USE OF INSULIN (H): ICD-10-CM

## 2024-04-30 DIAGNOSIS — E11.22 TYPE 2 DIABETES MELLITUS WITH STAGE 3A CHRONIC KIDNEY DISEASE, WITHOUT LONG-TERM CURRENT USE OF INSULIN (H): ICD-10-CM

## 2024-05-03 ENCOUNTER — OFFICE VISIT (OUTPATIENT)
Dept: FAMILY MEDICINE | Facility: CLINIC | Age: 59
End: 2024-05-03
Attending: PHYSICIAN ASSISTANT
Payer: COMMERCIAL

## 2024-05-03 VITALS
OXYGEN SATURATION: 97 % | RESPIRATION RATE: 20 BRPM | TEMPERATURE: 97.4 F | WEIGHT: 293 LBS | HEART RATE: 81 BPM | SYSTOLIC BLOOD PRESSURE: 132 MMHG | BODY MASS INDEX: 48.82 KG/M2 | HEIGHT: 65 IN | DIASTOLIC BLOOD PRESSURE: 80 MMHG

## 2024-05-03 DIAGNOSIS — N18.31 TYPE 2 DIABETES MELLITUS WITH STAGE 3A CHRONIC KIDNEY DISEASE, WITHOUT LONG-TERM CURRENT USE OF INSULIN (H): ICD-10-CM

## 2024-05-03 DIAGNOSIS — Z12.11 SCREEN FOR COLON CANCER: ICD-10-CM

## 2024-05-03 DIAGNOSIS — N18.31 CHRONIC KIDNEY DISEASE, STAGE 3A (H): ICD-10-CM

## 2024-05-03 DIAGNOSIS — Z12.31 VISIT FOR SCREENING MAMMOGRAM: ICD-10-CM

## 2024-05-03 DIAGNOSIS — E78.5 HYPERLIPIDEMIA LDL GOAL <100: ICD-10-CM

## 2024-05-03 DIAGNOSIS — E11.22 TYPE 2 DIABETES MELLITUS WITH STAGE 3A CHRONIC KIDNEY DISEASE, WITHOUT LONG-TERM CURRENT USE OF INSULIN (H): ICD-10-CM

## 2024-05-03 DIAGNOSIS — Z00.00 ROUTINE GENERAL MEDICAL EXAMINATION AT A HEALTH CARE FACILITY: Primary | ICD-10-CM

## 2024-05-03 DIAGNOSIS — I10 HYPERTENSION GOAL BP (BLOOD PRESSURE) < 140/90: ICD-10-CM

## 2024-05-03 LAB
ERYTHROCYTE [DISTWIDTH] IN BLOOD BY AUTOMATED COUNT: 14.3 % (ref 10–15)
HBA1C MFR BLD: 5.6 % (ref 0–5.6)
HCT VFR BLD AUTO: 40.3 % (ref 35–47)
HGB BLD-MCNC: 13.2 G/DL (ref 11.7–15.7)
MCH RBC QN AUTO: 27.7 PG (ref 26.5–33)
MCHC RBC AUTO-ENTMCNC: 32.8 G/DL (ref 31.5–36.5)
MCV RBC AUTO: 85 FL (ref 78–100)
PLATELET # BLD AUTO: 246 10E3/UL (ref 150–450)
RBC # BLD AUTO: 4.77 10E6/UL (ref 3.8–5.2)
WBC # BLD AUTO: 9.9 10E3/UL (ref 4–11)

## 2024-05-03 PROCEDURE — 80053 COMPREHEN METABOLIC PANEL: CPT | Performed by: PHYSICIAN ASSISTANT

## 2024-05-03 PROCEDURE — 99396 PREV VISIT EST AGE 40-64: CPT | Performed by: PHYSICIAN ASSISTANT

## 2024-05-03 PROCEDURE — 82570 ASSAY OF URINE CREATININE: CPT | Performed by: PHYSICIAN ASSISTANT

## 2024-05-03 PROCEDURE — 85027 COMPLETE CBC AUTOMATED: CPT | Performed by: PHYSICIAN ASSISTANT

## 2024-05-03 PROCEDURE — 80061 LIPID PANEL: CPT | Performed by: PHYSICIAN ASSISTANT

## 2024-05-03 PROCEDURE — 83036 HEMOGLOBIN GLYCOSYLATED A1C: CPT | Performed by: PHYSICIAN ASSISTANT

## 2024-05-03 PROCEDURE — 36415 COLL VENOUS BLD VENIPUNCTURE: CPT | Performed by: PHYSICIAN ASSISTANT

## 2024-05-03 PROCEDURE — 99214 OFFICE O/P EST MOD 30 MIN: CPT | Mod: 25 | Performed by: PHYSICIAN ASSISTANT

## 2024-05-03 PROCEDURE — 82043 UR ALBUMIN QUANTITATIVE: CPT | Performed by: PHYSICIAN ASSISTANT

## 2024-05-03 RX ORDER — METFORMIN HCL 500 MG
500 TABLET, EXTENDED RELEASE 24 HR ORAL 2 TIMES DAILY WITH MEALS
Qty: 180 TABLET | Refills: 3 | Status: SHIPPED | OUTPATIENT
Start: 2024-05-03 | End: 2024-05-27

## 2024-05-03 SDOH — HEALTH STABILITY: PHYSICAL HEALTH: ON AVERAGE, HOW MANY DAYS PER WEEK DO YOU ENGAGE IN MODERATE TO STRENUOUS EXERCISE (LIKE A BRISK WALK)?: 0 DAYS

## 2024-05-03 ASSESSMENT — PAIN SCALES - GENERAL: PAINLEVEL: NO PAIN (0)

## 2024-05-03 ASSESSMENT — PATIENT HEALTH QUESTIONNAIRE - PHQ9
SUM OF ALL RESPONSES TO PHQ QUESTIONS 1-9: 9
SUM OF ALL RESPONSES TO PHQ QUESTIONS 1-9: 9
10. IF YOU CHECKED OFF ANY PROBLEMS, HOW DIFFICULT HAVE THESE PROBLEMS MADE IT FOR YOU TO DO YOUR WORK, TAKE CARE OF THINGS AT HOME, OR GET ALONG WITH OTHER PEOPLE: SOMEWHAT DIFFICULT

## 2024-05-03 ASSESSMENT — SOCIAL DETERMINANTS OF HEALTH (SDOH): HOW OFTEN DO YOU GET TOGETHER WITH FRIENDS OR RELATIVES?: TWICE A WEEK

## 2024-05-03 NOTE — PATIENT INSTRUCTIONS
Preventive Care Advice   This is general advice given by our system to help you stay healthy. However, your care team may have specific advice just for you. Please talk to your care team about your preventive care needs.  Nutrition  Eat 5 or more servings of fruits and vegetables each day.  Try wheat bread, brown rice and whole grain pasta (instead of white bread, rice, and pasta).  Get enough calcium and vitamin D. Check the label on foods and aim for 100% of the RDA (recommended daily allowance).  Lifestyle  Exercise at least 150 minutes each week   (30 minutes a day, 5 days a week).  Do muscle strengthening activities 2 days a week. These help control your weight and prevent disease.  No smoking.  Wear sunscreen to prevent skin cancer.  Have a dental exam and cleaning every 6 months.  Yearly exams  See your health care team every year to talk about:  Any changes in your health.  Any medicines your care team has prescribed.  Preventive care, family planning, and ways to prevent chronic diseases.  Shots (vaccines)   HPV shots (up to age 26), if you've never had them before.  Hepatitis B shots (up to age 59), if you've never had them before.  COVID-19 shot: Get this shot when it's due.  Flu shot: Get a flu shot every year.  Tetanus shot: Get a tetanus shot every 10 years.  Pneumococcal, hepatitis A, and RSV shots: Ask your care team if you need these based on your risk.  Shingles shot (for age 50 and up).  General health tests  Diabetes screening:  Starting at age 35, Get screened for diabetes at least every 3 years.  If you are younger than age 35, ask your care team if you should be screened for diabetes.  Cholesterol test: At age 39, start having a cholesterol test every 5 years, or more often if advised.  Bone density scan (DEXA): At age 50, ask your care team if you should have this scan for osteoporosis (brittle bones).  Hepatitis C: Get tested at least once in your life.  STIs (sexually transmitted  infections)  Before age 24: Ask your care team if you should be screened for STIs.  After age 24: Get screened for STIs if you're at risk. You are at risk for STIs (including HIV) if:  You are sexually active with more than one person.  You don't use condoms every time.  You or a partner was diagnosed with a sexually transmitted infection.  If you are at risk for HIV, ask about PrEP medicine to prevent HIV.  Get tested for HIV at least once in your life, whether you are at risk for HIV or not.  Cancer screening tests  Cervical cancer screening: If you have a cervix, begin getting regular cervical cancer screening tests at age 21. Most people who have regular screenings with normal results can stop after age 65. Talk about this with your provider.  Breast cancer scan (mammogram): If you've ever had breasts, begin having regular mammograms starting at age 40. This is a scan to check for breast cancer.  Colon cancer screening: It is important to start screening for colon cancer at age 45.  Have a colonoscopy test every 10 years (or more often if you're at risk) Or, ask your provider about stool tests like a FIT test every year or Cologuard test every 3 years.  To learn more about your testing options, visit: https://[email or web address]/128296.pdf.  For help making a decision, visit: https://bit.ly/cu05014.  Prostate cancer screening test: If you have a prostate and are age 55 to 69, ask your provider if you would benefit from a yearly prostate cancer screening test.  Lung cancer screening: If you are a current or former smoker age 50 to 80, ask your care team if ongoing lung cancer screenings are right for you.  For informational purposes only. Not to replace the advice of your health care provider. Copyright   2023 [employer]. All rights reserved. Clinically reviewed by the [facility] Transitions Program. Glider 476190 - REV 01/24.    Preventing Falls: Care Instructions  Injuries and health  problems such as trouble walking or poor eyesight can increase your risk of falling. So can some medicines. But there are things you can do to help prevent falls. You can exercise to get stronger. You can also arrange your home to make it safer.    Talk to your doctor about the medicines you take. Ask if any of them increase the risk of falls and whether they can be changed or stopped.   Try to exercise regularly. It can help improve your strength and balance. This can help lower your risk of falling.     Practice fall safety and prevention.    Wear low-heeled shoes that fit well and give your feet good support. Talk to your doctor if you have foot problems that make this hard.  Carry a cellphone or wear a medical alert device that you can use to call for help.  Use stepladders instead of chairs to reach high objects. Don't climb if you're at risk for falls. Ask for help, if needed.  Wear the correct eyeglasses, if you need them.    Make your home safer.    Remove rugs, cords, clutter, and furniture from walkways.  Keep your house well lit. Use night-lights in hallways and bathrooms.  Install and use sturdy handrails on stairways.  Wear nonskid footwear, even inside. Don't walk barefoot or in socks without shoes.    Be safe outside.    Use handrails, curb cuts, and ramps whenever possible.  Keep your hands free by using a shoulder bag or backpack.  Try to walk in well-lit areas. Watch out for uneven ground, changes in pavement, and debris.  Be careful in the winter. Walk on the grass or gravel when sidewalks are slippery. Use de-icer on steps and walkways. Add non-slip devices to shoes.    Put grab bars and nonskid mats in your shower or tub and near the toilet. Try to use a shower chair or bath bench when bathing.   Get into a tub or shower by putting in your weaker leg first. Get out with your strong side first. Have a phone or medical alert device in the bathroom with you.   Where can you learn more?  Go to  "https://www.[employer].net/patiented  Enter G117 in the search box to learn more about \"Preventing Falls: Care Instructions.\"  Current as of: July 17, 2023               Content Version: 14.0    6123-7298 [employer].   Care instructions adapted under license by your healthcare professional. If you have questions about a medical condition or this instruction, always ask your healthcare professional. [employer] disclaims any warranty or liability for your use of this information.      Learning About Stress  What is stress?     Stress is your body's response to a hard situation. Your body can have a physical, emotional, or mental response. Stress is a fact of life for most people, and it affects everyone differently. What causes stress for you may not be stressful for someone else.  A lot of things can cause stress. You may feel stress when you go on a job interview, take a test, or run a race. This kind of short-term stress is normal and even useful. It can help you if you need to work hard or react quickly. For example, stress can help you finish an important job on time.  Long-term stress is caused by ongoing stressful situations or events. Examples of long-term stress include long-term health problems, ongoing problems at work, or conflicts in your family. Long-term stress can harm your health.  How does stress affect your health?  When you are stressed, your body responds as though you are in danger. It makes hormones that speed up your heart, make you breathe faster, and give you a burst of energy. This is called the fight-or-flight stress response. If the stress is over quickly, your body goes back to normal and no harm is done.  But if stress happens too often or lasts too long, it can have bad effects. Long-term stress can make you more likely to get sick, and it can make symptoms of some diseases worse. If you tense up when you are stressed, you may develop neck, shoulder, or low " back pain. Stress is linked to high blood pressure and heart disease.  Stress also harms your emotional health. It can make you anaya, tense, or depressed. Your relationships may suffer, and you may not do well at work or school.  What can you do to manage stress?  You can try these things to help manage stress:   Do something active. Exercise or activity can help reduce stress. Walking is a great way to get started. Even everyday activities such as housecleaning or yard work can help.  Try yoga or guerita chi. These techniques combine exercise and meditation. You may need some training at first to learn them.  Do something you enjoy. For example, listen to music or go to a movie. Practice your hobby or do volunteer work.  Meditate. This can help you relax, because you are not worrying about what happened before or what may happen in the future.  Do guided imagery. Imagine yourself in any setting that helps you feel calm. You can use online videos, books, or a teacher to guide you.  Do breathing exercises. For example:  From a standing position, bend forward from the waist with your knees slightly bent. Let your arms dangle close to the floor.  Breathe in slowly and deeply as you return to a standing position. Roll up slowly and lift your head last.  Hold your breath for just a few seconds in the standing position.  Breathe out slowly and bend forward from the waist.  Let your feelings out. Talk, laugh, cry, and express anger when you need to. Talking with supportive friends or family, a counselor, or a frankie leader about your feelings is a healthy way to relieve stress. Avoid discussing your feelings with people who make you feel worse.  Write. It may help to write about things that are bothering you. This helps you find out how much stress you feel and what is causing it. When you know this, you can find better ways to cope.  What can you do to prevent stress?  You might try some of these things to help prevent  "stress:  Manage your time. This helps you find time to do the things you want and need to do.  Get enough sleep. Your body recovers from the stresses of the day while you are sleeping.  Get support. Your family, friends, and community can make a difference in how you experience stress.  Limit your news feed. Avoid or limit time on social media or news that may make you feel stressed.  Do something active. Exercise or activity can help reduce stress. Walking is a great way to get started.  Where can you learn more?  Go to https://www.[employer].net/patiented  Enter N032 in the search box to learn more about \"Learning About Stress.\"  Current as of: October 24, 2023               Content Version: 14.0    [id number] [employer].   Care instructions adapted under license by your healthcare professional. If you have questions about a medical condition or this instruction, always ask your healthcare professional. [employer] disclaims any warranty or liability for your use of this information.      Learning About Depression Screening  What is depression screening?  Depression screening is a way to see if you have depression symptoms. It may be done by a doctor or counselor. It's often part of a routine checkup. That's because your mental health is just as important as your physical health.  Depression is a mental health condition that affects how you feel, think, and act. You may:  Have less energy.  Lose interest in your daily activities.  Feel sad and grouchy for a long time.  Depression is very common. It affects people of all ages.  Many things can lead to depression. Some people become depressed after they have a stroke or find out they have a major illness like cancer or heart disease. The death of a loved one or a breakup may lead to depression. It can run in families. Most experts believe that a combination of inherited genes and stressful life events can cause it.  What happens during " "screening?  You may be asked to fill out a form about your depression symptoms. You and the doctor will discuss your answers. The doctor may ask you more questions to learn more about how you think, act, and feel.  What happens after screening?  If you have symptoms of depression, your doctor will talk to you about your options.  Doctors usually treat depression with medicines or counseling. Often, combining the two works best. Many people don't get help because they think that they'll get over the depression on their own. But people with depression may not get better unless they get treatment.  The cause of depression is not well understood. There may be many factors involved. But if you have depression, it's not your fault.  A serious symptom of depression is thinking about death or suicide. If you or someone you care about talks about this or about feeling hopeless, get help right away.  It's important to know that depression can be treated. Medicine, counseling, and self-care may help.  Where can you learn more?  Go to https://www.[employer].net/patiented  Enter T185 in the search box to learn more about \"Learning About Depression Screening.\"  Current as of: June 24, 2023               Content Version: 14.0    3838-5762 [employer].   Care instructions adapted under license by your healthcare professional. If you have questions about a medical condition or this instruction, always ask your healthcare professional. [employer] disclaims any warranty or liability for your use of this information.      "

## 2024-05-03 NOTE — PROGRESS NOTES
"Preventive Care Visit  [facility] PA-C, Physician Assistant - Medical  May 3, 2024      Assessment & Plan     Routine general medical examination at a health care facility  - Comprehensive metabolic panel (BMP + Alb, Alk Phos, ALT, AST, Total. Bili, TP)  - CBC with platelets    Screen for colon cancer  - Colonoscopy Screening  Referral    Visit for screening mammogram  - MA Screen Bilateral w/Alber    Type 2 diabetes mellitus with stage 3a chronic kidney disease, without long-term current use of insulin (H)  Chronic. Has been controlled on current regimen, recheck labs today. Home BG readings reported have been within goal. Addendum 5/5: trial off metformin given A1c 5.6 and mild renal stress though stable. Pt will update with home BG readings in 2 weeks and can readjust medication recommendations at that time. Continue trulicity.  - Comprehensive metabolic panel (BMP + Alb, Alk Phos, ALT, AST, Total. Bili, TP)  - Hemoglobin A1c  - Albumin Random Urine Quantitative with Creat Ratio  - dulaglutide (TRULICITY) 0.75 MG/0.5ML pen  Dispense: 6 mL; Refill: 4  - metFORMIN (GLUCOPHAGE XR) 500 MG 24 hr tablet  Dispense: 180 tablet; Refill: 3    Chronic kidney disease, stage 3a (H)  - Albumin Random Urine Quantitative with Creat Ratio    Hyperlipidemia LDL goal <100  Chronic issue. Controlled on statin.  - Lipid panel reflex to direct LDL Fasting  - Comprehensive metabolic panel (BMP + Alb, Alk Phos, ALT, AST, Total. Bili, TP)    Hypertension goal BP (blood pressure) < 140/90  Chronic issue. Controlled.       BMI  Estimated body mass index is 53.31 kg/m  as calculated from the following:    Height as of this encounter: 1.65 m (5' 4.96\").    Weight as of this encounter: 145.2 kg (320 lb).   Weight management plan: Discussed healthy diet and exercise guidelines , on trulicity for DM management    Counseling  Appropriate preventive services were discussed with this patient, " including applicable screening as appropriate for fall prevention, nutrition, physical activity, Tobacco-use cessation, weight loss and cognition.  Checklist reviewing preventive services available has been given to the patient.  Reviewed patient's diet, addressing concerns and/or questions.   She is at risk for psychosocial distress and has been provided with information to reduce risk.   The patient's PHQ-9 score is consistent with mild depression. She was provided with information regarding depression.       Nica Workman PA-C on 5/3/2024 at 1:06 PM        Adry Hernandez is a 59 year old, presenting for the following:  Physical        5/3/2024    12:57 PM   Additional Questions   Roomed by Rachel KENNY        Via the [employer] Maintenance questionnaire, the patient has reported the following services have been completed -Eye Exam, this information has been sent to the abstraction team.  Health Care Directive  Patient has a Health Care Directive on file  Advance care planning document is on file and is current.    History of Present Illness       Diabetes:   She presents for follow up of diabetes.  She is checking home blood glucose one time daily.   She checks blood glucose before meals.  Blood glucose is never over 200 and never under 70.  When her blood glucose is low, the patient is asymptomatic for confusion, blurred vision, lethargy and reports not feeling dizzy, shaky, or weak.   She has no concerns regarding her diabetes at this time.   She is not experiencing numbness or burning in feet, excessive thirst, blurry vision, weight changes or redness, sores or blisters on feet. The patient has had a diabetic eye exam in the last 12 months. Eye exam performed on 4/2/24. Location of last eye exam Dr. William Vieira at [facility].       - Mammogram 2019, OVERDUE  - Pap/HPV normal 7/2022, due 2027  - Colonoscopy 2014, repeat 5 years OVERDUE    Diabetes  Last A1c 5.9 in 11/2023  Current management:  metformin 500 BID, trulicity 0.75 mg weekly   Glucoses running in the low 100s, under 130 fasting   Eye exam - 4/2/24    Wt Readings from Last 5 Encounters:   05/03/24 145.2 kg (320 lb)   01/19/24 145.1 kg (319 lb 12.8 oz)   01/17/24 (!) 153.3 kg (338 lb)   12/22/23 (!) 153.3 kg (338 lb)   12/12/23 (!) 153.4 kg (338 lb 1.6 oz)     Hypertension   Controlled    Hyperlipidemia   Continues on rosuvastatin           5/3/2024   General Health   How would you rate your overall physical health? (!) FAIR   Feel stress (tense, anxious, or unable to sleep) Only a little   (!) STRESS CONCERN      5/3/2024   Nutrition   Three or more servings of calcium each day? Yes   Diet: Regular (no restrictions)   How many servings of fruit and vegetables per day? (!) 0-1   How many sweetened beverages each day? (!) 2         5/3/2024   Exercise   Days per week of moderate/strenous exercise 0 days   (!) EXERCISE CONCERN      5/3/2024   Social Factors   Frequency of gathering with friends or relatives Twice a week   Worry food won't last until get money to buy more No   Food not last or not have enough money for food? No   Do you have housing?  Yes   Are you worried about losing your housing? No   Lack of transportation? No   Unable to get utilities (heat,electricity)? No         5/3/2024   Fall Risk   Fallen 2 or more times in the past year? No   Trouble with walking or balance? Yes   Gait Speed Test (Document in seconds) 5.4   Gait Speed Test Interpretation Greater than 5.01 seconds - ABNORMAL          5/3/2024   Dental   Dentist two times every year? Yes          Today's PHQ-9 Score:       5/3/2024    12:45 PM   PHQ-9 SCORE   PHQ-9 Total Score MyChart 9 (Mild depression)   PHQ-9 Total Score 9         5/3/2024   Substance Use   Alcohol more than 3/day or more than 7/wk No   Do you use any other substances recreationally? No     Social History     Tobacco Use    Smoking status: Never     Passive exposure: Never    Smokeless tobacco: Never    Vaping Use    Vaping status: Never Used   Substance Use Topics    Alcohol use: Yes     Comment: once in a while    Drug use: No     Mammogram Screening - Mammogram every 1-2 years updated in Health Maintenance based on mutual decision making        5/3/2024   STI Screening   New sexual partner(s) since last STI/HIV test? (!) DECLINE     History of abnormal Pap smear: NO - age 30-65 PAP every 5 years with negative HPV co-testing recommended        Latest Ref Rng & Units 7/19/2022     2:24 PM 10/17/2017     9:37 AM 10/17/2017     9:15 AM   PAP / HPV   PAP  Negative for Intraepithelial Lesion or Malignancy (NILM)      PAP (Historical)    NIL    HPV 16 DNA Negative Negative  Negative     HPV 18 DNA Negative Negative  Negative     Other HR HPV Negative Negative  Negative       ASCVD Risk   The 10-year ASCVD risk score (Beti SAMUELS, et al., 2019) is: 6.9%    Values used to calculate the score:      Age: 59 years      Sex: Female      Is Non- : No      Diabetic: Yes      Tobacco smoker: No      Systolic Blood Pressure: 132 mmHg      Is BP treated: Yes      HDL Cholesterol: 50 mg/dL      Total Cholesterol: 132 mg/dL      Reviewed and updated as needed this visit by Provider   Tobacco  Allergies  Meds  Problems  Med Hx  Surg Hx  Fam Hx            Past Medical History:   Diagnosis Date    Arthritis Nov 2019    Atrial fibrillation (H)     COPD (chronic obstructive pulmonary disease) (H)     colds turn to bronchitis easily    Depression     Depressive disorder     in chart    Diabetes (H)     GERD (gastroesophageal reflux disease)     HTN (hypertension)     Hyperlipidemia     Obese     BALTA (obstructive sleep apnea)     uses CPAP    Permanent atrial fibrillation (H) 06/05/2011    Uncomplicated asthma     mentioned by urgent care md     Past Surgical History:   Procedure Laterality Date    ABDOMEN SURGERY      in chart    ARTHROPLASTY KNEE Left 01/20/2022    Procedure: ARTHROPLASTY, LEFT  "KNEE, TOTAL;  Surgeon: Armand Martin MD;  Location: [facility] OR    ARTHROPLASTY KNEE Right 12/1/2023    Procedure: ARTHROPLASTY, RIGHT KNEE, TOTAL;  Surgeon: Armand Martin MD;  Location: [facility] OR    BIOPSY      skin and uterine lining    CARDIOVERSION  06/07/2011    CHOLECYSTECTOMY      COLONOSCOPY  2013    in chart    DILATION AND CURETTAGE  04/26/2012    Procedure:DILATION AND CURETTAGE; DILATION AND CURETTAGE; Surgeon:JEAN-PAUL DEL CID; Location:[facility]    DILATION AND CURETTAGE, HYSTEROSCOPY DIAGNOSTIC, COMBINED  12/08/2011    Procedure:COMBINED DILATION AND CURETTAGE, HYSTEROSCOPY DIAGNOSTIC; DILATION AND CURETTAGE, DIAGNOSTIC HYSTEROSCOPY ; Surgeon:JEAN-PAUL DEL CID; Location: [facility]    KNEE SURGERY      REMOVE HARDWARE LOWER EXTREMITY Left 01/20/2022    Procedure: REMOVAL, HARDWARE, LEFT LOWER EXTREMITY;  Surgeon: Armand Martin MD;  Location: [facility] OR         Review of Systems  Constitutional, HEENT, cardiovascular, pulmonary, GI, , musculoskeletal, neuro, skin, endocrine and psych systems are negative, except as otherwise noted.     Objective    Exam  /80 (BP Location: Left arm, Patient Position: Sitting, Cuff Size: Adult Regular)   Pulse 81   Temp 97.4  F (36.3  C) (Tympanic)   Resp 20   Ht 1.65 m (5' 4.96\")   Wt 145.2 kg (320 lb)   LMP 06/25/2019 (Approximate)   SpO2 97%   BMI 53.31 kg/m     Estimated body mass index is 53.31 kg/m  as calculated from the following:    Height as of this encounter: 1.65 m (5' 4.96\").    Weight as of this encounter: 145.2 kg (320 lb).    Physical Exam  GENERAL: alert and no distress  EYES: Eyes grossly normal to inspection, PERRL and conjunctivae and sclerae normal  HENT: ear canals and TM's normal, nose and mouth without ulcers or lesions  NECK: no adenopathy, no asymmetry, masses, or scars  RESP: lungs clear to auscultation - no rales, rhonchi or wheezes  BREAST: normal without masses, tenderness or nipple discharge and no palpable axillary masses or adenopathy  CV: " irregular rhythm, no murmur   ABDOMEN: soft, nontender, no hepatosplenomegaly, no masses and bowel sounds normal  MS: no gross musculoskeletal defects noted, no edema  SKIN: no suspicious lesions or rashes. SKs noted on breast and anterior chest wall   NEURO: Normal strength and tone, mentation intact and speech normal  PSYCH: mentation appears normal, affect normal/bright      Signed Electronically by: Nica Workman PA-C on 5/3/2024 at 1:25 PM

## 2024-05-04 LAB
ALBUMIN SERPL BCG-MCNC: 4.3 G/DL (ref 3.5–5.2)
ALP SERPL-CCNC: 142 U/L (ref 40–150)
ALT SERPL W P-5'-P-CCNC: 19 U/L (ref 0–50)
ANION GAP SERPL CALCULATED.3IONS-SCNC: 13 MMOL/L (ref 7–15)
AST SERPL W P-5'-P-CCNC: 23 U/L (ref 0–45)
BILIRUB SERPL-MCNC: 0.5 MG/DL
BUN SERPL-MCNC: 14.1 MG/DL (ref 8–23)
CALCIUM SERPL-MCNC: 10.2 MG/DL (ref 8.8–10.2)
CHLORIDE SERPL-SCNC: 104 MMOL/L (ref 98–107)
CHOLEST SERPL-MCNC: 104 MG/DL
CREAT SERPL-MCNC: 1.1 MG/DL (ref 0.51–0.95)
CREAT UR-MCNC: 185 MG/DL
DEPRECATED HCO3 PLAS-SCNC: 22 MMOL/L (ref 22–29)
EGFRCR SERPLBLD CKD-EPI 2021: 57 ML/MIN/1.73M2
FASTING STATUS PATIENT QL REPORTED: YES
GLUCOSE SERPL-MCNC: 87 MG/DL (ref 70–99)
HDLC SERPL-MCNC: 49 MG/DL
LDLC SERPL CALC-MCNC: 25 MG/DL
MICROALBUMIN UR-MCNC: 16.8 MG/L
MICROALBUMIN/CREAT UR: 9.08 MG/G CR (ref 0–25)
NONHDLC SERPL-MCNC: 55 MG/DL
POTASSIUM SERPL-SCNC: 4.8 MMOL/L (ref 3.4–5.3)
PROT SERPL-MCNC: 7.5 G/DL (ref 6.4–8.3)
SODIUM SERPL-SCNC: 139 MMOL/L (ref 135–145)
TRIGL SERPL-MCNC: 151 MG/DL

## 2024-05-05 NOTE — RESULT ENCOUNTER NOTE
Detail Level: Detailed
Mary,    I have reviewed your lab results below:    - cholesterol looks great overall! Triglycerides have improved since last year.   - kidney function is stable. Electrolytes, blood sugar and liver function normal   - urine test normal  - blood counts are normal - normal hemoglobin/red blood cells (no anemia), normal white blood cells (infection fighting cells), normal platelets (affect ability to clot normally)    - A1c EXCELLENT, now within normal range - we may be able to stop your metformin as I don't know that you need it for your blood sugar control while you are on trulicity, and it could be adding further stress to your kidneys. Let's try you off the metformin for 2 weeks, then let me know what your blood sugars are looking like and we can make further adjustments from there as needed.    Please let me know if you have any further questions.    Take care,  Nica Workman PA-C on 5/5/2024 at 7:32 AM  
Add 59321 Cpt? (Important Note: In 2017 The Use Of 07038 Is Being Tracked By Cms To Determine Future Global Period Reimbursement For Global Periods): yes

## 2024-05-24 ENCOUNTER — MYC MEDICAL ADVICE (OUTPATIENT)
Dept: FAMILY MEDICINE | Facility: CLINIC | Age: 59
End: 2024-05-24
Payer: COMMERCIAL

## 2024-08-05 ENCOUNTER — PATIENT OUTREACH (OUTPATIENT)
Dept: FAMILY MEDICINE | Facility: CLINIC | Age: 59
End: 2024-08-05
Payer: COMMERCIAL

## 2024-08-05 NOTE — LETTER
August 6, 2024      Mary Gorman  [id number] [facility] [address]        Dear Mary,    I care about your health and have reviewed your health plan. I have reviewed your medical conditions, medication list, and lab results and am making recommendations based on this review, to better manage your health.    You are in particular need of attention regarding:  -Diabetes  -High Blood Pressure  -Breast Cancer Screening  -Colon Cancer Screening  -Immunization    I am recommending that you:  -schedule a WELLNESS (Physical) APPOINTMENT with me.   I will check fasting labs the same day - nothing to eat except water and meds for 8-10 hours prior.    Here is a list of Health Maintenance topics that are due now or due soon:  Health Maintenance Due   Topic Date Due    Urine Test  Never done    Pneumococcal Vaccine (2 of 2 - PPSV23 or PCV20) 12/12/2017    Diabetic Foot Exam  10/17/2018    Colorectal Cancer Screening  08/18/2019    Mammogram  11/29/2021    COVID-19 Vaccine (4 - 2023-24 season) 09/01/2023    RSV VACCINE (Pregnancy & 60+) (1 - 1-dose 60+ series) Never done    A1C Lab  08/03/2024       Please call us at [phone] (or use [employer]) to address the above recommendations.     Thank you for trusting [facility] and we appreciate the opportunity to serve you.  We look forward to supporting your healthcare needs in the future.    Healthy Regards,    Nica Greco PA-C

## 2024-08-05 NOTE — TELEPHONE ENCOUNTER
Patient Quality Outreach    Patient is due for the following:   Diabetes -  A1C, LDL (Fasting), Microalbumin, and Diabetic Follow-Up Visit  Hypertension -  BP check  Colon Cancer Screening  Breast Cancer Screening - Mammogram  Cervical Cancer Screening - PAP Needed  Physical Preventive Adult Physical      Topic Date Due    Pneumococcal Vaccine (2 of 2 - PPSV23 or PCV20) 12/12/2017    COVID-19 Vaccine (4 - 2023-24 season) 09/01/2023       Next Steps:   Schedule a Adult Preventative    Type of outreach:    Sent [employer] message. and Sent letter.      Questions for provider review:    None           Delia Gentile MA

## 2024-08-10 ENCOUNTER — MYC MEDICAL ADVICE (OUTPATIENT)
Dept: ORTHOPEDICS | Facility: CLINIC | Age: 59
End: 2024-08-10
Payer: COMMERCIAL

## 2024-08-11 ENCOUNTER — HEALTH MAINTENANCE LETTER (OUTPATIENT)
Age: 59
End: 2024-08-11

## 2024-09-06 ENCOUNTER — MYC MEDICAL ADVICE (OUTPATIENT)
Dept: ORTHOPEDICS | Facility: CLINIC | Age: 59
End: 2024-09-06
Payer: COMMERCIAL

## 2024-09-06 DIAGNOSIS — Z96.651 STATUS POST TOTAL KNEE REPLACEMENT, RIGHT: Primary | ICD-10-CM

## 2024-09-09 RX ORDER — AMOXICILLIN 500 MG/1
TABLET, FILM COATED ORAL
Qty: 4 TABLET | Refills: 1 | Status: SHIPPED | OUTPATIENT
Start: 2024-09-09

## 2024-09-14 NOTE — PROGRESS NOTES
"[facility]    I had the pleasure of seeing Mary when she came for follow up of AFib.  This 58 year old saw Dr. Greenfield for her history of:       1. Permanent atrial fibrillation on a rate control/AC strategy given her asymptomatic status.  2. Hypertension under good control  3. Obesity   4. Lymphedema with adjustments in Diltiazem dose and addition of spironolactone without improvement.  Sees Lymphedema clinic  5. BALTA - on CPAP  6. DM2      Last Visit & Interval History:  I saw Mary 9/2023 at which time she was doing well from a cardiac perspective.  Her activity levels were quite limited due to right knee pain.  Lymphedema was \"okay\" with Velcro wraps and pumps.  HR per pulse oximeter was under good control 70-80s, but she had declined wearing any sort of monitor due to significant skin irritation/welts in the past even with a 24-hour Holter.  I asked her to get an updated echocardiogram and see us in follow-up.      Updated echo 10/2023 looked good, with normal EF 60%.  No significant valve abnormalities.  She has subsequently undergone  R TKA 12/2023      Today's Visit:  Overall Mary thinks things are going well. No issues with CP with all her PT, exercise, etc.  Notes knee is \"not progressing\" as fast as she'd thought it would b/c she is \"not doing the work.\"    Notes lightheadedness with standing quickly. No falls/fainting.     Notes mild STYLES with exercising. Is working on stamina/endurance - walking in the office twice/week and and with her mom (Citlaly) twice/week.    Still using Lymphedema wraps, not terribly worse despite TKA. L still >> R.      VITALS:  Vitals: /85   Pulse 74   Ht 1.651 m (5' 5\")   Wt (!) 152.4 kg (336 lb)   LMP 06/25/2019 (Approximate)   BMI 55.91 kg/m      Diagnostic Testing:  Echo 10/2023 LVEF 60%.  Normal LV.  Normal RV.  Normal atria.  No significant valvular abnormalities.  Normal aorta  Echocardiogram 10/2022-LVEF 55-60%.  No RWMA. No thrombus. Nl RV. Mod " dilated LA. mild aortic sclerosis.  No other significant valvular abnormalities. AFib on echo, no change c/w 6/2019  Holter 5/2020 AFib with avg HR 84 bpm on metoprolol XL 50 BID and Dilt 240  Echocardiogram 6/10/2019 showed EF 60% with normal RV size and function. No significant valvular abnormalities.   24 hour Holter 6/7/2019 showed atrial fibrillation with average HR 81 bpm. Range was 54 bpm @ 1431 and max was 236 bpm @ 122. Minimal ectopy.   Component      Latest Ref Rng 5/3/2024  1:50 PM   Sodium      135 - 145 mmol/L 139    Potassium      3.4 - 5.3 mmol/L 4.8    Carbon Dioxide (CO2)      22 - 29 mmol/L 22    Anion Gap      7 - 15 mmol/L 13    Urea Nitrogen      8.0 - 23.0 mg/dL 14.1    Creatinine      0.51 - 0.95 mg/dL 1.10 (H)    GFR Estimate      >60 mL/min/1.73m2 57 (L)    Calcium      8.8 - 10.2 mg/dL 10.2    Chloride      98 - 107 mmol/L 104    Glucose      70 - 99 mg/dL 87       Component      Latest Ref Rng 5/3/2024  1:50 PM   Alkaline Phosphatase      40 - 150 U/L 142    AST      0 - 45 U/L 23    ALT      0 - 50 U/L 19    Protein Total      6.4 - 8.3 g/dL 7.5    Albumin      3.5 - 5.2 g/dL 4.3    Bilirubin Total      <=1.2 mg/dL 0.5      Component      Latest Ref Rng 5/3/2024  1:50 PM   WBC      4.0 - 11.0 10e3/uL 9.9    RBC Count      3.80 - 5.20 10e6/uL 4.77    Hemoglobin      11.7 - 15.7 g/dL 13.2    Hematocrit      35.0 - 47.0 % 40.3    MCV      78 - 100 fL 85    MCH      26.5 - 33.0 pg 27.7    MCHC      31.5 - 36.5 g/dL 32.8    RDW      10.0 - 15.0 % 14.3    Platelet Count      150 - 450 10e3/uL 246          Plan:  Annual follow-up per her request (discussed Dr. Greenfield's skilled nursing and pt's asx'c permanent AFib so would not necessarily need to follow-up with Cardiology/EP)    Assessment/Plan:    Permanent AFib  Currently on Diltiazem 240 mg daily, Metoprolol XL 50 mg twice daily.  HR at home in the 70s typically    Echo 10/2023 with normal LVEF  Remains on AC with Eliquis.  [id number] 3 (HTN, DM,  sex).  Hemoglobin 5/2024  was 14.3 g/dL    PLAN:  Continue current medications  Continue Eliquis  Annual follow-up       Joceline Gomez PA-C, MSPAS      No orders of the defined types were placed in this encounter.    No orders of the defined types were placed in this encounter.    Medications Discontinued During This Encounter   Medication Reason    acetaminophen (TYLENOL) 500 MG tablet Therapy completed (No AVS)           No diagnosis found.      CURRENT MEDICATIONS:  Current Outpatient Medications   Medication Sig Dispense Refill    amoxicillin (AMOXIL) 500 MG tablet Administer 4 tabs (2,000 mg), 1 hour prior to dental visit and/or non-sterile procedure 4 tablet 1    blood glucose (NO BRAND SPECIFIED) lancets standard Use to test blood sugar 2 times daily or as directed. 200 each 3    blood glucose (NO BRAND SPECIFIED) test strip Use to test blood sugar 2 times daily or as directed. 200 strip 3    blood glucose monitoring (NO BRAND SPECIFIED) meter device kit Use to test blood sugar 2 times daily or as directed. 1 kit 0    buPROPion (WELLBUTRIN XL) 150 MG 24 hr tablet Take 1 tablet (150 mg) by mouth every morning Take with 300 mg for total of 450 mg 90 tablet 1    buPROPion (WELLBUTRIN XL) 300 MG 24 hr tablet Take 1 tablet (300 mg) by mouth every morning Take with 150 mg for total 450 mg 90 tablet 1    cetirizine (ZYRTEC) 10 MG tablet Take 10 mg by mouth as needed for allergies      diltiazem ER (DILT-XR) 240 MG 24 hr ER beaded capsule Take 1 capsule (240 mg) by mouth daily 90 capsule 3    dulaglutide (TRULICITY) 0.75 MG/0.5ML pen Inject 0.75 mg Subcutaneous every 7 days 6 mL 4    ELIQUIS ANTICOAGULANT 5 MG tablet Take 1 tablet (5 mg) by mouth 2 times daily 180 tablet 3    fluticasone (FLONASE) 50 MCG/ACT nasal spray Spray 1 spray into both nostrils at bedtime 16 mL 0    fosinopril (MONOPRIL) 10 MG tablet Take 1 tablet (10 mg) by mouth daily 90 tablet 3    metoprolol succinate ER (TOPROL XL) 50 MG 24 hr tablet Take  "1 tablet (50 mg) by mouth 2 times daily 180 tablet 3    rosuvastatin (CRESTOR) 10 MG tablet Take 1 tablet (10 mg) by mouth daily 90 tablet 3    sertraline (ZOLOFT) 100 MG tablet Take 200 mg by mouth daily 2 tabs (200mg) daily       spironolactone (ALDACTONE) 25 MG tablet Take 1 tablet (25 mg) by mouth daily 90 tablet 3    tiZANidine (ZANAFLEX) 2 MG tablet Take 1-2 tablets (2-4 mg) by mouth nightly as needed for muscle spasms 180 tablet 1    traZODone (DESYREL) 100 MG tablet Take 1 tablet (100 mg) by mouth at bedtime 90 tablet 3    triamcinolone (KENALOG) 0.1 % external cream Apply topically 2 times daily 80 g 3       ALLERGIES     Allergies   Allergen Reactions    Dyazide [Hydrochlorothiazide W-Triamterene] Unknown    Vistaril [Hydroxyzine] Visual Disturbance    Naproxen Rash    Nickel Rash    Robaxin [Methocarbamol] Rash    Sulfa Antibiotics Rash         Review of Systems:  Skin:  Negative     Eyes:  Positive for glasses  ENT:  Negative    Respiratory:  Positive for dyspnea on exertion  Cardiovascular:  Negative for;palpitations;chest pain Positive for;palpitations;dizziness;edema  Gastroenterology: Negative for melena;hematochezia  Genitourinary:  Negative    Musculoskeletal:  Positive for arthritis;joint pain  Neurologic:  Negative    Psychiatric:  Positive for excessive stress;depression;anxiety  Heme/Lymph/Imm:  Positive for allergies  Endocrine:  Negative      Physical Exam:  Vitals: /85   Pulse 74   Ht 1.651 m (5' 5\")   Wt (!) 152.4 kg (336 lb)   LMP 06/25/2019 (Approximate)   BMI 55.91 kg/m      Constitutional:  cooperative, alert and oriented, well developed, well nourished, in no acute distress        Skin:  warm and dry to the touch   reaction from patches on anterior chest wall    Head:  normocephalic, no masses or lesions        Eyes:  pupils equal and round;conjunctivae and lids unremarkable;sclera white        ENT:  no pallor or cyanosis, dentition good        Neck:  JVP normal;no carotid " bruit        Chest:  normal breath sounds, clear to auscultation, normal A-P diameter, normal symmetry, normal respiratory excursion, no use of accessory muscles        Cardiac:   irregularly irregular rhythm           HR 70s    Abdomen:  abdomen soft obese      Vascular: pulses full and equal                                      Extremities and Back:           Neurological:  no gross motor deficits            PAST MEDICAL HISTORY:  Past Medical History:   Diagnosis Date    Arthritis Nov 2019    Atrial fibrillation (H)     COPD (chronic obstructive pulmonary disease) (H)     colds turn to bronchitis easily    Depression     Depressive disorder     in chart    Diabetes (H)     GERD (gastroesophageal reflux disease)     HTN (hypertension)     Hyperlipidemia     Obese     BALTA (obstructive sleep apnea)     uses CPAP    Permanent atrial fibrillation (H) 06/05/2011    Uncomplicated asthma     mentioned by urgent care md       PAST SURGICAL HISTORY:  Past Surgical History:   Procedure Laterality Date    ABDOMEN SURGERY      in chart    ARTHROPLASTY KNEE Left 01/20/2022    Procedure: ARTHROPLASTY, LEFT KNEE, TOTAL;  Surgeon: Armand Martin MD;  Location: [facility]    ARTHROPLASTY KNEE Right 12/1/2023    Procedure: ARTHROPLASTY, RIGHT KNEE, TOTAL;  Surgeon: Armand Martin MD;  Location: [facility] OR    BIOPSY      skin and uterine lining    CARDIOVERSION  06/07/2011    CHOLECYSTECTOMY      COLONOSCOPY  2013    in chart    DILATION AND CURETTAGE  04/26/2012    Procedure:DILATION AND CURETTAGE; DILATION AND CURETTAGE; Surgeon:JEAN-PAUL DEL CID; Location:[facility]    DILATION AND CURETTAGE, HYSTEROSCOPY DIAGNOSTIC, COMBINED  12/08/2011    Procedure:COMBINED DILATION AND CURETTAGE, HYSTEROSCOPY DIAGNOSTIC; DILATION AND CURETTAGE, DIAGNOSTIC HYSTEROSCOPY ; Surgeon:JEAN-PAUL DEL CID; Location:[facility]    KNEE SURGERY      REMOVE HARDWARE LOWER EXTREMITY Left 01/20/2022    Procedure: REMOVAL, HARDWARE, LEFT LOWER EXTREMITY;  Surgeon: Armand Martin,  MD;  Location: [facility] OR       FAMILY HISTORY:  Family History   Problem Relation Age of Onset    Hypertension Mother     Heart Disease Mother         A-fib    Arthritis Mother     Hyperlipidemia Mother     Obesity Mother     Heart Disease Father         triple bypass    Cancer Father 50        bladder    Hypertension Father     Respiratory Father         smoker    Coronary Artery Disease Father         chf, pulm htn, by pass    Hyperlipidemia Father     Other Cancer Father         bladder    Cancer Paternal Grandmother 90        rectal    Colon Cancer Paternal Grandmother     Obesity Paternal Grandmother     Unknown/Adopted Brother     Obesity Brother        SOCIAL HISTORY:  Social History     Socioeconomic History    Marital status: Single     Spouse name: None    Number of children: None    Years of education: None    Highest education level: Bachelor's degree (e.g., BA, AB, BS)   Tobacco Use    Smoking status: Never     Passive exposure: Never    Smokeless tobacco: Never   Vaping Use    Vaping status: Never Used   Substance and Sexual Activity    Alcohol use: Yes     Comment: once in a while    Drug use: No    Sexual activity: Not Currently     Partners: Male     Birth control/protection: None   Other Topics Concern    Parent/sibling w/ CABG, MI or angioplasty before 65F 55M? Yes     Comment: father triple bypass    Special Diet No    Exercise Yes     Comment: walks 2 miles minimum 5 x weekly      Social Determinants of Health     Financial Resource Strain: Low Risk  (5/3/2024)    Financial Resource Strain     Within the past 12 months, have you or your family members you live with been unable to get utilities (heat, electricity) when it was really needed?: No   Food Insecurity: Low Risk  (5/3/2024)    Food Insecurity     Within the past 12 months, did you worry that your food would run out before you got money to buy more?: No     Within the past 12 months, did the food you bought just not last and you didn t have  money to get more?: No   Transportation Needs: Low Risk  (5/3/2024)    Transportation Needs     Within the past 12 months, has lack of transportation kept you from medical appointments, getting your medicines, non-medical meetings or appointments, work, or from getting things that you need?: No   Physical Activity: Unknown (5/3/2024)    Exercise Vital Sign     Days of Exercise per Week: 0 days   Stress: No Stress Concern Present (5/3/2024)    [employer] [place] [employer] - Occupational Stress Questionnaire     Feeling of Stress : Only a little   Social Connections: Unknown (5/3/2024)    Social Connection and Isolation Panel [NHANES]     Frequency of Social Gatherings with Friends and Family: Twice a week   Interpersonal Safety: Low Risk  (5/3/2024)    Interpersonal Safety     Do you feel physically and emotionally safe where you currently live?: Yes     Within the past 12 months, have you been hit, slapped, kicked or otherwise physically hurt by someone?: No     Within the past 12 months, have you been humiliated or emotionally abused in other ways by your partner or ex-partner?: No   Housing Stability: Low Risk  (5/3/2024)    Housing Stability     Do you have housing? : Yes     Are you worried about losing your housing?: No

## 2024-09-17 ENCOUNTER — OFFICE VISIT (OUTPATIENT)
Dept: CARDIOLOGY | Facility: CLINIC | Age: 59
End: 2024-09-17
Payer: COMMERCIAL

## 2024-09-17 VITALS
DIASTOLIC BLOOD PRESSURE: 85 MMHG | HEART RATE: 74 BPM | BODY MASS INDEX: 48.82 KG/M2 | WEIGHT: 293 LBS | HEIGHT: 65 IN | SYSTOLIC BLOOD PRESSURE: 136 MMHG

## 2024-09-17 DIAGNOSIS — I48.21 PERMANENT ATRIAL FIBRILLATION (H): Primary | ICD-10-CM

## 2024-09-17 PROCEDURE — 99213 OFFICE O/P EST LOW 20 MIN: CPT | Performed by: PHYSICIAN ASSISTANT

## 2024-09-17 NOTE — PATIENT INSTRUCTIONS
Mary - it was nice to see you today!    Today we reviewed:    Heart-wise, things are going pretty well  Knee feels great, just still working on endurance/stamina, etc    MEDICATION CHANGES:    NONE    RECOMMENDATIONS:    Continue Lymphedema wraps  Increase fluid intake for lightheadedness     FOLLOW UP:    Annual follow-up     IMPORTANT PHONE NUMBERS FOR [facility] ([place]):    Arrhythmia nurses Tami Jernigan & Kimberlee: [phone]

## 2024-09-17 NOTE — LETTER
"9/17/2024    Nica Greco PA-C  [address]  [place] [address]    RE: Mary Gorman       Dear Colleague,     I had the pleasure of seeing Mary Gorman in the [facility].  [facility]    I had the pleasure of seeing Mary when she came for follow up of AFib.  This 58 year old saw Dr. Greenfield for her history of:       1. Permanent atrial fibrillation on a rate control/AC strategy given her asymptomatic status.  2. Hypertension under good control  3. Obesity   4. Lymphedema with adjustments in Diltiazem dose and addition of spironolactone without improvement.  Sees Lymphedema clinic  5. BALTA - on CPAP  6. DM2      Last Visit & Interval History:  I saw Mary 9/2023 at which time she was doing well from a cardiac perspective.  Her activity levels were quite limited due to right knee pain.  Lymphedema was \"okay\" with Velcro wraps and pumps.  HR per pulse oximeter was under good control 70-80s, but she had declined wearing any sort of monitor due to significant skin irritation/welts in the past even with a 24-hour Holter.  I asked her to get an updated echocardiogram and see us in follow-up.      Updated echo 10/2023 looked good, with normal EF 60%.  No significant valve abnormalities.  She has subsequently undergone  R TKA 12/2023      Today's Visit:  Overall Mary thinks things are going well. No issues with CP with all her PT, exercise, etc.  Notes knee is \"not progressing\" as fast as she'd thought it would b/c she is \"not doing the work.\"    Notes lightheadedness with standing quickly. No falls/fainting.     Notes mild STYLES with exercising. Is working on stamina/endurance - walking in the office twice/week and and with her mom (Citlaly) twice/week.    Still using Lymphedema wraps, not terribly worse despite TKA. L still >> R.      VITALS:  Vitals: /85   Pulse 74   Ht 1.651 m (5' 5\")   Wt (!) 152.4 kg (336 lb)   LMP 06/25/2019 (Approximate)   BMI 55.91 kg/m  "     Diagnostic Testing:  Echo 10/2023 LVEF 60%.  Normal LV.  Normal RV.  Normal atria.  No significant valvular abnormalities.  Normal aorta  Echocardiogram 10/2022-LVEF 55-60%.  No RWMA. No thrombus. Nl RV. Mod dilated LA. mild aortic sclerosis.  No other significant valvular abnormalities. AFib on echo, no change c/w 6/2019  Holter 5/2020 AFib with avg HR 84 bpm on metoprolol XL 50 BID and Dilt 240  Echocardiogram 6/10/2019 showed EF 60% with normal RV size and function. No significant valvular abnormalities.   24 hour Holter 6/7/2019 showed atrial fibrillation with average HR 81 bpm. Range was 54 bpm @ 1431 and max was 236 bpm @ 122. Minimal ectopy.   Component      Latest Ref Rn 5/3/2024  1:50 PM   Sodium      135 - 145 mmol/L 139    Potassium      3.4 - 5.3 mmol/L 4.8    Carbon Dioxide (CO2)      22 - 29 mmol/L 22    Anion Gap      7 - 15 mmol/L 13    Urea Nitrogen      8.0 - 23.0 mg/dL 14.1    Creatinine      0.51 - 0.95 mg/dL 1.10 (H)    GFR Estimate      >60 mL/min/1.73m2 57 (L)    Calcium      8.8 - 10.2 mg/dL 10.2    Chloride      98 - 107 mmol/L 104    Glucose      70 - 99 mg/dL 87       Component      Latest Ref Rn 5/3/2024  1:50 PM   Alkaline Phosphatase      40 - 150 U/L 142    AST      0 - 45 U/L 23    ALT      0 - 50 U/L 19    Protein Total      6.4 - 8.3 g/dL 7.5    Albumin      3.5 - 5.2 g/dL 4.3    Bilirubin Total      <=1.2 mg/dL 0.5      Component      Latest Ref Rn 5/3/2024  1:50 PM   WBC      4.0 - 11.0 10e3/uL 9.9    RBC Count      3.80 - 5.20 10e6/uL 4.77    Hemoglobin      11.7 - 15.7 g/dL 13.2    Hematocrit      35.0 - 47.0 % 40.3    MCV      78 - 100 fL 85    MCH      26.5 - 33.0 pg 27.7    MCHC      31.5 - 36.5 g/dL 32.8    RDW      10.0 - 15.0 % 14.3    Platelet Count      150 - 450 10e3/uL 246          Plan:  Annual follow-up per her request (discussed Dr. Greenfield's FDC and pt's asx'c permanent AFib so would not necessarily need to follow-up with  Cardiology/EP)    Assessment/Plan:    Permanent AFib  Currently on Diltiazem 240 mg daily, Metoprolol XL 50 mg twice daily.  HR at home in the 70s typically    Echo 10/2023 with normal LVEF  Remains on AC with Eliquis.  [id number] 3 (HTN, DM, sex).  Hemoglobin 5/2024  was 14.3 g/dL    PLAN:  Continue current medications  Continue Eliquis  Annual follow-up       Joceline Gomez PA-C, MSPAS      No orders of the defined types were placed in this encounter.    No orders of the defined types were placed in this encounter.    Medications Discontinued During This Encounter   Medication Reason     acetaminophen (TYLENOL) 500 MG tablet Therapy completed (No AVS)           No diagnosis found.      CURRENT MEDICATIONS:  Current Outpatient Medications   Medication Sig Dispense Refill     amoxicillin (AMOXIL) 500 MG tablet Administer 4 tabs (2,000 mg), 1 hour prior to dental visit and/or non-sterile procedure 4 tablet 1     blood glucose (NO BRAND SPECIFIED) lancets standard Use to test blood sugar 2 times daily or as directed. 200 each 3     blood glucose (NO BRAND SPECIFIED) test strip Use to test blood sugar 2 times daily or as directed. 200 strip 3     blood glucose monitoring (NO BRAND SPECIFIED) meter device kit Use to test blood sugar 2 times daily or as directed. 1 kit 0     buPROPion (WELLBUTRIN XL) 150 MG 24 hr tablet Take 1 tablet (150 mg) by mouth every morning Take with 300 mg for total of 450 mg 90 tablet 1     buPROPion (WELLBUTRIN XL) 300 MG 24 hr tablet Take 1 tablet (300 mg) by mouth every morning Take with 150 mg for total 450 mg 90 tablet 1     cetirizine (ZYRTEC) 10 MG tablet Take 10 mg by mouth as needed for allergies       diltiazem ER (DILT-XR) 240 MG 24 hr ER beaded capsule Take 1 capsule (240 mg) by mouth daily 90 capsule 3     dulaglutide (TRULICITY) 0.75 MG/0.5ML pen Inject 0.75 mg Subcutaneous every 7 days 6 mL 4     ELIQUIS ANTICOAGULANT 5 MG tablet Take 1 tablet (5 mg) by mouth 2 times daily 180  "tablet 3     fluticasone (FLONASE) 50 MCG/ACT nasal spray Spray 1 spray into both nostrils at bedtime 16 mL 0     fosinopril (MONOPRIL) 10 MG tablet Take 1 tablet (10 mg) by mouth daily 90 tablet 3     metoprolol succinate ER (TOPROL XL) 50 MG 24 hr tablet Take 1 tablet (50 mg) by mouth 2 times daily 180 tablet 3     rosuvastatin (CRESTOR) 10 MG tablet Take 1 tablet (10 mg) by mouth daily 90 tablet 3     sertraline (ZOLOFT) 100 MG tablet Take 200 mg by mouth daily 2 tabs (200mg) daily        spironolactone (ALDACTONE) 25 MG tablet Take 1 tablet (25 mg) by mouth daily 90 tablet 3     tiZANidine (ZANAFLEX) 2 MG tablet Take 1-2 tablets (2-4 mg) by mouth nightly as needed for muscle spasms 180 tablet 1     traZODone (DESYREL) 100 MG tablet Take 1 tablet (100 mg) by mouth at bedtime 90 tablet 3     triamcinolone (KENALOG) 0.1 % external cream Apply topically 2 times daily 80 g 3       ALLERGIES     Allergies   Allergen Reactions     Dyazide [Hydrochlorothiazide W-Triamterene] Unknown     Vistaril [Hydroxyzine] Visual Disturbance     Naproxen Rash     Nickel Rash     Robaxin [Methocarbamol] Rash     Sulfa Antibiotics Rash         Review of Systems:  Skin:  Negative     Eyes:  Positive for glasses  ENT:  Negative    Respiratory:  Positive for dyspnea on exertion  Cardiovascular:  Negative for;palpitations;chest pain Positive for;palpitations;dizziness;edema  Gastroenterology: Negative for melena;hematochezia  Genitourinary:  Negative    Musculoskeletal:  Positive for arthritis;joint pain  Neurologic:  Negative    Psychiatric:  Positive for excessive stress;depression;anxiety  Heme/Lymph/Imm:  Positive for allergies  Endocrine:  Negative      Physical Exam:  Vitals: /85   Pulse 74   Ht 1.651 m (5' 5\")   Wt (!) 152.4 kg (336 lb)   LMP 06/25/2019 (Approximate)   BMI 55.91 kg/m      Constitutional:  cooperative, alert and oriented, well developed, well nourished, in no acute distress        Skin:  warm and dry to " the touch   reaction from patches on anterior chest wall    Head:  normocephalic, no masses or lesions        Eyes:  pupils equal and round;conjunctivae and lids unremarkable;sclera white        ENT:  no pallor or cyanosis, dentition good        Neck:  JVP normal;no carotid bruit        Chest:  normal breath sounds, clear to auscultation, normal A-P diameter, normal symmetry, normal respiratory excursion, no use of accessory muscles        Cardiac:   irregularly irregular rhythm           HR 70s    Abdomen:  abdomen soft obese      Vascular: pulses full and equal                                      Extremities and Back:           Neurological:  no gross motor deficits            PAST MEDICAL HISTORY:  Past Medical History:   Diagnosis Date     Arthritis Nov 2019     Atrial fibrillation (H)      COPD (chronic obstructive pulmonary disease) (H)     colds turn to bronchitis easily     Depression      Depressive disorder     in chart     Diabetes (H)      GERD (gastroesophageal reflux disease)      HTN (hypertension)      Hyperlipidemia      Obese      BALTA (obstructive sleep apnea)     uses CPAP     Permanent atrial fibrillation (H) 06/05/2011     Uncomplicated asthma     mentioned by urgent care md       PAST SURGICAL HISTORY:  Past Surgical History:   Procedure Laterality Date     ABDOMEN SURGERY      in chart     ARTHROPLASTY KNEE Left 01/20/2022    Procedure: ARTHROPLASTY, LEFT KNEE, TOTAL;  Surgeon: Armand Martin MD;  Location: [facility]     ARTHROPLASTY KNEE Right 12/1/2023    Procedure: ARTHROPLASTY, RIGHT KNEE, TOTAL;  Surgeon: Armand Martin MD;  Location: [facility]     BIOPSY      skin and uterine lining     CARDIOVERSION  06/07/2011     CHOLECYSTECTOMY       COLONOSCOPY  2013    in chart     DILATION AND CURETTAGE  04/26/2012    Procedure:DILATION AND CURETTAGE; DILATION AND CURETTAGE; Surgeon:JEAN-PAUL DEL CID; Location:[facility]     DILATION AND CURETTAGE, HYSTEROSCOPY DIAGNOSTIC, COMBINED  12/08/2011     Procedure:COMBINED DILATION AND CURETTAGE, HYSTEROSCOPY DIAGNOSTIC; DILATION AND CURETTAGE, DIAGNOSTIC HYSTEROSCOPY ; Surgeon:JEAN-PAUL DEL CID; Location: [facility]     KNEE SURGERY       REMOVE HARDWARE LOWER EXTREMITY Left 01/20/2022    Procedure: REMOVAL, HARDWARE, LEFT LOWER EXTREMITY;  Surgeon: Armand Martin MD;  Location: [facility]       FAMILY HISTORY:  Family History   Problem Relation Age of Onset     Hypertension Mother      Heart Disease Mother         A-fib     Arthritis Mother      Hyperlipidemia Mother      Obesity Mother      Heart Disease Father         triple bypass     Cancer Father 50        bladder     Hypertension Father      Respiratory Father         smoker     Coronary Artery Disease Father         chf, pulm htn, by pass     Hyperlipidemia Father      Other Cancer Father         bladder     Cancer Paternal Grandmother 90        rectal     Colon Cancer Paternal Grandmother      Obesity Paternal Grandmother      Unknown/Adopted Brother      Obesity Brother        SOCIAL HISTORY:  Social History     Socioeconomic History     Marital status: Single     Spouse name: None     Number of children: None     Years of education: None     Highest education level: Bachelor's degree (e.g., BA, AB, BS)   Tobacco Use     Smoking status: Never     Passive exposure: Never     Smokeless tobacco: Never   Vaping Use     Vaping status: Never Used   Substance and Sexual Activity     Alcohol use: Yes     Comment: once in a while     Drug use: No     Sexual activity: Not Currently     Partners: Male     Birth control/protection: None   Other Topics Concern     Parent/sibling w/ CABG, MI or angioplasty before 65F 55M? Yes     Comment: father triple bypass     Special Diet No     Exercise Yes     Comment: walks 2 miles minimum 5 x weekly      Social Determinants of Health     Financial Resource Strain: Low Risk  (5/3/2024)    Financial Resource Strain      Within the past 12 months, have you or your family members you live with  been unable to get utilities (heat, electricity) when it was really needed?: No   Food Insecurity: Low Risk  (5/3/2024)    Food Insecurity      Within the past 12 months, did you worry that your food would run out before you got money to buy more?: No      Within the past 12 months, did the food you bought just not last and you didn t have money to get more?: No   Transportation Needs: Low Risk  (5/3/2024)    Transportation Needs      Within the past 12 months, has lack of transportation kept you from medical appointments, getting your medicines, non-medical meetings or appointments, work, or from getting things that you need?: No   Physical Activity: Unknown (5/3/2024)    Exercise Vital Sign      Days of Exercise per Week: 0 days   Stress: No Stress Concern Present (5/3/2024)    [employer] [place] [employer] - Occupational Stress Questionnaire      Feeling of Stress : Only a little   Social Connections: Unknown (5/3/2024)    Social Connection and Isolation Panel [NHANES]      Frequency of Social Gatherings with Friends and Family: Twice a week   Interpersonal Safety: Low Risk  (5/3/2024)    Interpersonal Safety      Do you feel physically and emotionally safe where you currently live?: Yes      Within the past 12 months, have you been hit, slapped, kicked or otherwise physically hurt by someone?: No      Within the past 12 months, have you been humiliated or emotionally abused in other ways by your partner or ex-partner?: No   Housing Stability: Low Risk  (5/3/2024)    Housing Stability      Do you have housing? : Yes      Are you worried about losing your housing?: No         Thank you for allowing me to participate in the care of your patient.      Sincerely,     Kayleen Gomez PA-C     [facility]  cc:   Nica Workman PA-C  [address] [place] [address]

## 2024-11-03 ENCOUNTER — MYC REFILL (OUTPATIENT)
Dept: CARDIOLOGY | Facility: CLINIC | Age: 59
End: 2024-11-03
Payer: COMMERCIAL

## 2024-11-03 DIAGNOSIS — I10 ESSENTIAL HYPERTENSION: ICD-10-CM

## 2024-11-04 RX ORDER — SPIRONOLACTONE 25 MG/1
TABLET ORAL
Qty: 30 TABLET | Refills: 1 | Status: SHIPPED | OUTPATIENT
Start: 2024-11-04

## 2024-11-07 ENCOUNTER — OFFICE VISIT (OUTPATIENT)
Dept: FAMILY MEDICINE | Facility: CLINIC | Age: 59
End: 2024-11-07
Attending: PHYSICIAN ASSISTANT
Payer: COMMERCIAL

## 2024-11-07 VITALS
DIASTOLIC BLOOD PRESSURE: 74 MMHG | OXYGEN SATURATION: 97 % | BODY MASS INDEX: 55.41 KG/M2 | TEMPERATURE: 97.4 F | HEART RATE: 68 BPM | SYSTOLIC BLOOD PRESSURE: 126 MMHG | WEIGHT: 293 LBS | RESPIRATION RATE: 12 BRPM

## 2024-11-07 DIAGNOSIS — F33.0 MILD RECURRENT MAJOR DEPRESSION (H): ICD-10-CM

## 2024-11-07 DIAGNOSIS — Z23 NEED FOR COVID-19 VACCINE: ICD-10-CM

## 2024-11-07 DIAGNOSIS — Z23 NEED FOR PNEUMOCOCCAL VACCINE: ICD-10-CM

## 2024-11-07 DIAGNOSIS — Z29.11 NEED FOR RSV VACCINATION: ICD-10-CM

## 2024-11-07 DIAGNOSIS — N18.31 TYPE 2 DIABETES MELLITUS WITH STAGE 3A CHRONIC KIDNEY DISEASE, WITHOUT LONG-TERM CURRENT USE OF INSULIN (H): Primary | ICD-10-CM

## 2024-11-07 DIAGNOSIS — M62.838 MUSCLE SPASM: ICD-10-CM

## 2024-11-07 DIAGNOSIS — E11.22 TYPE 2 DIABETES MELLITUS WITH STAGE 3A CHRONIC KIDNEY DISEASE, WITHOUT LONG-TERM CURRENT USE OF INSULIN (H): Primary | ICD-10-CM

## 2024-11-07 DIAGNOSIS — N18.31 CHRONIC KIDNEY DISEASE, STAGE 3A (H): ICD-10-CM

## 2024-11-07 LAB
EST. AVERAGE GLUCOSE BLD GHB EST-MCNC: 137 MG/DL
HBA1C MFR BLD: 6.4 % (ref 0–5.6)

## 2024-11-07 PROCEDURE — 80048 BASIC METABOLIC PNL TOTAL CA: CPT | Performed by: PHYSICIAN ASSISTANT

## 2024-11-07 PROCEDURE — 83036 HEMOGLOBIN GLYCOSYLATED A1C: CPT | Performed by: PHYSICIAN ASSISTANT

## 2024-11-07 PROCEDURE — 91320 SARSCV2 VAC 30MCG TRS-SUC IM: CPT | Performed by: PHYSICIAN ASSISTANT

## 2024-11-07 PROCEDURE — 90678 RSV VACC PREF BIVALENT IM: CPT | Performed by: PHYSICIAN ASSISTANT

## 2024-11-07 PROCEDURE — 36415 COLL VENOUS BLD VENIPUNCTURE: CPT | Performed by: PHYSICIAN ASSISTANT

## 2024-11-07 PROCEDURE — 90480 ADMN SARSCOV2 VAC 1/ONLY CMP: CPT | Performed by: PHYSICIAN ASSISTANT

## 2024-11-07 PROCEDURE — 90472 IMMUNIZATION ADMIN EACH ADD: CPT | Performed by: PHYSICIAN ASSISTANT

## 2024-11-07 PROCEDURE — 90677 PCV20 VACCINE IM: CPT | Performed by: PHYSICIAN ASSISTANT

## 2024-11-07 PROCEDURE — 99214 OFFICE O/P EST MOD 30 MIN: CPT | Mod: 25 | Performed by: PHYSICIAN ASSISTANT

## 2024-11-07 PROCEDURE — 90471 IMMUNIZATION ADMIN: CPT | Performed by: PHYSICIAN ASSISTANT

## 2024-11-07 RX ORDER — TIZANIDINE 2 MG/1
2-4 TABLET ORAL
Qty: 180 TABLET | Refills: 1 | Status: SHIPPED | OUTPATIENT
Start: 2024-11-07

## 2024-11-07 ASSESSMENT — PAIN SCALES - GENERAL: PAINLEVEL_OUTOF10: NO PAIN (0)

## 2024-11-07 ASSESSMENT — PATIENT HEALTH QUESTIONNAIRE - PHQ9
SUM OF ALL RESPONSES TO PHQ QUESTIONS 1-9: 8
10. IF YOU CHECKED OFF ANY PROBLEMS, HOW DIFFICULT HAVE THESE PROBLEMS MADE IT FOR YOU TO DO YOUR WORK, TAKE CARE OF THINGS AT HOME, OR GET ALONG WITH OTHER PEOPLE: SOMEWHAT DIFFICULT
SUM OF ALL RESPONSES TO PHQ QUESTIONS 1-9: 8

## 2024-11-07 NOTE — PROGRESS NOTES
"  Assessment & Plan     Type 2 diabetes mellitus with stage 3a chronic kidney disease, without long-term current use of insulin (H)  Recheck A1c, off metformin x6 months, consider increasing trulicity based on results to aid in weight loss as well as continue to control blood sugars.   - HEMOGLOBIN A1C  - Basic metabolic panel  (Ca, Cl, CO2, Creat, Gluc, K, Na, BUN)    Chronic kidney disease, stage 3a (H)  Chronic, has been stable. Recheck today.  - Basic metabolic panel  (Ca, Cl, CO2, Creat, Gluc, K, Na, BUN)    Mild recurrent major depression (H)  Follows with psychiatry.    Muscle spasm  - refill tiZANidine (ZANAFLEX) 2 MG tablet  Dispense: 180 tablet; Refill: 1    Need for RSV vaccination  - RSV VACCINE (ABRYSVO)    Need for pneumococcal vaccine  - Pneumococcal 20 Valent Conjugate (Prevnar 20)    Need for COVID-19 vaccine  - COVID-19 12+ (PFIZER)      BMI  Estimated body mass index is 55.41 kg/m  as calculated from the following:    Height as of 9/17/24: 1.651 m (5' 5\").    Weight as of this encounter: 151 kg (333 lb).   Weight management plan: Discussed healthy diet and exercise guidelines    The longitudinal plan of care for the diagnosis(es)/condition(s) as documented were addressed during this visit. Due to the added complexity in care, I will continue to support Mary in the subsequent management and with ongoing continuity of care.     Nica Workman PA-C on 11/7/2024 at 3:18 PM      Adry Hernandez is a 59 year old, presenting for the following health issues:  Diabetes        11/7/2024     2:13 PM   Additional Questions   Roomed by Jose SCOTT     History of Present Illness       Reason for visit:  Diabetes follow up    She eats 0-1 servings of fruits and vegetables daily.She consumes 1 sweetened beverage(s) daily.She exercises with enough effort to increase her heart rate 9 or less minutes per day.  She exercises with enough effort to increase her heart rate 3 or less days per week.   She is taking " medications regularly.     Diabetes Follow-up    How often are you checking your blood sugar? A few times a month  What time of day are you checking your blood sugars (select all that apply)?  Before and after meals  Have you had any blood sugars above 200?  No  Have you had any blood sugars below 70?  No  What symptoms do you notice when your blood sugar is low?  None  What concerns do you have today about your diabetes? None   Do you have any of these symptoms? (Select all that apply)  No numbness or tingling in feet.  No redness, sores or blisters on feet.  No complaints of excessive thirst.  No reports of blurry vision.  No significant changes to weight.      BP Readings from Last 2 Encounters:   11/07/24 126/74   09/17/24 136/85     Hemoglobin A1C (%)   Date Value   05/03/2024 5.6   11/15/2023 5.9 (H)   06/28/2021 6.9 (H)   11/25/2019 6.4 (H)     LDL Cholesterol Calculated (mg/dL)   Date Value   05/03/2024 25   08/18/2023 40   06/28/2021 140 (H)   11/25/2019 120 (H)     How many days per week do you miss taking your medication? 0      Diabetes  Current management: trulicity   Discontinued metformin in May due to renal stress and improved A1c (5.6) with weight loss on trulicity   Hasn't been checking blood sugars   Did check on Monday and was 130    Wt Readings from Last 10 Encounters:   11/07/24 (!) 151 kg (333 lb)   09/17/24 (!) 152.4 kg (336 lb)   05/03/24 145.2 kg (320 lb)   01/19/24 145.1 kg (319 lb 12.8 oz)   01/17/24 (!) 153.3 kg (338 lb)   12/22/23 (!) 153.3 kg (338 lb)   12/12/23 (!) 153.4 kg (338 lb 1.6 oz)   12/07/23 (!) 153 kg (337 lb 6.4 oz)   12/01/23 (!) 151.2 kg (333 lb 5.4 oz)   11/15/23 (!) 152.9 kg (337 lb)      Left hand, 4th finger  Feels she needs to crack it but can't   Gets stuck and has to straighten it herself at times     Hypertension   Controlled     Hyperlipidemia   Continues on rosuvastatin       Objective    /74   Pulse 68   Temp 97.4  F (36.3  C) (Tympanic)   Resp 12    Wt (!) 151 kg (333 lb)   LMP 06/25/2019 (Approximate)   SpO2 97%   BMI 55.41 kg/m    Body mass index is 55.41 kg/m .  Physical Exam   GENERAL: alert and no distress  MS: no gross musculoskeletal defects noted, no edema  NEURO: Normal strength and tone, mentation intact and speech normal  PSYCH: mentation appears normal, affect normal/bright        Signed Electronically by: Nica Workman PA-C on 11/7/2024 at 2:22 PM

## 2024-11-08 LAB
ANION GAP SERPL CALCULATED.3IONS-SCNC: 12 MMOL/L (ref 7–15)
BUN SERPL-MCNC: 16.8 MG/DL (ref 8–23)
CALCIUM SERPL-MCNC: 9.8 MG/DL (ref 8.8–10.4)
CHLORIDE SERPL-SCNC: 107 MMOL/L (ref 98–107)
CREAT SERPL-MCNC: 1.17 MG/DL (ref 0.51–0.95)
EGFRCR SERPLBLD CKD-EPI 2021: 53 ML/MIN/1.73M2
GLUCOSE SERPL-MCNC: 129 MG/DL (ref 70–99)
HCO3 SERPL-SCNC: 22 MMOL/L (ref 22–29)
POTASSIUM SERPL-SCNC: 4.6 MMOL/L (ref 3.4–5.3)
SODIUM SERPL-SCNC: 141 MMOL/L (ref 135–145)

## 2024-11-08 NOTE — RESULT ENCOUNTER NOTE
Mary,    I have reviewed your lab results below:    - kidney function stable  - electrolytes normal  - A1c up a bit, but still considered well controlled. Let's increase your trulicity to 1.5 mg weekly - I sent in a new prescription for you. Then let's recheck in 6 months as discussed.     Please let me know if you have any further questions in the meantime    Take care,  Nica Workman PA-C on 11/8/2024 at 8:20 AM

## 2024-12-02 ENCOUNTER — MYC REFILL (OUTPATIENT)
Dept: CARDIOLOGY | Facility: CLINIC | Age: 59
End: 2024-12-02
Payer: COMMERCIAL

## 2024-12-02 DIAGNOSIS — I48.0 PAROXYSMAL ATRIAL FIBRILLATION (H): ICD-10-CM

## 2024-12-02 DIAGNOSIS — I48.20 CHRONIC ATRIAL FIBRILLATION (H): ICD-10-CM

## 2024-12-03 RX ORDER — APIXABAN 5 MG/1
5 TABLET, FILM COATED ORAL 2 TIMES DAILY
Qty: 180 TABLET | Refills: 3 | Status: SHIPPED | OUTPATIENT
Start: 2024-12-03

## 2024-12-03 RX ORDER — DILTIAZEM HYDROCHLORIDE 240 MG/1
240 CAPSULE, EXTENDED RELEASE ORAL DAILY
Qty: 90 CAPSULE | Refills: 3 | Status: SHIPPED | OUTPATIENT
Start: 2024-12-03

## 2024-12-17 ENCOUNTER — MYC REFILL (OUTPATIENT)
Dept: CARDIOLOGY | Facility: CLINIC | Age: 59
End: 2024-12-17
Payer: COMMERCIAL

## 2024-12-17 DIAGNOSIS — I10 ESSENTIAL HYPERTENSION, BENIGN: ICD-10-CM

## 2024-12-17 RX ORDER — METOPROLOL SUCCINATE 50 MG/1
50 TABLET, EXTENDED RELEASE ORAL 2 TIMES DAILY
Qty: 180 TABLET | Refills: 3 | Status: SHIPPED | OUTPATIENT
Start: 2024-12-17

## 2025-02-12 DIAGNOSIS — I10 ESSENTIAL HYPERTENSION: ICD-10-CM

## 2025-02-12 RX ORDER — SPIRONOLACTONE 25 MG/1
TABLET ORAL
Qty: 30 TABLET | Refills: 6 | Status: SHIPPED | OUTPATIENT
Start: 2025-02-12

## 2025-02-23 ENCOUNTER — HEALTH MAINTENANCE LETTER (OUTPATIENT)
Age: 60
End: 2025-02-23

## 2025-02-26 NOTE — TELEPHONE ENCOUNTER
Date of Service:  02/25/2025    SUBJECTIVE:  The patient was seen and examined and the chart was reviewed.  In summary, the patient is a very pleasant 46-year-old female.  She came to our clinic for evaluation of her excessive daytime sleepiness and snoring.  According to the patient, she dreams and there is some activity during her dreams.  She was also noted to have snoring.  She also feels tired during the daytime.  She tries not to take a nap.  She usually goes to bed at 10 p.m.  She gets out of bed 6:30 in the morning, but very tired, exhausted, and always uses alarm clock.  The patient denies nausea or vomiting.  No headache.  No blurred vision.  She also carries a diagnosis of vocal cord dysfunction and she has seen Ear, Nose, and Throat.  The patient is a nonsmoker.  She does not have any pets; however, she does have some environmental allergens.  The patient had a spirometry done at [facility].  This testing was done in October of 2024.  The patient's forced vital capacity was 3.79 L, which was 92% of predicted and FEV1 was 2.91 L, which was 89% of predicted with a normal ratio.  The patient also carries the diagnosis of moderate persistent asthma, which is fairly controlled as she does not have any symptoms.  She does have environmental allergens as described above.  The patient had a CT scan of the chest dated 09/23/2024.  Incidentally noted breast nodule and left adrenal nodule.  There is no acute abnormality in the chest.  The results were discussed with the patient.  The patient denies any history of unconsciousness, unresponsiveness, or loss of muscle tone.  She also denies any history of sudden sleep attacks.    MEDICATION LIST:  Reviewed, marked in the Epic.    Partial list of the patient's medications includes acyclovir, albuterol, Pulmicort, Rhinocort, dexamethasone, Allegra, ibuprofen, Mobic, Singulair, multivitamin, naproxen, nifedipine, hormone therapy, omeprazole,  Message from [facility]:  Original authorizing provider: Nicole Joy Siegler, PA-C Diane Carol Solberg would like a refill of the following medications:  fluticasone (FLONASE) 50 MCG/ACT nasal spray [Nicole Joy Siegler, PA-C]    Preferred pharmacy: [facility] - [place], [place] - [address]    Comment:     probiotic.    ALLERGIES:  Reviewed and marked in the Epic.    PHYSICAL EXAMINATION:  VITAL SIGNS:  At the time of my evaluation, the patient's blood pressure is 128/86 with a pulse of 91 beats per minute, respiratory rate is 18 breaths per minute, oxygen saturation is 98% at rest on room air.  LUNGS:  Breath sounds are diminished, but equal bilaterally.  No significant wheezing or crackles.  HEART:  Sounds S1, S2 normal with regular rate and rhythm.  No murmur, gallop, or rub.  ABDOMEN:  Soft and nontender.  No guarding, rigidity, or rebound.  EXTREMITIES:  Show no edema or muscle tenderness.  NECK:  Supple.  Trachea is central.  NEUROLOGICAL:  The patient is fully awake, alert, oriented in time, place, and person.  Insight, judgment, and cognition seem fair.    IMPRESSION:  1. Excessive daytime sleepiness.  2. Snoring and hypersomnia.  3. Moderate persistent asthma.  4. Vocal cord dysfunction.  5. Environmental allergens.    PLAN:  Plan of care is as follows:  1. Polysomnogram has been scheduled.  2. Sleep hygiene issues discussed with the patient.  3. Continue with speech therapy for vocal cord dysfunction.  4. Continue with inhalers as directed.  5. Questions were all answered.  We will re-evaluate the patient in about 3 months with polysomnogram results.        Dictated By:  Haily Baxter MD  Signing Provider:  MD EDGAR Thompson/AQT  D:  02/25/2025 01:27:57 PM  T:  02/25/2025 07:20:04 PM  Job:  [id number]

## 2025-04-08 ENCOUNTER — TRANSFERRED RECORDS (OUTPATIENT)
Dept: HEALTH INFORMATION MANAGEMENT | Facility: CLINIC | Age: 60
End: 2025-04-08
Payer: COMMERCIAL

## 2025-04-08 LAB — RETINOPATHY: NEGATIVE

## 2025-05-06 SDOH — HEALTH STABILITY: PHYSICAL HEALTH: ON AVERAGE, HOW MANY MINUTES DO YOU ENGAGE IN EXERCISE AT THIS LEVEL?: 0 MIN

## 2025-05-06 SDOH — HEALTH STABILITY: PHYSICAL HEALTH: ON AVERAGE, HOW MANY DAYS PER WEEK DO YOU ENGAGE IN MODERATE TO STRENUOUS EXERCISE (LIKE A BRISK WALK)?: 0 DAYS

## 2025-05-06 ASSESSMENT — SOCIAL DETERMINANTS OF HEALTH (SDOH): HOW OFTEN DO YOU GET TOGETHER WITH FRIENDS OR RELATIVES?: ONCE A WEEK

## 2025-05-08 ENCOUNTER — OFFICE VISIT (OUTPATIENT)
Dept: FAMILY MEDICINE | Facility: CLINIC | Age: 60
End: 2025-05-08
Attending: PHYSICIAN ASSISTANT
Payer: COMMERCIAL

## 2025-05-08 VITALS
OXYGEN SATURATION: 96 % | HEART RATE: 87 BPM | HEIGHT: 64 IN | DIASTOLIC BLOOD PRESSURE: 60 MMHG | BODY MASS INDEX: 50.02 KG/M2 | WEIGHT: 293 LBS | RESPIRATION RATE: 22 BRPM | SYSTOLIC BLOOD PRESSURE: 106 MMHG | TEMPERATURE: 97.8 F

## 2025-05-08 DIAGNOSIS — E66.01 MORBID OBESITY DUE TO EXCESS CALORIES (H): ICD-10-CM

## 2025-05-08 DIAGNOSIS — I48.21 PERMANENT ATRIAL FIBRILLATION (H): ICD-10-CM

## 2025-05-08 DIAGNOSIS — Z12.31 VISIT FOR SCREENING MAMMOGRAM: ICD-10-CM

## 2025-05-08 DIAGNOSIS — G47.9 DIFFICULTY SLEEPING: ICD-10-CM

## 2025-05-08 DIAGNOSIS — F33.0 MILD RECURRENT MAJOR DEPRESSION: ICD-10-CM

## 2025-05-08 DIAGNOSIS — Z00.00 ROUTINE GENERAL MEDICAL EXAMINATION AT A HEALTH CARE FACILITY: Primary | ICD-10-CM

## 2025-05-08 DIAGNOSIS — E11.22 TYPE 2 DIABETES MELLITUS WITH STAGE 3A CHRONIC KIDNEY DISEASE, WITHOUT LONG-TERM CURRENT USE OF INSULIN (H): ICD-10-CM

## 2025-05-08 DIAGNOSIS — N18.31 CHRONIC KIDNEY DISEASE, STAGE 3A (H): ICD-10-CM

## 2025-05-08 DIAGNOSIS — D22.9 MULTIPLE ATYPICAL SKIN MOLES: ICD-10-CM

## 2025-05-08 DIAGNOSIS — Z12.11 SCREEN FOR COLON CANCER: ICD-10-CM

## 2025-05-08 DIAGNOSIS — I10 HYPERTENSION GOAL BP (BLOOD PRESSURE) < 140/90: ICD-10-CM

## 2025-05-08 DIAGNOSIS — M62.838 MUSCLE SPASM: ICD-10-CM

## 2025-05-08 DIAGNOSIS — E78.5 HYPERLIPIDEMIA LDL GOAL <70: ICD-10-CM

## 2025-05-08 DIAGNOSIS — N18.31 TYPE 2 DIABETES MELLITUS WITH STAGE 3A CHRONIC KIDNEY DISEASE, WITHOUT LONG-TERM CURRENT USE OF INSULIN (H): ICD-10-CM

## 2025-05-08 LAB
ALBUMIN SERPL BCG-MCNC: 4.1 G/DL (ref 3.5–5.2)
ALBUMIN UR-MCNC: NEGATIVE MG/DL
ALP SERPL-CCNC: 144 U/L (ref 40–150)
ALT SERPL W P-5'-P-CCNC: 27 U/L (ref 0–50)
ANION GAP SERPL CALCULATED.3IONS-SCNC: 11 MMOL/L (ref 7–15)
APPEARANCE UR: CLEAR
AST SERPL W P-5'-P-CCNC: 35 U/L (ref 0–45)
BILIRUB SERPL-MCNC: 0.5 MG/DL
BILIRUB UR QL STRIP: NEGATIVE
BUN SERPL-MCNC: 16.9 MG/DL (ref 8–23)
CALCIUM SERPL-MCNC: 9.7 MG/DL (ref 8.8–10.4)
CHLORIDE SERPL-SCNC: 104 MMOL/L (ref 98–107)
CHOLEST SERPL-MCNC: 208 MG/DL
COLOR UR AUTO: YELLOW
CREAT SERPL-MCNC: 1.29 MG/DL (ref 0.51–0.95)
CREAT UR-MCNC: 171 MG/DL
EGFRCR SERPLBLD CKD-EPI 2021: 47 ML/MIN/1.73M2
ERYTHROCYTE [DISTWIDTH] IN BLOOD BY AUTOMATED COUNT: 13.9 % (ref 10–15)
EST. AVERAGE GLUCOSE BLD GHB EST-MCNC: 134 MG/DL
FASTING STATUS PATIENT QL REPORTED: YES
FASTING STATUS PATIENT QL REPORTED: YES
GLUCOSE SERPL-MCNC: 108 MG/DL (ref 70–99)
GLUCOSE UR STRIP-MCNC: NEGATIVE MG/DL
HBA1C MFR BLD: 6.3 % (ref 0–5.6)
HCO3 SERPL-SCNC: 23 MMOL/L (ref 22–29)
HCT VFR BLD AUTO: 44.4 % (ref 35–47)
HDLC SERPL-MCNC: 45 MG/DL
HGB BLD-MCNC: 14.9 G/DL (ref 11.7–15.7)
HGB UR QL STRIP: NEGATIVE
KETONES UR STRIP-MCNC: NEGATIVE MG/DL
LDLC SERPL CALC-MCNC: 134 MG/DL
LEUKOCYTE ESTERASE UR QL STRIP: NEGATIVE
MCH RBC QN AUTO: 29.3 PG (ref 26.5–33)
MCHC RBC AUTO-ENTMCNC: 33.6 G/DL (ref 31.5–36.5)
MCV RBC AUTO: 87 FL (ref 78–100)
MICROALBUMIN UR-MCNC: <12 MG/L
MICROALBUMIN/CREAT UR: NORMAL MG/G{CREAT}
NITRATE UR QL: NEGATIVE
NONHDLC SERPL-MCNC: 163 MG/DL
PH UR STRIP: 5.5 [PH] (ref 5–7)
PLATELET # BLD AUTO: 183 10E3/UL (ref 150–450)
POTASSIUM SERPL-SCNC: 4.9 MMOL/L (ref 3.4–5.3)
PROT SERPL-MCNC: 7.6 G/DL (ref 6.4–8.3)
RBC # BLD AUTO: 5.08 10E6/UL (ref 3.8–5.2)
SODIUM SERPL-SCNC: 138 MMOL/L (ref 135–145)
SP GR UR STRIP: 1.02 (ref 1–1.03)
TRIGL SERPL-MCNC: 144 MG/DL
UROBILINOGEN UR STRIP-ACNC: 0.2 E.U./DL
WBC # BLD AUTO: 7.2 10E3/UL (ref 4–11)

## 2025-05-08 RX ORDER — TRAZODONE HYDROCHLORIDE 100 MG/1
100 TABLET ORAL AT BEDTIME
Qty: 90 TABLET | Refills: 3 | Status: SHIPPED | OUTPATIENT
Start: 2025-05-08

## 2025-05-08 RX ORDER — TIZANIDINE 2 MG/1
2-4 TABLET ORAL
Qty: 180 TABLET | Refills: 1 | Status: SHIPPED | OUTPATIENT
Start: 2025-05-08

## 2025-05-08 RX ORDER — ROSUVASTATIN CALCIUM 10 MG/1
10 TABLET, COATED ORAL DAILY
Qty: 90 TABLET | Refills: 3 | Status: SHIPPED | OUTPATIENT
Start: 2025-05-08

## 2025-05-08 RX ORDER — FOSINOPRIL SODIUM 10 MG/1
10 TABLET ORAL DAILY
Qty: 90 TABLET | Refills: 3 | Status: SHIPPED | OUTPATIENT
Start: 2025-05-08

## 2025-05-08 ASSESSMENT — PATIENT HEALTH QUESTIONNAIRE - PHQ9
SUM OF ALL RESPONSES TO PHQ QUESTIONS 1-9: 5
SUM OF ALL RESPONSES TO PHQ QUESTIONS 1-9: 5
10. IF YOU CHECKED OFF ANY PROBLEMS, HOW DIFFICULT HAVE THESE PROBLEMS MADE IT FOR YOU TO DO YOUR WORK, TAKE CARE OF THINGS AT HOME, OR GET ALONG WITH OTHER PEOPLE: SOMEWHAT DIFFICULT

## 2025-05-08 ASSESSMENT — PAIN SCALES - GENERAL: PAINLEVEL_OUTOF10: MILD PAIN (1)

## 2025-05-08 NOTE — PATIENT INSTRUCTIONS
Switch trulicity to mounjaro - 2.5 mg weekly x4 weeks then 5 mg weekly     Follow up in 3 months for recheck on weight       Patient Education   Preventive Care Advice   This is general advice given by our system to help you stay healthy. However, your care team may have specific advice just for you. Please talk to your care team about your preventive care needs.  Nutrition  Eat 5 or more servings of fruits and vegetables each day.  Try wheat bread, brown rice and whole grain pasta (instead of white bread, rice, and pasta).  Get enough calcium and vitamin D. Check the label on foods and aim for 100% of the RDA (recommended daily allowance).  Lifestyle  Exercise at least 150 minutes each week  (30 minutes a day, 5 days a week).  Do muscle strengthening activities 2 days a week. These help control your weight and prevent disease.  No smoking.  Wear sunscreen to prevent skin cancer.  Have a dental exam and cleaning every 6 months.  Yearly exams  See your health care team every year to talk about:  Any changes in your health.  Any medicines your care team has prescribed.  Preventive care, family planning, and ways to prevent chronic diseases.  Shots (vaccines)   HPV shots (up to age 26), if you've never had them before.  Hepatitis B shots (up to age 59), if you've never had them before.  COVID-19 shot: Get this shot when it's due.  Flu shot: Get a flu shot every year.  Tetanus shot: Get a tetanus shot every 10 years.  Pneumococcal, hepatitis A, and RSV shots: Ask your care team if you need these based on your risk.  Shingles shot (for age 50 and up)  General health tests  Diabetes screening:  Starting at age 35, Get screened for diabetes at least every 3 years.  If you are younger than age 35, ask your care team if you should be screened for diabetes.  Cholesterol test: At age 39, start having a cholesterol test every 5 years, or more often if advised.  Bone density scan (DEXA): At age 50, ask your care team if you  should have this scan for osteoporosis (brittle bones).  Hepatitis C: Get tested at least once in your life.  STIs (sexually transmitted infections)  Before age 24: Ask your care team if you should be screened for STIs.  After age 24: Get screened for STIs if you're at risk. You are at risk for STIs (including HIV) if:  You are sexually active with more than one person.  You don't use condoms every time.  You or a partner was diagnosed with a sexually transmitted infection.  If you are at risk for HIV, ask about PrEP medicine to prevent HIV.  Get tested for HIV at least once in your life, whether you are at risk for HIV or not.  Cancer screening tests  Cervical cancer screening: If you have a cervix, begin getting regular cervical cancer screening tests starting at age 21.  Breast cancer scan (mammogram): If you've ever had breasts, begin having regular mammograms starting at age 40. This is a scan to check for breast cancer.  Colon cancer screening: It is important to start screening for colon cancer at age 45.  Have a colonoscopy test every 10 years (or more often if you're at risk) Or, ask your provider about stool tests like a FIT test every year or Cologuard test every 3 years.  To learn more about your testing options, visit:   .  For help making a decision, visit:   https://bit.ly/al98494.  Prostate cancer screening test: If you have a prostate, ask your care team if a prostate cancer screening test (PSA) at age 55 is right for you.  Lung cancer screening: If you are a current or former smoker ages 50 to 80, ask your care team if ongoing lung cancer screenings are right for you.  For informational purposes only. Not to replace the advice of your health care provider. Copyright   2023 [employer]. All rights reserved. Clinically reviewed by the [facility]itions [facility]. [employer] [id number] - REV 01/24.  Preventing Falls: Care Instructions  Injuries and health problems such as trouble  walking or poor eyesight can increase your risk of falling. So can some medicines. But there are things you can do to help prevent falls. You can exercise to get stronger. You can also arrange your home to make it safer.    Talk to your doctor about the medicines you take. Ask if any of them increase the risk of falls and whether they can be changed or stopped.   Try to exercise regularly. It can help improve your strength and balance. This can help lower your risk of falling.         Practice fall safety and prevention.   Wear low-heeled shoes that fit well and give your feet good support. Talk to your doctor if you have foot problems that make this hard.  Carry a cellphone or wear a medical alert device that you can use to call for help.  Use stepladders instead of chairs to reach high objects. Don't climb if you're at risk for falls. Ask for help, if needed.  Wear the correct eyeglasses, if you need them.        Make your home safer.   Remove rugs, cords, clutter, and furniture from walkways.  Keep your house well lit. Use night-lights in hallways and bathrooms.  Install and use sturdy handrails on stairways.  Wear nonskid footwear, even inside. Don't walk barefoot or in socks without shoes.        Be safe outside.   Use handrails, curb cuts, and ramps whenever possible.  Keep your hands free by using a shoulder bag or backpack.  Try to walk in well-lit areas. Watch out for uneven ground, changes in pavement, and debris.  Be careful in the winter. Walk on the grass or gravel when sidewalks are slippery. Use de-icer on steps and walkways. Add non-slip devices to shoes.    Put grab bars and nonskid mats in your shower or tub and near the toilet. Try to use a shower chair or bath bench when bathing.   Get into a tub or shower by putting in your weaker leg first. Get out with your strong side first. Have a phone or medical alert device in the bathroom with you.   Where can you learn more?  Go to  "https://www.[employer].net/patiented  Enter G117 in the search box to learn more about \"Preventing Falls: Care Instructions.\"  Current as of: July 31, 2024  Content Version: 14.4 2024-2025 [employer].   Care instructions adapted under license by your healthcare professional. If you have questions about a medical condition or this instruction, always ask your healthcare professional. [employer] disclaims any warranty or liability for your use of this information.    Learning About Stress  What is stress?     Stress is your body's response to a hard situation. Your body can have a physical, emotional, or mental response. Stress is a fact of life for most people, and it affects everyone differently. What causes stress for you may not be stressful for someone else.  A lot of things can cause stress. You may feel stress when you go on a job interview, take a test, or run a race. This kind of short-term stress is normal and even useful. It can help you if you need to work hard or react quickly. For example, stress can help you finish an important job on time.  Long-term stress is caused by ongoing stressful situations or events. Examples of long-term stress include long-term health problems, ongoing problems at work, or conflicts in your family. Long-term stress can harm your health.  How does stress affect your health?  When you are stressed, your body responds as though you are in danger. It makes hormones that speed up your heart, make you breathe faster, and give you a burst of energy. This is called the fight-or-flight stress response. If the stress is over quickly, your body goes back to normal and no harm is done.  But if stress happens too often or lasts too long, it can have bad effects. Long-term stress can make you more likely to get sick, and it can make symptoms of some diseases worse. If you tense up when you are stressed, you may develop neck, shoulder, or low back pain. Stress is " linked to high blood pressure and heart disease.  Stress also harms your emotional health. It can make you anaya, tense, or depressed. Your relationships may suffer, and you may not do well at work or school.  What can you do to manage stress?  You can try these things to help manage stress:   Do something active. Exercise or activity can help reduce stress. Walking is a great way to get started. Even everyday activities such as housecleaning or yard work can help.  Try yoga or guerita chi. These techniques combine exercise and meditation. You may need some training at first to learn them.  Do something you enjoy. For example, listen to music or go to a movie. Practice your hobby or do volunteer work.  Meditate. This can help you relax, because you are not worrying about what happened before or what may happen in the future.  Do guided imagery. Imagine yourself in any setting that helps you feel calm. You can use online videos, books, or a teacher to guide you.  Do breathing exercises. For example:  From a standing position, bend forward from the waist with your knees slightly bent. Let your arms dangle close to the floor.  Breathe in slowly and deeply as you return to a standing position. Roll up slowly and lift your head last.  Hold your breath for just a few seconds in the standing position.  Breathe out slowly and bend forward from the waist.  Let your feelings out. Talk, laugh, cry, and express anger when you need to. Talking with supportive friends or family, a counselor, or a frankie leader about your feelings is a healthy way to relieve stress. Avoid discussing your feelings with people who make you feel worse.  Write. It may help to write about things that are bothering you. This helps you find out how much stress you feel and what is causing it. When you know this, you can find better ways to cope.  What can you do to prevent stress?  You might try some of these things to help prevent stress:  Manage your time.  "This helps you find time to do the things you want and need to do.  Get enough sleep. Your body recovers from the stresses of the day while you are sleeping.  Get support. Your family, friends, and community can make a difference in how you experience stress.  Limit your news feed. Avoid or limit time on social media or news that may make you feel stressed.  Do something active. Exercise or activity can help reduce stress. Walking is a great way to get started.  Where can you learn more?  Go to https://www.[employer].net/patiented  Enter N032 in the search box to learn more about \"Learning About Stress.\"  Current as of: October 24, 2024  Content Version: 14.4 2024-2025 [employer].   Care instructions adapted under license by your healthcare professional. If you have questions about a medical condition or this instruction, always ask your healthcare professional. [employer] disclaims any warranty or liability for your use of this information.    Learning About Depression Screening  What is depression screening?  Depression screening is a way to see if you have depression symptoms. It may be done by a doctor or counselor. It's often part of a routine checkup. That's because your mental health is just as important as your physical health.  Depression is a mental health condition that affects how you feel, think, and act. You may:  Have less energy.  Lose interest in your daily activities.  Feel sad and grouchy for a long time.  Depression is very common. It affects people of all ages.  Many things can lead to depression. Some people become depressed after they have a stroke or find out they have a major illness like cancer or heart disease. The death of a loved one or a breakup may lead to depression. It can run in families. Most experts believe that a combination of inherited genes and stressful life events can cause it.  What happens during screening?  You may be asked to fill out a form " "about your depression symptoms. You and the doctor will discuss your answers. The doctor may ask you more questions to learn more about how you think, act, and feel.  What happens after screening?  If you have symptoms of depression, your doctor will talk to you about your options.  Doctors usually treat depression with medicines or counseling. Often, combining the two works best. Many people don't get help because they think that they'll get over the depression on their own. But people with depression may not get better unless they get treatment.  The cause of depression is not well understood. There may be many factors involved. But if you have depression, it's not your fault.  A serious symptom of depression is thinking about death or suicide. If you or someone you care about talks about this or about feeling hopeless, get help right away.  It's important to know that depression can be treated. Medicine, counseling, and self-care may help.  Where can you learn more?  Go to https://www.[employer].net/patiented  Enter T185 in the search box to learn more about \"Learning About Depression Screening.\"  Current as of: July 31, 2024  Content Version: 14.4    1037-0549 [employer].   Care instructions adapted under license by your healthcare professional. If you have questions about a medical condition or this instruction, always ask your healthcare professional. [employer] disclaims any warranty or liability for your use of this information.       "

## 2025-05-08 NOTE — PROGRESS NOTES
Preventive Care Visit  [facility] BRITTANY Workman PA-C, Physician Assistant - Medical  May 8, 2025    Assessment & Plan     Routine general medical examination at a health care facility  - Lipid panel reflex to direct LDL Fasting  - Comprehensive metabolic panel (BMP + Alb, Alk Phos, ALT, AST, Total. Bili, TP)  - CBC with platelets    Screen for colon cancer  - Colonoscopy Screening  Referral    Visit for screening mammogram  - MA Screening Bilateral w/ Alber    Type 2 diabetes mellitus with stage 3a chronic kidney disease, without long-term current use of insulin (H)  Chronic. Well managed. Weight is up and limiting mobility/endurance. Will switch trulicity to mounjaro to help with weight loss while continuing to manage diabetes.   - tirzepatide (MOUNJARO) 2.5 MG/0.5ML SOAJ auto-injector pen  Dispense: 2 mL; Refill: 0  - tirzepatide (MOUNJARO) 5 MG/0.5ML SOAJ auto-injector pen  Dispense: 2 mL; Refill: 1  - HEMOGLOBIN A1C  - Lipid panel reflex to direct LDL Fasting  - Albumin Random Urine Quantitative with Creat Ratio  - Comprehensive metabolic panel (BMP + Alb, Alk Phos, ALT, AST, Total. Bili, TP)    Morbid obesity due to excess calories (H)  Plan as noted above. Discussed that weight loss will be helpful for other comorbid conditions including diabetes, hypertension, hyperlipidemia.     Hyperlipidemia LDL goal <70  Off statin for a couple of months. Recheck lipids today.   - rosuvastatin (CRESTOR) 10 MG tablet  Dispense: 90 tablet; Refill: 3    Chronic kidney disease, stage 3a (H)  Chronic. Has been stable. Recheck labs today   - Albumin Random Urine Quantitative with Creat Ratio  - UA Macroscopic with reflex to Microscopic and Culture  - Comprehensive metabolic panel (BMP + Alb, Alk Phos, ALT, AST, Total. Bili, TP)    Hypertension goal BP (blood pressure) < 140/90  Chronic. Well managed. No change in management.   - fosinopril (MONOPRIL) 10 MG tablet  Dispense: 90 tablet;  "Refill: 3    Multiple atypical skin moles  Several abnormal appearing moles on back. Will refer to derm for further evaluation.   - Adult Dermatology  Referral    Muscle spasm  Stable. Uses tizanidine as needed which provides relief.   - tiZANidine (ZANAFLEX) 2 MG tablet  Dispense: 180 tablet; Refill: 1    Difficulty sleeping  Stable on trazodone.   - traZODone (DESYREL) 100 MG tablet  Dispense: 90 tablet; Refill: 3    Mild recurrent major depression  Follows with psychiatry. Stable.    Permanent atrial fibrillation (H)  Follows on with cardiology. Stable.         BMI  Estimated body mass index is 59.58 kg/m  as calculated from the following:    Height as of this encounter: 1.63 m (5' 4.17\").    Weight as of this encounter: 158.3 kg (349 lb).   Weight management plan: Discussed healthy diet and exercise guidelines and switch GLP1    Counseling  Appropriate preventive services were addressed with this patient via screening, questionnaire, or discussion as appropriate for fall prevention, nutrition, physical activity, Tobacco-use cessation, social engagement, weight loss and cognition.  Checklist reviewing preventive services available has been given to the patient.  Reviewed patient's diet, addressing concerns and/or questions.   She is at risk for psychosocial distress and has been provided with information to reduce risk.   The patient's PHQ-9 score is consistent with mild depression. She was provided with information regarding depression.     The longitudinal plan of care for the diagnosis(es)/condition(s) as documented were addressed during this visit. Due to the added complexity in care, I will continue to support Mary in the subsequent management and with ongoing continuity of care.     Nica Wormkan PA-C on 5/8/2025 at 1:06 PM      Subjective   Mary is a 60 year old, presenting for the following:  Physical        5/8/2025    12:48 PM   Additional Questions   Roomed by Jose SCOTT"       HPI      Less walking lately  Weight is up  Endurance is down    Off statin for a few months    Diabetes Follow-up    How often are you checking your blood sugar? Not at all  What concerns do you have today about your diabetes? None   Do you have any of these symptoms? (Select all that apply)  Numbness in feet      BP Readings from Last 2 Encounters:   05/08/25 106/60   11/07/24 126/74     Hemoglobin A1C (%)   Date Value   05/08/2025 6.3 (H)   11/07/2024 6.4 (H)   06/28/2021 6.9 (H)   11/25/2019 6.4 (H)     LDL Cholesterol Calculated (mg/dL)   Date Value   05/03/2024 25   08/18/2023 40   06/28/2021 140 (H)   11/25/2019 120 (H)       Hyperlipidemia Follow-Up    Are you regularly taking any medication or supplement to lower your cholesterol?   No Rosuvastatin - has been out of it for a few months   Are you having muscle aches or other side effects that you think could be caused by your cholesterol lowering medication?  No    Wt Readings from Last 5 Encounters:   05/08/25 (!) 158.3 kg (349 lb)   11/07/24 (!) 151 kg (333 lb)   09/17/24 (!) 152.4 kg (336 lb)   05/03/24 145.2 kg (320 lb)   01/19/24 145.1 kg (319 lb 12.8 oz)        Advance Care Planning  Document on file is a Health Care Directive or POLST.        5/6/2025   General Health   How would you rate your overall physical health? (!) FAIR   Feel stress (tense, anxious, or unable to sleep) To some extent   (!) STRESS CONCERN      5/6/2025   Nutrition   Three or more servings of calcium each day? (!) NO   Diet: Regular (no restrictions)   How many servings of fruit and vegetables per day? (!) 0-1   How many sweetened beverages each day? 0-1         5/6/2025   Exercise   Days per week of moderate/strenous exercise 0 days   Average minutes spent exercising at this level 0 min   (!) EXERCISE CONCERN      5/6/2025   Social Factors   Frequency of gathering with friends or relatives Once a week   Worry food won't last until get money to buy more No   Food not last  or not have enough money for food? No   Do you have housing? (Housing is defined as stable permanent housing and does not include staying outside in a car, in a tent, in an abandoned building, in an overnight shelter, or couch-surfing.) Yes   Are you worried about losing your housing? No   Lack of transportation? No   Unable to get utilities (heat,electricity)? No         5/8/2025   Fall Risk   Gait Speed Test (Document in seconds) 4.06   Gait Speed Test Interpretation Less than or equal to 5.00 seconds - PASS          5/6/2025   Dental   Dentist two times every year? Yes       Today's PHQ-9 Score:       5/8/2025    12:41 PM   PHQ-9 SCORE   PHQ-9 Total Score MyChart 5 (Mild depression)   PHQ-9 Total Score 5        Patient-reported         5/6/2025   Substance Use   Alcohol more than 3/day or more than 7/wk Not Applicable   Do you use any other substances recreationally? (!) PRESCRIPTION DRUGS     Social History     Tobacco Use    Smoking status: Never     Passive exposure: Never    Smokeless tobacco: Never   Vaping Use    Vaping status: Never Used   Substance Use Topics    Alcohol use: Not Currently     Comment: once in a while    Drug use: No        Mammogram Screening - Mammogram every 1-2 years updated in Health Maintenance based on mutual decision making        5/6/2025   STI Screening   New sexual partner(s) since last STI/HIV test? No     History of abnormal Pap smear: No - age 30- 64 PAP with HPV every 5 years recommended        Latest Ref Rng & Units 7/19/2022     2:24 PM 10/17/2017     9:37 AM 10/17/2017     9:15 AM   PAP / HPV   PAP  Negative for Intraepithelial Lesion or Malignancy (NILM)      PAP (Historical)    NIL    HPV 16 DNA Negative Negative  Negative     HPV 18 DNA Negative Negative  Negative     Other HR HPV Negative Negative  Negative       ASCVD Risk   The ASCVD Risk score (Beti SAMUELS, et al., 2019) failed to calculate for the following reasons:    The valid total cholesterol range is  130 to 320 mg/dL    Reviewed and updated as needed this visit by Provider   Tobacco  Allergies  Meds  Problems  Med Hx  Surg Hx  Fam Hx            Past Medical History:   Diagnosis Date    Arthritis Nov 2019    Atrial fibrillation (H)     COPD (chronic obstructive pulmonary disease) (H)     colds turn to bronchitis easily    Coronary artery disease 2008    afib    Depression     Depressive disorder     in chart    Diabetes (H)     GERD (gastroesophageal reflux disease)     HTN (hypertension)     Hyperlipidemia     Obese     BALTA (obstructive sleep apnea)     uses CPAP    Permanent atrial fibrillation (H) 06/05/2011    Uncomplicated asthma     mentioned by urgent care md     Past Surgical History:   Procedure Laterality Date    ABDOMEN SURGERY      in chart    ARTHROPLASTY KNEE Left 01/20/2022    Procedure: ARTHROPLASTY, LEFT KNEE, TOTAL;  Surgeon: Armand Martin MD;  Location: [facility] OR    ARTHROPLASTY KNEE Right 12/1/2023    Procedure: ARTHROPLASTY, RIGHT KNEE, TOTAL;  Surgeon: Armand Martin MD;  Location: [facility] OR    BIOPSY      skin and uterine lining    CARDIOVERSION  06/07/2011    CHOLECYSTECTOMY      COLONOSCOPY  2013    in chart    DILATION AND CURETTAGE  04/26/2012    Procedure:DILATION AND CURETTAGE; DILATION AND CURETTAGE; Surgeon:JEAN-PAUL DEL CID; Location:[facility]    DILATION AND CURETTAGE, HYSTEROSCOPY DIAGNOSTIC, COMBINED  12/08/2011    Procedure:COMBINED DILATION AND CURETTAGE, HYSTEROSCOPY DIAGNOSTIC; DILATION AND CURETTAGE, DIAGNOSTIC HYSTEROSCOPY ; Surgeon:JEAN-PAUL DEL CID; Location:[facility]    KNEE SURGERY      REMOVE HARDWARE LOWER EXTREMITY Left 01/20/2022    Procedure: REMOVAL, HARDWARE, LEFT LOWER EXTREMITY;  Surgeon: Armand Martin MD;  Location: [facility] OR         Review of Systems  Constitutional, HEENT, cardiovascular, pulmonary, GI, , musculoskeletal, neuro, skin, endocrine and psych systems are negative, except as otherwise noted.     Objective    Exam  /60   Pulse 87   Temp 97.8  F (36.6  " C) (Tympanic)   Resp 22   Ht 1.63 m (5' 4.17\")   Wt (!) 158.3 kg (349 lb)   LMP 06/25/2019 (Approximate)   SpO2 96%   BMI 59.58 kg/m     Estimated body mass index is 59.58 kg/m  as calculated from the following:    Height as of this encounter: 1.63 m (5' 4.17\").    Weight as of this encounter: 158.3 kg (349 lb).    Physical Exam  GENERAL: alert and no distress  EYES: Eyes grossly normal to inspection, PERRL and conjunctivae and sclerae normal  HENT: right ear canal - occluded with wax, left ear canal and TM normal, nose and mouth without ulcers or lesions  NECK: no adenopathy, no asymmetry, masses, or scars  RESP: lungs clear to auscultation - no rales, rhonchi or wheezes  BREAST: normal without masses, tenderness or nipple discharge and no palpable axillary masses or adenopathy  CV: irregular rhythm, no murmur heard  ABDOMEN: soft, nontender, no hepatosplenomegaly, no masses and bowel sounds normal  MS: no gross musculoskeletal defects noted, no edema  SKIN: several irregularly shaped and colored moles on back   NEURO: Normal strength and tone, mentation intact and speech normal  PSYCH: mentation appears normal, affect normal/bright        Signed Electronically by: Nica Workman PA-C    "

## 2025-05-09 ENCOUNTER — RESULTS FOLLOW-UP (OUTPATIENT)
Dept: FAMILY MEDICINE | Facility: CLINIC | Age: 60
End: 2025-05-09

## 2025-07-18 ENCOUNTER — ANCILLARY PROCEDURE (OUTPATIENT)
Dept: MAMMOGRAPHY | Facility: CLINIC | Age: 60
End: 2025-07-18
Attending: PHYSICIAN ASSISTANT
Payer: COMMERCIAL

## 2025-07-18 DIAGNOSIS — Z12.31 VISIT FOR SCREENING MAMMOGRAM: ICD-10-CM

## 2025-07-18 PROCEDURE — 77063 BREAST TOMOSYNTHESIS BI: CPT | Mod: TC | Performed by: RADIOLOGY

## 2025-07-18 PROCEDURE — 77067 SCR MAMMO BI INCL CAD: CPT | Mod: TC | Performed by: RADIOLOGY

## 2025-07-22 ENCOUNTER — HOSPITAL ENCOUNTER (OUTPATIENT)
Dept: MAMMOGRAPHY | Facility: CLINIC | Age: 60
Discharge: HOME | End: 2025-07-22
Attending: PHYSICIAN ASSISTANT
Payer: COMMERCIAL

## 2025-07-22 DIAGNOSIS — R92.8 ABNORMAL MAMMOGRAM: ICD-10-CM

## 2025-07-22 PROCEDURE — 77065 DX MAMMO INCL CAD UNI: CPT | Mod: RT

## 2025-07-29 ENCOUNTER — HOSPITAL ENCOUNTER (OUTPATIENT)
Dept: MAMMOGRAPHY | Facility: CLINIC | Age: 60
Discharge: HOME | End: 2025-07-29
Attending: PHYSICIAN ASSISTANT
Payer: COMMERCIAL

## 2025-07-29 DIAGNOSIS — R92.8 ABNORMAL MAMMOGRAM: ICD-10-CM

## 2025-07-29 PROCEDURE — 999N000065 MA POST PROCEDURE RIGHT

## 2025-07-29 PROCEDURE — 250N000009 HC RX 250: Performed by: RADIOLOGY

## 2025-07-29 PROCEDURE — 19081 BX BREAST 1ST LESION STRTCTC: CPT | Mod: RT

## 2025-07-29 PROCEDURE — 250N000011 HC RX IP 250 OP 636: Performed by: RADIOLOGY

## 2025-07-29 PROCEDURE — 88305 TISSUE EXAM BY PATHOLOGIST: CPT | Mod: TC | Performed by: PHYSICIAN ASSISTANT

## 2025-07-29 PROCEDURE — 19082 BX BREAST ADD LESION STRTCTC: CPT | Mod: RT

## 2025-07-29 RX ADMIN — LIDOCAINE HYDROCHLORIDE 5 ML: 10 INJECTION, SOLUTION INFILTRATION; PERINEURAL at 10:34

## 2025-07-29 RX ADMIN — LIDOCAINE HYDROCHLORIDE 10 ML: 10; .005 INJECTION, SOLUTION EPIDURAL; INFILTRATION; INTRACAUDAL; PERINEURAL at 10:34

## 2025-07-29 NOTE — DISCHARGE INSTRUCTIONS
After Your Breast Biopsy  Bleeding, bruising, and pain  Breast tenderness and some bruising is normal and may last several days. You may wear your bra overnight to support the breast.  You may use an ice pack for pain. Place it over the area for 15 to 20 minutes, several times a day.  You may take over-the-counter pain medicine:  On the day of the biopsy, we recommend Tylenol (acetaminophen) because it does not raise your risk of bleeding.  The next day, you may take an anti-inflammatory medicine (aspirin, ibuprofen, Motrin, Aleve, Advil), unless your doctor tells you not to.  Bandages and showering  Keep your bandage in place until tomorrow morning. Don't get it wet.  If you have small pieces of tape on the skin, leave them in place. They will fall off on their own, or you can remove them after 5 days.  You may shower the next morning after your biopsy.  Activity  No heavy activity (no running, no gym workouts, no lifting, no vacuuming, etc.) on the day of your biopsy.  You may go back to normal activity the next day. But limit what you do if you still have pain or discomfort.  Infection  Infection is rare. Signs of infection include:  Fever (including sweats and chills)  Redness  Pain that gets worse  Fluid draining from the biopsy site  Biopsy results  Results may take up to 5 business days.  A nurse or doctor from the [facility] Center will call with your results. We will also send the results to the doctor that ordered your biopsy.  If you have not gotten your results in 5 days, please call the Breast Center.  Call the Breast Center with questions or if:   You have bleeding that lasts more than 20 minutes.  You have pain that you can't control.  You have signs of infection (fever, sweats, chills, redness, increasing pain, or drainage).  After hours, please call the doctor who ordered your biopsy.  For informational purposes only. Not to replace the advice of your health care provider. Copyright   2010 [facility]. All rights reserved. Clinically reviewed by Lana Be, Director, [facility]. [employer] [id number] - REV 08/23.

## 2025-07-30 ENCOUNTER — TELEPHONE (OUTPATIENT)
Dept: MAMMOGRAPHY | Facility: CLINIC | Age: 60
End: 2025-07-30
Payer: COMMERCIAL

## 2025-07-30 LAB
PATH REPORT.COMMENTS IMP SPEC: NORMAL
PATH REPORT.COMMENTS IMP SPEC: NORMAL
PATH REPORT.FINAL DX SPEC: NORMAL
PATH REPORT.GROSS SPEC: NORMAL
PATH REPORT.MICROSCOPIC SPEC OTHER STN: NORMAL
PHOTO IMAGE: NORMAL

## 2025-07-30 PROCEDURE — 88305 TISSUE EXAM BY PATHOLOGIST: CPT | Mod: 26 | Performed by: PATHOLOGY

## 2025-07-30 NOTE — TELEPHONE ENCOUNTER
Call placed to Mary.   verified.     Mary was notified that pathology results from her 2025 RIGHT BREAST BIOPSY revealed:     [facility]  Mary Gorman [id number]  F, 1965  Surgical Pathology Report (Final result) [id number]  Authorizing Provider: Nica Workman PA-C Ordering Provider: Nica Workman PA-C  Ordering Location: [facility]  Collected: 2025 10:37 AM  Pathologist: Robbie Rivas MD Received: 2025 11:28 AM  .  Specimens  A Breast, Right, Breast Biopsy Stereo  .  .  Final Diagnosis  A. Breast, right, microcalcifications, subareolar, 7 cm from nipple, 0.6 cm size, stereotactic needle core biopsy:  - Focal fibroadenomatoid change with coarse microcalcifications.  Electronically signed by Robbie Rivas MD on 2025 at 1341 CDT     Per [facility] Radiologist, Dr. Tish Ya, results are concordant with imaging findings.     Recommendation: Annual Screening Mammograms     Mary denies any concerns with the biopsy site. Ordering provider was informed of the results and follow up plan.  I encouraged her to contact her doctor with any further breast concerns.  Patient verbalized understanding and agrees with the plan of care.        Michelle Gambino RN BSN  Procedure Nurse  [facility]  [phone]

## 2025-08-16 ENCOUNTER — HEALTH MAINTENANCE LETTER (OUTPATIENT)
Age: 60
End: 2025-08-16

## 2025-08-20 DIAGNOSIS — E11.22 TYPE 2 DIABETES MELLITUS WITH STAGE 3A CHRONIC KIDNEY DISEASE, WITHOUT LONG-TERM CURRENT USE OF INSULIN (H): ICD-10-CM

## 2025-08-20 DIAGNOSIS — N18.31 TYPE 2 DIABETES MELLITUS WITH STAGE 3A CHRONIC KIDNEY DISEASE, WITHOUT LONG-TERM CURRENT USE OF INSULIN (H): ICD-10-CM

## 2025-08-21 RX ORDER — TIRZEPATIDE 5 MG/.5ML
INJECTION, SOLUTION SUBCUTANEOUS
Qty: 2 ML | Refills: 1 | Status: SHIPPED | OUTPATIENT
Start: 2025-08-21

## (undated) DEVICE — GLOVE BIOGEL PI ULTRATOUCH G SZ 7.5 42175

## (undated) DEVICE — LINEN TOWEL PACK X5 5464

## (undated) DEVICE — ESU PENCIL W/SMOKE EVAC NEPTUNE STRYKER 0703-046-000

## (undated) DEVICE — BONE CLEANING TIP INTERPULSE  0210-010-000

## (undated) DEVICE — SOL NACL 0.9% IRRIG 3000ML BAG 2B7477

## (undated) DEVICE — LINEN BACK PACK 5440

## (undated) DEVICE — SOL NACL 0.9% IRRIG 1000ML BOTTLE 2F7124

## (undated) DEVICE — BASIN SET MAJOR

## (undated) DEVICE — SU VICRYL 2-0 CT-1 27" UND J259H

## (undated) DEVICE — BONE CEMENT MIXEVAC HI VAC W/CARTRIDGE 0306-563-000

## (undated) DEVICE — SU PDS II 3-0 PS-1 18" Z683G

## (undated) DEVICE — DRSG TEGADERM ALGINATE AG 4X5" 90303

## (undated) DEVICE — DRAPE U-DRAPE 1015NSD NON-STERILE

## (undated) DEVICE — DRSG STERI STRIP 1/2X4" R1547

## (undated) DEVICE — SPONGE LAP 18X18" X8435

## (undated) DEVICE — SUCTION IRR SYSTEM W/O TIP INTERPULSE HANDPIECE 0210-100-000

## (undated) DEVICE — EYE PREP BETADINE 5% SOLUTION 30ML 0065-0411-30

## (undated) DEVICE — PREP CHLORAPREP 26ML TINTED HI-LITE ORANGE 930815

## (undated) DEVICE — HOOD SURG T7PLUS PEEL AWAY FACE SHIELD STRL LF 0416-801-100

## (undated) DEVICE — Device

## (undated) DEVICE — BRUSH SURGICAL SCRUB W/4% CHLORHEXIDINE GLUCONATE SOL 4458A

## (undated) DEVICE — ESU GROUND PAD ADULT W/CORD E7507

## (undated) DEVICE — SU VICRYL 1 CT-1 CR 8X18" J741D

## (undated) DEVICE — SUCTION MANIFOLD NEPTUNE 2 SYS 4 PORT 0702-020-000

## (undated) DEVICE — BLADE SAW SAGITTAL STRK 18X90X1.27MM HD SYS 6 6118-127-090

## (undated) DEVICE — STRAP KNEE/BODY 31143004

## (undated) DEVICE — SOL WATER IRRIG 1000ML BOTTLE 2F7114

## (undated) DEVICE — SU VICRYL 0 CT-1 CR 8X27" UND JJ41G

## (undated) DEVICE — GOWN IMPERVIOUS SPECIALTY XLG/XLONG 32474

## (undated) DEVICE — GLOVE PROTEXIS BLUE W/NEU-THERA 8.0  2D73EB80

## (undated) DEVICE — DRSG TEGADERM 4X10" 1627

## (undated) DEVICE — DECANTER VIAL 2006S

## (undated) DEVICE — BLADE CLIPPER SGL USE 9680

## (undated) DEVICE — GLOVE BIOGEL PI MICRO INDICATOR UNDERGLOVE SZ 8.0 48980

## (undated) DEVICE — SU MONOCRYL 3-0 PS-1 27" Y936H

## (undated) DEVICE — TOURNIQUET CUFF 42" REPRO MAROON 60-7070-107

## (undated) DEVICE — SU VICRYL 0 CT-1 27" UND J260H

## (undated) DEVICE — HOOD FLYTE W/PEELAWAY 408-800-100

## (undated) DEVICE — SU VICRYL 0 CT-1 27" J340H

## (undated) DEVICE — SUTURE VICRYL+ 1 CT-1 CR 8X18" VIO VCP741D

## (undated) DEVICE — SPECIMEN CONTAINER 5OZ STERILE 2600SA

## (undated) DEVICE — GLOVE PROTEXIS W/NEU-THERA 7.5  2D73TE75

## (undated) DEVICE — TOURNIQUET CUFF 34" REPRO BROWN 60-7070-106

## (undated) RX ORDER — FENTANYL CITRATE 50 UG/ML
INJECTION, SOLUTION INTRAMUSCULAR; INTRAVENOUS
Status: DISPENSED
Start: 2022-01-20

## (undated) RX ORDER — PROPOFOL 10 MG/ML
INJECTION, EMULSION INTRAVENOUS
Status: DISPENSED
Start: 2023-12-01

## (undated) RX ORDER — CEFAZOLIN SODIUM/WATER 3 G/30 ML
SYRINGE (ML) INTRAVENOUS
Status: DISPENSED
Start: 2023-12-01

## (undated) RX ORDER — FENTANYL CITRATE-0.9 % NACL/PF 10 MCG/ML
PLASTIC BAG, INJECTION (ML) INTRAVENOUS
Status: DISPENSED
Start: 2022-01-20

## (undated) RX ORDER — FENTANYL CITRATE 50 UG/ML
INJECTION, SOLUTION INTRAMUSCULAR; INTRAVENOUS
Status: DISPENSED
Start: 2023-12-01

## (undated) RX ORDER — HYDROMORPHONE HYDROCHLORIDE 1 MG/ML
INJECTION, SOLUTION INTRAMUSCULAR; INTRAVENOUS; SUBCUTANEOUS
Status: DISPENSED
Start: 2022-01-20

## (undated) RX ORDER — ACETAMINOPHEN 325 MG/1
TABLET ORAL
Status: DISPENSED
Start: 2023-12-01

## (undated) RX ORDER — METOPROLOL TARTRATE 1 MG/ML
INJECTION, SOLUTION INTRAVENOUS
Status: DISPENSED
Start: 2022-01-20

## (undated) RX ORDER — CEFAZOLIN SODIUM IN 0.9 % NACL 3 G/100 ML
INTRAVENOUS SOLUTION, PIGGYBACK (ML) INTRAVENOUS
Status: DISPENSED
Start: 2022-01-20

## (undated) RX ORDER — METOPROLOL TARTRATE 1 MG/ML
INJECTION, SOLUTION INTRAVENOUS
Status: DISPENSED
Start: 2023-12-01

## (undated) RX ORDER — FENTANYL CITRATE-0.9 % NACL/PF 10 MCG/ML
PLASTIC BAG, INJECTION (ML) INTRAVENOUS
Status: DISPENSED
Start: 2023-12-01

## (undated) RX ORDER — HYDROMORPHONE HYDROCHLORIDE 1 MG/ML
INJECTION, SOLUTION INTRAMUSCULAR; INTRAVENOUS; SUBCUTANEOUS
Status: DISPENSED
Start: 2023-12-01

## (undated) RX ORDER — ONDANSETRON 2 MG/ML
INJECTION INTRAMUSCULAR; INTRAVENOUS
Status: DISPENSED
Start: 2023-12-01

## (undated) RX ORDER — TRANEXAMIC ACID 650 MG/1
TABLET ORAL
Status: DISPENSED
Start: 2022-01-20

## (undated) RX ORDER — GLYCOPYRROLATE 0.2 MG/ML
INJECTION INTRAMUSCULAR; INTRAVENOUS
Status: DISPENSED
Start: 2023-12-01

## (undated) RX ORDER — KETOROLAC TROMETHAMINE 15 MG/ML
INJECTION, SOLUTION INTRAMUSCULAR; INTRAVENOUS
Status: DISPENSED
Start: 2022-01-20

## (undated) RX ORDER — ONDANSETRON 2 MG/ML
INJECTION INTRAMUSCULAR; INTRAVENOUS
Status: DISPENSED
Start: 2022-01-20

## (undated) RX ORDER — ACETAMINOPHEN 325 MG/1
TABLET ORAL
Status: DISPENSED
Start: 2022-01-20

## (undated) RX ORDER — TRANEXAMIC ACID 650 MG/1
TABLET ORAL
Status: DISPENSED
Start: 2023-12-01

## (undated) RX ORDER — KETOROLAC TROMETHAMINE 30 MG/ML
INJECTION, SOLUTION INTRAMUSCULAR; INTRAVENOUS
Status: DISPENSED
Start: 2023-12-01

## (undated) RX ORDER — DEXAMETHASONE SODIUM PHOSPHATE 4 MG/ML
INJECTION, SOLUTION INTRA-ARTICULAR; INTRALESIONAL; INTRAMUSCULAR; INTRAVENOUS; SOFT TISSUE
Status: DISPENSED
Start: 2023-12-01